# Patient Record
Sex: MALE | Race: WHITE | NOT HISPANIC OR LATINO
[De-identification: names, ages, dates, MRNs, and addresses within clinical notes are randomized per-mention and may not be internally consistent; named-entity substitution may affect disease eponyms.]

---

## 2017-01-03 ENCOUNTER — APPOINTMENT (OUTPATIENT)
Dept: INTERNAL MEDICINE | Facility: CLINIC | Age: 82
End: 2017-01-03

## 2017-01-03 VITALS
WEIGHT: 183 LBS | BODY MASS INDEX: 27.11 KG/M2 | SYSTOLIC BLOOD PRESSURE: 120 MMHG | DIASTOLIC BLOOD PRESSURE: 75 MMHG | HEIGHT: 69 IN | TEMPERATURE: 98 F | HEART RATE: 70 BPM

## 2017-01-03 DIAGNOSIS — H61.23 IMPACTED CERUMEN, BILATERAL: ICD-10-CM

## 2017-01-08 RX ORDER — HYDROCODONE BITARTRATE AND HOMATROPINE METHYLBROMIDE 5; 1.5 MG/5ML; MG/5ML
5-1.5 SYRUP ORAL
Qty: 240 | Refills: 0 | Status: DISCONTINUED | COMMUNITY
Start: 2017-01-03 | End: 2017-01-08

## 2017-01-10 ENCOUNTER — APPOINTMENT (OUTPATIENT)
Dept: INTERNAL MEDICINE | Facility: CLINIC | Age: 82
End: 2017-01-10

## 2017-01-10 VITALS
SYSTOLIC BLOOD PRESSURE: 120 MMHG | TEMPERATURE: 97.3 F | HEIGHT: 69 IN | DIASTOLIC BLOOD PRESSURE: 70 MMHG | HEART RATE: 75 BPM | WEIGHT: 183 LBS | BODY MASS INDEX: 27.11 KG/M2

## 2017-01-10 DIAGNOSIS — R10.32 LEFT LOWER QUADRANT PAIN: ICD-10-CM

## 2017-01-10 RX ORDER — CEFUROXIME AXETIL 250 MG/1
250 TABLET ORAL
Qty: 20 | Refills: 0 | Status: DISCONTINUED | COMMUNITY
Start: 2017-01-03 | End: 2017-01-10

## 2017-01-11 ENCOUNTER — MESSAGE (OUTPATIENT)
Age: 82
End: 2017-01-11

## 2017-01-11 LAB
ALBUMIN SERPL ELPH-MCNC: 4.1 G/DL
ALP BLD-CCNC: 64 U/L
ALT SERPL-CCNC: 11 U/L
ANION GAP SERPL CALC-SCNC: 13 MMOL/L
AST SERPL-CCNC: 18 U/L
BASOPHILS # BLD AUTO: 0.01 K/UL
BASOPHILS NFR BLD AUTO: 0.1 %
BILIRUB SERPL-MCNC: 0.3 MG/DL
BUN SERPL-MCNC: 23 MG/DL
CALCIUM SERPL-MCNC: 9.3 MG/DL
CHLORIDE SERPL-SCNC: 99 MMOL/L
CO2 SERPL-SCNC: 27 MMOL/L
CREAT SERPL-MCNC: 1.21 MG/DL
EOSINOPHIL # BLD AUTO: 0.15 K/UL
EOSINOPHIL NFR BLD AUTO: 2 %
GLUCOSE SERPL-MCNC: 134 MG/DL
HCT VFR BLD CALC: 42.1 %
HGB BLD-MCNC: 13.5 G/DL
IMM GRANULOCYTES NFR BLD AUTO: 0.4 %
LYMPHOCYTES # BLD AUTO: 1.89 K/UL
LYMPHOCYTES NFR BLD AUTO: 24.9 %
MAN DIFF?: NORMAL
MCHC RBC-ENTMCNC: 32.1 GM/DL
MCHC RBC-ENTMCNC: 32.4 PG
MCV RBC AUTO: 101 FL
MONOCYTES # BLD AUTO: 0.61 K/UL
MONOCYTES NFR BLD AUTO: 8 %
NEUTROPHILS # BLD AUTO: 4.89 K/UL
NEUTROPHILS NFR BLD AUTO: 64.6 %
PLATELET # BLD AUTO: 227 K/UL
POTASSIUM SERPL-SCNC: 4.8 MMOL/L
PROT SERPL-MCNC: 6.9 G/DL
RBC # BLD: 4.17 M/UL
RBC # FLD: 13.1 %
SODIUM SERPL-SCNC: 139 MMOL/L
TSH SERPL-ACNC: 1.51 UIU/ML
WBC # FLD AUTO: 7.58 K/UL

## 2017-01-16 ENCOUNTER — MESSAGE (OUTPATIENT)
Age: 82
End: 2017-01-16

## 2017-01-24 ENCOUNTER — APPOINTMENT (OUTPATIENT)
Dept: INTERNAL MEDICINE | Facility: CLINIC | Age: 82
End: 2017-01-24

## 2017-02-10 ENCOUNTER — RX RENEWAL (OUTPATIENT)
Age: 82
End: 2017-02-10

## 2017-02-14 ENCOUNTER — RX RENEWAL (OUTPATIENT)
Age: 82
End: 2017-02-14

## 2017-04-12 ENCOUNTER — APPOINTMENT (OUTPATIENT)
Dept: INTERNAL MEDICINE | Facility: CLINIC | Age: 82
End: 2017-04-12

## 2017-04-12 VITALS
TEMPERATURE: 97.5 F | HEIGHT: 69 IN | WEIGHT: 183 LBS | SYSTOLIC BLOOD PRESSURE: 120 MMHG | BODY MASS INDEX: 27.11 KG/M2 | HEART RATE: 69 BPM | DIASTOLIC BLOOD PRESSURE: 74 MMHG

## 2017-04-12 DIAGNOSIS — J06.9 ACUTE UPPER RESPIRATORY INFECTION, UNSPECIFIED: ICD-10-CM

## 2017-04-18 ENCOUNTER — APPOINTMENT (OUTPATIENT)
Dept: INTERNAL MEDICINE | Facility: CLINIC | Age: 82
End: 2017-04-18

## 2017-04-18 VITALS
HEART RATE: 72 BPM | DIASTOLIC BLOOD PRESSURE: 70 MMHG | BODY MASS INDEX: 27.11 KG/M2 | HEIGHT: 69 IN | TEMPERATURE: 97.7 F | WEIGHT: 183 LBS | SYSTOLIC BLOOD PRESSURE: 120 MMHG

## 2017-04-19 ENCOUNTER — RESULT REVIEW (OUTPATIENT)
Age: 82
End: 2017-04-19

## 2017-04-28 ENCOUNTER — APPOINTMENT (OUTPATIENT)
Dept: PULMONOLOGY | Facility: CLINIC | Age: 82
End: 2017-04-28

## 2017-04-28 VITALS
SYSTOLIC BLOOD PRESSURE: 128 MMHG | BODY MASS INDEX: 27.7 KG/M2 | DIASTOLIC BLOOD PRESSURE: 68 MMHG | HEIGHT: 69 IN | TEMPERATURE: 97.2 F | WEIGHT: 187 LBS | RESPIRATION RATE: 12 BRPM | HEART RATE: 86 BPM

## 2017-04-28 VITALS — BODY MASS INDEX: 27.66 KG/M2 | WEIGHT: 187.31 LBS

## 2017-04-28 DIAGNOSIS — K22.2 ESOPHAGEAL OBSTRUCTION: ICD-10-CM

## 2017-04-28 DIAGNOSIS — Z87.891 PERSONAL HISTORY OF NICOTINE DEPENDENCE: ICD-10-CM

## 2017-04-28 RX ORDER — ALBUTEROL SULFATE 90 UG/1
108 (90 BASE) AEROSOL, METERED RESPIRATORY (INHALATION)
Qty: 1 | Refills: 3 | Status: DISCONTINUED | COMMUNITY
Start: 2017-04-28 | End: 2017-04-28

## 2017-05-02 ENCOUNTER — APPOINTMENT (OUTPATIENT)
Dept: INTERNAL MEDICINE | Facility: CLINIC | Age: 82
End: 2017-05-02

## 2017-05-02 VITALS
HEART RATE: 75 BPM | SYSTOLIC BLOOD PRESSURE: 120 MMHG | TEMPERATURE: 98 F | BODY MASS INDEX: 27.7 KG/M2 | HEIGHT: 69 IN | WEIGHT: 187 LBS | DIASTOLIC BLOOD PRESSURE: 70 MMHG

## 2017-05-02 DIAGNOSIS — J06.9 ACUTE UPPER RESPIRATORY INFECTION, UNSPECIFIED: ICD-10-CM

## 2017-05-02 DIAGNOSIS — J04.10 ACUTE TRACHEITIS W/OUT OBSTRUCTION: ICD-10-CM

## 2017-05-02 RX ORDER — METHYLPREDNISOLONE 4 MG/1
4 TABLET ORAL
Qty: 1 | Refills: 0 | Status: DISCONTINUED | COMMUNITY
Start: 2017-04-18 | End: 2017-05-02

## 2017-05-02 RX ORDER — CEFUROXIME AXETIL 250 MG/1
250 TABLET ORAL
Qty: 14 | Refills: 0 | Status: COMPLETED | COMMUNITY
Start: 2017-04-28 | End: 2017-05-02

## 2017-05-02 RX ORDER — BENZONATATE 200 MG/1
200 CAPSULE ORAL
Qty: 21 | Refills: 0 | Status: COMPLETED | COMMUNITY
Start: 2017-04-12 | End: 2017-05-02

## 2017-05-02 RX ORDER — DOXYCYCLINE HYCLATE 100 MG/1
100 TABLET ORAL TWICE DAILY
Qty: 20 | Refills: 0 | Status: COMPLETED | COMMUNITY
Start: 2017-04-12 | End: 2017-05-02

## 2017-05-09 ENCOUNTER — RX RENEWAL (OUTPATIENT)
Age: 82
End: 2017-05-09

## 2017-06-23 ENCOUNTER — OUTPATIENT (OUTPATIENT)
Dept: OUTPATIENT SERVICES | Facility: HOSPITAL | Age: 82
LOS: 1 days | End: 2017-06-23
Payer: MEDICARE

## 2017-06-23 VITALS
OXYGEN SATURATION: 97 % | TEMPERATURE: 98 F | HEIGHT: 68.5 IN | DIASTOLIC BLOOD PRESSURE: 92 MMHG | WEIGHT: 184.97 LBS | HEART RATE: 82 BPM | SYSTOLIC BLOOD PRESSURE: 184 MMHG | RESPIRATION RATE: 18 BRPM

## 2017-06-23 DIAGNOSIS — Z90.49 ACQUIRED ABSENCE OF OTHER SPECIFIED PARTS OF DIGESTIVE TRACT: Chronic | ICD-10-CM

## 2017-06-23 DIAGNOSIS — Z98.89 OTHER SPECIFIED POSTPROCEDURAL STATES: Chronic | ICD-10-CM

## 2017-06-23 DIAGNOSIS — Z98.61 CORONARY ANGIOPLASTY STATUS: Chronic | ICD-10-CM

## 2017-06-23 DIAGNOSIS — Z98.41 CATARACT EXTRACTION STATUS, RIGHT EYE: Chronic | ICD-10-CM

## 2017-06-23 DIAGNOSIS — Z98.42 CATARACT EXTRACTION STATUS, LEFT EYE: Chronic | ICD-10-CM

## 2017-06-23 DIAGNOSIS — Z96.60 PRESENCE OF UNSPECIFIED ORTHOPEDIC JOINT IMPLANT: Chronic | ICD-10-CM

## 2017-06-23 DIAGNOSIS — Z01.810 ENCOUNTER FOR PREPROCEDURAL CARDIOVASCULAR EXAMINATION: ICD-10-CM

## 2017-06-23 LAB
ANION GAP SERPL CALC-SCNC: 14 MMOL/L — SIGNIFICANT CHANGE UP (ref 5–17)
APTT BLD: 28.3 SEC — SIGNIFICANT CHANGE UP (ref 27.5–37.4)
BUN SERPL-MCNC: 25 MG/DL — HIGH (ref 8–20)
CALCIUM SERPL-MCNC: 9.3 MG/DL — SIGNIFICANT CHANGE UP (ref 8.6–10.2)
CHLORIDE SERPL-SCNC: 100 MMOL/L — SIGNIFICANT CHANGE UP (ref 98–107)
CHOLEST SERPL-MCNC: 218 MG/DL — HIGH (ref 110–199)
CO2 SERPL-SCNC: 28 MMOL/L — SIGNIFICANT CHANGE UP (ref 22–29)
CREAT SERPL-MCNC: 1.13 MG/DL — SIGNIFICANT CHANGE UP (ref 0.5–1.3)
GLUCOSE SERPL-MCNC: 161 MG/DL — HIGH (ref 70–115)
HCT VFR BLD CALC: 41.3 % — LOW (ref 42–52)
HDLC SERPL-MCNC: 61 MG/DL — SIGNIFICANT CHANGE UP
HGB BLD-MCNC: 13.7 G/DL — LOW (ref 14–18)
INR BLD: 1.08 RATIO — SIGNIFICANT CHANGE UP (ref 0.88–1.16)
LIPID PNL WITH DIRECT LDL SERPL: 108 MG/DL — SIGNIFICANT CHANGE UP
MCHC RBC-ENTMCNC: 32.3 PG — HIGH (ref 27–31)
MCHC RBC-ENTMCNC: 33.2 G/DL — SIGNIFICANT CHANGE UP (ref 32–36)
MCV RBC AUTO: 97.4 FL — HIGH (ref 80–94)
PLATELET # BLD AUTO: 205 K/UL — SIGNIFICANT CHANGE UP (ref 150–400)
POTASSIUM SERPL-MCNC: 4.1 MMOL/L — SIGNIFICANT CHANGE UP (ref 3.5–5.3)
POTASSIUM SERPL-SCNC: 4.1 MMOL/L — SIGNIFICANT CHANGE UP (ref 3.5–5.3)
PROTHROM AB SERPL-ACNC: 11.9 SEC — SIGNIFICANT CHANGE UP (ref 9.8–12.7)
RBC # BLD: 4.24 M/UL — LOW (ref 4.6–6.2)
RBC # FLD: 12.9 % — SIGNIFICANT CHANGE UP (ref 11–15.6)
SODIUM SERPL-SCNC: 142 MMOL/L — SIGNIFICANT CHANGE UP (ref 135–145)
TOTAL CHOLESTEROL/HDL RATIO MEASUREMENT: 4 RATIO — SIGNIFICANT CHANGE UP (ref 3.4–9.6)
TRIGL SERPL-MCNC: 244 MG/DL — HIGH (ref 10–200)
WBC # BLD: 8.3 K/UL — SIGNIFICANT CHANGE UP (ref 4.8–10.8)
WBC # FLD AUTO: 8.3 K/UL — SIGNIFICANT CHANGE UP (ref 4.8–10.8)

## 2017-06-23 PROCEDURE — 93005 ELECTROCARDIOGRAM TRACING: CPT

## 2017-06-23 PROCEDURE — 85610 PROTHROMBIN TIME: CPT

## 2017-06-23 PROCEDURE — 80048 BASIC METABOLIC PNL TOTAL CA: CPT

## 2017-06-23 PROCEDURE — 36415 COLL VENOUS BLD VENIPUNCTURE: CPT

## 2017-06-23 PROCEDURE — G0463: CPT

## 2017-06-23 PROCEDURE — 85027 COMPLETE CBC AUTOMATED: CPT

## 2017-06-23 PROCEDURE — 80061 LIPID PANEL: CPT

## 2017-06-23 PROCEDURE — 85730 THROMBOPLASTIN TIME PARTIAL: CPT

## 2017-06-23 PROCEDURE — 93010 ELECTROCARDIOGRAM REPORT: CPT

## 2017-06-23 NOTE — H&P PST ADULT - ATTENDING COMMENTS
chronic diastolic congestive heart failure with severe aortic stenosis. Risks, benefits, and alternatives of surgery were discussed with patient and familyCheli harmon

## 2017-06-23 NOTE — H&P PST ADULT - NEGATIVE NEUROLOGICAL SYMPTOMS
no paresthesias/no vertigo/no weakness/no generalized seizures/no focal seizures/no tremors/no syncope/no transient paralysis

## 2017-06-23 NOTE — ASU PATIENT PROFILE, ADULT - PSH
History of CEA (carotid endarterectomy)  Bilateral  S/P cataract surgery, left    S/P cataract surgery, right    S/P cholecystectomy    S/P joint replacement  Bilateral knee replacement  S/P nasal surgery  Secondary to nasal fracture  S/P PTCA (percutaneous transluminal coronary angioplasty)  x 8  S/P tonsillectomy

## 2017-06-23 NOTE — H&P PST ADULT - PMH
Angina pectoris  class II  Aortic stenosis    Asthma    BPH (benign prostatic hyperplasia)    CAD (coronary artery disease)  S/P Stenting x8  CAD (coronary artery disease)  CABG  Carotid artery disease  RCEA  Diabetes mellitus    MENDEZ (dyspnea on exertion)    Fatigue  Profound w/exertion  Former smoker    Hyperlipidemia    Hypertension    FANNY (obstructive sleep apnea)    PVD (peripheral vascular disease)    SOB (shortness of breath)    SOB (shortness of breath)

## 2017-06-23 NOTE — H&P PST ADULT - HISTORY OF PRESENT ILLNESS
83 y/o white male presents to PST for R&LHC for evaluation for TAVR  Known AS with worsening and progressive MENDEZ/SOB with virtually an intolerance for any distance walking greater than 50 feet before having to sit down    ECHO per MD note states mod AS with preserve EF   2/17 PET small area of ischemia/low risk

## 2017-06-23 NOTE — H&P PST ADULT - NEUROLOGICAL DETAILS
responds to pain/alert and oriented x 3/responds to verbal commands/cranial nerves intact/deep reflexes intact/sensation intact

## 2017-06-23 NOTE — H&P PST ADULT - NEGATIVE OPHTHALMOLOGIC SYMPTOMS
no lacrimation R/no lacrimation L/no blurred vision R/no photophobia/no blurred vision L/no diplopia

## 2017-06-27 ENCOUNTER — OUTPATIENT (OUTPATIENT)
Dept: OUTPATIENT SERVICES | Facility: HOSPITAL | Age: 82
LOS: 1 days | End: 2017-06-27
Payer: MEDICARE

## 2017-06-27 ENCOUNTER — INPATIENT (INPATIENT)
Facility: HOSPITAL | Age: 82
LOS: 0 days | Discharge: HOME | End: 2017-06-28
Attending: INTERNAL MEDICINE | Admitting: INTERNAL MEDICINE
Payer: MEDICARE

## 2017-06-27 VITALS
DIASTOLIC BLOOD PRESSURE: 65 MMHG | RESPIRATION RATE: 18 BRPM | TEMPERATURE: 97 F | HEART RATE: 74 BPM | SYSTOLIC BLOOD PRESSURE: 169 MMHG | OXYGEN SATURATION: 96 %

## 2017-06-27 VITALS
OXYGEN SATURATION: 97 % | SYSTOLIC BLOOD PRESSURE: 178 MMHG | RESPIRATION RATE: 17 BRPM | DIASTOLIC BLOOD PRESSURE: 81 MMHG | HEART RATE: 87 BPM

## 2017-06-27 DIAGNOSIS — Z98.41 CATARACT EXTRACTION STATUS, RIGHT EYE: Chronic | ICD-10-CM

## 2017-06-27 DIAGNOSIS — Z98.42 CATARACT EXTRACTION STATUS, LEFT EYE: Chronic | ICD-10-CM

## 2017-06-27 DIAGNOSIS — Z98.61 CORONARY ANGIOPLASTY STATUS: Chronic | ICD-10-CM

## 2017-06-27 DIAGNOSIS — Z98.89 OTHER SPECIFIED POSTPROCEDURAL STATES: Chronic | ICD-10-CM

## 2017-06-27 DIAGNOSIS — Z90.49 ACQUIRED ABSENCE OF OTHER SPECIFIED PARTS OF DIGESTIVE TRACT: Chronic | ICD-10-CM

## 2017-06-27 DIAGNOSIS — Z96.60 PRESENCE OF UNSPECIFIED ORTHOPEDIC JOINT IMPLANT: Chronic | ICD-10-CM

## 2017-06-27 DIAGNOSIS — I25.10 ATHEROSCLEROTIC HEART DISEASE OF NATIVE CORONARY ARTERY WITHOUT ANGINA PECTORIS: ICD-10-CM

## 2017-06-27 LAB
BLD GP AB SCN SERPL QL: SIGNIFICANT CHANGE UP
DIR ANTIGLOB POLYSPECIFIC INTERPRETATION: SIGNIFICANT CHANGE UP
TYPE + AB SCN PNL BLD: SIGNIFICANT CHANGE UP

## 2017-06-27 PROCEDURE — 93010 ELECTROCARDIOGRAM REPORT: CPT

## 2017-06-27 RX ORDER — SODIUM CHLORIDE 9 MG/ML
1000 INJECTION, SOLUTION INTRAVENOUS
Qty: 0 | Refills: 0 | Status: DISCONTINUED | OUTPATIENT
Start: 2017-06-27 | End: 2017-06-28

## 2017-06-27 RX ORDER — HYDRALAZINE HCL 50 MG
5 TABLET ORAL ONCE
Qty: 0 | Refills: 0 | Status: COMPLETED | OUTPATIENT
Start: 2017-06-27 | End: 2017-06-27

## 2017-06-27 RX ORDER — DEXTROSE 50 % IN WATER 50 %
1 SYRINGE (ML) INTRAVENOUS ONCE
Qty: 0 | Refills: 0 | Status: DISCONTINUED | OUTPATIENT
Start: 2017-06-27 | End: 2017-06-28

## 2017-06-27 RX ORDER — BUDESONIDE AND FORMOTEROL FUMARATE DIHYDRATE 160; 4.5 UG/1; UG/1
2 AEROSOL RESPIRATORY (INHALATION)
Qty: 0 | Refills: 0 | Status: DISCONTINUED | OUTPATIENT
Start: 2017-06-27 | End: 2017-06-28

## 2017-06-27 RX ORDER — DEXTROSE 50 % IN WATER 50 %
12.5 SYRINGE (ML) INTRAVENOUS ONCE
Qty: 0 | Refills: 0 | Status: DISCONTINUED | OUTPATIENT
Start: 2017-06-27 | End: 2017-06-28

## 2017-06-27 RX ORDER — ASPIRIN/CALCIUM CARB/MAGNESIUM 324 MG
325 TABLET ORAL DAILY
Qty: 0 | Refills: 0 | Status: DISCONTINUED | OUTPATIENT
Start: 2017-06-27 | End: 2017-06-28

## 2017-06-27 RX ORDER — ACETAMINOPHEN 500 MG
650 TABLET ORAL EVERY 6 HOURS
Qty: 0 | Refills: 0 | Status: DISCONTINUED | OUTPATIENT
Start: 2017-06-27 | End: 2017-06-28

## 2017-06-27 RX ORDER — DEXTROSE 50 % IN WATER 50 %
25 SYRINGE (ML) INTRAVENOUS ONCE
Qty: 0 | Refills: 0 | Status: DISCONTINUED | OUTPATIENT
Start: 2017-06-27 | End: 2017-06-28

## 2017-06-27 RX ORDER — INSULIN LISPRO 100/ML
VIAL (ML) SUBCUTANEOUS
Qty: 0 | Refills: 0 | Status: DISCONTINUED | OUTPATIENT
Start: 2017-06-27 | End: 2017-06-28

## 2017-06-27 RX ORDER — PANTOPRAZOLE SODIUM 20 MG/1
40 TABLET, DELAYED RELEASE ORAL
Qty: 0 | Refills: 0 | Status: DISCONTINUED | OUTPATIENT
Start: 2017-06-27 | End: 2017-06-28

## 2017-06-27 RX ORDER — ALPRAZOLAM 0.25 MG
0.25 TABLET ORAL EVERY 8 HOURS
Qty: 0 | Refills: 0 | Status: DISCONTINUED | OUTPATIENT
Start: 2017-06-27 | End: 2017-06-28

## 2017-06-27 RX ORDER — TAMSULOSIN HYDROCHLORIDE 0.4 MG/1
0.4 CAPSULE ORAL AT BEDTIME
Qty: 0 | Refills: 0 | Status: DISCONTINUED | OUTPATIENT
Start: 2017-06-27 | End: 2017-06-28

## 2017-06-27 RX ORDER — LATANOPROST 0.05 MG/ML
1 SOLUTION/ DROPS OPHTHALMIC; TOPICAL AT BEDTIME
Qty: 0 | Refills: 0 | Status: DISCONTINUED | OUTPATIENT
Start: 2017-06-27 | End: 2017-06-28

## 2017-06-27 RX ORDER — GLUCAGON INJECTION, SOLUTION 0.5 MG/.1ML
1 INJECTION, SOLUTION SUBCUTANEOUS ONCE
Qty: 0 | Refills: 0 | Status: DISCONTINUED | OUTPATIENT
Start: 2017-06-27 | End: 2017-06-28

## 2017-06-27 RX ORDER — CLOPIDOGREL BISULFATE 75 MG/1
75 TABLET, FILM COATED ORAL DAILY
Qty: 0 | Refills: 0 | Status: DISCONTINUED | OUTPATIENT
Start: 2017-06-27 | End: 2017-06-28

## 2017-06-27 RX ORDER — LOSARTAN POTASSIUM 100 MG/1
25 TABLET, FILM COATED ORAL DAILY
Qty: 0 | Refills: 0 | Status: DISCONTINUED | OUTPATIENT
Start: 2017-06-27 | End: 2017-06-28

## 2017-06-27 RX ADMIN — TAMSULOSIN HYDROCHLORIDE 0.4 MILLIGRAM(S): 0.4 CAPSULE ORAL at 22:52

## 2017-06-27 RX ADMIN — Medication: at 22:55

## 2017-06-27 RX ADMIN — LATANOPROST 1 DROP(S): 0.05 SOLUTION/ DROPS OPHTHALMIC; TOPICAL at 22:52

## 2017-06-27 RX ADMIN — Medication 5 MILLIGRAM(S): at 18:55

## 2017-06-27 RX ADMIN — Medication 0.25 MILLIGRAM(S): at 22:55

## 2017-06-27 NOTE — DISCHARGE NOTE ADULT - HOSPITAL COURSE
83 y/o male presents with worsening AS and progressive MENDEZ/SOB with virtually an intolerance for any distance walking greater than 50 feet s/p R+LHC s/p balloon angioplasty to LM and HUE x1 pLCX. No complications during hospital stay. Discharged home with outpatient follow up for TAVR work up with Dr Mcgill    < from: Cardiac Cath Lab - Adult (06.27.17 @ 14:49) >  Moderate pulmonary HTN.  Severe AS.  Preserved EF.  No sig MR/AI.  Patent LIMA to LAD and SVG to OM.  Angiosculpt PTCA/ICS x 1 to LCx w/ ROEL III flow maintained thruout and 0%  residual stenosis.    s/p balloon angioplasty to LM and HUE x1 pLCX  RFA, RFV access benign, dsg removed. Pt ambulating this am. Denies any complaints

## 2017-06-27 NOTE — DISCHARGE NOTE ADULT - MEDICATION SUMMARY - MEDICATIONS TO TAKE
I will START or STAY ON the medications listed below when I get home from the hospital:    pur[employer]'s Pride probiotic  -- 1 cap(s) by mouth once a day  -- Indication: For Supplement    napncon A  eye drops  -- 1 drop(s) in each eye once a day  -- Indication: For eye care    aspirin 325 mg oral tablet  -- 1 tab(s) by mouth once a day  -- Indication: For CAD (coronary artery disease)    losartan 25 mg oral tablet  -- 1 tab(s) by mouth once a day  -- Indication: For CAD (coronary artery disease)    tamsulosin 0.4 mg oral capsule  -- 1 cap(s) by mouth once a day  -- Indication: For BPH    glimepiride 2 mg oral tablet  -- 1 tab(s) by mouth once a day  -- Indication: For Diabetes    Zetia 10 mg oral tablet  -- 1 tab(s) by mouth once a day  -- Indication: For CAD (coronary artery disease)    clopidogrel 75 mg oral tablet  -- 1 tab(s) by mouth once a day  -- Indication: For CAD (coronary artery disease)    Symbicort 80 mcg-4.5 mcg/inh inhalation aerosol  -- 2 puff(s) inhaled 2 times a day  -- 2 puffs per day  -- Indication: For Reactive airway    garlic oral capsule  -- 1000 milligram(s) by mouth 2 times a day  -- Indication: For Supplement    latanoprost 0.005% ophthalmic solution  -- 1 drop(s) to each affected eye once a day (at bedtime)  -- Indication: For Eye care    pantoprazole 40 mg oral delayed release tablet  -- 1 tab(s) by mouth once a day  -- Indication: For Reflux    multivitamin  -- 1 tab(s) by mouth once a day  -- Indication: For Supplement

## 2017-06-27 NOTE — DISCHARGE NOTE ADULT - CARE PLAN
Principal Discharge DX:	CAD (coronary artery disease)  Goal:	Remain free of worsening CAD  Instructions for follow-up, activity and diet:	No heavy lifting, driving, sex, tub baths, swimming, or any activity that submerges the lower half of the body in water for 48 hours.  Limited walking and stairs for 48 hours.    Change the bandaid after 24 hours and every 24 hours after that.  Keep the puncture site dry and covered with a bandaid until a scab forms.    Observe the site frequently.  If bleeding or a large lump (the size of a golf ball or bigger) occurs lie flat, apply continuous direct pressure just above the puncture site for at least 10 minutes, and notify your physician immediately.  If the bleeding cannot be controlled, call 911 immediately for assistance.  Notify your physician of pain, swelling or any drainage.    Notify your physician immediately if coldness, numbness, discoloration or pain in your foot occurs.  Follow up with Dr. Mcgill in one to two weeks. Principal Discharge DX:	CAD (coronary artery disease)  Goal:	Remain free of worsening CAD  Instructions for follow-up, activity and diet:	Groin care:  No heavy lifting, driving, sex, tub baths, swimming, or any activity that submerges the lower half of the body in water for 48 hours.  Limited walking and stairs for 48 hours.   Observe the site frequently.  If bleeding or a large lump (the size of a golf ball or bigger) occurs lie flat, apply continuous direct pressure just above the puncture site for at least 10 minutes, and notify your physician immediately.  If the bleeding cannot be controlled, call 911 immediately for assistance.  Notify your physician of pain, swelling or any drainage.    Notify your physician immediately if coldness, numbness, discoloration or pain in your foot occurs.  Follow up with Dr. Mcgill in one to two weeks.

## 2017-06-27 NOTE — DISCHARGE NOTE ADULT - CARE PROVIDER_API CALL
Kem Mcgill (MD), Cardiovascular Disease; Interventional Cardiology  [address] [place] [address]  Phone: [phone]  Fax: [phone]

## 2017-06-27 NOTE — PROGRESS NOTE ADULT - SUBJECTIVE AND OBJECTIVE BOX
Cardiology NP note:   -RFA/RFV sheaths discontinued--Manual compression held x 15 minutes with hemostasis obtained--4x4 and tegaderm dsg placed; no bruit; + right PP  -Frequent VS and NV check as ordered  -Patient may sit up at 2230  -Bedrest until 0030 then OOB as tolerated

## 2017-06-27 NOTE — DISCHARGE NOTE ADULT - PLAN OF CARE
Remain free of worsening CAD No heavy lifting, driving, sex, tub baths, swimming, or any activity that submerges the lower half of the body in water for 48 hours.  Limited walking and stairs for 48 hours.    Change the bandaid after 24 hours and every 24 hours after that.  Keep the puncture site dry and covered with a bandaid until a scab forms.    Observe the site frequently.  If bleeding or a large lump (the size of a golf ball or bigger) occurs lie flat, apply continuous direct pressure just above the puncture site for at least 10 minutes, and notify your physician immediately.  If the bleeding cannot be controlled, call 911 immediately for assistance.  Notify your physician of pain, swelling or any drainage.    Notify your physician immediately if coldness, numbness, discoloration or pain in your foot occurs.  Follow up with Dr. Mcgill in one to two weeks. Groin care:  No heavy lifting, driving, sex, tub baths, swimming, or any activity that submerges the lower half of the body in water for 48 hours.  Limited walking and stairs for 48 hours.   Observe the site frequently.  If bleeding or a large lump (the size of a golf ball or bigger) occurs lie flat, apply continuous direct pressure just above the puncture site for at least 10 minutes, and notify your physician immediately.  If the bleeding cannot be controlled, call 911 immediately for assistance.  Notify your physician of pain, swelling or any drainage.    Notify your physician immediately if coldness, numbness, discoloration or pain in your foot occurs.  Follow up with Dr. Mcgill in one to two weeks.

## 2017-06-27 NOTE — PROGRESS NOTE ADULT - SUBJECTIVE AND OBJECTIVE BOX
Cardiology NP note:     -s/p HUE x 1 to pLCX  -RFA #6FR/RFV #7FR sutured in place--Site benign without hematoma/bleeding; + 1 right PP  -VSS  -post PCI EKG: NSR 1st degree 84 bpm RBBB without ST elevations/depressions unchanged from previous EKG    -A/P: 83 y/o male with h/o moderate AS, CAD s/p CABG and stents, PVD s/p CEA with progressive SOB and chest pain with exertion over last few months.  Patient scheduled for elective LHC today in preparation for possible TAVR in the near future now s/p HUE x 1 to pLCX.  -Admit to tele--monitor for arrhythmia and cath site for bleeding/hematoma  -If stable, discharge home in the am.  -Follow up with Dr. Mcgill in one to two weeks.  -Discontinue sheaths at 1830  -Bedrest for 6 hours post sheath pull then OOB as tolerated  -Meds: Maintain ASA/Plavix/ARB/Zetia             Intolerant to Statins  -Benefits of ASA/Plavix emphasized  -Lifestyle mods/diet/activity/meds discussed with patient verbal understanding  -nonsmoker  -Discussed with Dr. Mcgill

## 2017-06-27 NOTE — DISCHARGE NOTE ADULT - PATIENT PORTAL LINK FT
“You can access the Follow[facility] Patient Portal, offered by [facility], by registering with the following website: [email or web address]”

## 2017-06-28 VITALS
RESPIRATION RATE: 18 BRPM | DIASTOLIC BLOOD PRESSURE: 58 MMHG | SYSTOLIC BLOOD PRESSURE: 116 MMHG | OXYGEN SATURATION: 97 % | HEART RATE: 91 BPM

## 2017-06-28 LAB
ANION GAP SERPL CALC-SCNC: 13 MMOL/L — SIGNIFICANT CHANGE UP (ref 5–17)
BASOPHILS # BLD AUTO: 0 K/UL — SIGNIFICANT CHANGE UP (ref 0–0.2)
BASOPHILS NFR BLD AUTO: 0.1 % — SIGNIFICANT CHANGE UP (ref 0–2)
BUN SERPL-MCNC: 23 MG/DL — HIGH (ref 8–20)
CALCIUM SERPL-MCNC: 8.6 MG/DL — SIGNIFICANT CHANGE UP (ref 8.6–10.2)
CHLORIDE SERPL-SCNC: 99 MMOL/L — SIGNIFICANT CHANGE UP (ref 98–107)
CO2 SERPL-SCNC: 24 MMOL/L — SIGNIFICANT CHANGE UP (ref 22–29)
CREAT SERPL-MCNC: 1.14 MG/DL — SIGNIFICANT CHANGE UP (ref 0.5–1.3)
EOSINOPHIL # BLD AUTO: 0.2 K/UL — SIGNIFICANT CHANGE UP (ref 0–0.5)
EOSINOPHIL NFR BLD AUTO: 2.6 % — SIGNIFICANT CHANGE UP (ref 0–5)
GLUCOSE SERPL-MCNC: 201 MG/DL — HIGH (ref 70–115)
HCT VFR BLD CALC: 40.2 % — LOW (ref 42–52)
HGB BLD-MCNC: 13.6 G/DL — LOW (ref 14–18)
LYMPHOCYTES # BLD AUTO: 1.3 K/UL — SIGNIFICANT CHANGE UP (ref 1–4.8)
LYMPHOCYTES # BLD AUTO: 15.1 % — LOW (ref 20–55)
MCHC RBC-ENTMCNC: 32.8 PG — HIGH (ref 27–31)
MCHC RBC-ENTMCNC: 33.8 G/DL — SIGNIFICANT CHANGE UP (ref 32–36)
MCV RBC AUTO: 96.9 FL — HIGH (ref 80–94)
MONOCYTES # BLD AUTO: 1 K/UL — HIGH (ref 0–0.8)
MONOCYTES NFR BLD AUTO: 11.2 % — HIGH (ref 3–10)
NEUTROPHILS # BLD AUTO: 6 K/UL — SIGNIFICANT CHANGE UP (ref 1.8–8)
NEUTROPHILS NFR BLD AUTO: 70.8 % — SIGNIFICANT CHANGE UP (ref 37–73)
PLATELET # BLD AUTO: 188 K/UL — SIGNIFICANT CHANGE UP (ref 150–400)
POTASSIUM SERPL-MCNC: 4.1 MMOL/L — SIGNIFICANT CHANGE UP (ref 3.5–5.3)
POTASSIUM SERPL-SCNC: 4.1 MMOL/L — SIGNIFICANT CHANGE UP (ref 3.5–5.3)
RBC # BLD: 4.15 M/UL — LOW (ref 4.6–6.2)
RBC # FLD: 13.2 % — SIGNIFICANT CHANGE UP (ref 11–15.6)
SODIUM SERPL-SCNC: 136 MMOL/L — SIGNIFICANT CHANGE UP (ref 135–145)
WBC # BLD: 8.4 K/UL — SIGNIFICANT CHANGE UP (ref 4.8–10.8)
WBC # FLD AUTO: 8.4 K/UL — SIGNIFICANT CHANGE UP (ref 4.8–10.8)

## 2017-06-28 PROCEDURE — 86900 BLOOD TYPING SEROLOGIC ABO: CPT

## 2017-06-28 PROCEDURE — 36415 COLL VENOUS BLD VENIPUNCTURE: CPT

## 2017-06-28 PROCEDURE — 93005 ELECTROCARDIOGRAM TRACING: CPT

## 2017-06-28 PROCEDURE — 86850 RBC ANTIBODY SCREEN: CPT

## 2017-06-28 PROCEDURE — C1889: CPT

## 2017-06-28 PROCEDURE — 93461 R&L HRT ART/VENTRICLE ANGIO: CPT | Mod: XU

## 2017-06-28 PROCEDURE — C1725: CPT

## 2017-06-28 PROCEDURE — C1894: CPT

## 2017-06-28 PROCEDURE — 86901 BLOOD TYPING SEROLOGIC RH(D): CPT

## 2017-06-28 PROCEDURE — C1769: CPT

## 2017-06-28 PROCEDURE — 93010 ELECTROCARDIOGRAM REPORT: CPT

## 2017-06-28 PROCEDURE — 85027 COMPLETE CBC AUTOMATED: CPT

## 2017-06-28 PROCEDURE — C9600: CPT | Mod: LC

## 2017-06-28 PROCEDURE — 86880 COOMBS TEST DIRECT: CPT

## 2017-06-28 PROCEDURE — C1887: CPT

## 2017-06-28 PROCEDURE — 80048 BASIC METABOLIC PNL TOTAL CA: CPT

## 2017-06-28 PROCEDURE — 93567 NJX CAR CTH SPRVLV AORTGRPHY: CPT

## 2017-06-28 PROCEDURE — C1874: CPT

## 2017-06-28 RX ORDER — CLOPIDOGREL BISULFATE 75 MG/1
1 TABLET, FILM COATED ORAL
Qty: 30 | Refills: 11 | OUTPATIENT
Start: 2017-06-28

## 2017-06-28 RX ORDER — ASPIRIN/CALCIUM CARB/MAGNESIUM 324 MG
1 TABLET ORAL
Qty: 30 | Refills: 5 | OUTPATIENT
Start: 2017-06-28

## 2017-06-28 RX ADMIN — Medication 650 MILLIGRAM(S): at 01:30

## 2017-06-28 RX ADMIN — Medication 650 MILLIGRAM(S): at 00:48

## 2017-06-28 RX ADMIN — PANTOPRAZOLE SODIUM 40 MILLIGRAM(S): 20 TABLET, DELAYED RELEASE ORAL at 06:31

## 2017-06-28 RX ADMIN — LOSARTAN POTASSIUM 25 MILLIGRAM(S): 100 TABLET, FILM COATED ORAL at 06:31

## 2017-06-28 RX ADMIN — Medication: at 07:15

## 2017-06-28 NOTE — PROGRESS NOTE ADULT - SUBJECTIVE AND OBJECTIVE BOX
85 y/o male presents with worsening AS and progressive MENDEZ/SOB with virtually an intolerance for any distance walking greater than 50 feet s/p R+LHC which showed    < from: Cardiac Cath Lab - Adult (06.27.17 @ 14:49) >  Moderate pulmonary HTN.  Severe AS.  Preserved EF.  No sig MR/AI.  Patent LIMA to LAD and SVG to OM.  Angiosculpt PTCA/ICS x 1 to LCx w/ ROEL III flow maintained thruout and 0%  residual stenosis.    s/p balloon angioplasty to LM and HUE x1 pLCX  RFA, RFV access benign, dsg removed. Pt ambulating this am. Denies any complaints    Vital Signs Last 24 Hrs    T(F): 97.4   HR: 91 (28 Jun 2017 06:30) (74 - 94)  BP: 116/58 (28 Jun 2017 06:30) (116/58 - 179/93)  BP(mean): --  RR: 18 (28 Jun 2017 06:30) (16 - 20)  SpO2: 97% (28 Jun 2017 06:30) (95% - 100%)                          13.6   8.4   )-----------( 188      ( 28 Jun 2017 05:52 )             40.2   06-28    136  |  99  |  23.0<H>  ----------------------------<  201<H>  4.1   |  24.0  |  1.14    Ca    8.6      28 Jun 2017 05:52    A/P:  continue aspirin, plavix, losartan  Pt intolerant to statin, continue zetia  Site precautions discussed with patient  Outpatient follow up with Dr Nano PAYAN w/u

## 2017-07-06 ENCOUNTER — APPOINTMENT (OUTPATIENT)
Dept: INTERNAL MEDICINE | Facility: CLINIC | Age: 82
End: 2017-07-06

## 2017-07-06 VITALS
WEIGHT: 188.5 LBS | SYSTOLIC BLOOD PRESSURE: 120 MMHG | DIASTOLIC BLOOD PRESSURE: 70 MMHG | HEART RATE: 76 BPM | BODY MASS INDEX: 27.92 KG/M2 | HEIGHT: 69 IN | RESPIRATION RATE: 14 BRPM

## 2017-07-06 DIAGNOSIS — F41.8 OTHER SPECIFIED ANXIETY DISORDERS: ICD-10-CM

## 2017-07-06 DIAGNOSIS — Z02.89 ENCOUNTER FOR OTHER ADMINISTRATIVE EXAMINATIONS: ICD-10-CM

## 2017-07-06 DIAGNOSIS — Z87.01 PERSONAL HISTORY OF PNEUMONIA (RECURRENT): ICD-10-CM

## 2017-07-06 DIAGNOSIS — R29.898 OTHER SYMPTOMS AND SIGNS INVOLVING THE MUSCULOSKELETAL SYSTEM: ICD-10-CM

## 2017-07-06 LAB
ALBUMIN: NORMAL
CREATININE: NORMAL
MICROALBUMIN/CREAT UR TEST STR-RTO: ABNORMAL

## 2017-07-06 RX ORDER — PREDNISONE 10 MG/1
10 TABLET ORAL
Qty: 30 | Refills: 0 | Status: DISCONTINUED | COMMUNITY
Start: 2017-04-28 | End: 2017-07-06

## 2017-07-07 ENCOUNTER — APPOINTMENT (OUTPATIENT)
Dept: CARDIOTHORACIC SURGERY | Facility: CLINIC | Age: 82
End: 2017-07-07

## 2017-07-07 VITALS
DIASTOLIC BLOOD PRESSURE: 69 MMHG | SYSTOLIC BLOOD PRESSURE: 142 MMHG | HEIGHT: 69 IN | BODY MASS INDEX: 27.85 KG/M2 | OXYGEN SATURATION: 94 % | RESPIRATION RATE: 16 BRPM | WEIGHT: 188 LBS | HEART RATE: 87 BPM

## 2017-07-07 LAB
ALBUMIN SERPL ELPH-MCNC: 4.4 G/DL
ALP BLD-CCNC: 62 U/L
ALT SERPL-CCNC: 13 U/L
ANION GAP SERPL CALC-SCNC: 18 MMOL/L
APPEARANCE: CLEAR
AST SERPL-CCNC: 17 U/L
BACTERIA: NEGATIVE
BASOPHILS # BLD AUTO: 0.02 K/UL
BASOPHILS NFR BLD AUTO: 0.2 %
BILIRUB SERPL-MCNC: 0.4 MG/DL
BILIRUBIN URINE: NEGATIVE
BLOOD URINE: ABNORMAL
BUN SERPL-MCNC: 28 MG/DL
CALCIUM SERPL-MCNC: 9.6 MG/DL
CHLORIDE SERPL-SCNC: 100 MMOL/L
CHOLEST SERPL-MCNC: 207 MG/DL
CHOLEST/HDLC SERPL: 3.6 RATIO
CO2 SERPL-SCNC: 24 MMOL/L
COLOR: YELLOW
CREAT SERPL-MCNC: 1.32 MG/DL
EOSINOPHIL # BLD AUTO: 0.21 K/UL
EOSINOPHIL NFR BLD AUTO: 2.5 %
GLUCOSE QUALITATIVE U: NORMAL MG/DL
GLUCOSE SERPL-MCNC: 146 MG/DL
HBA1C MFR BLD HPLC: 7.7 %
HCT VFR BLD CALC: 42.1 %
HDLC SERPL-MCNC: 57 MG/DL
HGB BLD-MCNC: 13.8 G/DL
HYALINE CASTS: 0 /LPF
IMM GRANULOCYTES NFR BLD AUTO: 0.2 %
KETONES URINE: NEGATIVE
LDLC SERPL CALC-MCNC: 117 MG/DL
LEUKOCYTE ESTERASE URINE: NEGATIVE
LYMPHOCYTES # BLD AUTO: 2.19 K/UL
LYMPHOCYTES NFR BLD AUTO: 25.8 %
MAGNESIUM SERPL-MCNC: 2 MG/DL
MAN DIFF?: NORMAL
MCHC RBC-ENTMCNC: 32.3 PG
MCHC RBC-ENTMCNC: 32.8 GM/DL
MCV RBC AUTO: 98.6 FL
MICROSCOPIC-UA: NORMAL
MONOCYTES # BLD AUTO: 0.83 K/UL
MONOCYTES NFR BLD AUTO: 9.8 %
NEUTROPHILS # BLD AUTO: 5.23 K/UL
NEUTROPHILS NFR BLD AUTO: 61.5 %
NITRITE URINE: NEGATIVE
PH URINE: 5
PLATELET # BLD AUTO: 227 K/UL
POTASSIUM SERPL-SCNC: 4.7 MMOL/L
PROT SERPL-MCNC: 7.5 G/DL
PROTEIN URINE: ABNORMAL MG/DL
RBC # BLD: 4.27 M/UL
RBC # FLD: 13.4 %
RED BLOOD CELLS URINE: 1 /HPF
SODIUM SERPL-SCNC: 142 MMOL/L
SPECIFIC GRAVITY URINE: 1.02
SQUAMOUS EPITHELIAL CELLS: 0 /HPF
TRIGL SERPL-MCNC: 166 MG/DL
UROBILINOGEN URINE: NORMAL MG/DL
WBC # FLD AUTO: 8.5 K/UL
WHITE BLOOD CELLS URINE: 0 /HPF

## 2017-07-12 ENCOUNTER — APPOINTMENT (OUTPATIENT)
Dept: PULMONOLOGY | Facility: CLINIC | Age: 82
End: 2017-07-12

## 2017-07-13 ENCOUNTER — OTHER (OUTPATIENT)
Age: 82
End: 2017-07-13

## 2017-07-13 ENCOUNTER — OUTPATIENT (OUTPATIENT)
Dept: OUTPATIENT SERVICES | Facility: HOSPITAL | Age: 82
LOS: 1 days | End: 2017-07-13
Payer: MEDICARE

## 2017-07-13 VITALS
TEMPERATURE: 98 F | SYSTOLIC BLOOD PRESSURE: 156 MMHG | WEIGHT: 184.97 LBS | OXYGEN SATURATION: 96 % | HEIGHT: 68.5 IN | RESPIRATION RATE: 18 BRPM | HEART RATE: 77 BPM | DIASTOLIC BLOOD PRESSURE: 78 MMHG

## 2017-07-13 DIAGNOSIS — I35.0 NONRHEUMATIC AORTIC (VALVE) STENOSIS: ICD-10-CM

## 2017-07-13 DIAGNOSIS — Z90.49 ACQUIRED ABSENCE OF OTHER SPECIFIED PARTS OF DIGESTIVE TRACT: Chronic | ICD-10-CM

## 2017-07-13 DIAGNOSIS — Z01.810 ENCOUNTER FOR PREPROCEDURAL CARDIOVASCULAR EXAMINATION: ICD-10-CM

## 2017-07-13 DIAGNOSIS — Z98.42 CATARACT EXTRACTION STATUS, LEFT EYE: Chronic | ICD-10-CM

## 2017-07-13 DIAGNOSIS — Z98.89 OTHER SPECIFIED POSTPROCEDURAL STATES: Chronic | ICD-10-CM

## 2017-07-13 DIAGNOSIS — Z96.60 PRESENCE OF UNSPECIFIED ORTHOPEDIC JOINT IMPLANT: Chronic | ICD-10-CM

## 2017-07-13 DIAGNOSIS — Z98.41 CATARACT EXTRACTION STATUS, RIGHT EYE: Chronic | ICD-10-CM

## 2017-07-13 DIAGNOSIS — Z98.61 CORONARY ANGIOPLASTY STATUS: Chronic | ICD-10-CM

## 2017-07-13 LAB
ANION GAP SERPL CALC-SCNC: 14 MMOL/L — SIGNIFICANT CHANGE UP (ref 5–17)
BUN SERPL-MCNC: 22 MG/DL — HIGH (ref 8–20)
CALCIUM SERPL-MCNC: 9 MG/DL — SIGNIFICANT CHANGE UP (ref 8.6–10.2)
CHLORIDE SERPL-SCNC: 96 MMOL/L — LOW (ref 98–107)
CHOLEST SERPL-MCNC: 178 MG/DL — SIGNIFICANT CHANGE UP (ref 110–199)
CO2 SERPL-SCNC: 25 MMOL/L — SIGNIFICANT CHANGE UP (ref 22–29)
CREAT SERPL-MCNC: 1.28 MG/DL — SIGNIFICANT CHANGE UP (ref 0.5–1.3)
GLUCOSE SERPL-MCNC: 231 MG/DL — HIGH (ref 70–115)
HCT VFR BLD CALC: 40.7 % — LOW (ref 42–52)
HDLC SERPL-MCNC: 55 MG/DL — SIGNIFICANT CHANGE UP
HGB BLD-MCNC: 13.6 G/DL — LOW (ref 14–18)
INR BLD: 1.09 RATIO — SIGNIFICANT CHANGE UP (ref 0.88–1.16)
LIPID PNL WITH DIRECT LDL SERPL: 89 MG/DL — SIGNIFICANT CHANGE UP
MCHC RBC-ENTMCNC: 32.5 PG — HIGH (ref 27–31)
MCHC RBC-ENTMCNC: 33.4 G/DL — SIGNIFICANT CHANGE UP (ref 32–36)
MCV RBC AUTO: 97.1 FL — HIGH (ref 80–94)
PLATELET # BLD AUTO: 192 K/UL — SIGNIFICANT CHANGE UP (ref 150–400)
POTASSIUM SERPL-MCNC: 4.5 MMOL/L — SIGNIFICANT CHANGE UP (ref 3.5–5.3)
POTASSIUM SERPL-SCNC: 4.5 MMOL/L — SIGNIFICANT CHANGE UP (ref 3.5–5.3)
PROTHROM AB SERPL-ACNC: 12 SEC — SIGNIFICANT CHANGE UP (ref 9.8–12.7)
RBC # BLD: 4.19 M/UL — LOW (ref 4.6–6.2)
RBC # FLD: 13 % — SIGNIFICANT CHANGE UP (ref 11–15.6)
SODIUM SERPL-SCNC: 135 MMOL/L — SIGNIFICANT CHANGE UP (ref 135–145)
TOTAL CHOLESTEROL/HDL RATIO MEASUREMENT: 3 RATIO — LOW (ref 3.4–9.6)
TRIGL SERPL-MCNC: 168 MG/DL — SIGNIFICANT CHANGE UP (ref 10–200)
WBC # BLD: 7 K/UL — SIGNIFICANT CHANGE UP (ref 4.8–10.8)
WBC # FLD AUTO: 7 K/UL — SIGNIFICANT CHANGE UP (ref 4.8–10.8)

## 2017-07-13 PROCEDURE — 36415 COLL VENOUS BLD VENIPUNCTURE: CPT

## 2017-07-13 PROCEDURE — 93010 ELECTROCARDIOGRAM REPORT: CPT

## 2017-07-13 PROCEDURE — 80048 BASIC METABOLIC PNL TOTAL CA: CPT

## 2017-07-13 PROCEDURE — 80061 LIPID PANEL: CPT

## 2017-07-13 PROCEDURE — 85610 PROTHROMBIN TIME: CPT

## 2017-07-13 PROCEDURE — 93005 ELECTROCARDIOGRAM TRACING: CPT

## 2017-07-13 PROCEDURE — 85027 COMPLETE CBC AUTOMATED: CPT

## 2017-07-13 PROCEDURE — G0463: CPT

## 2017-07-13 NOTE — H&P PST ADULT - HISTORY OF PRESENT ILLNESS
84 year old male with a h/o CAD s/p CABG, s/p PCIs most recent HUE to LCx 6/27/17, PAD s/p PTA, HTN, HLD, DM, AS who presents for GREGORIA to evaluate AS for TAVR evaluation. Patient reports progressive worsening of dyspnea/fatigue/chest pressure and activity tolerance. He states he has been SOB for years but more recently he has been limited in his activity, such as playing golf or simple grocery shopping.  He also admits to lightheadedness and near syncope type feelings intermittently.      PMD: Dr. Burns  Cardiologist: Dr. Mcgill

## 2017-07-13 NOTE — H&P PST ADULT - FAMILY HISTORY
<<-----Click on this checkbox to enter Family History Family history of depression     Family history of stroke     Mother  Still living? No  Family history of acute myocardial infarction, Age at diagnosis: Age Unknown

## 2017-07-13 NOTE — ASU PATIENT PROFILE, ADULT - PMH
Angina pectoris  class II  Aortic stenosis    Asthma    BPH (benign prostatic hyperplasia)    CAD (coronary artery disease)  S/P Stenting x8  CAD (coronary artery disease)  CABG  Carotid artery disease  RCEA  Diabetes mellitus    MENDEZ (dyspnea on exertion)    Fatigue  Profound w/exertion  Former smoker    Hyperlipidemia    Hypertension    FANNY (obstructive sleep apnea)    PVD (peripheral vascular disease)    SOB (shortness of breath)    SOB (shortness of breath) Angina pectoris  class II  Aortic stenosis    Asthma    BPH (benign prostatic hyperplasia)    CAD (coronary artery disease)  S/P Stenting x8  CAD (coronary artery disease)  CABG  Carotid artery disease  RCEA  Diabetes mellitus    MENDEZ (dyspnea on exertion)    Fatigue  Profound w/exertion  Former smoker    Hyperlipidemia    Hypertension    FANNY (obstructive sleep apnea)  uses mouth peice  PVD (peripheral vascular disease)    SOB (shortness of breath)    SOB (shortness of breath)

## 2017-07-24 ENCOUNTER — APPOINTMENT (OUTPATIENT)
Dept: INTERNAL MEDICINE | Facility: CLINIC | Age: 82
End: 2017-07-24

## 2017-07-24 VITALS
TEMPERATURE: 98.5 F | DIASTOLIC BLOOD PRESSURE: 70 MMHG | SYSTOLIC BLOOD PRESSURE: 130 MMHG | HEART RATE: 76 BPM | BODY MASS INDEX: 27.85 KG/M2 | WEIGHT: 188 LBS | HEIGHT: 69 IN

## 2017-07-24 LAB
BILIRUB UR QL STRIP: NEGATIVE
CLARITY UR: CLEAR
COLLECTION METHOD: NORMAL
GLUCOSE UR-MCNC: NEGATIVE
HCG UR QL: 0.2 EU/DL
HGB UR QL STRIP.AUTO: NORMAL
KETONES UR-MCNC: NEGATIVE
LEUKOCYTE ESTERASE UR QL STRIP: NEGATIVE
NITRITE UR QL STRIP: NEGATIVE
PH UR STRIP: 5
PROT UR STRIP-MCNC: NEGATIVE
SP GR UR STRIP: 1.01

## 2017-07-25 ENCOUNTER — RESULT REVIEW (OUTPATIENT)
Age: 82
End: 2017-07-25

## 2017-07-25 LAB
APPEARANCE: CLEAR
BACTERIA: NEGATIVE
BILIRUBIN URINE: NEGATIVE
BLOOD URINE: NEGATIVE
COLOR: YELLOW
GLUCOSE QUALITATIVE U: NORMAL MG/DL
HYALINE CASTS: 1 /LPF
KETONES URINE: NEGATIVE
LEUKOCYTE ESTERASE URINE: NEGATIVE
MICROSCOPIC-UA: NORMAL
NITRITE URINE: NEGATIVE
PH URINE: 5.5
PROTEIN URINE: NEGATIVE MG/DL
RED BLOOD CELLS URINE: 3 /HPF
SPECIFIC GRAVITY URINE: 1.01
SQUAMOUS EPITHELIAL CELLS: 0 /HPF
UROBILINOGEN URINE: NORMAL MG/DL
WHITE BLOOD CELLS URINE: 0 /HPF

## 2017-07-26 ENCOUNTER — TRANSCRIPTION ENCOUNTER (OUTPATIENT)
Age: 82
End: 2017-07-26

## 2017-07-26 ENCOUNTER — OUTPATIENT (OUTPATIENT)
Dept: OUTPATIENT SERVICES | Facility: HOSPITAL | Age: 82
LOS: 1 days | End: 2017-07-26
Payer: MEDICARE

## 2017-07-26 DIAGNOSIS — Z98.89 OTHER SPECIFIED POSTPROCEDURAL STATES: Chronic | ICD-10-CM

## 2017-07-26 DIAGNOSIS — Z98.61 CORONARY ANGIOPLASTY STATUS: Chronic | ICD-10-CM

## 2017-07-26 DIAGNOSIS — Z01.810 ENCOUNTER FOR PREPROCEDURAL CARDIOVASCULAR EXAMINATION: ICD-10-CM

## 2017-07-26 DIAGNOSIS — Z96.60 PRESENCE OF UNSPECIFIED ORTHOPEDIC JOINT IMPLANT: Chronic | ICD-10-CM

## 2017-07-26 DIAGNOSIS — I35.0 NONRHEUMATIC AORTIC (VALVE) STENOSIS: ICD-10-CM

## 2017-07-26 DIAGNOSIS — Z98.42 CATARACT EXTRACTION STATUS, LEFT EYE: Chronic | ICD-10-CM

## 2017-07-26 DIAGNOSIS — Z90.49 ACQUIRED ABSENCE OF OTHER SPECIFIED PARTS OF DIGESTIVE TRACT: Chronic | ICD-10-CM

## 2017-07-26 DIAGNOSIS — I25.10 ATHEROSCLEROTIC HEART DISEASE OF NATIVE CORONARY ARTERY WITHOUT ANGINA PECTORIS: ICD-10-CM

## 2017-07-26 DIAGNOSIS — Z98.41 CATARACT EXTRACTION STATUS, RIGHT EYE: Chronic | ICD-10-CM

## 2017-07-26 PROCEDURE — 93312 ECHO TRANSESOPHAGEAL: CPT

## 2017-07-26 PROCEDURE — 93320 DOPPLER ECHO COMPLETE: CPT

## 2017-07-26 PROCEDURE — 93325 DOPPLER ECHO COLOR FLOW MAPG: CPT

## 2017-07-26 NOTE — PROGRESS NOTE ADULT - SUBJECTIVE AND OBJECTIVE BOX
< from: GREGORIA Echo Doppler (07.26.17 @ 08:27) >   Summary:   1. Left ventricular ejection fraction, by visual estimation, is 55 to   60%.   2. Normal global left ventricular systolic function.   3. Moderately increased LV wallthickness.   4. Normal right ventricular size and function.   5. The aortic valve is severely calcified with decreased leaflet   opening. The peak gradient is 40 mm Hg with dimensionless index: 0.21   consistent with severe aortic stenosis.    < end of copied text >    full report in chart    Rec: Outpt evaluation for TAVR  CT this Friday    Dr. Mcgill informed

## 2017-07-26 NOTE — DISCHARGE NOTE ADULT - CARE PLAN
Principal Discharge DX:	Aortic stenosis  Goal:	Optimal cardiac function  Instructions for follow-up, activity and diet:	Notify your doctor if you develop a fever, cough up blood, have any chest pain, palpitations or difficulty breathing. You may take a throat lozenge if you develop a sore throat or try warm salt water gargle. Resume your diet with soft foods first. Follow up with your cardiologist within 2 weeks for a follow up or sooner with any concerns. Report to the nearest ER with any emergencies.

## 2017-07-26 NOTE — DISCHARGE NOTE ADULT - HOSPITAL COURSE
s/p GREGORIA     < from: GREGORIA Echo Doppler (07.26.17 @ 08:27) >   Summary:   1. Left ventricular ejection fraction, by visual estimation, is 55 to   60%.   2. Normal global left ventricular systolic function.   3. Moderately increased LV wallthickness.   4. Normal right ventricular size and function.   5. The aortic valve is severely calcified with decreased leaflet   opening. The peak gradient is 40 mm Hg with dimensionless index: 0.21   consistent with severe aortic stenosis.      < end of copied text >    Vital Signs Last 24 Hrs  T(C): --  T(F): --  HR: -- 79  BP: -- 149/73  BP(mean): --  RR: --  SpO2: --    No complications during hospital visit  Continue current medications  Outpatient follow up with Dr Mcgill and Dr Jones

## 2017-07-26 NOTE — DISCHARGE NOTE ADULT - CARE PROVIDER_API CALL
Kem Mcgill), Cardiovascular Disease; Interventional Cardiology  [address] [place] [address]  Phone: [phone]  Fax: [phone]    Joshua Jones), Surgery; Thoracic and Cardiac Surgery  [address] [place] [address]  Phone: [phone]  Fax: [phone]

## 2017-07-26 NOTE — DISCHARGE NOTE ADULT - NS AS ACTIVITY OBS
Do not drive or operate machinery/Walking-Outdoors allowed/Do not make important decisions/Driving allowed/Walking-Indoors allowed

## 2017-07-26 NOTE — PROGRESS NOTE ADULT - SUBJECTIVE AND OBJECTIVE BOX
[facility], [facility]                                   [address] [place] [address]                                                      PHONE: [phone]                                                         FAX: [phone]  -------------------------------------------------------------------------------------------------------------------------------    I have seen and examined this patient. H & P reviewed. Informed consent obtained.   Risks and benefits fully explained. All questions answered.

## 2017-07-26 NOTE — DISCHARGE NOTE ADULT - MEDICATION SUMMARY - MEDICATIONS TO TAKE
I will START or STAY ON the medications listed below when I get home from the hospital:    purvinnie's Pride probiotic  -- 1 cap(s) by mouth once a day  -- Indication: For Supplement    napncon A  eye drops  -- 1 drop(s) in each eye once a day  -- Indication: For Supplement    aspirin 325 mg oral tablet  -- 1 tab(s) by mouth once a day  -- Indication: For Heart disease    losartan 50 mg oral tablet  -- 1 tab(s) by mouth once a day  -- Indication: For HTN    tamsulosin 0.4 mg oral capsule  -- 1 cap(s) by mouth once a day  -- Indication: For BPH    glimepiride 2 mg oral tablet  -- 1 tab(s) by mouth once a day  -- Indication: For Diabetes    Zetia 10 mg oral tablet  -- 1 tab(s) by mouth once a day  -- Indication: For HLD    clopidogrel 75 mg oral tablet  -- 1 tab(s) by mouth once a day  -- Indication: For CAD    Symbicort 80 mcg-4.5 mcg/inh inhalation aerosol  -- 2 puff(s) inhaled 2 times a day  -- 2 puffs per day  -- Indication: For Asthma    garlic oral capsule  -- 1000 milligram(s) by mouth 2 times a day  -- Indication: For Supplement    latanoprost 0.005% ophthalmic solution  -- 1 drop(s) to each affected eye once a day (at bedtime)  -- Indication: For Eye care    pantoprazole 40 mg oral delayed release tablet  -- 1 tab(s) by mouth once a day  -- Indication: For REflux    multivitamin  -- 1 tab(s) by mouth once a day  -- Indication: For Supplement

## 2017-07-27 ENCOUNTER — RESULT REVIEW (OUTPATIENT)
Age: 82
End: 2017-07-27

## 2017-07-28 ENCOUNTER — OUTPATIENT (OUTPATIENT)
Dept: OUTPATIENT SERVICES | Facility: HOSPITAL | Age: 82
LOS: 1 days | End: 2017-07-28
Payer: MEDICARE

## 2017-07-28 DIAGNOSIS — Z98.41 CATARACT EXTRACTION STATUS, RIGHT EYE: Chronic | ICD-10-CM

## 2017-07-28 DIAGNOSIS — Z98.89 OTHER SPECIFIED POSTPROCEDURAL STATES: Chronic | ICD-10-CM

## 2017-07-28 DIAGNOSIS — Z96.60 PRESENCE OF UNSPECIFIED ORTHOPEDIC JOINT IMPLANT: Chronic | ICD-10-CM

## 2017-07-28 DIAGNOSIS — I35.0 NONRHEUMATIC AORTIC (VALVE) STENOSIS: ICD-10-CM

## 2017-07-28 DIAGNOSIS — Z98.61 CORONARY ANGIOPLASTY STATUS: Chronic | ICD-10-CM

## 2017-07-28 DIAGNOSIS — Z98.42 CATARACT EXTRACTION STATUS, LEFT EYE: Chronic | ICD-10-CM

## 2017-07-28 DIAGNOSIS — Z90.49 ACQUIRED ABSENCE OF OTHER SPECIFIED PARTS OF DIGESTIVE TRACT: Chronic | ICD-10-CM

## 2017-07-28 PROCEDURE — 71275 CT ANGIOGRAPHY CHEST: CPT

## 2017-07-28 PROCEDURE — 71275 CT ANGIOGRAPHY CHEST: CPT | Mod: 26

## 2017-07-28 PROCEDURE — 74174 CTA ABD&PLVS W/CONTRAST: CPT | Mod: 26

## 2017-07-28 PROCEDURE — 74174 CTA ABD&PLVS W/CONTRAST: CPT

## 2017-07-30 NOTE — DISCHARGE NOTE ADULT - ABILITY TO HEAR (WITH HEARING AID OR HEARING APPLIANCE IF NORMALLY USED):
Adequate: hears normal conversation without difficulty Head is atraumatic. Head shape is symmetrical.

## 2017-08-01 ENCOUNTER — OTHER (OUTPATIENT)
Age: 82
End: 2017-08-01

## 2017-08-04 ENCOUNTER — OUTPATIENT (OUTPATIENT)
Dept: OUTPATIENT SERVICES | Facility: HOSPITAL | Age: 82
LOS: 1 days | End: 2017-08-04
Payer: MEDICARE

## 2017-08-04 VITALS
SYSTOLIC BLOOD PRESSURE: 150 MMHG | DIASTOLIC BLOOD PRESSURE: 78 MMHG | TEMPERATURE: 98 F | HEIGHT: 68 IN | HEART RATE: 76 BPM | WEIGHT: 188.5 LBS | RESPIRATION RATE: 16 BRPM

## 2017-08-04 DIAGNOSIS — Z98.89 OTHER SPECIFIED POSTPROCEDURAL STATES: Chronic | ICD-10-CM

## 2017-08-04 DIAGNOSIS — Z98.42 CATARACT EXTRACTION STATUS, LEFT EYE: Chronic | ICD-10-CM

## 2017-08-04 DIAGNOSIS — Z01.818 ENCOUNTER FOR OTHER PREPROCEDURAL EXAMINATION: ICD-10-CM

## 2017-08-04 DIAGNOSIS — E11.9 TYPE 2 DIABETES MELLITUS WITHOUT COMPLICATIONS: ICD-10-CM

## 2017-08-04 DIAGNOSIS — Z95.1 PRESENCE OF AORTOCORONARY BYPASS GRAFT: Chronic | ICD-10-CM

## 2017-08-04 DIAGNOSIS — I35.0 NONRHEUMATIC AORTIC (VALVE) STENOSIS: ICD-10-CM

## 2017-08-04 DIAGNOSIS — Z98.41 CATARACT EXTRACTION STATUS, RIGHT EYE: Chronic | ICD-10-CM

## 2017-08-04 DIAGNOSIS — Z96.60 PRESENCE OF UNSPECIFIED ORTHOPEDIC JOINT IMPLANT: Chronic | ICD-10-CM

## 2017-08-04 DIAGNOSIS — Z98.61 CORONARY ANGIOPLASTY STATUS: Chronic | ICD-10-CM

## 2017-08-04 DIAGNOSIS — Z90.49 ACQUIRED ABSENCE OF OTHER SPECIFIED PARTS OF DIGESTIVE TRACT: Chronic | ICD-10-CM

## 2017-08-04 LAB
ALBUMIN SERPL ELPH-MCNC: 4.5 G/DL — SIGNIFICANT CHANGE UP (ref 3.3–5.2)
ALP SERPL-CCNC: 63 U/L — SIGNIFICANT CHANGE UP (ref 40–120)
ALT FLD-CCNC: 12 U/L — SIGNIFICANT CHANGE UP
ANION GAP SERPL CALC-SCNC: 14 MMOL/L — SIGNIFICANT CHANGE UP (ref 5–17)
APPEARANCE UR: CLEAR — SIGNIFICANT CHANGE UP
APTT BLD: 28.8 SEC — SIGNIFICANT CHANGE UP (ref 27.5–37.4)
AST SERPL-CCNC: 15 U/L — SIGNIFICANT CHANGE UP
BASOPHILS # BLD AUTO: 0 K/UL — SIGNIFICANT CHANGE UP (ref 0–0.2)
BASOPHILS NFR BLD AUTO: 0.1 % — SIGNIFICANT CHANGE UP (ref 0–2)
BILIRUB SERPL-MCNC: 0.3 MG/DL — LOW (ref 0.4–2)
BILIRUB UR-MCNC: NEGATIVE — SIGNIFICANT CHANGE UP
BLD GP AB SCN SERPL QL: SIGNIFICANT CHANGE UP
BUN SERPL-MCNC: 28 MG/DL — HIGH (ref 8–20)
CALCIUM SERPL-MCNC: 9.5 MG/DL — SIGNIFICANT CHANGE UP (ref 8.6–10.2)
CHLORIDE SERPL-SCNC: 100 MMOL/L — SIGNIFICANT CHANGE UP (ref 98–107)
CO2 SERPL-SCNC: 27 MMOL/L — SIGNIFICANT CHANGE UP (ref 22–29)
COLOR SPEC: YELLOW — SIGNIFICANT CHANGE UP
CREAT SERPL-MCNC: 1.18 MG/DL — SIGNIFICANT CHANGE UP (ref 0.5–1.3)
DIFF PNL FLD: ABNORMAL
DIR ANTIGLOB POLYSPECIFIC INTERPRETATION: SIGNIFICANT CHANGE UP
EOSINOPHIL # BLD AUTO: 0.2 K/UL — SIGNIFICANT CHANGE UP (ref 0–0.5)
EOSINOPHIL NFR BLD AUTO: 2.6 % — SIGNIFICANT CHANGE UP (ref 0–5)
EPI CELLS # UR: SIGNIFICANT CHANGE UP
GLUCOSE SERPL-MCNC: 134 MG/DL — HIGH (ref 70–115)
GLUCOSE UR QL: NEGATIVE MG/DL — SIGNIFICANT CHANGE UP
HBA1C BLD-MCNC: 8.3 % — HIGH (ref 4–5.6)
HCT VFR BLD CALC: 42.4 % — SIGNIFICANT CHANGE UP (ref 42–52)
HGB BLD-MCNC: 14.1 G/DL — SIGNIFICANT CHANGE UP (ref 14–18)
INR BLD: 0.98 RATIO — SIGNIFICANT CHANGE UP (ref 0.88–1.16)
KETONES UR-MCNC: NEGATIVE — SIGNIFICANT CHANGE UP
LEUKOCYTE ESTERASE UR-ACNC: NEGATIVE — SIGNIFICANT CHANGE UP
LYMPHOCYTES # BLD AUTO: 1.6 K/UL — SIGNIFICANT CHANGE UP (ref 1–4.8)
LYMPHOCYTES # BLD AUTO: 22.8 % — SIGNIFICANT CHANGE UP (ref 20–55)
MCHC RBC-ENTMCNC: 32.3 PG — HIGH (ref 27–31)
MCHC RBC-ENTMCNC: 33.3 G/DL — SIGNIFICANT CHANGE UP (ref 32–36)
MCV RBC AUTO: 97 FL — HIGH (ref 80–94)
MONOCYTES # BLD AUTO: 0.6 K/UL — SIGNIFICANT CHANGE UP (ref 0–0.8)
MONOCYTES NFR BLD AUTO: 8.1 % — SIGNIFICANT CHANGE UP (ref 3–10)
MRSA PCR RESULT.: SIGNIFICANT CHANGE UP
NEUTROPHILS # BLD AUTO: 4.6 K/UL — SIGNIFICANT CHANGE UP (ref 1.8–8)
NEUTROPHILS NFR BLD AUTO: 66.1 % — SIGNIFICANT CHANGE UP (ref 37–73)
NITRITE UR-MCNC: NEGATIVE — SIGNIFICANT CHANGE UP
NT-PROBNP SERPL-SCNC: 556 PG/ML — HIGH (ref 0–300)
PH UR: 5 — SIGNIFICANT CHANGE UP (ref 5–8)
PLATELET # BLD AUTO: 199 K/UL — SIGNIFICANT CHANGE UP (ref 150–400)
POTASSIUM SERPL-MCNC: 4.7 MMOL/L — SIGNIFICANT CHANGE UP (ref 3.5–5.3)
POTASSIUM SERPL-SCNC: 4.7 MMOL/L — SIGNIFICANT CHANGE UP (ref 3.5–5.3)
PREALB SERPL-MCNC: 27 MG/DL — SIGNIFICANT CHANGE UP (ref 18–38)
PROT SERPL-MCNC: 7.5 G/DL — SIGNIFICANT CHANGE UP (ref 6.6–8.7)
PROT UR-MCNC: 15 MG/DL
PROTHROM AB SERPL-ACNC: 10.8 SEC — SIGNIFICANT CHANGE UP (ref 9.8–12.7)
RBC # BLD: 4.37 M/UL — LOW (ref 4.6–6.2)
RBC # FLD: 12.8 % — SIGNIFICANT CHANGE UP (ref 11–15.6)
RBC CASTS # UR COMP ASSIST: ABNORMAL /HPF (ref 0–4)
S AUREUS DNA NOSE QL NAA+PROBE: SIGNIFICANT CHANGE UP
SODIUM SERPL-SCNC: 141 MMOL/L — SIGNIFICANT CHANGE UP (ref 135–145)
SP GR SPEC: 1.02 — SIGNIFICANT CHANGE UP (ref 1.01–1.02)
T3 SERPL-MCNC: 103 NG/DL — SIGNIFICANT CHANGE UP (ref 80–200)
T4 AB SER-ACNC: 7.9 UG/DL — SIGNIFICANT CHANGE UP (ref 4.5–12)
TSH SERPL-MCNC: 2.34 UIU/ML — SIGNIFICANT CHANGE UP (ref 0.27–4.2)
TYPE + AB SCN PNL BLD: SIGNIFICANT CHANGE UP
UROBILINOGEN FLD QL: NEGATIVE MG/DL — SIGNIFICANT CHANGE UP
WBC # BLD: 6.9 K/UL — SIGNIFICANT CHANGE UP (ref 4.8–10.8)
WBC # FLD AUTO: 6.9 K/UL — SIGNIFICANT CHANGE UP (ref 4.8–10.8)
WBC UR QL: SIGNIFICANT CHANGE UP

## 2017-08-04 PROCEDURE — 71046 X-RAY EXAM CHEST 2 VIEWS: CPT

## 2017-08-04 PROCEDURE — 87640 STAPH A DNA AMP PROBE: CPT

## 2017-08-04 PROCEDURE — 85730 THROMBOPLASTIN TIME PARTIAL: CPT

## 2017-08-04 PROCEDURE — 87641 MR-STAPH DNA AMP PROBE: CPT

## 2017-08-04 PROCEDURE — 80053 COMPREHEN METABOLIC PANEL: CPT

## 2017-08-04 PROCEDURE — 86850 RBC ANTIBODY SCREEN: CPT

## 2017-08-04 PROCEDURE — 83880 ASSAY OF NATRIURETIC PEPTIDE: CPT

## 2017-08-04 PROCEDURE — 85027 COMPLETE CBC AUTOMATED: CPT

## 2017-08-04 PROCEDURE — G0463: CPT

## 2017-08-04 PROCEDURE — 71020: CPT | Mod: 26

## 2017-08-04 PROCEDURE — 84134 ASSAY OF PREALBUMIN: CPT

## 2017-08-04 PROCEDURE — 86901 BLOOD TYPING SEROLOGIC RH(D): CPT

## 2017-08-04 PROCEDURE — 86880 COOMBS TEST DIRECT: CPT

## 2017-08-04 PROCEDURE — 86900 BLOOD TYPING SEROLOGIC ABO: CPT

## 2017-08-04 PROCEDURE — 84480 ASSAY TRIIODOTHYRONINE (T3): CPT

## 2017-08-04 PROCEDURE — 83036 HEMOGLOBIN GLYCOSYLATED A1C: CPT

## 2017-08-04 PROCEDURE — 85610 PROTHROMBIN TIME: CPT

## 2017-08-04 PROCEDURE — 93005 ELECTROCARDIOGRAM TRACING: CPT

## 2017-08-04 PROCEDURE — 36415 COLL VENOUS BLD VENIPUNCTURE: CPT

## 2017-08-04 PROCEDURE — 87086 URINE CULTURE/COLONY COUNT: CPT

## 2017-08-04 PROCEDURE — 81001 URINALYSIS AUTO W/SCOPE: CPT

## 2017-08-04 PROCEDURE — 93010 ELECTROCARDIOGRAM REPORT: CPT

## 2017-08-04 PROCEDURE — 84436 ASSAY OF TOTAL THYROXINE: CPT

## 2017-08-04 PROCEDURE — 84443 ASSAY THYROID STIM HORMONE: CPT

## 2017-08-04 NOTE — PATIENT PROFILE ADULT. - PMH
Angina pectoris  class II  Aortic stenosis    Asthma    BPH (benign prostatic hyperplasia)    CAD (coronary artery disease)  CABG  CAD (coronary artery disease)  S/P Stenting x8  Carotid artery disease  RCEA  Diabetes mellitus    MENDEZ (dyspnea on exertion)    Fatigue  Profound w/exertion  Former smoker    Hyperlipidemia    Hypertension    FANNY (obstructive sleep apnea)  uses mouth peice  PVD (peripheral vascular disease)    SOB (shortness of breath)

## 2017-08-04 NOTE — H&P PST ADULT - PMH
Angina pectoris  class II  Aortic stenosis    Asthma    BPH (benign prostatic hyperplasia)    CAD (coronary artery disease)  CABG  CAD (coronary artery disease)  S/P Stenting x8  Carotid artery disease  RCEA  Diabetes mellitus    MENDEZ (dyspnea on exertion)    Fatigue  Profound w/exertion  Former smoker    Hyperlipidemia    Hypertension    FANNY (obstructive sleep apnea)  uses mouth peice  PVD (peripheral vascular disease)    SOB (shortness of breath) Angina pectoris  class II  Aortic stenosis    Asthma    BPH (benign prostatic hyperplasia)    CAD (coronary artery disease)  CABG  CAD (coronary artery disease)  S/P Stenting x8  Carotid artery disease  RCEA  Diabetes mellitus    MENDEZ (dyspnea on exertion)    Fatigue  Profound w/exertion  Former smoker    Hyperlipidemia    Hypertension    FANNY (obstructive sleep apnea)  uses mouth peice  PVD (peripheral vascular disease)  intervention RLE  PVD (peripheral vascular disease)    SOB (shortness of breath)

## 2017-08-04 NOTE — H&P PST ADULT - HISTORY OF PRESENT ILLNESS
83 yo male with AS, planning TAVR, reports chest pressure, lightheadedness and lack of energy with activity.

## 2017-08-04 NOTE — H&P PST ADULT - PSH
History of CEA (carotid endarterectomy)  Bilateral  S/P CABG x 2    S/P cataract surgery, left    S/P cataract surgery, right    S/P cholecystectomy    S/P joint replacement  Bilateral knee replacement  S/P nasal surgery  Secondary to nasal fracture  S/P PTCA (percutaneous transluminal coronary angioplasty)  x 9  S/P tonsillectomy

## 2017-08-07 ENCOUNTER — APPOINTMENT (OUTPATIENT)
Dept: THORACIC SURGERY | Facility: CLINIC | Age: 82
End: 2017-08-07

## 2017-08-07 VITALS
HEART RATE: 78 BPM | DIASTOLIC BLOOD PRESSURE: 69 MMHG | OXYGEN SATURATION: 98 % | SYSTOLIC BLOOD PRESSURE: 153 MMHG | WEIGHT: 185 LBS | BODY MASS INDEX: 27.4 KG/M2 | HEIGHT: 69 IN

## 2017-08-07 LAB
CULTURE RESULTS: SIGNIFICANT CHANGE UP
SPECIMEN SOURCE: SIGNIFICANT CHANGE UP

## 2017-08-11 ENCOUNTER — INPATIENT (INPATIENT)
Facility: HOSPITAL | Age: 82
LOS: 0 days | Discharge: HOME HEALTH | DRG: 266 | End: 2017-08-12
Attending: THORACIC SURGERY (CARDIOTHORACIC VASCULAR SURGERY) | Admitting: THORACIC SURGERY (CARDIOTHORACIC VASCULAR SURGERY)
Payer: MEDICARE

## 2017-08-11 ENCOUNTER — TRANSCRIPTION ENCOUNTER (OUTPATIENT)
Age: 82
End: 2017-08-11

## 2017-08-11 ENCOUNTER — APPOINTMENT (OUTPATIENT)
Dept: CARDIOTHORACIC SURGERY | Facility: HOSPITAL | Age: 82
End: 2017-08-11

## 2017-08-11 VITALS
DIASTOLIC BLOOD PRESSURE: 67 MMHG | WEIGHT: 184.97 LBS | RESPIRATION RATE: 16 BRPM | HEIGHT: 68 IN | OXYGEN SATURATION: 100 % | TEMPERATURE: 98 F | HEART RATE: 75 BPM | SYSTOLIC BLOOD PRESSURE: 149 MMHG

## 2017-08-11 DIAGNOSIS — Z98.42 CATARACT EXTRACTION STATUS, LEFT EYE: Chronic | ICD-10-CM

## 2017-08-11 DIAGNOSIS — Z98.61 CORONARY ANGIOPLASTY STATUS: Chronic | ICD-10-CM

## 2017-08-11 DIAGNOSIS — Z95.1 PRESENCE OF AORTOCORONARY BYPASS GRAFT: Chronic | ICD-10-CM

## 2017-08-11 DIAGNOSIS — Z98.41 CATARACT EXTRACTION STATUS, RIGHT EYE: Chronic | ICD-10-CM

## 2017-08-11 DIAGNOSIS — Z98.89 OTHER SPECIFIED POSTPROCEDURAL STATES: Chronic | ICD-10-CM

## 2017-08-11 DIAGNOSIS — Z96.60 PRESENCE OF UNSPECIFIED ORTHOPEDIC JOINT IMPLANT: Chronic | ICD-10-CM

## 2017-08-11 DIAGNOSIS — I35.0 NONRHEUMATIC AORTIC (VALVE) STENOSIS: ICD-10-CM

## 2017-08-11 DIAGNOSIS — Z90.49 ACQUIRED ABSENCE OF OTHER SPECIFIED PARTS OF DIGESTIVE TRACT: Chronic | ICD-10-CM

## 2017-08-11 LAB
ALBUMIN SERPL ELPH-MCNC: 3.3 G/DL — SIGNIFICANT CHANGE UP (ref 3.3–5.2)
ALP SERPL-CCNC: 43 U/L — SIGNIFICANT CHANGE UP (ref 40–120)
ALT FLD-CCNC: 7 U/L — SIGNIFICANT CHANGE UP
ANION GAP SERPL CALC-SCNC: 11 MMOL/L — SIGNIFICANT CHANGE UP (ref 5–17)
APTT BLD: 26 SEC — LOW (ref 27.5–37.4)
AST SERPL-CCNC: 14 U/L — SIGNIFICANT CHANGE UP
BILIRUB SERPL-MCNC: 0.6 MG/DL — SIGNIFICANT CHANGE UP (ref 0.4–2)
BLD GP AB SCN SERPL QL: SIGNIFICANT CHANGE UP
BUN SERPL-MCNC: 23 MG/DL — HIGH (ref 8–20)
CALCIUM SERPL-MCNC: 8 MG/DL — LOW (ref 8.6–10.2)
CHLORIDE SERPL-SCNC: 106 MMOL/L — SIGNIFICANT CHANGE UP (ref 98–107)
CK SERPL-CCNC: 144 U/L — SIGNIFICANT CHANGE UP (ref 30–200)
CO2 SERPL-SCNC: 23 MMOL/L — SIGNIFICANT CHANGE UP (ref 22–29)
CREAT SERPL-MCNC: 0.96 MG/DL — SIGNIFICANT CHANGE UP (ref 0.5–1.3)
GAS PNL BLDA: SIGNIFICANT CHANGE UP
GAS PNL BLDA: SIGNIFICANT CHANGE UP
GLUCOSE SERPL-MCNC: 134 MG/DL — HIGH (ref 70–115)
HCT VFR BLD CALC: 34.7 % — LOW (ref 42–52)
HGB BLD-MCNC: 11.4 G/DL — LOW (ref 14–18)
INR BLD: 1.18 RATIO — HIGH (ref 0.88–1.16)
MAGNESIUM SERPL-MCNC: 1.6 MG/DL — SIGNIFICANT CHANGE UP (ref 1.6–2.6)
MCHC RBC-ENTMCNC: 32.2 PG — HIGH (ref 27–31)
MCHC RBC-ENTMCNC: 32.9 G/DL — SIGNIFICANT CHANGE UP (ref 32–36)
MCV RBC AUTO: 98 FL — HIGH (ref 80–94)
PLATELET # BLD AUTO: 138 K/UL — LOW (ref 150–400)
POTASSIUM SERPL-MCNC: 4.3 MMOL/L — SIGNIFICANT CHANGE UP (ref 3.5–5.3)
POTASSIUM SERPL-SCNC: 4.3 MMOL/L — SIGNIFICANT CHANGE UP (ref 3.5–5.3)
PROT SERPL-MCNC: 5.6 G/DL — LOW (ref 6.6–8.7)
PROTHROM AB SERPL-ACNC: 13 SEC — HIGH (ref 9.8–12.7)
RBC # BLD: 3.54 M/UL — LOW (ref 4.6–6.2)
RBC # FLD: 12.6 % — SIGNIFICANT CHANGE UP (ref 11–15.6)
SODIUM SERPL-SCNC: 140 MMOL/L — SIGNIFICANT CHANGE UP (ref 135–145)
TROPONIN T SERPL-MCNC: 0.08 NG/ML — HIGH (ref 0–0.06)
TYPE + AB SCN PNL BLD: SIGNIFICANT CHANGE UP
WBC # BLD: 8.6 K/UL — SIGNIFICANT CHANGE UP (ref 4.8–10.8)
WBC # FLD AUTO: 8.6 K/UL — SIGNIFICANT CHANGE UP (ref 4.8–10.8)

## 2017-08-11 PROCEDURE — 93010 ELECTROCARDIOGRAM REPORT: CPT

## 2017-08-11 PROCEDURE — 71010: CPT | Mod: 26

## 2017-08-11 PROCEDURE — 93355 ECHO TRANSESOPHAGEAL (TEE): CPT

## 2017-08-11 PROCEDURE — 33361 REPLACE AORTIC VALVE PERQ: CPT | Mod: 62,Q0

## 2017-08-11 RX ORDER — DEXAMETHASONE 0.5 MG/5ML
6 ELIXIR ORAL ONCE
Qty: 0 | Refills: 0 | Status: COMPLETED | OUTPATIENT
Start: 2017-08-11 | End: 2017-08-11

## 2017-08-11 RX ORDER — DEXAMETHASONE 0.5 MG/5ML
10 ELIXIR ORAL ONCE
Qty: 0 | Refills: 0 | Status: DISCONTINUED | OUTPATIENT
Start: 2017-08-11 | End: 2017-08-11

## 2017-08-11 RX ORDER — SODIUM CHLORIDE 9 MG/ML
250 INJECTION, SOLUTION INTRAVENOUS ONCE
Qty: 0 | Refills: 0 | Status: COMPLETED | OUTPATIENT
Start: 2017-08-11 | End: 2017-08-11

## 2017-08-11 RX ORDER — TAMSULOSIN HYDROCHLORIDE 0.4 MG/1
0.4 CAPSULE ORAL AT BEDTIME
Qty: 0 | Refills: 0 | Status: DISCONTINUED | OUTPATIENT
Start: 2017-08-11 | End: 2017-08-12

## 2017-08-11 RX ORDER — PANTOPRAZOLE SODIUM 20 MG/1
40 TABLET, DELAYED RELEASE ORAL DAILY
Qty: 0 | Refills: 0 | Status: DISCONTINUED | OUTPATIENT
Start: 2017-08-11 | End: 2017-08-12

## 2017-08-11 RX ORDER — DEXTROSE 50 % IN WATER 50 %
1 SYRINGE (ML) INTRAVENOUS ONCE
Qty: 0 | Refills: 0 | Status: DISCONTINUED | OUTPATIENT
Start: 2017-08-11 | End: 2017-08-12

## 2017-08-11 RX ORDER — SODIUM CHLORIDE 9 MG/ML
3 INJECTION INTRAMUSCULAR; INTRAVENOUS; SUBCUTANEOUS EVERY 8 HOURS
Qty: 0 | Refills: 0 | Status: DISCONTINUED | OUTPATIENT
Start: 2017-08-11 | End: 2017-08-12

## 2017-08-11 RX ORDER — ASPIRIN/CALCIUM CARB/MAGNESIUM 324 MG
325 TABLET ORAL DAILY
Qty: 0 | Refills: 0 | Status: DISCONTINUED | OUTPATIENT
Start: 2017-08-12 | End: 2017-08-12

## 2017-08-11 RX ORDER — SODIUM CHLORIDE 9 MG/ML
1000 INJECTION INTRAMUSCULAR; INTRAVENOUS; SUBCUTANEOUS
Qty: 0 | Refills: 0 | Status: DISCONTINUED | OUTPATIENT
Start: 2017-08-11 | End: 2017-08-12

## 2017-08-11 RX ORDER — ASPIRIN/CALCIUM CARB/MAGNESIUM 324 MG
325 TABLET ORAL ONCE
Qty: 0 | Refills: 0 | Status: COMPLETED | OUTPATIENT
Start: 2017-08-11 | End: 2017-08-11

## 2017-08-11 RX ORDER — DEXTROSE 50 % IN WATER 50 %
25 SYRINGE (ML) INTRAVENOUS ONCE
Qty: 0 | Refills: 0 | Status: DISCONTINUED | OUTPATIENT
Start: 2017-08-11 | End: 2017-08-12

## 2017-08-11 RX ORDER — BENZOCAINE AND MENTHOL 5; 1 G/100ML; G/100ML
1 LIQUID ORAL EVERY 4 HOURS
Qty: 0 | Refills: 0 | Status: DISCONTINUED | OUTPATIENT
Start: 2017-08-11 | End: 2017-08-12

## 2017-08-11 RX ORDER — DEXAMETHASONE 0.5 MG/5ML
4 ELIXIR ORAL ONCE
Qty: 0 | Refills: 0 | Status: COMPLETED | OUTPATIENT
Start: 2017-08-11 | End: 2017-08-11

## 2017-08-11 RX ORDER — ACETAMINOPHEN 500 MG
1000 TABLET ORAL ONCE
Qty: 0 | Refills: 0 | Status: COMPLETED | OUTPATIENT
Start: 2017-08-11 | End: 2017-08-11

## 2017-08-11 RX ORDER — BUDESONIDE AND FORMOTEROL FUMARATE DIHYDRATE 160; 4.5 UG/1; UG/1
2 AEROSOL RESPIRATORY (INHALATION)
Qty: 0 | Refills: 0 | Status: DISCONTINUED | OUTPATIENT
Start: 2017-08-11 | End: 2017-08-12

## 2017-08-11 RX ORDER — DEXTROSE 50 % IN WATER 50 %
12.5 SYRINGE (ML) INTRAVENOUS ONCE
Qty: 0 | Refills: 0 | Status: DISCONTINUED | OUTPATIENT
Start: 2017-08-11 | End: 2017-08-12

## 2017-08-11 RX ORDER — IPRATROPIUM/ALBUTEROL SULFATE 18-103MCG
3 AEROSOL WITH ADAPTER (GRAM) INHALATION ONCE
Qty: 0 | Refills: 0 | Status: COMPLETED | OUTPATIENT
Start: 2017-08-11 | End: 2017-08-11

## 2017-08-11 RX ORDER — SODIUM CHLORIDE 9 MG/ML
500 INJECTION, SOLUTION INTRAVENOUS ONCE
Qty: 0 | Refills: 0 | Status: COMPLETED | OUTPATIENT
Start: 2017-08-11 | End: 2017-08-11

## 2017-08-11 RX ORDER — LATANOPROST 0.05 MG/ML
1 SOLUTION/ DROPS OPHTHALMIC; TOPICAL AT BEDTIME
Qty: 0 | Refills: 0 | Status: DISCONTINUED | OUTPATIENT
Start: 2017-08-11 | End: 2017-08-12

## 2017-08-11 RX ORDER — DOCUSATE SODIUM 100 MG
100 CAPSULE ORAL THREE TIMES A DAY
Qty: 0 | Refills: 0 | Status: DISCONTINUED | OUTPATIENT
Start: 2017-08-11 | End: 2017-08-12

## 2017-08-11 RX ORDER — CLOPIDOGREL BISULFATE 75 MG/1
75 TABLET, FILM COATED ORAL ONCE
Qty: 0 | Refills: 0 | Status: COMPLETED | OUTPATIENT
Start: 2017-08-11 | End: 2017-08-11

## 2017-08-11 RX ORDER — CLOPIDOGREL BISULFATE 75 MG/1
75 TABLET, FILM COATED ORAL DAILY
Qty: 0 | Refills: 0 | Status: DISCONTINUED | OUTPATIENT
Start: 2017-08-12 | End: 2017-08-12

## 2017-08-11 RX ORDER — CEFUROXIME AXETIL 250 MG
1500 TABLET ORAL EVERY 8 HOURS
Qty: 0 | Refills: 0 | Status: DISCONTINUED | OUTPATIENT
Start: 2017-08-11 | End: 2017-08-12

## 2017-08-11 RX ORDER — EPINEPHRINE 11.25MG/ML
0.5 SOLUTION, NON-ORAL INHALATION ONCE
Qty: 0 | Refills: 0 | Status: COMPLETED | OUTPATIENT
Start: 2017-08-11 | End: 2017-08-11

## 2017-08-11 RX ORDER — INSULIN LISPRO 100/ML
VIAL (ML) SUBCUTANEOUS
Qty: 0 | Refills: 0 | Status: DISCONTINUED | OUTPATIENT
Start: 2017-08-11 | End: 2017-08-12

## 2017-08-11 RX ORDER — SODIUM CHLORIDE 9 MG/ML
1000 INJECTION, SOLUTION INTRAVENOUS
Qty: 0 | Refills: 0 | Status: DISCONTINUED | OUTPATIENT
Start: 2017-08-11 | End: 2017-08-12

## 2017-08-11 RX ORDER — MAGNESIUM SULFATE 500 MG/ML
2 VIAL (ML) INJECTION ONCE
Qty: 0 | Refills: 0 | Status: COMPLETED | OUTPATIENT
Start: 2017-08-11 | End: 2017-08-11

## 2017-08-11 RX ORDER — GLUCAGON INJECTION, SOLUTION 0.5 MG/.1ML
1 INJECTION, SOLUTION SUBCUTANEOUS ONCE
Qty: 0 | Refills: 0 | Status: DISCONTINUED | OUTPATIENT
Start: 2017-08-11 | End: 2017-08-12

## 2017-08-11 RX ORDER — CEFUROXIME AXETIL 250 MG
1500 TABLET ORAL ONCE
Qty: 0 | Refills: 0 | Status: DISCONTINUED | OUTPATIENT
Start: 2017-08-11 | End: 2017-08-12

## 2017-08-11 RX ADMIN — SODIUM CHLORIDE 2000 MILLILITER(S): 9 INJECTION, SOLUTION INTRAVENOUS at 16:00

## 2017-08-11 RX ADMIN — Medication 0.5 MILLILITER(S): at 13:48

## 2017-08-11 RX ADMIN — BENZOCAINE AND MENTHOL 1 LOZENGE: 5; 1 LIQUID ORAL at 19:49

## 2017-08-11 RX ADMIN — Medication 6 MILLIGRAM(S): at 15:35

## 2017-08-11 RX ADMIN — TAMSULOSIN HYDROCHLORIDE 0.4 MILLIGRAM(S): 0.4 CAPSULE ORAL at 21:12

## 2017-08-11 RX ADMIN — Medication 4 MILLIGRAM(S): at 16:33

## 2017-08-11 RX ADMIN — Medication 3 MILLILITER(S): at 13:48

## 2017-08-11 RX ADMIN — LATANOPROST 1 DROP(S): 0.05 SOLUTION/ DROPS OPHTHALMIC; TOPICAL at 21:13

## 2017-08-11 RX ADMIN — Medication 400 MILLIGRAM(S): at 16:35

## 2017-08-11 RX ADMIN — Medication 1000 MILLIGRAM(S): at 17:35

## 2017-08-11 RX ADMIN — SODIUM CHLORIDE 3 MILLILITER(S): 9 INJECTION INTRAMUSCULAR; INTRAVENOUS; SUBCUTANEOUS at 14:54

## 2017-08-11 RX ADMIN — BUDESONIDE AND FORMOTEROL FUMARATE DIHYDRATE 2 PUFF(S): 160; 4.5 AEROSOL RESPIRATORY (INHALATION) at 20:24

## 2017-08-11 RX ADMIN — SODIUM CHLORIDE 750 MILLILITER(S): 9 INJECTION, SOLUTION INTRAVENOUS at 19:02

## 2017-08-11 RX ADMIN — SODIUM CHLORIDE 3 MILLILITER(S): 9 INJECTION INTRAMUSCULAR; INTRAVENOUS; SUBCUTANEOUS at 21:11

## 2017-08-11 RX ADMIN — Medication 100 MILLIGRAM(S): at 19:02

## 2017-08-11 RX ADMIN — Medication 50 GRAM(S): at 20:48

## 2017-08-11 NOTE — BRIEF OPERATIVE NOTE - COMMENTS
Invasive Lines: Right Radial Arterial Line, Left Femoral Arterial & Venous Sheaths with TVP  IV Medication Infusions: None

## 2017-08-11 NOTE — PROGRESS NOTE ADULT - SUBJECTIVE AND OBJECTIVE BOX
Commercial 29mm CoreValve Evolut Pro Percutaneous Transfemoral TAVR via Right Common Femoral Artery.  NCT# [id number], STS/ACC TVT Registry ID# [id number].

## 2017-08-11 NOTE — CONSULT NOTE ADULT - SUBJECTIVE AND OBJECTIVE BOX
[facility], [facility]                                   [address] [place] [address]                                                      PHONE: [phone]                                                         FAX: [phone]  [email or web address]  ---------------------------------------------------------------------------------------------------------------------------------    Reason for Consult: CHB s/p TAVR    HPI:  TIFFANIE ZELAYA is an 84y Male h/o CAD s/p CABG, s/p PCIs most recent HUE to LCx 6/27/17, PAD s/p PTA, HTN, HLD, DM, AS now s/o TAVR this afternoon with CHB post procedure. Temp wire in place in left femoral vein. Initial rhythm post procedure was sinus huber 46. EKG preprocedure revealed NSR with first degree AVB and RBBB. Pt currently comfortable in bed with family at bedside.    PAST MEDICAL & SURGICAL HISTORY:  PVD (peripheral vascular disease): intervention RLE  CAD (coronary artery disease): CABG  Carotid artery disease: RCEA  Former smoker  Fatigue: Profound w/exertion  MENDEZ (dyspnea on exertion)  SOB (shortness of breath)  BPH (benign prostatic hyperplasia)  Angina pectoris: class II  PVD (peripheral vascular disease)  FANNY (obstructive sleep apnea): uses mouth peice  CAD (coronary artery disease): S/P Stenting x8  Asthma  Aortic stenosis  Diabetes mellitus  Hyperlipidemia  Hypertension  S/P CABG x 2  S/P PTCA (percutaneous transluminal coronary angioplasty): x 9  S/P nasal surgery: Secondary to nasal fracture  S/P joint replacement: Bilateral knee replacement  S/P cataract surgery, right  S/P cataract surgery, left  History of CEA (carotid endarterectomy): Bilateral  S/P cholecystectomy  S/P tonsillectomy      No Known Allergies      MEDICATIONS  (STANDING):  sodium chloride 0.9% lock flush 3 milliLiter(s) IV Push every 8 hours  cefuroxime  IVPB 1500 milliGRAM(s) IV Intermittent once  sodium chloride 0.9%. 1000 milliLiter(s) (5 mL/Hr) IV Continuous <Continuous>  sodium chloride 0.9%. 1000 milliLiter(s) (10 mL/Hr) IV Continuous <Continuous>  pantoprazole  Injectable 40 milliGRAM(s) IV Push daily  docusate sodium 100 milliGRAM(s) Oral three times a day  dexamethasone  IVPB 10 milliGRAM(s) IV Intermittent once  aspirin enteric coated 325 milliGRAM(s) Oral once  clopidogrel Tablet 75 milliGRAM(s) Oral once  tamsulosin 0.4 milliGRAM(s) Oral at bedtime  buDESOnide  80 MICROgram(s)/formoterol 4.5 MICROgram(s) Inhaler 2 Puff(s) Inhalation two times a day  latanoprost 0.005% Ophthalmic Solution 1 Drop(s) Both EYES at bedtime  multivitamin 1 Tablet(s) Oral daily  lactated ringers Bolus 500 milliLiter(s) IV Bolus once    MEDICATIONS  (PRN):      Social History:  Cigarettes:      no              Alchohol:       no          Illicit Drug Abuse:  no    ROS: Negative other than as mentioned in HPI.    Vital Signs Last 24 Hrs  T(C): 36.6 (11 Aug 2017 15:30), Max: 36.9 (11 Aug 2017 08:06)  T(F): 97.9 (11 Aug 2017 15:30), Max: 98.4 (11 Aug 2017 08:06)  HR: 59 (11 Aug 2017 15:30) (54 - 75)  BP: 149/67 (11 Aug 2017 08:06) (149/67 - 149/67)  BP(mean): --  RR: 28 (11 Aug 2017 15:30) (13 - 33)  SpO2: 95% (11 Aug 2017 15:30) (92% - 100%)  ICU Vital Signs Last 24 Hrs  TIFFANIE ZELAYA  I&O's Detail    11 Aug 2017 07:01  -  11 Aug 2017 15:48  --------------------------------------------------------  IN:    Lactated Ringers IV Bolus: 500 mL  Total IN: 500 mL    OUT:    Indwelling Catheter - Urethral: 195 mL  Total OUT: 195 mL    Total NET: 305 mL        I&O's Summary    11 Aug 2017 07:01  -  11 Aug 2017 15:48  --------------------------------------------------------  IN: 500 mL / OUT: 195 mL / NET: 305 mL      Drug Dosing Weight  TIFFANIE ZELAYA      PHYSICAL EXAM:  General: Appears well developed.  HEENT: Head; normocephalic, atraumatic.  Eyes: Pupils reactive.  Neck: Supple.  CARDIOVASCULAR: Normal S1 and S2.   LUNGS: Normal breath sounds bilaterally.  ABDOMEN: Soft, nontender.  EXTREMITIES: No clubbing, cyanosis or edema.   SKIN: warm and dry.  NEURO: Alert/oriented.    PSYCH: normal affect.        LABS:                        11.4   8.6   )-----------( 138      ( 11 Aug 2017 14:19 )             34.7     08-11    140  |  106  |  23.0<H>  ----------------------------<  134<H>  4.3   |  23.0  |  0.96    Ca    8.0<L>      11 Aug 2017 14:19  Mg     1.6     08-11    TPro  5.6<L>  /  Alb  3.3  /  TBili  0.6  /  DBili  x   /  AST  14  /  ALT  7   /  AlkPhos  43  08-11    TIFFANIE ZELAYA  CARDIAC MARKERS ( 11 Aug 2017 14:19 )  x     / 0.08 ng/mL / 144 U/L / x     / x          PT/INR - ( 11 Aug 2017 14:19 )   PT: 13.0 sec;   INR: 1.18 ratio         PTT - ( 11 Aug 2017 14:19 )  PTT:26.0 sec      RADIOLOGY & ADDITIONAL STUDIES:    INTERPRETATION OF TELEMETRY (personally reviewed): CHB with V pacing    ECG: as described above    ECHO: EF 55-60%      Assessment and Plan:  In summary, TIFFANIE ZELAYA is an 84y Male with past medical history significant for CAD s/p CABG, s/p PCIs most recent HUE to LCx 6/27/17, PAD s/p PTA, HTN, HLD, DM, AS now s/o TAVR this afternoon with CHB post procedure. Temp wire in place in left femoral vein. Initial rhythm post procedure was sinus huber 46. EKG preprocedure revealed NSR with first degree AVB and RBBB. Pt currently comfortable in bed with family at bedside.    1. If CHB does not resolve, then recommend proceeding with PPM.  2. Discussed with CT ICU service.

## 2017-08-12 ENCOUNTER — MOBILE ON CALL (OUTPATIENT)
Age: 82
End: 2017-08-12

## 2017-08-12 VITALS
OXYGEN SATURATION: 95 % | SYSTOLIC BLOOD PRESSURE: 123 MMHG | HEART RATE: 68 BPM | DIASTOLIC BLOOD PRESSURE: 63 MMHG | RESPIRATION RATE: 24 BRPM

## 2017-08-12 DIAGNOSIS — I46.9 CARDIAC ARREST, CAUSE UNSPECIFIED: ICD-10-CM

## 2017-08-12 DIAGNOSIS — I35.0 NONRHEUMATIC AORTIC (VALVE) STENOSIS: ICD-10-CM

## 2017-08-12 DIAGNOSIS — I44.2 ATRIOVENTRICULAR BLOCK, COMPLETE: ICD-10-CM

## 2017-08-12 DIAGNOSIS — I25.10 ATHEROSCLEROTIC HEART DISEASE OF NATIVE CORONARY ARTERY WITHOUT ANGINA PECTORIS: ICD-10-CM

## 2017-08-12 DIAGNOSIS — R06.1 STRIDOR: ICD-10-CM

## 2017-08-12 DIAGNOSIS — G47.33 OBSTRUCTIVE SLEEP APNEA (ADULT) (PEDIATRIC): ICD-10-CM

## 2017-08-12 DIAGNOSIS — I50.32 CHRONIC DIASTOLIC (CONGESTIVE) HEART FAILURE: ICD-10-CM

## 2017-08-12 DIAGNOSIS — E11.9 TYPE 2 DIABETES MELLITUS WITHOUT COMPLICATIONS: ICD-10-CM

## 2017-08-12 DIAGNOSIS — J45.909 UNSPECIFIED ASTHMA, UNCOMPLICATED: ICD-10-CM

## 2017-08-12 DIAGNOSIS — N40.0 BENIGN PROSTATIC HYPERPLASIA WITHOUT LOWER URINARY TRACT SYMPTOMS: ICD-10-CM

## 2017-08-12 LAB
ALBUMIN SERPL ELPH-MCNC: 3.4 G/DL — SIGNIFICANT CHANGE UP (ref 3.3–5.2)
ALP SERPL-CCNC: 47 U/L — SIGNIFICANT CHANGE UP (ref 40–120)
ALT FLD-CCNC: 7 U/L — SIGNIFICANT CHANGE UP
ANION GAP SERPL CALC-SCNC: 10 MMOL/L — SIGNIFICANT CHANGE UP (ref 5–17)
APTT BLD: 27.8 SEC — SIGNIFICANT CHANGE UP (ref 27.5–37.4)
AST SERPL-CCNC: 19 U/L — SIGNIFICANT CHANGE UP
BILIRUB DIRECT SERPL-MCNC: 0.1 MG/DL — SIGNIFICANT CHANGE UP (ref 0–0.3)
BILIRUB INDIRECT FLD-MCNC: 0.2 MG/DL — SIGNIFICANT CHANGE UP (ref 0.2–1)
BILIRUB SERPL-MCNC: 0.3 MG/DL — LOW (ref 0.4–2)
BUN SERPL-MCNC: 25 MG/DL — HIGH (ref 8–20)
CALCIUM SERPL-MCNC: 8.2 MG/DL — LOW (ref 8.6–10.2)
CHLORIDE SERPL-SCNC: 103 MMOL/L — SIGNIFICANT CHANGE UP (ref 98–107)
CK MB CFR SERPL CALC: 11.5 NG/ML — HIGH (ref 0–6.7)
CK SERPL-CCNC: 192 U/L — SIGNIFICANT CHANGE UP (ref 30–200)
CO2 SERPL-SCNC: 25 MMOL/L — SIGNIFICANT CHANGE UP (ref 22–29)
CREAT SERPL-MCNC: 1.1 MG/DL — SIGNIFICANT CHANGE UP (ref 0.5–1.3)
GLUCOSE SERPL-MCNC: 200 MG/DL — HIGH (ref 70–115)
HCT VFR BLD CALC: 36.3 % — LOW (ref 42–52)
HGB BLD-MCNC: 11.9 G/DL — LOW (ref 14–18)
INR BLD: 1.12 RATIO — SIGNIFICANT CHANGE UP (ref 0.88–1.16)
MAGNESIUM SERPL-MCNC: 2.5 MG/DL — SIGNIFICANT CHANGE UP (ref 1.6–2.6)
MCHC RBC-ENTMCNC: 32.1 PG — HIGH (ref 27–31)
MCHC RBC-ENTMCNC: 32.8 G/DL — SIGNIFICANT CHANGE UP (ref 32–36)
MCV RBC AUTO: 97.8 FL — HIGH (ref 80–94)
PLATELET # BLD AUTO: 181 K/UL — SIGNIFICANT CHANGE UP (ref 150–400)
POTASSIUM SERPL-MCNC: 4.6 MMOL/L — SIGNIFICANT CHANGE UP (ref 3.5–5.3)
POTASSIUM SERPL-SCNC: 4.6 MMOL/L — SIGNIFICANT CHANGE UP (ref 3.5–5.3)
PROT SERPL-MCNC: 5.8 G/DL — LOW (ref 6.6–8.7)
PROTHROM AB SERPL-ACNC: 12.3 SEC — SIGNIFICANT CHANGE UP (ref 9.8–12.7)
RBC # BLD: 3.71 M/UL — LOW (ref 4.6–6.2)
RBC # FLD: 12.7 % — SIGNIFICANT CHANGE UP (ref 11–15.6)
SODIUM SERPL-SCNC: 138 MMOL/L — SIGNIFICANT CHANGE UP (ref 135–145)
TROPONIN T SERPL-MCNC: 0.1 NG/ML — HIGH (ref 0–0.06)
WBC # BLD: 11.8 K/UL — HIGH (ref 4.8–10.8)
WBC # FLD AUTO: 11.8 K/UL — HIGH (ref 4.8–10.8)

## 2017-08-12 PROCEDURE — 93010 ELECTROCARDIOGRAM REPORT: CPT

## 2017-08-12 PROCEDURE — 71010: CPT | Mod: 26

## 2017-08-12 RX ORDER — MAGNESIUM SULFATE 500 MG/ML
2 VIAL (ML) INJECTION ONCE
Qty: 0 | Refills: 0 | Status: COMPLETED | OUTPATIENT
Start: 2017-08-12 | End: 2017-08-12

## 2017-08-12 RX ORDER — LOSARTAN POTASSIUM 100 MG/1
1 TABLET, FILM COATED ORAL
Qty: 0 | Refills: 0 | COMMUNITY
Start: 2017-08-12

## 2017-08-12 RX ORDER — CEPHALEXIN 500 MG
1 CAPSULE ORAL
Qty: 0 | Refills: 0 | COMMUNITY
Start: 2017-08-12

## 2017-08-12 RX ORDER — LOSARTAN POTASSIUM 100 MG/1
25 TABLET, FILM COATED ORAL DAILY
Qty: 0 | Refills: 0 | Status: DISCONTINUED | OUTPATIENT
Start: 2017-08-12 | End: 2017-08-12

## 2017-08-12 RX ORDER — ASPIRIN/CALCIUM CARB/MAGNESIUM 324 MG
1 TABLET ORAL
Qty: 0 | Refills: 0 | DISCHARGE
Start: 2017-08-12

## 2017-08-12 RX ORDER — GLIMEPIRIDE 1 MG
2 TABLET ORAL
Qty: 0 | Refills: 0 | Status: DISCONTINUED | OUTPATIENT
Start: 2017-08-12 | End: 2017-08-12

## 2017-08-12 RX ORDER — PANTOPRAZOLE SODIUM 20 MG/1
40 TABLET, DELAYED RELEASE ORAL
Qty: 0 | Refills: 0 | Status: DISCONTINUED | OUTPATIENT
Start: 2017-08-12 | End: 2017-08-12

## 2017-08-12 RX ORDER — LOSARTAN POTASSIUM 100 MG/1
1 TABLET, FILM COATED ORAL
Qty: 0 | Refills: 0 | COMMUNITY

## 2017-08-12 RX ORDER — LOSARTAN POTASSIUM 100 MG/1
1 TABLET, FILM COATED ORAL
Qty: 30 | Refills: 1 | OUTPATIENT
Start: 2017-08-12 | End: 2017-10-10

## 2017-08-12 RX ORDER — TAMSULOSIN HYDROCHLORIDE 0.4 MG/1
1 CAPSULE ORAL
Qty: 0 | Refills: 0 | DISCHARGE
Start: 2017-08-12

## 2017-08-12 RX ORDER — CEFAZOLIN SODIUM 1 G
2000 VIAL (EA) INJECTION EVERY 8 HOURS
Qty: 0 | Refills: 0 | Status: DISCONTINUED | OUTPATIENT
Start: 2017-08-12 | End: 2017-08-12

## 2017-08-12 RX ORDER — CEPHALEXIN 500 MG
500 CAPSULE ORAL EVERY 12 HOURS
Qty: 0 | Refills: 0 | Status: DISCONTINUED | OUTPATIENT
Start: 2017-08-12 | End: 2017-08-12

## 2017-08-12 RX ORDER — DOCUSATE SODIUM 100 MG
1 CAPSULE ORAL
Qty: 0 | Refills: 0 | COMMUNITY
Start: 2017-08-12

## 2017-08-12 RX ORDER — FENTANYL CITRATE 50 UG/ML
25 INJECTION INTRAVENOUS ONCE
Qty: 0 | Refills: 0 | Status: DISCONTINUED | OUTPATIENT
Start: 2017-08-12 | End: 2017-08-12

## 2017-08-12 RX ORDER — CEPHALEXIN 500 MG
1 CAPSULE ORAL
Qty: 10 | Refills: 0 | OUTPATIENT
Start: 2017-08-12 | End: 2017-08-17

## 2017-08-12 RX ORDER — CLOPIDOGREL BISULFATE 75 MG/1
1 TABLET, FILM COATED ORAL
Qty: 0 | Refills: 0 | DISCHARGE
Start: 2017-08-12

## 2017-08-12 RX ADMIN — Medication 4: at 11:34

## 2017-08-12 RX ADMIN — BUDESONIDE AND FORMOTEROL FUMARATE DIHYDRATE 2 PUFF(S): 160; 4.5 AEROSOL RESPIRATORY (INHALATION) at 08:16

## 2017-08-12 RX ADMIN — Medication 1 TABLET(S): at 11:35

## 2017-08-12 RX ADMIN — FENTANYL CITRATE 25 MICROGRAM(S): 50 INJECTION INTRAVENOUS at 02:22

## 2017-08-12 RX ADMIN — CLOPIDOGREL BISULFATE 75 MILLIGRAM(S): 75 TABLET, FILM COATED ORAL at 11:36

## 2017-08-12 RX ADMIN — Medication 100 MILLIGRAM(S): at 06:00

## 2017-08-12 RX ADMIN — Medication 100 MILLIGRAM(S): at 06:29

## 2017-08-12 RX ADMIN — Medication 100 MILLIGRAM(S): at 03:15

## 2017-08-12 RX ADMIN — Medication 2: at 07:58

## 2017-08-12 RX ADMIN — Medication 100 MILLIGRAM(S): at 11:34

## 2017-08-12 RX ADMIN — LOSARTAN POTASSIUM 25 MILLIGRAM(S): 100 TABLET, FILM COATED ORAL at 11:34

## 2017-08-12 RX ADMIN — SODIUM CHLORIDE 3 MILLILITER(S): 9 INJECTION INTRAMUSCULAR; INTRAVENOUS; SUBCUTANEOUS at 05:03

## 2017-08-12 RX ADMIN — FENTANYL CITRATE 25 MICROGRAM(S): 50 INJECTION INTRAVENOUS at 02:10

## 2017-08-12 RX ADMIN — Medication 50 GRAM(S): at 00:25

## 2017-08-12 RX ADMIN — PANTOPRAZOLE SODIUM 40 MILLIGRAM(S): 20 TABLET, DELAYED RELEASE ORAL at 07:59

## 2017-08-12 RX ADMIN — Medication 325 MILLIGRAM(S): at 11:35

## 2017-08-12 RX ADMIN — Medication 2 MILLIGRAM(S): at 09:24

## 2017-08-12 RX ADMIN — Medication 24 MILLIGRAM(S): at 11:32

## 2017-08-12 NOTE — PROGRESS NOTE ADULT - PROBLEM SELECTOR PLAN 6
Patient uses nasal CPAP at home  Did not bring CPAP machine to hospital  Offered hospital CPAP machine and refused  SpO2 stable on NC

## 2017-08-12 NOTE — DIETITIAN INITIAL EVALUATION ADULT. - PERTINENT LABORATORY DATA
(8/12)- Labs WNL except low H/H 11.9/36.3, elevated BUN 25, elevated glucose 200, elevated Hgb A1c 8.3

## 2017-08-12 NOTE — DISCHARGE NOTE ADULT - CARE PROVIDER_API CALL
Joshua Jones), Surgery; Thoracic [place] [address]  Phone: [phone]  Fax: [phone]    Kem Mcgill), Cardiovascular Disease; Interventional Cardiology  [address] [place] [address]  Phone: [phone]  Fax: [phone]    Kamran Wiggins), Cardiac Electrophysiology; Cardiovascular Disease  [address] [place] [address]  Phone: [phone]  Fax: [phone] Joshua Jones), Surgery; Thoracic and Cardiac Surgery  [facility]  [address] [place] [address]  Phone: [phone]  Fax: [phone]    Kem Mcgill), Cardiovascular Disease; Interventional Cardiology  [address] [place] [address]  Phone: [phone]  Fax: [phone]    Kamran Wiggins), Cardiac Electrophysiology; Cardiovascular Disease  [address] [place] [address]  Phone: [phone]  Fax: [phone]    Abel Weir (DO), Critical Care Medicine; Internal Medicine; Pulmonary Disease  [address] [facility]  [address]  [place] [address]  Phone: [phone]  Fax: [phone]

## 2017-08-12 NOTE — PROGRESS NOTE ADULT - PROBLEM SELECTOR PLAN 1
s/p TAVR with 29mm core valve  ASA and Plavix to resume in AM  CBC to monitor platelet count  Encourage PO intake  Encourage OOB to chair and ambulation with PT  Encourage deep breathing exercised and coughing  Chest PT with nursing staff

## 2017-08-12 NOTE — DISCHARGE NOTE ADULT - ADDITIONAL INSTRUCTIONS
Please follow up with Dr. Jones by calling the CT PA office at [phone] on Monday August 14, 2017 to make a follow up appointment.   Please follow up with Dr. Wiggins by calling his office at [phone] on Monday August 14, 2017 to make a follow up appointment.  Please call the Cardiothoracic Surgery office at [phone] if you are experiencing any shortness of breath, chest pain, fevers or chills, drainage from the incisions, persistent nausea, vomiting or if you have any questions about your medications. If the symptoms are severe, call [phone] and go to the nearest hospital. Please follow up with Dr. Jones by calling the CT PA office at [phone] on Monday August 14, 2017 to make a follow up appointment.   Please follow up with Dr. Wiggins by calling his office at [phone] on Monday August 14, 2017 to make a follow up appointment.  Please follow up with Dr. Mcgill by calling his office on Monday August 14, 2017 to make a follow up appointment.  Please follow up with Dr. Weir by calling his office on Monday August 14, 2017 to make a follow up appointment.  Please call the Cardiothoracic Surgery office at [phone] if you are experiencing any shortness of breath, chest pain, fevers or chills, drainage from the incisions, persistent nausea, vomiting or if you have any questions about your medications. If the symptoms are severe, call [phone] and go to the nearest hospital.

## 2017-08-12 NOTE — DISCHARGE NOTE ADULT - INSTRUCTIONS
A registered dietician can help you create a personalized plan for healthy eating. Make your calories count by choosin. Healthy carbohydrates such as fruits, vegetables, whole grains, legumes (beans, peas and lentils) and low-fat dairy products.  2. Fiber-rich foods such as vegetables, fruits, nuts, legumes, whole-wheat flour and wheat bran.  3. Heart-healthy fish at least twice a week such as cod, tuna and halibut, which are low-fat options as well as salmon, mackerel, tuna, sardines and bluefish, which are rich in omega-3 fatty acids. Avoid fish with high levels of mercury.  4. "Good" fats such as avocados, almonds, pecans, walnuts, olives and canola, olive and peanut oils.     Minimize foods with high saturated fats (beef, hot dogs, sausage and pollock), trans fats (processed snacks, baked goods, shortening and stick margarines), high cholesterol foods (high fat dairy products and animal proteins, fried food) and high sodium foods (fast food, many frozen foods, processed food).

## 2017-08-12 NOTE — PROGRESS NOTE ADULT - ASSESSMENT
84 year old Male POD# 1 TAVR 29mm core valve via right groin percutaneous approach with post-operative complete heart block progressed to ventricular standstill requiring PPM. Now with [employer] PPM A sensing V pacing with settings of DDD . Immediate post-op period significant for stridor requiring decadron and racemic epinephrine, now resolved.

## 2017-08-12 NOTE — DISCHARGE NOTE ADULT - FINDINGS/TREATMENT
24 mm core valve with mild PVL [employer] PPM left chest settings DDD , currently A sensing V pacing

## 2017-08-12 NOTE — PATIENT PROFILE ADULT. - NS PRO OT REFERRAL QUES 2 YN
83 year old male with Sarcoidosis, Spinal Stenosis, Peripheral Neuropathy, Rt. 3rd toe ulcer, acute on chronic constipation.    Problem/Plan:  Toe Ulcer r/o osteomyelitis - defer work-up and management to primary team, ID following  pain management    Problem/Plan  Acute on Chronic Constipation - improving with bowel regimen  Miralax BID  Colace  Senna HS  Psyllium daily  follow up TSH (testing)  GI prophylaxis - continue PPI     Please call over weekend prn with GI concerns      Enrico Mcneill PA-C    [facility]  [phone] no

## 2017-08-12 NOTE — DISCHARGE NOTE ADULT - PROVIDER TOKENS
TOKEN:'[id number]',TOKEN:'[id number]',TOKEN:'[id number]' TOKEN:'[id number]',TOKEN:'[id number]'

## 2017-08-12 NOTE — DISCHARGE NOTE ADULT - NSFTFSERV1RD_GEN_ALL_CORE
teaching and training/wound care and assessment/medication teaching and assessment/observation and assessment

## 2017-08-12 NOTE — BRIEF OPERATIVE NOTE - POST-OP DX
Chronic diastolic CHF (congestive heart failure), NYHA class 3  08/11/2017    Active  Deyvi Mars  Complete heart block  08/12/2017    Active  Kamran Wiggins  Severe aortic stenosis  08/11/2017    Active  Deyvi Mars
Chronic diastolic CHF (congestive heart failure), NYHA class 3  08/11/2017    Active  Deyvi Mars  Severe aortic stenosis  08/11/2017    Active  Deyvi Mars

## 2017-08-12 NOTE — PROGRESS NOTE ADULT - PROBLEM SELECTOR PLAN 7
Flomax restarted  Patient had traumatic villeda insertion, urine color improving  Will flush then d/c in AM  Follow up trial to void

## 2017-08-12 NOTE — PROGRESS NOTE ADULT - PROBLEM SELECTOR PLAN 3
s/p [employer] PPM placement in left chest  post-op CXR without PTX, official read pending  Dr. Fritz spoke to family and patient at the bedside, explained post-operative instructions  Instruction written in discharge note

## 2017-08-12 NOTE — DISCHARGE NOTE ADULT - CARE PLAN
Principal Discharge DX:	Severe aortic stenosis  Goal:	To recover from surgery.  Instructions for follow-up, activity and diet:	Please follow up with Dr. Jones by calling the [place] PA office at [phone] on Monday August 14, 2017 to make a follow up appointment.   The cardiac surgery office is on the second floor of [facility].   Take the [place] ("G") elevators to 2 and make a left.   Walk down to the second hallway on your left (before the double doors).   Sign says Cardiovascular and Thoracic Surgery.  The waiting room is on your left.   1. Take ALL of your medications as ordered. Fill your prescriptions the day you are discharged and take according to the schedule you were given. Continue to take a stool softener if you are taking narcotic pain medications. AVOID medications such as ibuprofen or naproxen if you have had bypass surgery. If you have any questions or are unable to fill the prescriptions, please call the office right away at [phone].  2. Shower daily. Clean all incisions daily while showering with warm water and mild soap, pat dry with a clean towel and do not cover with any dressings unless instructed to. No bathing, swimming in a pool or the ocean until instructed by MD.  DO NOT use creams or lotions on the wound.  3. We advise that you do not drive until instructed by MD.   4. You may not return to work until instructed by MD.   5. Please eat a low fat, low cholesterol, low salt diet. (No added/extra salt)  6. Weigh yourself every day in the morning and record it in the weight log in your red folder. Notify the office of any weight gain more than 2-3 pounds in 24 hours.  7. Continue breathing exercises several times a day. Continue to use your heart pillow.  8. No heavy lifting nothing greater than 5 pounds until cleared by MD.   9. Call / Notify MD any fever greater than 101.0, any drainage from incisions or if they become red, hot or very tender to the touch.  10. Increase activity as tolerated. Walk indoors and/or outdoors at least 3 times a day.  Secondary Diagnosis:	Chronic diastolic heart failure  Goal:	To continue medications, maintain low sodium intake, and weigh yourself daily.  Instructions for follow-up, activity and diet:	Please follow up with your Cardiologist and Primary Care Physician 2-4 weeks from discharge.  Secondary Diagnosis:	Complete heart block, post-surgical  Goal:	S/P [employer] pacemaker placed on 8/11/17  Instructions for follow-up, activity and diet:	Please do not shower for 7 days or until you follow up- sponge baths only with careful attention to avoiding the pacemaker area.  Please do not move the left arm above shoulder height, use sling if needed.  No physical activity (golf) for at least 4 weeks.  Take antibiotics as prescribed.  Please follow up with your electrophysiologist in 1 week from discharge. Principal Discharge DX:	Severe aortic stenosis  Goal:	To recover from surgery.  Instructions for follow-up, activity and diet:	Please follow up with Dr. Jones by calling the CT PA office at [phone] on Monday August 14, 2017 to make a follow up appointment.   The cardiac surgery office is on the second floor of [facility].   Take the [place] ("G") elevators to 2 and make a left.   Walk down to the second hallway on your left (before the double doors).   Sign says Cardiovascular and Thoracic Surgery.  The waiting room is on your left.   1. Take ALL of your medications as ordered. Fill your prescriptions the day you are discharged and take according to the schedule you were given. Continue to take a stool softener if you are taking narcotic pain medications. AVOID medications such as ibuprofen or naproxen if you have had bypass surgery. If you have any questions or are unable to fill the prescriptions, please call the office right away at [phone].  2. Shower daily. Clean all incisions daily while showering with warm water and mild soap, pat dry with a clean towel and do not cover with any dressings unless instructed to. No bathing, swimming in a pool or the ocean until instructed by MD.  DO NOT use creams or lotions on the wound.  3. We advise that you do not drive until instructed by MD.   4. You may not return to work until instructed by MD.   5. Please eat a low fat, low cholesterol, low salt diet. (No added/extra salt)  6. Weigh yourself every day in the morning and record it in the weight log in your red folder. Notify the office of any weight gain more than 2-3 pounds in 24 hours.  7. Continue breathing exercises several times a day. Continue to use your heart pillow.  8. No heavy lifting nothing greater than 5 pounds until cleared by MD.   9. Call / Notify MD any fever greater than 101.0, any drainage from incisions or if they become red, hot or very tender to the touch.  10. Increase activity as tolerated. Walk indoors and/or outdoors at least 3 times a day.  Secondary Diagnosis:	Chronic diastolic heart failure  Goal:	To continue medications, maintain low sodium intake, and weigh yourself daily.  Instructions for follow-up, activity and diet:	Please follow up with your Cardiologist and Primary Care Physician 2-4 weeks from discharge.  Secondary Diagnosis:	Complete heart block, post-surgical  Goal:	S/P [employer] pacemaker placed on 8/11/17  Instructions for follow-up, activity and diet:	Cardiac Device Implant Post Operative Instructions  - Bruising around the implant site or over the chest, side or arm near the incision is normal, and will take a few weeks to resolve.  -Do not lift the affected arm higher than 90 degrees (shoulder height) in any direction for 4 weeks.   - Do not push, pull or lift anything heavier than 10 lbs (about a gallon of milk) with the affected arm for 4 weeks.     - Do not touch the incision until it is completely healed.    - Do not apply soaps, creams, lotions, ointments or powders to the incision until it is completely healed.  You should call the doctor if:   - you notice redness, drainage, swelling, increased tenderness, hot sensation around the  incision, bleeding or incision edges pulling apart.  - your temperature is greater than 100 degress F for more than 24 hours.  - you notice swelling or bulging at the incision or around the device that was not there when you left the hospital or is increasing in size.  -you experience increased difficulty breathing.  - you notice new/worsening swelling in your legs and ankles.  - you faint or have dizzy spells.  -you have any questions or concerns regarding your device or the procedure. Principal Discharge DX:	Severe aortic stenosis  Goal:	To recover from surgery.  Instructions for follow-up, activity and diet:	Please follow up with Dr. Jones by calling the CT PA office at [phone] on Monday August 14, 2017 to make a follow up appointment.   The cardiac surgery office is on the second floor of [facility].   Take the [place] ("G") elevators to 2 and make a left.   Walk down to the second hallway on your left (before the double doors).   Sign says Cardiovascular and Thoracic Surgery.  The waiting room is on your left.   1. Take ALL of your medications as ordered. Fill your prescriptions the day you are discharged and take according to the schedule you were given. Continue to take a stool softener if you are taking narcotic pain medications. AVOID medications such as ibuprofen or naproxen if you have had bypass surgery. If you have any questions or are unable to fill the prescriptions, please call the office right away at [phone].  2. Shower daily. Clean all incisions daily while showering with warm water and mild soap, pat dry with a clean towel and do not cover with any dressings unless instructed to. No bathing, swimming in a pool or the ocean until instructed by MD.  DO NOT use creams or lotions on the wound.  3. We advise that you do not drive until instructed by MD.   4. You may not return to work until instructed by MD.   5. Please eat a low fat, low cholesterol, low salt diet. (No added/extra salt)  6. Weigh yourself every day in the morning and record it in the weight log in your red folder. Notify the office of any weight gain more than 2-3 pounds in 24 hours.  7. Continue breathing exercises several times a day. Continue to use your heart pillow.  8. No heavy lifting nothing greater than 5 pounds until cleared by MD.   9. Call / Notify MD any fever greater than 101.0, any drainage from incisions or if they become red, hot or very tender to the touch.  10. Increase activity as tolerated. Walk indoors and/or outdoors at least 3 times a day.  Secondary Diagnosis:	Chronic diastolic heart failure  Goal:	To continue medications, maintain low sodium intake, and weigh yourself daily.  Instructions for follow-up, activity and diet:	Please follow up with your Cardiologist and Primary Care Physician 1-2 weeks from discharge.  Secondary Diagnosis:	Complete heart block, post-surgical  Goal:	S/P [employer] pacemaker placed on 8/11/17  Instructions for follow-up, activity and diet:	Cardiac Device Implant Post Operative Instructions  - Bruising around the implant site or over the chest, side or arm near the incision is normal, and will take a few weeks to resolve.  -Do not lift the affected arm higher than 90 degrees (shoulder height) in any direction for 4 weeks.   - Do not push, pull or lift anything heavier than 10 lbs (about a gallon of milk) with the affected arm for 4 weeks.     - Do not touch the incision until it is completely healed.    - Do not apply soaps, creams, lotions, ointments or powders to the incision until it is completely healed.  You should call the doctor if:   - you notice redness, drainage, swelling, increased tenderness, hot sensation around the  incision, bleeding or incision edges pulling apart.  - your temperature is greater than 100 degress F for more than 24 hours.  - you notice swelling or bulging at the incision or around the device that was not there when you left the hospital or is increasing in size.  -you experience increased difficulty breathing.  - you notice new/worsening swelling in your legs and ankles.  - you faint or have dizzy spells.  -you have any questions or concerns regarding your device or the procedure. Principal Discharge DX:	Severe aortic stenosis  Goal:	To recover from surgery.  Instructions for follow-up, activity and diet:	Please follow up with Dr. Jones by calling the CT PA office at [phone] on Monday August 14, 2017 to make a follow up appointment.   The cardiac surgery office is on the second floor of [facility].   Take the [place] ("G") elevators to 2 and make a left.   Walk down to the second hallway on your left (before the double doors).   Sign says Cardiovascular and Thoracic Surgery.  The waiting room is on your left.   1. Take ALL of your medications as ordered. Fill your prescriptions the day you are discharged and take according to the schedule you were given. Continue to take a stool softener if you are taking narcotic pain medications. AVOID medications such as ibuprofen or naproxen if you have had bypass surgery. If you have any questions or are unable to fill the prescriptions, please call the office right away at [phone].  2. Shower daily. Clean all incisions daily while showering with warm water and mild soap, pat dry with a clean towel and do not cover with any dressings unless instructed to. No bathing, swimming in a pool or the ocean until instructed by MD.  DO NOT use creams or lotions on the wound.  3. We advise that you do not drive until instructed by MD.   4. You may not return to work until instructed by MD.   5. Please eat a low fat, low cholesterol, low salt diet. (No added/extra salt)  6. Weigh yourself every day in the morning and record it in the weight log in your red folder. Notify the office of any weight gain more than 2-3 pounds in 24 hours.  7. Continue breathing exercises several times a day. Continue to use your heart pillow.  8. No heavy lifting nothing greater than 5 pounds until cleared by MD.   9. Call / Notify MD any fever greater than 101.0, any drainage from incisions or if they become red, hot or very tender to the touch.  10. Increase activity as tolerated. Walk indoors and/or outdoors at least 3 times a day.  Secondary Diagnosis:	Chronic diastolic heart failure  Goal:	To continue medications, maintain low sodium intake, and weigh yourself daily.  Instructions for follow-up, activity and diet:	Please follow up with your Cardiologist and Primary Care Physician 1-2 weeks from discharge.  Secondary Diagnosis:	Complete heart block, post-surgical  Goal:	S/P [employer] pacemaker placed on 8/11/17  Instructions for follow-up, activity and diet:	Cardiac Device Implant Post Operative Instructions  - Bruising around the implant site or over the chest, side or arm near the incision is normal, and will take a few weeks to resolve.  -Do not lift the affected arm higher than 90 degrees (shoulder height) in any direction for 4 weeks.   - Do not push, pull or lift anything heavier than 10 lbs (about a gallon of milk) with the affected arm for 4 weeks.     - Do not touch the incision until it is completely healed.    - Do not apply soaps, creams, lotions, ointments or powders to the incision until it is completely healed.  You should call the doctor if:   - you notice redness, drainage, swelling, increased tenderness, hot sensation around the  incision, bleeding or incision edges pulling apart.  - your temperature is greater than 100 degress F for more than 24 hours.  - you notice swelling or bulging at the incision or around the device that was not there when you left the hospital or is increasing in size.  -you experience increased difficulty breathing.  - you notice new/worsening swelling in your legs and ankles.  - you faint or have dizzy spells.  -you have any questions or concerns regarding your device or the procedure. Principal Discharge DX:	Severe aortic stenosis  Goal:	To recover from surgery.  Instructions for follow-up, activity and diet:	Please follow up with Dr. Jones by calling the CT PA office at [phone] on Monday August 14, 2017 to make a follow up appointment.   The cardiac surgery office is on the second floor of [facility].   Take the [place] ("G") elevators to 2 and make a left.   Walk down to the second hallway on your left (before the double doors).   Sign says Cardiovascular and Thoracic Surgery.  The waiting room is on your left.   1. Take ALL of your medications as ordered. Fill your prescriptions the day you are discharged and take according to the schedule you were given. Continue to take a stool softener if you are taking narcotic pain medications. AVOID medications such as ibuprofen or naproxen if you have had bypass surgery. If you have any questions or are unable to fill the prescriptions, please call the office right away at [phone].  2. Shower daily. Clean all incisions daily while showering with warm water and mild soap, pat dry with a clean towel and do not cover with any dressings unless instructed to. No bathing, swimming in a pool or the ocean until instructed by MD.  DO NOT use creams or lotions on the wound.  3. We advise that you do not drive until instructed by MD.   4. You may not return to work until instructed by MD.   5. Please eat a low fat, low cholesterol, low salt diet. (No added/extra salt)  6. Weigh yourself every day in the morning and record it in the weight log in your red folder. Notify the office of any weight gain more than 2-3 pounds in 24 hours.  7. Continue breathing exercises several times a day. Continue to use your heart pillow.  8. No heavy lifting nothing greater than 5 pounds until cleared by MD.   9. Call / Notify MD any fever greater than 101.0, any drainage from incisions or if they become red, hot or very tender to the touch.  10. Increase activity as tolerated. Walk indoors and/or outdoors at least 3 times a day.  Secondary Diagnosis:	Chronic diastolic heart failure  Goal:	To continue medications, maintain low sodium intake, and weigh yourself daily.  Instructions for follow-up, activity and diet:	Please follow up with your Cardiologist and Primary Care Physician 1-2 weeks from discharge.  Secondary Diagnosis:	Complete heart block, post-surgical  Goal:	S/P [place] pacemaker placed on 8/11/17  Instructions for follow-up, activity and diet:	Cardiac Device Implant Post Operative Instructions  - Bruising around the implant site or over the chest, side or arm near the incision is normal, and will take a few weeks to resolve.  -Do not lift the affected arm higher than 90 degrees (shoulder height) in any direction for 4 weeks.   - Do not push, pull or lift anything heavier than 10 lbs (about a gallon of milk) with the affected arm for 4 weeks.     - Do not touch the incision until it is completely healed.    - Do not apply soaps, creams, lotions, ointments or powders to the incision until it is completely healed.  You should call the doctor if:   - you notice redness, drainage, swelling, increased tenderness, hot sensation around the  incision, bleeding or incision edges pulling apart.  - your temperature is greater than 100 degress F for more than 24 hours.  - you notice swelling or bulging at the incision or around the device that was not there when you left the hospital or is increasing in size.  -you experience increased difficulty breathing.  - you notice new/worsening swelling in your legs and ankles.  - you faint or have dizzy spells.  -you have any questions or concerns regarding your device or the procedure. Principal Discharge DX:	Severe aortic stenosis  Goal:	To recover from surgery.  Instructions for follow-up, activity and diet:	Please follow up with Dr. Jones by calling the CT PA office at [phone] on Monday August 14, 2017 to make a follow up appointment.   The cardiac surgery office is on the second floor of [facility].   Take the [place] ("G") elevators to 2 and make a left.   Walk down to the second hallway on your left (before the double doors).   Sign says Cardiovascular and Thoracic Surgery.  The waiting room is on your left.   1. Take ALL of your medications as ordered. Fill your prescriptions the day you are discharged and take according to the schedule you were given. Continue to take a stool softener if you are taking narcotic pain medications. AVOID medications such as ibuprofen or naproxen if you have had bypass surgery. If you have any questions or are unable to fill the prescriptions, please call the office right away at [phone].  2. Shower daily. Clean all incisions daily while showering with warm water and mild soap, pat dry with a clean towel and do not cover with any dressings unless instructed to. No bathing, swimming in a pool or the ocean until instructed by MD.  DO NOT use creams or lotions on the wound.  3. We advise that you do not drive until instructed by MD.   4. You may not return to work until instructed by MD.   5. Please eat a low fat, low cholesterol, low salt diet. (No added/extra salt)  6. Weigh yourself every day in the morning and record it in the weight log in your red folder. Notify the office of any weight gain more than 2-3 pounds in 24 hours.  7. Continue breathing exercises several times a day. Continue to use your heart pillow.  8. No heavy lifting nothing greater than 5 pounds until cleared by MD.   9. Call / Notify MD any fever greater than 101.0, any drainage from incisions or if they become red, hot or very tender to the touch.  10. Increase activity as tolerated. Walk indoors and/or outdoors at least 3 times a day.  Secondary Diagnosis:	Chronic diastolic heart failure  Goal:	To continue medications, maintain low sodium intake, and weigh yourself daily.  Instructions for follow-up, activity and diet:	Please follow up with your Cardiologist and Primary Care Physician 1-2 weeks from discharge.  Secondary Diagnosis:	Complete heart block, post-surgical  Goal:	S/P [employer] pacemaker placed on 8/11/17  Instructions for follow-up, activity and diet:	Cardiac Device Implant Post Operative Instructions  - Bruising around the implant site or over the chest, side or arm near the incision is normal, and will take a few weeks to resolve.  -Do not lift the affected arm higher than 90 degrees (shoulder height) in any direction for 4 weeks.   - Do not push, pull or lift anything heavier than 10 lbs (about a gallon of milk) with the affected arm for 4 weeks.     - Do not touch the incision until it is completely healed.    - Do not apply soaps, creams, lotions, ointments or powders to the incision until it is completely healed.  You should call the doctor if:   - you notice redness, drainage, swelling, increased tenderness, hot sensation around the  incision, bleeding or incision edges pulling apart.  - your temperature is greater than 100 degress F for more than 24 hours.  - you notice swelling or bulging at the incision or around the device that was not there when you left the hospital or is increasing in size.  -you experience increased difficulty breathing.  - you notice new/worsening swelling in your legs and ankles.  - you faint or have dizzy spells.  -you have any questions or concerns regarding your device or the procedure. Principal Discharge DX:	Severe aortic stenosis  Goal:	To recover from surgery.  Instructions for follow-up, activity and diet:	Please follow up with Dr. Jones by calling the CT PA office at [phone] on Monday August 14, 2017 to make a follow up appointment.   The cardiac surgery office is on the second floor of [facility].   Take the [place] ("G") elevators to 2 and make a left.   Walk down to the second hallway on your left (before the double doors).   Sign says Cardiovascular and Thoracic Surgery.  The waiting room is on your left.   1. Take ALL of your medications as ordered. Fill your prescriptions the day you are discharged and take according to the schedule you were given. Continue to take a stool softener if you are taking narcotic pain medications. AVOID medications such as ibuprofen or naproxen if you have had bypass surgery. If you have any questions or are unable to fill the prescriptions, please call the office right away at [phone].  2. Shower daily. Clean all incisions daily while showering with warm water and mild soap, pat dry with a clean towel and do not cover with any dressings unless instructed to. No bathing, swimming in a pool or the ocean until instructed by MD.  DO NOT use creams or lotions on the wound.  3. We advise that you do not drive until instructed by MD.   4. You may not return to work until instructed by MD.   5. Please eat a low fat, low cholesterol, low salt diet. (No added/extra salt)  6. Weigh yourself every day in the morning and record it in the weight log in your red folder. Notify the office of any weight gain more than 2-3 pounds in 24 hours.  7. Continue breathing exercises several times a day. Continue to use your heart pillow.  8. No heavy lifting nothing greater than 5 pounds until cleared by MD.   9. Call / Notify MD any fever greater than 101.0, any drainage from incisions or if they become red, hot or very tender to the touch.  10. Increase activity as tolerated. Walk indoors and/or outdoors at least 3 times a day.  Secondary Diagnosis:	Chronic diastolic heart failure  Goal:	To continue medications, maintain low sodium intake, and weigh yourself daily.  Instructions for follow-up, activity and diet:	Please follow up with your Cardiologist and Primary Care Physician 1-2 weeks from discharge.  Secondary Diagnosis:	Complete heart block, post-surgical  Goal:	S/P [employer] [employer] pacemaker placed on 8/11/17  Instructions for follow-up, activity and diet:	Cardiac Device Implant Post Operative Instructions  - Bruising around the implant site or over the chest, side or arm near the incision is normal, and will take a few weeks to resolve.  -Do not lift the affected arm higher than 90 degrees (shoulder height) in any direction for 4 weeks.   - Do not push, pull or lift anything heavier than 10 lbs (about a gallon of milk) with the affected arm for 4 weeks.     - Do not touch the incision until it is completely healed.    - Do not apply soaps, creams, lotions, ointments or powders to the incision until it is completely healed.  You should call the doctor if:   - you notice redness, drainage, swelling, increased tenderness, hot sensation around the  incision, bleeding or incision edges pulling apart.  - your temperature is greater than 100 degress F for more than 24 hours.  - you notice swelling or bulging at the incision or around the device that was not there when you left the hospital or is increasing in size.  -you experience increased difficulty breathing.  - you notice new/worsening swelling in your legs and ankles.  - you faint or have dizzy spells.  -you have any questions or concerns regarding your device or the procedure.

## 2017-08-12 NOTE — BRIEF OPERATIVE NOTE - PROCEDURE
Permanent pacemaker insertion  08/12/2017    [facility]
TAVR, percutaneous  08/11/2017  Percutaneous Transfemoral TAVR via Right Commno Femoral Artery (29mm CoreValve Evolut Pro) (NCT# [id number]) (STS/ACC TVT Registry ID# [id number])  Active  [id number]

## 2017-08-12 NOTE — DISCHARGE NOTE ADULT - MEDICATION SUMMARY - MEDICATIONS TO CHANGE
I will SWITCH the dose or number of times a day I take the medications listed below when I get home from the hospital:    losartan 50 mg oral tablet  -- 1 tab(s) by mouth once a day

## 2017-08-12 NOTE — DISCHARGE NOTE ADULT - HOSPITAL COURSE
84 year old male initially presented with fatigue, progressive MENDEZ, orthopnea, LE edema. Patient had TAVR with 29mm Core valve 8/11/17. Patient was extubated in OR and Mild PVL. Patient had complete heart block post valve deployment therefore transvenous pacing wire was kept & EP called to evaluate. Patient had stridor post op that resolved after Decadron & Racemic Epinephrine was given. [employer] PPM was placed by Dr. Wiggins with settings as follows: DDD , A-sensing V-pacing. Prior to procedure, patient had ventricular standstill and is pacer dependent at this point. Pre-discharge TTE _____________________________. 84 year old male initially presented with fatigue, progressive MENDEZ, orthopnea, LE edema. Patient had TAVR with 29mm Core valve 8/11/17. Patient was extubated in OR and Mild PVL. Patient had complete heart block post valve deployment therefore transvenous pacing wire was kept & EP called to evaluate. Patient had stridor post op that resolved after Decadron & Racemic Epinephrine was given. [employer] PPM was placed by Dr. Wiggins with settings as follows: DDD , A-sensing V-pacing. Prior to procedure, patient had ventricular standstill.  Patient is stable for discharge. 84 year old male initially presented with fatigue, progressive MENDEZ, orthopnea, LE edema. Patient had TAVR with 29mm Core valve 8/11/17. Patient was extubated in OR and Mild PVL. Patient had complete heart block post valve deployment therefore transvenous pacing wire was kept & EP called to evaluate. Patient had stridor post op that resolved after Decadron & Racemic Epinephrine was given. [employer] PPM was placed by Dr. Wiggins with settings as follows: DDD , A-sensing V-pacing. Prior to procedure, patient had ventricular standstill.  Patient is stable for discharge.    5 mm right thyroid nodule seen on preop ct scan.  Please share these findings with your primary care physician so they can order the appropriate outpatient follow-up.

## 2017-08-12 NOTE — DISCHARGE NOTE ADULT - PLAN OF CARE
To recover from surgery. Please follow up with Dr. Jones by calling the CT PA office at [phone] on Monday August 14, 2017 to make a follow up appointment.   The cardiac surgery office is on the second floor of [facility].   Take the [place] ("G") elevators to 2 and make a left.   Walk down to the second hallway on your left (before the double doors).   Sign says Cardiovascular and Thoracic Surgery.  The waiting room is on your left.   1. Take ALL of your medications as ordered. Fill your prescriptions the day you are discharged and take according to the schedule you were given. Continue to take a stool softener if you are taking narcotic pain medications. AVOID medications such as ibuprofen or naproxen if you have had bypass surgery. If you have any questions or are unable to fill the prescriptions, please call the office right away at [phone].  2. Shower daily. Clean all incisions daily while showering with warm water and mild soap, pat dry with a clean towel and do not cover with any dressings unless instructed to. No bathing, swimming in a pool or the ocean until instructed by MD.  DO NOT use creams or lotions on the wound.  3. We advise that you do not drive until instructed by MD.   4. You may not return to work until instructed by MD.   5. Please eat a low fat, low cholesterol, low salt diet. (No added/extra salt)  6. Weigh yourself every day in the morning and record it in the weight log in your red folder. Notify the office of any weight gain more than 2-3 pounds in 24 hours.  7. Continue breathing exercises several times a day. Continue to use your heart pillow.  8. No heavy lifting nothing greater than 5 pounds until cleared by MD.   9. Call / Notify MD any fever greater than 101.0, any drainage from incisions or if they become red, hot or very tender to the touch.  10. Increase activity as tolerated. Walk indoors and/or outdoors at least 3 times a day. To continue medications, maintain low sodium intake, and weigh yourself daily. Please follow up with your Cardiologist and Primary Care Physician 2-4 weeks from discharge. S/P [employer] pacemaker placed on 8/11/17 Please do not shower for 7 days or until you follow up- sponge baths only with careful attention to avoiding the pacemaker area.  Please do not move the left arm above shoulder height, use sling if needed.  No physical activity (golf) for at least 4 weeks.  Take antibiotics as prescribed.  Please follow up with your electrophysiologist in 1 week from discharge. Cardiac Device Implant Post Operative Instructions  - Bruising around the implant site or over the chest, side or arm near the incision is normal, and will take a few weeks to resolve.  -Do not lift the affected arm higher than 90 degrees (shoulder height) in any direction for 4 weeks.   - Do not push, pull or lift anything heavier than 10 lbs (about a gallon of milk) with the affected arm for 4 weeks.     - Do not touch the incision until it is completely healed.    - Do not apply soaps, creams, lotions, ointments or powders to the incision until it is completely healed.  You should call the doctor if:   - you notice redness, drainage, swelling, increased tenderness, hot sensation around the  incision, bleeding or incision edges pulling apart.  - your temperature is greater than 100 degress F for more than 24 hours.  - you notice swelling or bulging at the incision or around the device that was not there when you left the hospital or is increasing in size.  -you experience increased difficulty breathing.  - you notice new/worsening swelling in your legs and ankles.  - you faint or have dizzy spells.  -you have any questions or concerns regarding your device or the procedure. Please follow up with your Cardiologist and Primary Care Physician 1-2 weeks from discharge.

## 2017-08-12 NOTE — DISCHARGE NOTE ADULT - PATIENT PORTAL LINK FT
“You can access the FollowHealth Patient Portal, offered by [facility], by registering with the following website: [email or web address]”

## 2017-08-12 NOTE — DISCHARGE NOTE ADULT - CARE PROVIDERS DIRECT ADDRESSES
,[email or web address],DirectAddress_Unknown [email or web address],DirectAddress_[email or web address]

## 2017-08-12 NOTE — PROGRESS NOTE ADULT - SUBJECTIVE AND OBJECTIVE BOX
Nurse Practitioner Progress note:     INTERVAL HISTORY: 84 year old male s/p TAVR c/b CHB now s/p PPM    MEDICATIONS:  tamsulosin 0.4 milliGRAM(s) Oral at bedtime  losartan 25 milliGRAM(s) Oral daily    cefuroxime  IVPB 1500 milliGRAM(s) IV Intermittent once  cefuroxime  IVPB 1500 milliGRAM(s) IV Intermittent every 8 hours  cephalexin 500 milliGRAM(s) Oral every 12 hours    buDESOnide  80 MICROgram(s)/formoterol 4.5 MICROgram(s) Inhaler 2 Puff(s) Inhalation two times a day      docusate sodium 100 milliGRAM(s) Oral three times a day  pantoprazole    Tablet 40 milliGRAM(s) Oral before breakfast    insulin lispro (HumaLOG) corrective regimen sliding scale   SubCutaneous Before meals and at bedtime  dextrose Gel 1 Dose(s) Oral once PRN  dextrose 50% Injectable 12.5 Gram(s) IV Push once  dextrose 50% Injectable 25 Gram(s) IV Push once  dextrose 50% Injectable 25 Gram(s) IV Push once  glucagon  Injectable 1 milliGRAM(s) IntraMuscular once PRN  glimepiride 2 milliGRAM(s) Oral with breakfast  methylPREDNISolone 24 milliGRAM(s) Oral once  methylPREDNISolone  milliGRAM(s) Oral     aspirin enteric coated 325 milliGRAM(s) Oral daily  clopidogrel Tablet 75 milliGRAM(s) Oral daily  latanoprost 0.005% Ophthalmic Solution 1 Drop(s) Both EYES at bedtime  multivitamin 1 Tablet(s) Oral daily  benzocaine 15 mG/menthol 3.6 mG Lozenge 1 Lozenge Oral every 4 hours PRN      TELEMETRY: NSR     T(C): 36.5 (08-12-17 @ 08:00), Max: 37.5 (08-12-17 @ 06:00)  HR: 80 (08-12-17 @ 08:00) (54 - 84)  BP: 119/58 (08-12-17 @ 08:00) (119/58 - 135/43)  RR: 22 (08-12-17 @ 08:00) (12 - 33)  SpO2: 95% (08-12-17 @ 08:00) (92% - 100%)  Wt(kg): --    PHYSICAL EXAM:  Appearance: Normal	  Cardiovascular: Normal S1 S2, No JVD, No murmurs, No edema  Respiratory: Lungs clear to auscultation	  Neurologic: Non-focal, A&O X3.  No neuro deficits  Procedure Site: L pressure dressing remove with tegaderm. Site benign.  Dermabond intact.  No bleeding/hematoma.  + ecchymosis.    12 lead EKG:  	NSR 77 bpm    LABS:	 	                          11.9   11.8  )-----------( 181      ( 12 Aug 2017 03:33 )             36.3     08-12    138  |  103  |  25.0<H>  ----------------------------<  200<H>  4.6   |  25.0  |  1.10    Ca    8.2<L>      12 Aug 2017 03:33  Mg     2.5     08-12    TPro  5.8<L>  /  Alb  3.4  /  TBili  0.3<L>  /  DBili  0.1  /  AST  19  /  ALT  7   /  AlkPhos  47  08-12        PROCEDURE RESULTS: S/P BS PPM    ASSESSMENT/PLAN: 	  -device precautions reviewed  -CXR w/o ptx  -Resume home meds  -Initiate keflex for discharge  -Follow up with Dr. Wiggins

## 2017-08-12 NOTE — DISCHARGE NOTE ADULT - MEDICATION SUMMARY - MEDICATIONS TO TAKE
I will START or STAY ON the medications listed below when I get home from the hospital:    napncon A  eye drops  -- 1 drop(s) in each eye once a day  -- Indication: For glaucoma    methylPREDNISolone  -- 4mg after breakfast x 5 days  4 mg after lunch x 3 days  4 mg after dinner x 2 days  4 mg before bed x 3 days  8 mg before bed x 1 day tonight  -- Indication: For Stridor    aspirin 325 mg oral delayed release tablet  -- 1 tab(s) by mouth once a day  -- Indication: For S/p tavr    losartan 25 mg oral tablet  -- 1 tab(s) by mouth once a day  -- Indication: For hypertension    tamsulosin 0.4 mg oral capsule  -- 1 cap(s) by mouth once a day (at bedtime)  -- Indication: For Benign prostatic hyperplasia, presence of lower urinary tract symptoms unspecified    glimepiride 2 mg oral tablet  -- 1 tab(s) by mouth once a day  -- Indication: For diabetes    Zetia 10 mg oral tablet  -- 1 tab(s) by mouth once a day  -- Indication: For hyperlipidemia    clopidogrel 75 mg oral tablet  -- 1 tab(s) by mouth once a day  -- Indication: For S/p tavr    Symbicort 80 mcg-4.5 mcg/inh inhalation aerosol  -- 2 puff(s) inhaled 2 times a day  -- 2 puffs per day  -- Indication: For asthma    cephalexin 500 mg oral capsule  -- 1 cap(s) by mouth every 12 hours  -- Indication: For S/p ppm placement    garlic oral capsule  -- 1000 milligram(s) by mouth 2 times a day  -- Indication: For Supplement    docusate sodium 100 mg oral capsule  -- 1 cap(s) by mouth 3 times a day  -- Indication: For Constipation    latanoprost 0.005% ophthalmic solution  -- 1 drop(s) to each affected eye once a day (at bedtime)  -- Indication: For glaucoma    Align 4 mg oral capsule  -- 1 cap(s) by mouth once a day  -- Indication: For probiotics    pantoprazole 40 mg oral delayed release tablet  -- 1 tab(s) by mouth once a day  -- Indication: For gerd    multivitamin  -- 1 tab(s) by mouth once a day  -- Indication: For Supplement

## 2017-08-12 NOTE — PROGRESS NOTE ADULT - SUBJECTIVE AND OBJECTIVE BOX
[place] [facility] - [facility], THE HEART [facility]                                   [address] [place] [address]                                                      PHONE: [phone]                                                         FAX: [phone]  [email or web address]  ---------------------------------------------------------------------------------------------------------------------------------    Overnight events/patient complaints:  NAD feeling today     No Known Allergies    MEDICATIONS  (STANDING):  sodium chloride 0.9% lock flush 3 milliLiter(s) IV Push every 8 hours  cefuroxime  IVPB 1500 milliGRAM(s) IV Intermittent once  docusate sodium 100 milliGRAM(s) Oral three times a day  aspirin enteric coated 325 milliGRAM(s) Oral daily  clopidogrel Tablet 75 milliGRAM(s) Oral daily  tamsulosin 0.4 milliGRAM(s) Oral at bedtime  buDESOnide  80 MICROgram(s)/formoterol 4.5 MICROgram(s) Inhaler 2 Puff(s) Inhalation two times a day  latanoprost 0.005% Ophthalmic Solution 1 Drop(s) Both EYES at bedtime  multivitamin 1 Tablet(s) Oral daily  cefuroxime  IVPB 1500 milliGRAM(s) IV Intermittent every 8 hours  insulin lispro (HumaLOG) corrective regimen sliding scale   SubCutaneous Before meals and at bedtime  dextrose 50% Injectable 12.5 Gram(s) IV Push once  dextrose 50% Injectable 25 Gram(s) IV Push once  dextrose 50% Injectable 25 Gram(s) IV Push once  pantoprazole    Tablet 40 milliGRAM(s) Oral before breakfast  losartan 25 milliGRAM(s) Oral daily  glimepiride 2 milliGRAM(s) Oral with breakfast  methylPREDNISolone  milliGRAM(s) Oral     MEDICATIONS  (PRN):  benzocaine 15 mG/menthol 3.6 mG Lozenge 1 Lozenge Oral every 4 hours PRN Sore Throat  dextrose Gel 1 Dose(s) Oral once PRN Blood Glucose LESS THAN 70 milliGRAM(s)/deciliter  glucagon  Injectable 1 milliGRAM(s) IntraMuscular once PRN Glucose LESS THAN 70 milligrams/deciliter      Vital Signs Last 24 Hrs  T(C): 36.5 (12 Aug 2017 08:00), Max: 37.5 (12 Aug 2017 06:00)  T(F): 97.7 (12 Aug 2017 08:00), Max: 99.5 (12 Aug 2017 06:00)  HR: 80 (12 Aug 2017 08:00) (54 - 84)  BP: 119/58 (12 Aug 2017 08:00) (119/58 - 135/43)  BP(mean): 84 (12 Aug 2017 08:00) (82 - 88)  RR: 22 (12 Aug 2017 08:00) (12 - 33)  SpO2: 95% (12 Aug 2017 08:00) (92% - 100%)  ICU Vital Signs Last 24 Hrs  TIFFANIE ZELAYA  I&O's Detail    11 Aug 2017 07:01  -  12 Aug 2017 07:00  --------------------------------------------------------  IN:    Lactated Ringers IV Bolus: 750 mL    Solution: 100 mL    Solution: 100 mL    Solution: 100 mL    Solution: 50 mL  Total IN: 1100 mL    OUT:    Indwelling Catheter - Urethral: 935 mL    Voided: 100 mL  Total OUT: 1035 mL    Total NET: 65 mL      12 Aug 2017 07:01  -  12 Aug 2017 10:05  --------------------------------------------------------  IN:    Oral Fluid: 120 mL  Total IN: 120 mL    OUT:    Voided: 100 mL  Total OUT: 100 mL    Total NET: 20 mL        I&O's Summary    11 Aug 2017 07:01  -  12 Aug 2017 07:00  --------------------------------------------------------  IN: 1100 mL / OUT: 1035 mL / NET: 65 mL    12 Aug 2017 07:01  -  12 Aug 2017 10:05  --------------------------------------------------------  IN: 120 mL / OUT: 100 mL / NET: 20 mL      Drug Dosing Weight  TIFFANIE ZELAYA      PHYSICAL EXAM:  General: Appears well developed, well nourished alert and cooperative.  HEENT: Head; normocephalic, atraumatic.  Eyes: Pupils reactive, cornea wnl.  Neck: Supple, no nodes adenopathy, no NVD or carotid bruit or thyromegaly.  CARDIOVASCULAR: Normal S1 and S2, 3/6 murmur, rub, gallop or lift.   LUNGS: Decrease BS B/L   ABDOMEN: Soft, nontender without mass or organomegaly. bowel sounds normoactive.  EXTREMITIES: No clubbing, cyanosis or edema. Distal pulses wnl.   SKIN: warm and dry with normal turgor.  NEURO: Alert/oriented x 3/normal motor exam. No pathologic reflexes.    PSYCH: normal affect.        LABS:                        11.9   11.8  )-----------( 181      ( 12 Aug 2017 03:33 )             36.3     08-12    138  |  103  |  25.0<H>  ----------------------------<  200<H>  4.6   |  25.0  |  1.10    Ca    8.2<L>      12 Aug 2017 03:33  Mg     2.5     08-12    TPro  5.8<L>  /  Alb  3.4  /  TBili  0.3<L>  /  DBili  0.1  /  AST  19  /  ALT  7   /  AlkPhos  47  08-12    TIFFANIE ZELAYA  CARDIAC MARKERS ( 12 Aug 2017 03:33 )  x     / 0.10 ng/mL / 192 U/L / x     / 11.5 ng/mL  CARDIAC MARKERS ( 11 Aug 2017 14:19 )  x     / 0.08 ng/mL / 144 U/L / x     / x          PT/INR - ( 12 Aug 2017 03:33 )   PT: 12.3 sec;   INR: 1.12 ratio         PTT - ( 12 Aug 2017 03:33 )  PTT:27.8 sec      RADIOLOGY & ADDITIONAL STUDIES:    INTERPRETATION OF TELEMETRY (personally reviewed):    ECG:    ECHO: pending     ASSESSMENT AND PLAN:  In summary, TIFFANIE ZELAYA is an 84y Male with past medical history significant for severe AS s/p TAVR PPM HD stable awaiting a repeat ECHO post TAVR and if stable then DC planning as per CT surgery

## 2017-08-12 NOTE — PROGRESS NOTE ADULT - SUBJECTIVE AND OBJECTIVE BOX
Brief summary:  84 year old Male POD# 1 TAVR 29mm core valve via right groin percutaneous approach with post-operative complete heart block progressed to ventricular standstill requiring PPM.    Overnight events:  No acute events overnight.    Past Medical History:  Nonrheumatic aortic valve stenosis  H/o or current diagnosis of HF- ACEI/ARB contraindication unknown  Family history of stroke  Family history of acute myocardial infarction (Mother)  Family history of depression  Handoff  PVD (peripheral vascular disease)  CAD (coronary artery disease)  Carotid artery disease  Former smoker  Fatigue  MENDEZ (dyspnea on exertion)  SOB (shortness of breath)  BPH (benign prostatic hyperplasia)  Angina pectoris  PVD (peripheral vascular disease)  SOB (shortness of breath)  FANNY (obstructive sleep apnea)  CAD (coronary artery disease)  Asthma  Aortic stenosis  Diabetes mellitus  Hyperlipidemia  Hypertension  Complete heart block  Chronic diastolic CHF (congestive heart failure), NYHA class 3  Severe aortic stenosis  Complete heart block  Chronic diastolic CHF (congestive heart failure), NYHA class 3  Severe aortic stenosis  TAVR, percutaneous  Severe aortic stenosis  Permanent pacemaker insertion  TAVR, percutaneous  S/P CABG x 2  S/P PTCA (percutaneous transluminal coronary angioplasty)  S/P nasal surgery  S/P joint replacement  S/P cataract surgery, right  S/P cataract surgery, left  History of CEA (carotid endarterectomy)  S/P cholecystectomy  S/P tonsillectomy  NONRHEUMATIC AORTIC (VALVE) ST  Permanent pacemaker insertion  Complete heart block, post-surgical  Chronic diastolic heart failure        sodium chloride 0.9% lock flush 3 milliLiter(s) IV Push every 8 hours  cefuroxime  IVPB 1500 milliGRAM(s) IV Intermittent once  sodium chloride 0.9%. 1000 milliLiter(s) IV Continuous <Continuous>  sodium chloride 0.9%. 1000 milliLiter(s) IV Continuous <Continuous>  pantoprazole  Injectable 40 milliGRAM(s) IV Push daily  docusate sodium 100 milliGRAM(s) Oral three times a day  aspirin enteric coated 325 milliGRAM(s) Oral once  aspirin enteric coated 325 milliGRAM(s) Oral daily  clopidogrel Tablet 75 milliGRAM(s) Oral once  clopidogrel Tablet 75 milliGRAM(s) Oral daily  tamsulosin 0.4 milliGRAM(s) Oral at bedtime  buDESOnide  80 MICROgram(s)/formoterol 4.5 MICROgram(s) Inhaler 2 Puff(s) Inhalation two times a day  latanoprost 0.005% Ophthalmic Solution 1 Drop(s) Both EYES at bedtime  multivitamin 1 Tablet(s) Oral daily  cefuroxime  IVPB 1500 milliGRAM(s) IV Intermittent every 8 hours  benzocaine 15 mG/menthol 3.6 mG Lozenge 1 Lozenge Oral every 4 hours PRN  insulin lispro (HumaLOG) corrective regimen sliding scale   SubCutaneous Before meals and at bedtime  dextrose 5%. 1000 milliLiter(s) IV Continuous <Continuous>  dextrose Gel 1 Dose(s) Oral once PRN  dextrose 50% Injectable 12.5 Gram(s) IV Push once  dextrose 50% Injectable 25 Gram(s) IV Push once  dextrose 50% Injectable 25 Gram(s) IV Push once  glucagon  Injectable 1 milliGRAM(s) IntraMuscular once PRN  ceFAZolin   IVPB 2000 milliGRAM(s) IV Intermittent every 8 hours  fentaNYL    Injectable 25 MICROGram(s) IV Push once  MEDICATIONS  (PRN):  benzocaine 15 mG/menthol 3.6 mG Lozenge 1 Lozenge Oral every 4 hours PRN Sore Throat  dextrose Gel 1 Dose(s) Oral once PRN Blood Glucose LESS THAN 70 milliGRAM(s)/deciliter  glucagon  Injectable 1 milliGRAM(s) IntraMuscular once PRN Glucose LESS THAN 70 milligrams/deciliter    Height (cm): 172 (08-11 @ 21:56)  Weight (kg): 85 (08-11 @ 21:56)  BMI (kg/m2): 28.7 (08-11 @ 21:56)  BSA (m2): 1.98 (08-11 @ 21:56)  Daily Height in cm: 172 (11 Aug 2017 21:56)    Daily       ABG - ( 11 Aug 2017 14:36 )  pH: 7.37  /  pCO2: 40    /  pO2: 201   / HCO3: 23    / Base Excess: -1.8  /  SaO2: 98                                      11.4   8.6   )-----------( 138      ( 11 Aug 2017 14:19 )             34.7   08-11    140  |  106  |  23.0<H>  ----------------------------<  134<H>  4.3   |  23.0  |  0.96    Ca    8.0<L>      11 Aug 2017 14:19  Mg     1.6     08-11    TPro  5.6<L>  /  Alb  3.3  /  TBili  0.6  /  DBili  x   /  AST  14  /  ALT  7   /  AlkPhos  43  08-11    CARDIAC MARKERS ( 11 Aug 2017 14:19 )  x     / 0.08 ng/mL / 144 U/L / x     / x        PT/INR - ( 11 Aug 2017 14:19 )   PT: 13.0 sec;   INR: 1.18 ratio         PTT - ( 11 Aug 2017 14:19 )  PTT:26.0 sec      Objective:  T(C): 36.4 (08-12-17 @ 01:00), Max: 37.2 (08-11-17 @ 19:15)  HR: 68 (08-12-17 @ 01:45) (54 - 84)  BP: 135/43 (08-11-17 @ 21:56) (135/43 - 149/67)  RR: 12 (08-12-17 @ 01:45) (12 - 33)  SpO2: 95% (08-12-17 @ 01:45) (92% - 100%)  Wt(kg): --CAPILLARY BLOOD GLUCOSE  112 (11 Aug 2017 08:06)      I&O's Summary    11 Aug 2017 07:01  -  12 Aug 2017 02:10  --------------------------------------------------------  IN: 1000 mL / OUT: 625 mL / NET: 375 mL        Physical Exam  Neuro: A+O x 3, non-focal, speech clear and intact  Pulm: CTA, equal bilaterally  CV: A sensing V pacing via PPM +S1S2  Abd: soft, NT, ND, +BS  Ext: +DP Pulses b/l, +PT pulses, no edema  Inc: right femoral percutaneous / left femoral sheaths without hematoma or active bleeding

## 2017-08-12 NOTE — DISCHARGE NOTE ADULT - NS AS ACTIVITY OBS
Sex allowed/Walking-Indoors allowed/Walking-Outdoors allowed/Stairs allowed/Do not make important decisions/Do not drive or operate machinery/No Heavy lifting/straining

## 2017-08-28 NOTE — PROGRESS NOTE ADULT - PROBLEM SELECTOR PLAN 10
Right inguinal hernia
Tight glucose control  DEDRA before meals and at bedtime   Resume home medications upon discharge      Plan to be discussed / reviewed with attending surgeons during AM rounds.

## 2017-09-05 ENCOUNTER — APPOINTMENT (OUTPATIENT)
Dept: CARDIOTHORACIC SURGERY | Facility: CLINIC | Age: 82
End: 2017-09-05
Payer: MEDICARE

## 2017-09-05 VITALS
WEIGHT: 181 LBS | SYSTOLIC BLOOD PRESSURE: 169 MMHG | RESPIRATION RATE: 16 BRPM | BODY MASS INDEX: 26.81 KG/M2 | OXYGEN SATURATION: 97 % | DIASTOLIC BLOOD PRESSURE: 73 MMHG | HEART RATE: 88 BPM | HEIGHT: 69 IN

## 2017-09-05 PROCEDURE — 99213 OFFICE O/P EST LOW 20 MIN: CPT

## 2017-09-12 ENCOUNTER — APPOINTMENT (OUTPATIENT)
Dept: PULMONOLOGY | Facility: CLINIC | Age: 82
End: 2017-09-12
Payer: MEDICARE

## 2017-09-12 VITALS
SYSTOLIC BLOOD PRESSURE: 120 MMHG | DIASTOLIC BLOOD PRESSURE: 80 MMHG | HEART RATE: 93 BPM | RESPIRATION RATE: 16 BRPM | OXYGEN SATURATION: 95 %

## 2017-09-12 VITALS — BODY MASS INDEX: 27.47 KG/M2 | WEIGHT: 186 LBS

## 2017-09-12 VITALS — SYSTOLIC BLOOD PRESSURE: 120 MMHG | DIASTOLIC BLOOD PRESSURE: 80 MMHG | OXYGEN SATURATION: 95 % | HEART RATE: 93 BPM

## 2017-09-12 PROCEDURE — 99215 OFFICE O/P EST HI 40 MIN: CPT | Mod: 25

## 2017-09-12 PROCEDURE — 94010 BREATHING CAPACITY TEST: CPT

## 2017-09-20 ENCOUNTER — RX RENEWAL (OUTPATIENT)
Age: 82
End: 2017-09-20

## 2017-10-04 ENCOUNTER — APPOINTMENT (OUTPATIENT)
Dept: INTERNAL MEDICINE | Facility: CLINIC | Age: 82
End: 2017-10-04
Payer: MEDICARE

## 2017-10-04 VITALS
OXYGEN SATURATION: 97 % | HEART RATE: 90 BPM | SYSTOLIC BLOOD PRESSURE: 125 MMHG | HEIGHT: 69 IN | BODY MASS INDEX: 27.11 KG/M2 | DIASTOLIC BLOOD PRESSURE: 75 MMHG | WEIGHT: 183 LBS

## 2017-10-04 DIAGNOSIS — R06.09 OTHER FORMS OF DYSPNEA: ICD-10-CM

## 2017-10-04 DIAGNOSIS — Z87.898 PERSONAL HISTORY OF OTHER SPECIFIED CONDITIONS: ICD-10-CM

## 2017-10-04 DIAGNOSIS — H61.23 IMPACTED CERUMEN, BILATERAL: ICD-10-CM

## 2017-10-04 DIAGNOSIS — Z95.0 PRESENCE OF CARDIAC PACEMAKER: ICD-10-CM

## 2017-10-04 DIAGNOSIS — K57.32 DIVERTICULITIS OF LARGE INTESTINE W/OUT PERFORATION OR ABSCESS W/OUT BLEEDING: ICD-10-CM

## 2017-10-04 PROCEDURE — 90662 IIV NO PRSV INCREASED AG IM: CPT

## 2017-10-04 PROCEDURE — G0008: CPT

## 2017-10-04 PROCEDURE — 99214 OFFICE O/P EST MOD 30 MIN: CPT | Mod: 25

## 2017-10-04 PROCEDURE — 36415 COLL VENOUS BLD VENIPUNCTURE: CPT

## 2017-10-04 RX ORDER — FINASTERIDE 5 MG/1
5 TABLET, FILM COATED ORAL
Refills: 0 | Status: DISCONTINUED | COMMUNITY
End: 2017-10-04

## 2017-10-04 RX ORDER — METHYLPREDNISOLONE 4 MG/1
4 TABLET ORAL
Refills: 0 | Status: DISCONTINUED | COMMUNITY
End: 2017-10-04

## 2017-10-05 LAB
ALBUMIN SERPL ELPH-MCNC: 4.1 G/DL
ALP BLD-CCNC: 68 U/L
ALT SERPL-CCNC: 10 U/L
ANION GAP SERPL CALC-SCNC: 13 MMOL/L
AST SERPL-CCNC: 17 U/L
BASOPHILS # BLD AUTO: 0.01 K/UL
BASOPHILS NFR BLD AUTO: 0.2 %
BILIRUB SERPL-MCNC: 0.4 MG/DL
BUN SERPL-MCNC: 20 MG/DL
CALCIUM SERPL-MCNC: 9.3 MG/DL
CHLORIDE SERPL-SCNC: 102 MMOL/L
CHOLEST SERPL-MCNC: 198 MG/DL
CHOLEST/HDLC SERPL: 3.7 RATIO
CO2 SERPL-SCNC: 27 MMOL/L
CREAT SERPL-MCNC: 1.18 MG/DL
EOSINOPHIL # BLD AUTO: 0.2 K/UL
EOSINOPHIL NFR BLD AUTO: 3.1 %
GLUCOSE SERPL-MCNC: 149 MG/DL
HBA1C MFR BLD HPLC: 7.5 %
HCT VFR BLD CALC: 40.6 %
HDLC SERPL-MCNC: 53 MG/DL
HGB BLD-MCNC: 13.4 G/DL
IMM GRANULOCYTES NFR BLD AUTO: 0.3 %
LDLC SERPL CALC-MCNC: 110 MG/DL
LYMPHOCYTES # BLD AUTO: 1.49 K/UL
LYMPHOCYTES NFR BLD AUTO: 23.2 %
MAGNESIUM SERPL-MCNC: 1.9 MG/DL
MAN DIFF?: NORMAL
MCHC RBC-ENTMCNC: 32.1 PG
MCHC RBC-ENTMCNC: 33 GM/DL
MCV RBC AUTO: 97.4 FL
MONOCYTES # BLD AUTO: 0.57 K/UL
MONOCYTES NFR BLD AUTO: 8.9 %
NEUTROPHILS # BLD AUTO: 4.14 K/UL
NEUTROPHILS NFR BLD AUTO: 64.3 %
PLATELET # BLD AUTO: 184 K/UL
POTASSIUM SERPL-SCNC: 4.4 MMOL/L
PROT SERPL-MCNC: 7.1 G/DL
RBC # BLD: 4.17 M/UL
RBC # FLD: 13.1 %
SODIUM SERPL-SCNC: 142 MMOL/L
TRIGL SERPL-MCNC: 177 MG/DL
WBC # FLD AUTO: 6.43 K/UL

## 2017-10-06 ENCOUNTER — RESULT REVIEW (OUTPATIENT)
Age: 82
End: 2017-10-06

## 2017-10-12 ENCOUNTER — OUTPATIENT (OUTPATIENT)
Dept: OUTPATIENT SERVICES | Facility: HOSPITAL | Age: 82
LOS: 1 days | End: 2017-10-12
Payer: MEDICARE

## 2017-10-12 VITALS
TEMPERATURE: 97 F | OXYGEN SATURATION: 94 % | RESPIRATION RATE: 18 BRPM | DIASTOLIC BLOOD PRESSURE: 67 MMHG | SYSTOLIC BLOOD PRESSURE: 145 MMHG | HEART RATE: 80 BPM | HEIGHT: 68.5 IN | WEIGHT: 179.9 LBS

## 2017-10-12 VITALS — WEIGHT: 181 LBS | HEIGHT: 68.5 IN

## 2017-10-12 DIAGNOSIS — Z98.89 OTHER SPECIFIED POSTPROCEDURAL STATES: Chronic | ICD-10-CM

## 2017-10-12 DIAGNOSIS — Z01.810 ENCOUNTER FOR PREPROCEDURAL CARDIOVASCULAR EXAMINATION: ICD-10-CM

## 2017-10-12 DIAGNOSIS — Z98.61 CORONARY ANGIOPLASTY STATUS: Chronic | ICD-10-CM

## 2017-10-12 DIAGNOSIS — Z98.41 CATARACT EXTRACTION STATUS, RIGHT EYE: Chronic | ICD-10-CM

## 2017-10-12 DIAGNOSIS — Z96.60 PRESENCE OF UNSPECIFIED ORTHOPEDIC JOINT IMPLANT: Chronic | ICD-10-CM

## 2017-10-12 DIAGNOSIS — Z98.42 CATARACT EXTRACTION STATUS, LEFT EYE: Chronic | ICD-10-CM

## 2017-10-12 DIAGNOSIS — Z90.49 ACQUIRED ABSENCE OF OTHER SPECIFIED PARTS OF DIGESTIVE TRACT: Chronic | ICD-10-CM

## 2017-10-12 DIAGNOSIS — Z95.1 PRESENCE OF AORTOCORONARY BYPASS GRAFT: Chronic | ICD-10-CM

## 2017-10-12 DIAGNOSIS — I73.9 PERIPHERAL VASCULAR DISEASE, UNSPECIFIED: ICD-10-CM

## 2017-10-12 LAB
ALLERGY+IMMUNOLOGY DIAG STUDY NOTE: SIGNIFICANT CHANGE UP
ANION GAP SERPL CALC-SCNC: 11 MMOL/L — SIGNIFICANT CHANGE UP (ref 5–17)
ANTIBODY INTERPRETATION 2: SIGNIFICANT CHANGE UP
APTT BLD: 27.9 SEC — SIGNIFICANT CHANGE UP (ref 27.5–37.4)
BLD GP AB SCN SERPL QL: ABNORMAL
BUN SERPL-MCNC: 25 MG/DL — HIGH (ref 8–20)
CALCIUM SERPL-MCNC: 8.7 MG/DL — SIGNIFICANT CHANGE UP (ref 8.6–10.2)
CHLORIDE SERPL-SCNC: 99 MMOL/L — SIGNIFICANT CHANGE UP (ref 98–107)
CHOLEST SERPL-MCNC: 193 MG/DL — SIGNIFICANT CHANGE UP (ref 110–199)
CO2 SERPL-SCNC: 28 MMOL/L — SIGNIFICANT CHANGE UP (ref 22–29)
CREAT SERPL-MCNC: 1.1 MG/DL — SIGNIFICANT CHANGE UP (ref 0.5–1.3)
DAT IGG-SP REAG RBC-IMP: SIGNIFICANT CHANGE UP
DIR ANTIGLOB POLYSPECIFIC INTERPRETATION: ABNORMAL
GLUCOSE SERPL-MCNC: 255 MG/DL — HIGH (ref 70–115)
HCT VFR BLD CALC: 37.7 % — LOW (ref 42–52)
HDLC SERPL-MCNC: 48 MG/DL — LOW
HGB BLD-MCNC: 12.6 G/DL — LOW (ref 14–18)
IAT COMP-SP REAG SERPL QL: ABNORMAL
INR BLD: 1.13 RATIO — SIGNIFICANT CHANGE UP (ref 0.88–1.16)
LIPID PNL WITH DIRECT LDL SERPL: 84 MG/DL — SIGNIFICANT CHANGE UP
MAGNESIUM SERPL-MCNC: 1.9 MG/DL — SIGNIFICANT CHANGE UP (ref 1.6–2.6)
MCHC RBC-ENTMCNC: 31.8 PG — HIGH (ref 27–31)
MCHC RBC-ENTMCNC: 33.4 G/DL — SIGNIFICANT CHANGE UP (ref 32–36)
MCV RBC AUTO: 95.2 FL — HIGH (ref 80–94)
PLATELET # BLD AUTO: 186 K/UL — SIGNIFICANT CHANGE UP (ref 150–400)
POTASSIUM SERPL-MCNC: 4.1 MMOL/L — SIGNIFICANT CHANGE UP (ref 3.5–5.3)
POTASSIUM SERPL-SCNC: 4.1 MMOL/L — SIGNIFICANT CHANGE UP (ref 3.5–5.3)
PROTHROM AB SERPL-ACNC: 12.5 SEC — SIGNIFICANT CHANGE UP (ref 9.8–12.7)
RBC # BLD: 3.96 M/UL — LOW (ref 4.6–6.2)
RBC # FLD: 12.8 % — SIGNIFICANT CHANGE UP (ref 11–15.6)
SODIUM SERPL-SCNC: 138 MMOL/L — SIGNIFICANT CHANGE UP (ref 135–145)
TOTAL CHOLESTEROL/HDL RATIO MEASUREMENT: 4 RATIO — SIGNIFICANT CHANGE UP (ref 3.4–9.6)
TRIGL SERPL-MCNC: 303 MG/DL — HIGH (ref 10–200)
TYPE + AB SCN PNL BLD: SIGNIFICANT CHANGE UP
WBC # BLD: 6.7 K/UL — SIGNIFICANT CHANGE UP (ref 4.8–10.8)
WBC # FLD AUTO: 6.7 K/UL — SIGNIFICANT CHANGE UP (ref 4.8–10.8)

## 2017-10-12 PROCEDURE — G0463: CPT

## 2017-10-12 PROCEDURE — 80048 BASIC METABOLIC PNL TOTAL CA: CPT

## 2017-10-12 PROCEDURE — 93010 ELECTROCARDIOGRAM REPORT: CPT

## 2017-10-12 PROCEDURE — 86880 COOMBS TEST DIRECT: CPT

## 2017-10-12 PROCEDURE — 80061 LIPID PANEL: CPT

## 2017-10-12 PROCEDURE — 86850 RBC ANTIBODY SCREEN: CPT

## 2017-10-12 PROCEDURE — 85610 PROTHROMBIN TIME: CPT

## 2017-10-12 PROCEDURE — 86870 RBC ANTIBODY IDENTIFICATION: CPT

## 2017-10-12 PROCEDURE — 85730 THROMBOPLASTIN TIME PARTIAL: CPT

## 2017-10-12 PROCEDURE — 86077 PHYS BLOOD BANK SERV XMATCH: CPT

## 2017-10-12 PROCEDURE — 83735 ASSAY OF MAGNESIUM: CPT

## 2017-10-12 PROCEDURE — 86900 BLOOD TYPING SEROLOGIC ABO: CPT

## 2017-10-12 PROCEDURE — 93005 ELECTROCARDIOGRAM TRACING: CPT

## 2017-10-12 PROCEDURE — 36415 COLL VENOUS BLD VENIPUNCTURE: CPT

## 2017-10-12 PROCEDURE — 85027 COMPLETE CBC AUTOMATED: CPT

## 2017-10-12 PROCEDURE — 86901 BLOOD TYPING SEROLOGIC RH(D): CPT

## 2017-10-12 NOTE — H&P PST ADULT - FAMILY HISTORY
Family history of depression     Family history of stroke     Mother  Still living? No  Family history of acute myocardial infarction, Age at diagnosis: Age Unknown

## 2017-10-12 NOTE — H&P PST ADULT - PMH
Angina pectoris  class II  Aortic stenosis    Asthma    BPH (benign prostatic hyperplasia)    CAD (coronary artery disease)  CABG  CAD (coronary artery disease)  S/P Stenting x8  Carotid artery disease  RCEA  Diabetes mellitus    MENDEZ (dyspnea on exertion)    Fatigue  Profound w/exertion  Former smoker    Hyperlipidemia    Hypertension    FANNY (obstructive sleep apnea)  uses mouth peice  PVD (peripheral vascular disease)  intervention RLE  PVD (peripheral vascular disease)    SOB (shortness of breath)

## 2017-10-12 NOTE — H&P PST ADULT - HISTORY OF PRESENT ILLNESS
83 yo female presenting for PST for peripherial angiogram with possible PPI.  Patient has h/o TAVR/PPM, CABG, carotid endarterctomy, FANNY, DM, hypertension, hyperlipidemia, BPH and CAD with coronary stents has recent c/o bilateral calf pain.

## 2017-10-13 LAB
BSA DERIVED: 1.73 M2
CREAT 24H UR-MCNC: 1.4 G/24 H
CREAT 24H UR-MCNC: 1.4 G/24 H
CREAT ?TM UR-MCNC: 122 MG/DL
CREAT ?TM UR-MCNC: 122 MG/DL
CREAT CL 24H UR+SERPL-VRATE: 78 ML/MIN
CREAT SERPL-MCNC: 1.22 MG/DL
PROT 24H UR-MRATE: 22 MG/DL
PROT ?TM UR-MCNC: 24 HR
PROT ?TM UR-MCNC: 24 HR
PROT UR-MCNC: 248 MG/24 H
SPECIMEN VOL 24H UR: 1125 ML
SPECIMEN VOL 24H UR: 1125 ML

## 2017-10-18 ENCOUNTER — TRANSCRIPTION ENCOUNTER (OUTPATIENT)
Age: 82
End: 2017-10-18

## 2017-10-18 ENCOUNTER — OUTPATIENT (OUTPATIENT)
Dept: OUTPATIENT SERVICES | Facility: HOSPITAL | Age: 82
LOS: 1 days | Discharge: HOME | End: 2017-10-18
Payer: MEDICARE

## 2017-10-18 VITALS
OXYGEN SATURATION: 96 % | SYSTOLIC BLOOD PRESSURE: 142 MMHG | TEMPERATURE: 98 F | DIASTOLIC BLOOD PRESSURE: 80 MMHG | RESPIRATION RATE: 16 BRPM | HEART RATE: 80 BPM

## 2017-10-18 VITALS
DIASTOLIC BLOOD PRESSURE: 65 MMHG | RESPIRATION RATE: 16 BRPM | SYSTOLIC BLOOD PRESSURE: 146 MMHG | OXYGEN SATURATION: 98 % | HEART RATE: 77 BPM

## 2017-10-18 DIAGNOSIS — Z98.61 CORONARY ANGIOPLASTY STATUS: Chronic | ICD-10-CM

## 2017-10-18 DIAGNOSIS — Z98.89 OTHER SPECIFIED POSTPROCEDURAL STATES: Chronic | ICD-10-CM

## 2017-10-18 DIAGNOSIS — Z95.1 PRESENCE OF AORTOCORONARY BYPASS GRAFT: Chronic | ICD-10-CM

## 2017-10-18 DIAGNOSIS — Z96.60 PRESENCE OF UNSPECIFIED ORTHOPEDIC JOINT IMPLANT: Chronic | ICD-10-CM

## 2017-10-18 DIAGNOSIS — Z98.42 CATARACT EXTRACTION STATUS, LEFT EYE: Chronic | ICD-10-CM

## 2017-10-18 DIAGNOSIS — Z98.41 CATARACT EXTRACTION STATUS, RIGHT EYE: Chronic | ICD-10-CM

## 2017-10-18 DIAGNOSIS — I73.9 PERIPHERAL VASCULAR DISEASE, UNSPECIFIED: ICD-10-CM

## 2017-10-18 DIAGNOSIS — Z90.49 ACQUIRED ABSENCE OF OTHER SPECIFIED PARTS OF DIGESTIVE TRACT: Chronic | ICD-10-CM

## 2017-10-18 LAB
BLD GP AB SCN SERPL QL: ABNORMAL
DIR ANTIGLOB POLYSPECIFIC INTERPRETATION: SIGNIFICANT CHANGE UP
GLUCOSE BLDC GLUCOMTR-MCNC: 178 MG/DL — HIGH (ref 70–99)
TYPE + AB SCN PNL BLD: SIGNIFICANT CHANGE UP

## 2017-10-18 PROCEDURE — 86850 RBC ANTIBODY SCREEN: CPT

## 2017-10-18 PROCEDURE — 36247 INS CATH ABD/L-EXT ART 3RD: CPT | Mod: 50

## 2017-10-18 PROCEDURE — 36415 COLL VENOUS BLD VENIPUNCTURE: CPT

## 2017-10-18 PROCEDURE — 86901 BLOOD TYPING SEROLOGIC RH(D): CPT

## 2017-10-18 PROCEDURE — 86922 COMPATIBILITY TEST ANTIGLOB: CPT

## 2017-10-18 PROCEDURE — C1887: CPT

## 2017-10-18 PROCEDURE — C1769: CPT

## 2017-10-18 PROCEDURE — C1894: CPT

## 2017-10-18 PROCEDURE — 82962 GLUCOSE BLOOD TEST: CPT

## 2017-10-18 PROCEDURE — 86900 BLOOD TYPING SEROLOGIC ABO: CPT

## 2017-10-18 PROCEDURE — 86880 COOMBS TEST DIRECT: CPT

## 2017-10-18 PROCEDURE — 75716 ARTERY X-RAYS ARMS/LEGS: CPT

## 2017-10-18 NOTE — DISCHARGE NOTE ADULT - PATIENT PORTAL LINK FT
“You can access the FollowHealth Patient Portal, offered by [facility], by registering with the following website: [email or web address]”

## 2017-10-18 NOTE — DISCHARGE NOTE ADULT - HOSPITAL COURSE
He came to the cardiac catheterization laboratory and had a peripheral catheterization which showed bilateral SFA stenosis  tolerated procedure well  d/c today   return for staged intervention in 1-2 weeks with MD Mcgill  IV hydration

## 2017-10-18 NOTE — DISCHARGE NOTE ADULT - CARE PLAN
Principal Discharge DX:	PVD (peripheral vascular disease)  Goal:	return for staged intervention on bilateral SFA to reveal symptoms of claudication  Instructions for follow-up, activity and diet:	Follow up with your Cardiologist as instructed.  Take all medications as instructed.  Speak to your doctor before stopping any medications.  Follow the specified diet.  return for staged intervention in near future

## 2017-10-18 NOTE — DISCHARGE NOTE ADULT - PLAN OF CARE
return for staged intervention on bilateral SFA to reveal symptoms of claudication Follow up with your Cardiologist as instructed.  Take all medications as instructed.  Speak to your doctor before stopping any medications.  Follow the specified diet.  return for staged intervention in near future

## 2017-10-18 NOTE — DISCHARGE NOTE ADULT - CARE PROVIDER_API CALL
Kem Mcgill (MD), Cardiovascular Disease; Interventional Cardiology  [address] [place] [address]  Phone: [phone]  Fax: [phone]

## 2017-10-18 NOTE — PROGRESS NOTE ADULT - SUBJECTIVE AND OBJECTIVE BOX
s/p peripheral angiogram revealing severe bilateral stenosis in SFA    Patient feels well.  Denies chest pain, shortness of breath, dizziness or palpitations at this time    LEFT groin #5 arterial sheath removed by RN, procedure site CDI.  no bleeding, no hematoma, site soft, non tender, positive pedal pulses bilaterally        HPI:  83 yo female presenting for PST for peripheral angiogram with possible PPI.  Patient has h/o TAVR/PPM, CABG, carotid endarterctomy, FANNY, DM, hypertension, hyperlipidemia, BPH and CAD with coronary stents has recent c/o bilateral calf pain.    now s/p peripheral angiogram revealing severe bilateral stenosis in SFA    -vital signs, labs, diet and activity per post procedure orders  -ambulate ad edyta post bedrest  -iv hydration   -encourage PO fluids  -plan of care discussed with patient, family and MD Mcgill  -d/c after bedrest if stable  -return in 1-2 weeks for staged procedure  -post procedure and d/c instructions reviewed  -follow up with MD Mcgill in 1-2 weeks  -Discussed therapeutic lifestyle changes to reduce risk factors such as following a cardiac diet, weight loss, maintaining a healthy weight, exercise, smoking cessation, medication compliance, and regular follow-up  with MD to know your numbers (BP, cholesterol, weight, and glucose)

## 2017-10-18 NOTE — DISCHARGE NOTE ADULT - MEDICATION SUMMARY - MEDICATIONS TO TAKE
I will START or STAY ON the medications listed below when I get home from the hospital:    napncon A  eye drops  -- 1 drop(s) in each eye once a day  -- Indication: For eye drops    aspirin 325 mg oral delayed release tablet  -- 1 tab(s) by mouth once a day  -- Indication: For Peripheral vascular disease/coronary disease    losartan 25 mg oral tablet  -- 1 tab(s) by mouth once a day  -- Indication: For high blood pressure    tamsulosin 0.4 mg oral capsule  -- 1 cap(s) by mouth once a day (at bedtime)  -- Indication: For BPH    glimepiride 2 mg oral tablet  -- 1 tab(s) by mouth once a day  -- Indication: For diabetes    Zetia 10 mg oral tablet  -- 1 tab(s) by mouth once a day  -- Indication: For high cholesterol    clopidogrel 75 mg oral tablet  -- 1 tab(s) by mouth once a day  -- Indication: For Peripheral vascular disease/coronary disease    Symbicort 80 mcg-4.5 mcg/inh inhalation aerosol  -- 2 puff(s) inhaled 2 times a day  -- 2 puffs per day  -- Indication: For asthma    garlic oral capsule  -- 1000 milligram(s) by mouth 2 times a day  -- Indication: For supplement    latanoprost 0.005% ophthalmic solution  -- 1 drop(s) to each affected eye once a day (at bedtime)  -- Indication: For eyes drops    Align 4 mg oral capsule  -- 1 cap(s) by mouth once a day  -- Indication: For Probiotic    pantoprazole 40 mg oral delayed release tablet  -- 1 tab(s) by mouth once a day  -- Indication: For GERD    multivitamin  -- 1 tab(s) by mouth once a day  -- Indication: For supplement

## 2017-10-19 PROCEDURE — C1760: CPT

## 2017-10-19 PROCEDURE — C1725: CPT

## 2017-10-19 PROCEDURE — 80048 BASIC METABOLIC PNL TOTAL CA: CPT

## 2017-10-19 PROCEDURE — 99231 SBSQ HOSP IP/OBS SF/LOW 25: CPT

## 2017-10-19 PROCEDURE — 93312 ECHO TRANSESOPHAGEAL: CPT

## 2017-10-19 PROCEDURE — C1785: CPT

## 2017-10-19 PROCEDURE — 82550 ASSAY OF CK (CPK): CPT

## 2017-10-19 PROCEDURE — C1887: CPT

## 2017-10-19 PROCEDURE — 93325 DOPPLER ECHO COLOR FLOW MAPG: CPT

## 2017-10-19 PROCEDURE — 76000 FLUOROSCOPY <1 HR PHYS/QHP: CPT

## 2017-10-19 PROCEDURE — C1889: CPT

## 2017-10-19 PROCEDURE — 71045 X-RAY EXAM CHEST 1 VIEW: CPT

## 2017-10-19 PROCEDURE — 85730 THROMBOPLASTIN TIME PARTIAL: CPT

## 2017-10-19 PROCEDURE — L8699: CPT

## 2017-10-19 PROCEDURE — 82553 CREATINE MB FRACTION: CPT

## 2017-10-19 PROCEDURE — 93320 DOPPLER ECHO COMPLETE: CPT

## 2017-10-19 PROCEDURE — 82435 ASSAY OF BLOOD CHLORIDE: CPT

## 2017-10-19 PROCEDURE — C1769: CPT

## 2017-10-19 PROCEDURE — 82803 BLOOD GASES ANY COMBINATION: CPT

## 2017-10-19 PROCEDURE — 85610 PROTHROMBIN TIME: CPT

## 2017-10-19 PROCEDURE — 82947 ASSAY GLUCOSE BLOOD QUANT: CPT

## 2017-10-19 PROCEDURE — 86900 BLOOD TYPING SEROLOGIC ABO: CPT

## 2017-10-19 PROCEDURE — C1898: CPT

## 2017-10-19 PROCEDURE — 80053 COMPREHEN METABOLIC PANEL: CPT

## 2017-10-19 PROCEDURE — 85027 COMPLETE CBC AUTOMATED: CPT

## 2017-10-19 PROCEDURE — 80076 HEPATIC FUNCTION PANEL: CPT

## 2017-10-19 PROCEDURE — 82330 ASSAY OF CALCIUM: CPT

## 2017-10-19 PROCEDURE — 83605 ASSAY OF LACTIC ACID: CPT

## 2017-10-19 PROCEDURE — 86901 BLOOD TYPING SEROLOGIC RH(D): CPT

## 2017-10-19 PROCEDURE — 83735 ASSAY OF MAGNESIUM: CPT

## 2017-10-19 PROCEDURE — 86920 COMPATIBILITY TEST SPIN: CPT

## 2017-10-19 PROCEDURE — 84295 ASSAY OF SERUM SODIUM: CPT

## 2017-10-19 PROCEDURE — 84132 ASSAY OF SERUM POTASSIUM: CPT

## 2017-10-19 PROCEDURE — 84484 ASSAY OF TROPONIN QUANT: CPT

## 2017-10-19 PROCEDURE — 94640 AIRWAY INHALATION TREATMENT: CPT

## 2017-10-19 PROCEDURE — C1894: CPT

## 2017-10-19 PROCEDURE — 86850 RBC ANTIBODY SCREEN: CPT

## 2017-10-19 PROCEDURE — 94660 CPAP INITIATION&MGMT: CPT

## 2017-10-19 PROCEDURE — 36415 COLL VENOUS BLD VENIPUNCTURE: CPT

## 2017-10-19 PROCEDURE — 93005 ELECTROCARDIOGRAM TRACING: CPT

## 2017-10-19 PROCEDURE — 93308 TTE F-UP OR LMTD: CPT

## 2017-10-19 PROCEDURE — 94760 N-INVAS EAR/PLS OXIMETRY 1: CPT

## 2017-10-19 PROCEDURE — 85014 HEMATOCRIT: CPT

## 2017-10-25 ENCOUNTER — CLINICAL ADVICE (OUTPATIENT)
Age: 82
End: 2017-10-25

## 2017-11-14 ENCOUNTER — OUTPATIENT (OUTPATIENT)
Dept: OUTPATIENT SERVICES | Facility: HOSPITAL | Age: 82
LOS: 1 days | End: 2017-11-14
Payer: MEDICARE

## 2017-11-14 VITALS
WEIGHT: 179.9 LBS | DIASTOLIC BLOOD PRESSURE: 72 MMHG | RESPIRATION RATE: 18 BRPM | SYSTOLIC BLOOD PRESSURE: 156 MMHG | HEART RATE: 75 BPM | HEIGHT: 68.5 IN | TEMPERATURE: 97 F | OXYGEN SATURATION: 99 %

## 2017-11-14 VITALS — WEIGHT: 179.9 LBS | HEIGHT: 68.5 IN

## 2017-11-14 DIAGNOSIS — Z98.89 OTHER SPECIFIED POSTPROCEDURAL STATES: Chronic | ICD-10-CM

## 2017-11-14 DIAGNOSIS — Z98.61 CORONARY ANGIOPLASTY STATUS: Chronic | ICD-10-CM

## 2017-11-14 DIAGNOSIS — Z98.41 CATARACT EXTRACTION STATUS, RIGHT EYE: Chronic | ICD-10-CM

## 2017-11-14 DIAGNOSIS — Z96.60 PRESENCE OF UNSPECIFIED ORTHOPEDIC JOINT IMPLANT: Chronic | ICD-10-CM

## 2017-11-14 DIAGNOSIS — Z01.810 ENCOUNTER FOR PREPROCEDURAL CARDIOVASCULAR EXAMINATION: ICD-10-CM

## 2017-11-14 DIAGNOSIS — Z98.42 CATARACT EXTRACTION STATUS, LEFT EYE: Chronic | ICD-10-CM

## 2017-11-14 DIAGNOSIS — Z95.1 PRESENCE OF AORTOCORONARY BYPASS GRAFT: Chronic | ICD-10-CM

## 2017-11-14 DIAGNOSIS — Z90.49 ACQUIRED ABSENCE OF OTHER SPECIFIED PARTS OF DIGESTIVE TRACT: Chronic | ICD-10-CM

## 2017-11-14 LAB
ANION GAP SERPL CALC-SCNC: 12 MMOL/L — SIGNIFICANT CHANGE UP (ref 5–17)
APTT BLD: 29.8 SEC — SIGNIFICANT CHANGE UP (ref 27.5–37.4)
BASOPHILS # BLD AUTO: 0 K/UL — SIGNIFICANT CHANGE UP (ref 0–0.2)
BASOPHILS NFR BLD AUTO: 0.1 % — SIGNIFICANT CHANGE UP (ref 0–2)
BUN SERPL-MCNC: 32 MG/DL — HIGH (ref 8–20)
CALCIUM SERPL-MCNC: 9.2 MG/DL — SIGNIFICANT CHANGE UP (ref 8.6–10.2)
CHLORIDE SERPL-SCNC: 102 MMOL/L — SIGNIFICANT CHANGE UP (ref 98–107)
CHOLEST SERPL-MCNC: 186 MG/DL — SIGNIFICANT CHANGE UP (ref 110–199)
CO2 SERPL-SCNC: 26 MMOL/L — SIGNIFICANT CHANGE UP (ref 22–29)
CREAT SERPL-MCNC: 1.12 MG/DL — SIGNIFICANT CHANGE UP (ref 0.5–1.3)
EOSINOPHIL # BLD AUTO: 0.1 K/UL — SIGNIFICANT CHANGE UP (ref 0–0.5)
EOSINOPHIL NFR BLD AUTO: 1.9 % — SIGNIFICANT CHANGE UP (ref 0–5)
GLUCOSE SERPL-MCNC: 137 MG/DL — HIGH (ref 70–115)
HCT VFR BLD CALC: 38.1 % — LOW (ref 42–52)
HDLC SERPL-MCNC: 54 MG/DL — LOW
HGB BLD-MCNC: 12.7 G/DL — LOW (ref 14–18)
INR BLD: 1.03 RATIO — SIGNIFICANT CHANGE UP (ref 0.88–1.16)
LIPID PNL WITH DIRECT LDL SERPL: 107 MG/DL — SIGNIFICANT CHANGE UP
LYMPHOCYTES # BLD AUTO: 1.9 K/UL — SIGNIFICANT CHANGE UP (ref 1–4.8)
LYMPHOCYTES # BLD AUTO: 26.1 % — SIGNIFICANT CHANGE UP (ref 20–55)
MCHC RBC-ENTMCNC: 31.5 PG — HIGH (ref 27–31)
MCHC RBC-ENTMCNC: 33.3 G/DL — SIGNIFICANT CHANGE UP (ref 32–36)
MCV RBC AUTO: 94.5 FL — HIGH (ref 80–94)
MONOCYTES # BLD AUTO: 0.7 K/UL — SIGNIFICANT CHANGE UP (ref 0–0.8)
MONOCYTES NFR BLD AUTO: 9.6 % — SIGNIFICANT CHANGE UP (ref 3–10)
NEUTROPHILS # BLD AUTO: 4.5 K/UL — SIGNIFICANT CHANGE UP (ref 1.8–8)
NEUTROPHILS NFR BLD AUTO: 62 % — SIGNIFICANT CHANGE UP (ref 37–73)
PLATELET # BLD AUTO: 179 K/UL — SIGNIFICANT CHANGE UP (ref 150–400)
POTASSIUM SERPL-MCNC: 4.3 MMOL/L — SIGNIFICANT CHANGE UP (ref 3.5–5.3)
POTASSIUM SERPL-SCNC: 4.3 MMOL/L — SIGNIFICANT CHANGE UP (ref 3.5–5.3)
PROTHROM AB SERPL-ACNC: 11.3 SEC — SIGNIFICANT CHANGE UP (ref 9.8–12.7)
RBC # BLD: 4.03 M/UL — LOW (ref 4.6–6.2)
RBC # FLD: 12.9 % — SIGNIFICANT CHANGE UP (ref 11–15.6)
SODIUM SERPL-SCNC: 140 MMOL/L — SIGNIFICANT CHANGE UP (ref 135–145)
TOTAL CHOLESTEROL/HDL RATIO MEASUREMENT: 3 RATIO — LOW (ref 3.4–9.6)
TRIGL SERPL-MCNC: 126 MG/DL — SIGNIFICANT CHANGE UP (ref 10–200)
WBC # BLD: 7.3 K/UL — SIGNIFICANT CHANGE UP (ref 4.8–10.8)
WBC # FLD AUTO: 7.3 K/UL — SIGNIFICANT CHANGE UP (ref 4.8–10.8)

## 2017-11-14 PROCEDURE — 93010 ELECTROCARDIOGRAM REPORT: CPT

## 2017-11-14 NOTE — H&P PST ADULT - ASSESSMENT
This is an 85 yo white male who presents for complaints of L and R lower extremity calf pain with ambulation which started approximately 1 to 1 & 1/2 years ago.  Mr Pedraza id 2 months sp TAVR/PPM here at [facility]. Past medical history includes Aortic stenosis, asthma BPH with urinary obstruction, CAD, DM, HLD, HTN, microscopic hematuria, nocturia, FANNY uses a mouth piece, SOB, and urinary frequency.  Past surgical history includes b/l knee replacements, cataract surgery, multiple cardiac catheterizations last on approximately 3 months ago with multiple stent placements totaling approximately nine, b/l CEAs, CABG, PPM.      Patient presents today for PST for Peripheral angiogram on 11/17/2017. This is an 85 yo white male who presents for complaints of L and R lower extremity calf pain with ambulation which started approximately 1 to 1 & 1/2 years ago.  Mr Pedraza id 2 months sp TAVR/PPM here at [facility]. Past medical history includes Aortic stenosis, asthma BPH with urinary obstruction, CAD, DM, HLD, HTN, microscopic hematuria, nocturia, FANNY uses a mouth piece, SOB, and urinary frequency.  Past surgical history includes b/l knee replacements, cataract surgery, multiple cardiac catheterizations last on approximately 3 months ago with multiple stent placements totaling approximately nine, b/l CEAs, CABG, PPM.      Patient presents today for PST for Peripheral angiogram on 11/17/2017. Patient instructed to hold Glipizide the morning of this procedure. This is an 85 yo white male who presents for complaints of L and R lower extremity calf pain with ambulation which started approximately 1 to 1 & 1/2 years ago.  Mr Pedraza id 2 months sp TAVR/PPM here at [facility]. Past medical history includes Aortic stenosis, asthma BPH with urinary obstruction, CAD, DM, HLD, HTN, microscopic hematuria, nocturia, FANNY uses a mouth piece, SOB, and urinary frequency.  Past surgical history includes b/l knee replacements, cataract surgery, multiple cardiac catheterizations last on approximately 3 months ago with multiple stent placements totaling approximately nine, b/l CEAs, CABG, PPM.      Patient presents today for PST for Peripheral angiogram on 11/17/2017. Patient instructed to hold Glipizide the morning of this procedure.

## 2017-11-14 NOTE — H&P PST ADULT - PMH
Age-related incipient cataract of both eyes    Angina pectoris  class II  Aortic stenosis    Asthma    BPH (benign prostatic hyperplasia)    CAD (coronary artery disease)  CABG  CAD (coronary artery disease)  S/P Stenting x8  Carotid artery disease  RCEA  Diabetes mellitus    MENDEZ (dyspnea on exertion)    Fatigue  Profound w/exertion  Former smoker    Hyperlipidemia    Hypertension    FANNY (obstructive sleep apnea)  uses mouth peice  Osteoarthritis    PVD (peripheral vascular disease)  intervention RLE  PVD (peripheral vascular disease)    SOB (shortness of breath)

## 2017-11-14 NOTE — H&P PST ADULT - LAST ECHOCARDIOGRAM
9/7/2017 LV normal in size, moderate LVH, EF 70-75 %, mildly dilated L atrium, mildly thickened mitral leaflets, mild diastolic dysfunction, mild mitral regurg, mild tricuspid regurg, s/p TAVR, bioprosthetic aortic valve, mild aortic regurg, normal aorta.

## 2017-11-14 NOTE — H&P PST ADULT - HISTORY OF PRESENT ILLNESS
This is an 85 yo white male who presents for complaints of L and R lower extremity calf pain with ambulation which started approximately 1 to 1 & 1/2 years ago.  Mr Keila id 2 months sp TAVR/PPM here at [facility]. Past medical history includes Aortic stenosis, asthma BPH with urinary obstruction, CAD, DM, HLD, HTN, microscopic hematuria, nocturia, FANNY uses a mouth piece, SOB, and urinary frequency.  Past surgical history includes b/l knee replacements, cataract surgery, multiple cardiac catheterizations last on approximately 3 months ago with multiple stent placements totaling approximately nine, b/l CEAs, CABG, PPM.      Last Cardiac Cath: 10/18/2017  < from: Cardiac Cath Lab - Adult (10.18.17 @ 10:40) >  AORTA: Calcified distal abd aorta w/ small nonflow limiting dissection and  mild disesae.  Iliac disease noted bilaterally w/ no gradient.  LEFT LOWER EXTREMITY VESSELS: CFA endarterectomy site widely patent.  Distal SFA 95% calcified stenosis w/ popliteal occlusion and distal vessel  filling via collaterals.  RIGHT LOWER EXTREMITY VESSELS: 95% distal SFA stenosis w/ mild popliteal  disease.  2 v runoff noted.  COMPLICATIONS: No complications occurred during the cath lab visit.  DIAGNOSTIC IMPRESSIONS: Severe SFA disease bilaterally ( L > R ) w/  artificial knees bilaterally that make full visualization difficult.  Hx TAVR.  Hx CABG.  DM.  PPM.  DIAGNOSTIC RECOMMENDATIONS: Outpt CTA/surgical eval to aid in planning  intervention  INTERVENTIONAL IMPRESSIONS: Severe SFA disease bilaterally ( L > R ) w/  artificial knees bilaterally that make full visualization difficult.  Hx TAVR.  Hx CABG.  DM.  PPM.  INTERVENTIONAL RECOMMENDATIONS: Outpt CTA/surgical eval to aid in planning  intervention    < end of copied text > This is an 85 yo white male who presents for complaints of L and R lower extremity calf pain with ambulation which started approximately 1 to 1 & 1/2 years ago.  Mr Keila is 2 months sp TAVR/PPM here at [facility]. Past medical history includes Aortic stenosis, asthma BPH with urinary obstruction, CAD, DM, HLD, HTN, microscopic hematuria, nocturia, FANNY uses a mouth piece, SOB, and urinary frequency.  Past surgical history includes b/l knee replacements, cataract surgery, multiple cardiac catheterizations last one approximately 3 months ago with multiple stent placements totaling approximately nine, b/l CEAs, CABG, PPM.      Last Cardiac Cath: 10/18/2017  < from: Cardiac Cath Lab - Adult (10.18.17 @ 10:40) >  AORTA: Calcified distal abd aorta w/ small nonflow limiting dissection and  mild disesae.  Iliac disease noted bilaterally w/ no gradient.  LEFT LOWER EXTREMITY VESSELS: CFA endarterectomy site widely patent.  Distal SFA 95% calcified stenosis w/ popliteal occlusion and distal vessel  filling via collaterals.  RIGHT LOWER EXTREMITY VESSELS: 95% distal SFA stenosis w/ mild popliteal  disease.  2 v runoff noted.  COMPLICATIONS: No complications occurred during the cath lab visit.  DIAGNOSTIC IMPRESSIONS: Severe SFA disease bilaterally ( L > R ) w/  artificial knees bilaterally that make full visualization difficult.  Hx TAVR.  Hx CABG.  DM.  PPM.  DIAGNOSTIC RECOMMENDATIONS: Outpt CTA/surgical eval to aid in planning  intervention  INTERVENTIONAL IMPRESSIONS: Severe SFA disease bilaterally ( L > R ) w/  artificial knees bilaterally that make full visualization difficult.  Hx TAVR.  Hx CABG.  DM.  PPM.  INTERVENTIONAL RECOMMENDATIONS: Outpt CTA/surgical eval to aid in planning  intervention    < end of copied text >

## 2017-11-17 ENCOUNTER — TRANSCRIPTION ENCOUNTER (OUTPATIENT)
Age: 82
End: 2017-11-17

## 2017-11-17 ENCOUNTER — INPATIENT (INPATIENT)
Facility: HOSPITAL | Age: 82
LOS: 0 days | Discharge: HOME | DRG: 253 | End: 2017-11-18
Attending: INTERNAL MEDICINE | Admitting: INTERNAL MEDICINE
Payer: COMMERCIAL

## 2017-11-17 VITALS
TEMPERATURE: 98 F | RESPIRATION RATE: 16 BRPM | DIASTOLIC BLOOD PRESSURE: 66 MMHG | HEART RATE: 78 BPM | SYSTOLIC BLOOD PRESSURE: 144 MMHG | OXYGEN SATURATION: 95 %

## 2017-11-17 DIAGNOSIS — Z96.60 PRESENCE OF UNSPECIFIED ORTHOPEDIC JOINT IMPLANT: Chronic | ICD-10-CM

## 2017-11-17 DIAGNOSIS — Z90.49 ACQUIRED ABSENCE OF OTHER SPECIFIED PARTS OF DIGESTIVE TRACT: Chronic | ICD-10-CM

## 2017-11-17 DIAGNOSIS — Z98.89 OTHER SPECIFIED POSTPROCEDURAL STATES: Chronic | ICD-10-CM

## 2017-11-17 DIAGNOSIS — Z98.61 CORONARY ANGIOPLASTY STATUS: Chronic | ICD-10-CM

## 2017-11-17 DIAGNOSIS — I73.9 PERIPHERAL VASCULAR DISEASE, UNSPECIFIED: ICD-10-CM

## 2017-11-17 DIAGNOSIS — Z95.1 PRESENCE OF AORTOCORONARY BYPASS GRAFT: Chronic | ICD-10-CM

## 2017-11-17 DIAGNOSIS — Z98.41 CATARACT EXTRACTION STATUS, RIGHT EYE: Chronic | ICD-10-CM

## 2017-11-17 DIAGNOSIS — Z98.42 CATARACT EXTRACTION STATUS, LEFT EYE: Chronic | ICD-10-CM

## 2017-11-17 LAB
GLUCOSE BLDC GLUCOMTR-MCNC: 109 MG/DL — HIGH (ref 70–99)
GLUCOSE BLDC GLUCOMTR-MCNC: 192 MG/DL — HIGH (ref 70–99)
GLUCOSE BLDC GLUCOMTR-MCNC: 220 MG/DL — HIGH (ref 70–99)

## 2017-11-17 RX ORDER — LOSARTAN POTASSIUM 100 MG/1
50 TABLET, FILM COATED ORAL DAILY
Qty: 0 | Refills: 0 | Status: DISCONTINUED | OUTPATIENT
Start: 2017-11-17 | End: 2017-11-18

## 2017-11-17 RX ORDER — BUDESONIDE AND FORMOTEROL FUMARATE DIHYDRATE 160; 4.5 UG/1; UG/1
2 AEROSOL RESPIRATORY (INHALATION)
Qty: 0 | Refills: 0 | Status: DISCONTINUED | OUTPATIENT
Start: 2017-11-17 | End: 2017-11-18

## 2017-11-17 RX ORDER — ASPIRIN/CALCIUM CARB/MAGNESIUM 324 MG
325 TABLET ORAL DAILY
Qty: 0 | Refills: 0 | Status: DISCONTINUED | OUTPATIENT
Start: 2017-11-17 | End: 2017-11-18

## 2017-11-17 RX ORDER — TAMSULOSIN HYDROCHLORIDE 0.4 MG/1
0.4 CAPSULE ORAL AT BEDTIME
Qty: 0 | Refills: 0 | Status: DISCONTINUED | OUTPATIENT
Start: 2017-11-17 | End: 2017-11-18

## 2017-11-17 RX ORDER — CLOPIDOGREL BISULFATE 75 MG/1
75 TABLET, FILM COATED ORAL DAILY
Qty: 0 | Refills: 0 | Status: DISCONTINUED | OUTPATIENT
Start: 2017-11-17 | End: 2017-11-18

## 2017-11-17 RX ORDER — PANTOPRAZOLE SODIUM 20 MG/1
40 TABLET, DELAYED RELEASE ORAL
Qty: 0 | Refills: 0 | Status: DISCONTINUED | OUTPATIENT
Start: 2017-11-17 | End: 2017-11-18

## 2017-11-17 RX ORDER — SODIUM CHLORIDE 9 MG/ML
1000 INJECTION INTRAMUSCULAR; INTRAVENOUS; SUBCUTANEOUS
Qty: 0 | Refills: 0 | Status: DISCONTINUED | OUTPATIENT
Start: 2017-11-17 | End: 2017-11-17

## 2017-11-17 RX ORDER — DEXTROSE 50 % IN WATER 50 %
25 SYRINGE (ML) INTRAVENOUS ONCE
Qty: 0 | Refills: 0 | Status: DISCONTINUED | OUTPATIENT
Start: 2017-11-17 | End: 2017-11-18

## 2017-11-17 RX ORDER — HYDRALAZINE HCL 50 MG
5 TABLET ORAL ONCE
Qty: 0 | Refills: 0 | Status: DISCONTINUED | OUTPATIENT
Start: 2017-11-17 | End: 2017-11-18

## 2017-11-17 RX ORDER — INSULIN LISPRO 100/ML
VIAL (ML) SUBCUTANEOUS
Qty: 0 | Refills: 0 | Status: DISCONTINUED | OUTPATIENT
Start: 2017-11-17 | End: 2017-11-18

## 2017-11-17 RX ORDER — LATANOPROST 0.05 MG/ML
1 SOLUTION/ DROPS OPHTHALMIC; TOPICAL AT BEDTIME
Qty: 0 | Refills: 0 | Status: DISCONTINUED | OUTPATIENT
Start: 2017-11-17 | End: 2017-11-18

## 2017-11-17 RX ORDER — DEXTROSE 50 % IN WATER 50 %
1 SYRINGE (ML) INTRAVENOUS ONCE
Qty: 0 | Refills: 0 | Status: DISCONTINUED | OUTPATIENT
Start: 2017-11-17 | End: 2017-11-18

## 2017-11-17 RX ORDER — SODIUM CHLORIDE 9 MG/ML
500 INJECTION INTRAMUSCULAR; INTRAVENOUS; SUBCUTANEOUS
Qty: 0 | Refills: 0 | Status: DISCONTINUED | OUTPATIENT
Start: 2017-11-17 | End: 2017-11-18

## 2017-11-17 RX ORDER — ALPRAZOLAM 0.25 MG
0.25 TABLET ORAL EVERY 6 HOURS
Qty: 0 | Refills: 0 | Status: DISCONTINUED | OUTPATIENT
Start: 2017-11-17 | End: 2017-11-18

## 2017-11-17 RX ORDER — SODIUM CHLORIDE 9 MG/ML
1000 INJECTION, SOLUTION INTRAVENOUS
Qty: 0 | Refills: 0 | Status: DISCONTINUED | OUTPATIENT
Start: 2017-11-17 | End: 2017-11-18

## 2017-11-17 RX ORDER — ACETAMINOPHEN 500 MG
650 TABLET ORAL EVERY 6 HOURS
Qty: 0 | Refills: 0 | Status: DISCONTINUED | OUTPATIENT
Start: 2017-11-17 | End: 2017-11-18

## 2017-11-17 RX ORDER — DEXTROSE 50 % IN WATER 50 %
12.5 SYRINGE (ML) INTRAVENOUS ONCE
Qty: 0 | Refills: 0 | Status: DISCONTINUED | OUTPATIENT
Start: 2017-11-17 | End: 2017-11-18

## 2017-11-17 RX ORDER — ZOLPIDEM TARTRATE 10 MG/1
5 TABLET ORAL AT BEDTIME
Qty: 0 | Refills: 0 | Status: DISCONTINUED | OUTPATIENT
Start: 2017-11-17 | End: 2017-11-18

## 2017-11-17 RX ORDER — GLUCAGON INJECTION, SOLUTION 0.5 MG/.1ML
1 INJECTION, SOLUTION SUBCUTANEOUS ONCE
Qty: 0 | Refills: 0 | Status: DISCONTINUED | OUTPATIENT
Start: 2017-11-17 | End: 2017-11-18

## 2017-11-17 RX ADMIN — Medication: at 15:52

## 2017-11-17 RX ADMIN — LATANOPROST 1 DROP(S): 0.05 SOLUTION/ DROPS OPHTHALMIC; TOPICAL at 21:24

## 2017-11-17 RX ADMIN — TAMSULOSIN HYDROCHLORIDE 0.4 MILLIGRAM(S): 0.4 CAPSULE ORAL at 21:24

## 2017-11-17 RX ADMIN — LOSARTAN POTASSIUM 50 MILLIGRAM(S): 100 TABLET, FILM COATED ORAL at 16:52

## 2017-11-17 RX ADMIN — Medication 0.25 MILLIGRAM(S): at 21:24

## 2017-11-17 RX ADMIN — BUDESONIDE AND FORMOTEROL FUMARATE DIHYDRATE 2 PUFF(S): 160; 4.5 AEROSOL RESPIRATORY (INHALATION) at 20:50

## 2017-11-17 NOTE — PATIENT PROFILE ADULT. - NS PRO AD PATIENT TYPE
Patient here today with sore throat and fever.   No medications prior to arrival.
Health Care Proxy (HCP)

## 2017-11-17 NOTE — DISCHARGE NOTE ADULT - INSTRUCTIONS
Activities as tolerated minimize stair climbing, heavy lifting greater than 10lbs, strenous house work, contact sports,No intercourse for 1 week. Site care no Bath tubs, Hot tubs , Pools for 1 week. Monitor site for infection such as warmth drainage or swelling of site. Monitor for bleeding call MD if continous bleeding occurs hold pressure report to nearest ER, Do not opperate heavy machinery or drive for 24hours.  Remove left groin dressing within 24 hours

## 2017-11-17 NOTE — PROGRESS NOTE ADULT - SUBJECTIVE AND OBJECTIVE BOX
S/P PPA w/PPI per report:    LEFT LOWER EXTREMITY VESSELS: Runoff angio of LLE reveals calcified 99%  stenosis at distal SFA w/ short popliteal occlusion and TPtrunk fills  retrograde.  PT fills at mid leg.  RIGHT LOWER EXTREMITY VESSELS: Run off of RLE reveals 95% calcified  stenosis w 1 v runoff. Right superficial femoral: There was a 95 %  stenosis.  COMPLICATIONS: No complications occurred during the cath lab visit.  DIAGNOSTIC IMPRESSIONS: PTA/DCB and Stent x 1 to R SFA w/ good results and  normal flow maintained thruout.  Occluded distal L SFA w/ reconstitution at the knee w/ 2 v runoff .  DIAGNOSTIC RECOMMENDATIONS: Asa/plavix.  Followup 2-4 weeks at [facility] to discuss L leg intervention-would require  atherectomy and lateral projection ( TKR ).  INTERVENTIONAL IMPRESSIONS: PTA/DCB and Stent x 1 to R SFA w/ good results  and normal flow maintained thruout.  Occluded distal L SFA w/ reconstitution at the knee w/ 2 v runoff .            REVIEW OF SYSTEMS:  Denies SOB, CP, NV, HA, dizziness, palpitations, site pain    PHYSICAL EXAM: A&Ox3 NAD Skin warm and dry  NEURO: Speech intact +gag +swallow Tongue midline FERGUSON  NECK: No JVD, trachea midline. Eupneic  HEART:  RRR gr ii/vi SM tele SR no ectopy  PULMONARY:  CTA nano  ABDOMEN: Soft nontender X4 +BS   EXTREMITIES"  LFA site: No bleed, hematoma, pain, ecchymosis or swelling Leftt DP/PT+  patient experienced significant post sheath removal bleed requiring 30 additional minutes of manual hold

## 2017-11-17 NOTE — DISCHARGE NOTE ADULT - MEDICATION SUMMARY - MEDICATIONS TO TAKE
I will START or STAY ON the medications listed below when I get home from the hospital:    napncon A  eye drops  -- 1 drop(s) in each eye once a day  -- Indication: For Allergies    aspirin 325 mg oral delayed release tablet  -- 1 tab(s) by mouth once a day  -- Indication: For PAD (peripheral artery disease)    losartan 50 mg oral tablet  -- 1 tab(s) by mouth once a day  -- Indication: For Hypertension    tamsulosin 0.4 mg oral capsule  -- 1 cap(s) by mouth once a day (at bedtime)  -- Indication: For BPH (benign prostatic hyperplasia)    GlipiZIDE XL 5 mg oral tablet, extended release  -- 1 tab(s) by mouth once a day  -- Indication: For Diabetes mellitus    Zetia 10 mg oral tablet  -- 1 tab(s) by mouth once a day  -- Indication: For Hyperlipidemia    clopidogrel 75 mg oral tablet  -- 1 tab(s) by mouth once a day  -- Indication: For PAD (peripheral artery disease)    Symbicort 80 mcg-4.5 mcg/inh inhalation aerosol  -- 2 puff(s) inhaled 2 times a day  -- 2 puffs per day  -- Indication: For Asthma    garlic oral capsule  -- 1000 milligram(s) by mouth 2 times a day  -- Indication: For Supplement    latanoprost 0.005% ophthalmic solution  -- 1 drop(s) to each affected eye once a day (at bedtime)  -- Indication: For Glaucoma    Align 4 mg oral capsule  -- 1 cap(s) by mouth once a day  -- Indication: For Supplement    pantoprazole 40 mg oral delayed release tablet  -- 1 tab(s) by mouth once a day  -- Indication: For GERD    multivitamin  -- 1 tab(s) by mouth once a day  -- Indication: For Supplement

## 2017-11-17 NOTE — DISCHARGE NOTE ADULT - PLAN OF CARE
optimal peripheral circulation Follow up in 2 weeks with Dr. Mcgill  Continue with all prescribed medications.  Don't stop your antiplatelet medication (Plavix/Effient/Brilinta) without asking your cardiologist first.  Follow your prescribed diet.

## 2017-11-17 NOTE — DISCHARGE NOTE ADULT - CARE PLAN
Principal Discharge DX:	PVD (peripheral vascular disease)  Goal:	optimal peripheral circulation Principal Discharge DX:	PAD (peripheral artery disease)  Goal:	optimal peripheral circulation  Instructions for follow-up, activity and diet:	Follow up in 2 weeks with Dr. Mcgill  Continue with all prescribed medications.  Don't stop your antiplatelet medication (Plavix/Effient/Brilinta) without asking your cardiologist first.  Follow your prescribed diet.

## 2017-11-17 NOTE — PROGRESS NOTE ADULT - PROBLEM SELECTOR PLAN 1
Due to left groin post procedure bleed and patient's wife unable to drive (she just had CABG/AVR) will admit pt overnight for safety of vascular concerns and anticipate discharge in morning.    F/U [place] 2-4 weeks for further options for left leg PVD management  D/W patient needs for compliance of diet, meds, and follow-up as well as groin care Due to left groin post procedure bleed and patient's wife unable to drive (she just had CABG/AVR and they live far from the hospital) I  will admit pt overnight for safety of vascular concerns and anticipate discharge in morning.    F/U [place] 2-4 weeks for further options for left leg PVD management  D/W patient needs for compliance of diet, meds, and follow-up as well as groin care

## 2017-11-17 NOTE — DISCHARGE NOTE ADULT - PATIENT PORTAL LINK FT
“You can access the FollowHealth Patient Portal, offered by [facility], by registering with the following website: [email or web address]”

## 2017-11-17 NOTE — DISCHARGE NOTE ADULT - CARE PROVIDER_API CALL
Kem Mcgill (MD), Cardiovascular Disease; Interventional Cardiology  [address] [place] [address]  Phone: [phone]  Fax: [phone]

## 2017-11-17 NOTE — PROGRESS NOTE ADULT - ASSESSMENT
LEFT LOWER EXTREMITY VESSELS: Runoff angio of LLE reveals calcified 99%  stenosis at distal SFA w/ short popliteal occlusion and TPtrunk fills  retrograde.  PT fills at mid leg.  RIGHT LOWER EXTREMITY VESSELS: Run off of RLE reveals 95% calcified  stenosis w 1 v runoff. Right superficial femoral: There was a 95 %  stenosis.  COMPLICATIONS: No complications occurred during the cath lab visit.  DIAGNOSTIC IMPRESSIONS: PTA/DCB and Stent x 1 to R SFA w/ good results and  normal flow maintained thruout.  Occluded distal L SFA w/ reconstitution at the knee w/ 2 v runoff .  DIAGNOSTIC RECOMMENDATIONS: Asa/plavix.  Followup 2-4 weeks at [facility] to discuss L leg intervention-would require  atherectomy and lateral projection ( TKR ).  INTERVENTIONAL IMPRESSIONS: PTA/DCB and Stent x 1 to R SFA w/ good results  and normal flow maintained thruout.  Occluded distal L SFA w/ reconstitution at the knee w/ 2 v runoff .

## 2017-11-17 NOTE — DISCHARGE NOTE ADULT - HOSPITAL COURSE
s/p PPA with PPI via LFA via #6 [place] sheath. Had post sheath pull bleed and for vascular safety concerns kept pt overnight due to his wife unable to drive and concerns with further bleeding at home.  DIAGNOSTIC IMPRESSIONS: PTA/DCB and Stent x 1 to R SFA w/ good results and  normal flow maintained thruout.  Occluded distal L SFA w/ reconstitution at the knee w/ 2 v runoff .  DIAGNOSTIC RECOMMENDATIONS: Asa/plavix.  Followup 2-4 weeks at [facility] to discuss L leg intervention-would require  atherectomy and lateral projection ( TKR ).  INTERVENTIONAL IMPRESSIONS: PTA/DCB and Stent x 1 to R SFA w/ good results  and normal flow maintained thruout.  Occluded distal L SFA w/ reconstitution at the knee w/ 2 v runoff .  INTERVENTIONAL RECOMMENDATIONS: Asa/plavix.  Followup 2-4 weeks at [facility] to discuss L leg intervention-would require  atherectomy and lateral projection ( TKR ).

## 2017-11-18 VITALS
RESPIRATION RATE: 18 BRPM | HEART RATE: 85 BPM | TEMPERATURE: 98 F | DIASTOLIC BLOOD PRESSURE: 58 MMHG | OXYGEN SATURATION: 95 % | SYSTOLIC BLOOD PRESSURE: 146 MMHG

## 2017-11-18 LAB
ALBUMIN SERPL ELPH-MCNC: 3.4 G/DL — SIGNIFICANT CHANGE UP (ref 3.3–5.2)
ALP SERPL-CCNC: 57 U/L — SIGNIFICANT CHANGE UP (ref 40–120)
ALT FLD-CCNC: 9 U/L — SIGNIFICANT CHANGE UP
ANION GAP SERPL CALC-SCNC: 12 MMOL/L — SIGNIFICANT CHANGE UP (ref 5–17)
AST SERPL-CCNC: 14 U/L — SIGNIFICANT CHANGE UP
BILIRUB SERPL-MCNC: 0.4 MG/DL — SIGNIFICANT CHANGE UP (ref 0.4–2)
BUN SERPL-MCNC: 24 MG/DL — HIGH (ref 8–20)
CALCIUM SERPL-MCNC: 8.7 MG/DL — SIGNIFICANT CHANGE UP (ref 8.6–10.2)
CHLORIDE SERPL-SCNC: 101 MMOL/L — SIGNIFICANT CHANGE UP (ref 98–107)
CO2 SERPL-SCNC: 26 MMOL/L — SIGNIFICANT CHANGE UP (ref 22–29)
CREAT SERPL-MCNC: 1.13 MG/DL — SIGNIFICANT CHANGE UP (ref 0.5–1.3)
GLUCOSE BLDC GLUCOMTR-MCNC: 159 MG/DL — HIGH (ref 70–99)
GLUCOSE BLDC GLUCOMTR-MCNC: 308 MG/DL — HIGH (ref 70–99)
GLUCOSE SERPL-MCNC: 179 MG/DL — HIGH (ref 70–115)
HBA1C BLD-MCNC: 7.4 % — HIGH (ref 4–5.6)
POTASSIUM SERPL-MCNC: 3.9 MMOL/L — SIGNIFICANT CHANGE UP (ref 3.5–5.3)
POTASSIUM SERPL-SCNC: 3.9 MMOL/L — SIGNIFICANT CHANGE UP (ref 3.5–5.3)
PROT SERPL-MCNC: 6.3 G/DL — LOW (ref 6.6–8.7)
SODIUM SERPL-SCNC: 139 MMOL/L — SIGNIFICANT CHANGE UP (ref 135–145)

## 2017-11-18 RX ADMIN — CLOPIDOGREL BISULFATE 75 MILLIGRAM(S): 75 TABLET, FILM COATED ORAL at 11:20

## 2017-11-18 RX ADMIN — BUDESONIDE AND FORMOTEROL FUMARATE DIHYDRATE 2 PUFF(S): 160; 4.5 AEROSOL RESPIRATORY (INHALATION) at 09:27

## 2017-11-18 RX ADMIN — PANTOPRAZOLE SODIUM 40 MILLIGRAM(S): 20 TABLET, DELAYED RELEASE ORAL at 05:14

## 2017-11-18 RX ADMIN — Medication 4: at 11:21

## 2017-11-18 RX ADMIN — Medication 325 MILLIGRAM(S): at 11:20

## 2017-11-18 RX ADMIN — Medication 1: at 08:32

## 2017-11-18 RX ADMIN — LOSARTAN POTASSIUM 50 MILLIGRAM(S): 100 TABLET, FILM COATED ORAL at 05:14

## 2017-11-18 NOTE — PROGRESS NOTE ADULT - ASSESSMENT
Procedure: Right leg angiography and intervention on right superficial femoral artery with drug coated balloon angioplasty and stent.  Discharge Diagnosis: PAD    1. Discharge home today    2. Follow up as an outpatient with: Dr. Mcgill    3. Post procedure teaching done including importance of medication adherence and access site care and monitoring.    4. DAPT: Plavix 75mg daily and aspirin 325mg daily    5. Continue other medications

## 2017-11-18 NOTE — PROGRESS NOTE ADULT - SUBJECTIVE AND OBJECTIVE BOX
85yMale S/P lower extremity angiography as stated below.  The patient denies chest pain, SOB, leg/foot pain or groin pain.    LEFT LOWER EXTREMITY VESSELS: Runoff angio of LLE reveals calcified 99% stenosis at distal SFA w/ short popliteal occlusion and TP trunk fills retrograde. PT fills at mid leg.  RIGHT LOWER EXTREMITY VESSELS: Run off of RLE reveals 95% calcified stenosis w 1 v runoff. Right superficial femoral: There was a 95 % stenosis treated with a LUTONIX 5F 5 X 40 X 130CM balloon and EVERFLEX 6MM X 60MM X 120CM stent.      HPI: This is an 83 yo white male who presents for complaints of L and R lower extremity calf pain with ambulation which started approximately 1 to 1 & 1/2 years ago.       General: Awake, alert, oriented, in no acute distress   Chest: CTA, S1, S2, RRR  Right Groin: Right femoral artery sheath, soft, no bleeding, no hematoma  Extremities: No edema  Pulses:        Right: +       Left: +    Labs:                         13.4   8.3   )-----------( 170      ( 18 Nov 2017 07:30 )             39.2     11-18    138  |  100  |  23.0  ----------------------<  167  4.4   |  25.0  |  1.12    Ca    8.9      18 Nov 2017 07:30    TPro  6.8  /  Alb  3.6  /  TBili  0.6  /  DBili  x   /  AST  14  /  ALT  10  /  AlkPhos  66  11-18

## 2017-12-14 ENCOUNTER — APPOINTMENT (OUTPATIENT)
Dept: PULMONOLOGY | Facility: CLINIC | Age: 82
End: 2017-12-14

## 2017-12-19 ENCOUNTER — APPOINTMENT (OUTPATIENT)
Dept: VASCULAR SURGERY | Facility: CLINIC | Age: 82
End: 2017-12-19
Payer: MEDICARE

## 2017-12-19 VITALS
BODY MASS INDEX: 27.25 KG/M2 | RESPIRATION RATE: 14 BRPM | SYSTOLIC BLOOD PRESSURE: 145 MMHG | OXYGEN SATURATION: 96 % | HEART RATE: 79 BPM | TEMPERATURE: 97.9 F | DIASTOLIC BLOOD PRESSURE: 61 MMHG | HEIGHT: 69 IN | WEIGHT: 184 LBS

## 2017-12-19 PROCEDURE — 85610 PROTHROMBIN TIME: CPT

## 2017-12-19 PROCEDURE — C1769: CPT

## 2017-12-19 PROCEDURE — 94640 AIRWAY INHALATION TREATMENT: CPT

## 2017-12-19 PROCEDURE — 80061 LIPID PANEL: CPT

## 2017-12-19 PROCEDURE — 36415 COLL VENOUS BLD VENIPUNCTURE: CPT

## 2017-12-19 PROCEDURE — C1725: CPT

## 2017-12-19 PROCEDURE — 85027 COMPLETE CBC AUTOMATED: CPT

## 2017-12-19 PROCEDURE — 93005 ELECTROCARDIOGRAM TRACING: CPT

## 2017-12-19 PROCEDURE — 80048 BASIC METABOLIC PNL TOTAL CA: CPT

## 2017-12-19 PROCEDURE — 80053 COMPREHEN METABOLIC PANEL: CPT

## 2017-12-19 PROCEDURE — 82962 GLUCOSE BLOOD TEST: CPT

## 2017-12-19 PROCEDURE — C1894: CPT

## 2017-12-19 PROCEDURE — 83036 HEMOGLOBIN GLYCOSYLATED A1C: CPT

## 2017-12-19 PROCEDURE — C1887: CPT

## 2017-12-19 PROCEDURE — G0463: CPT

## 2017-12-19 PROCEDURE — 85730 THROMBOPLASTIN TIME PARTIAL: CPT

## 2017-12-19 PROCEDURE — 99204 OFFICE O/P NEW MOD 45 MIN: CPT

## 2018-01-12 ENCOUNTER — APPOINTMENT (OUTPATIENT)
Dept: INTERNAL MEDICINE | Facility: CLINIC | Age: 83
End: 2018-01-12
Payer: MEDICARE

## 2018-01-12 ENCOUNTER — LABORATORY RESULT (OUTPATIENT)
Age: 83
End: 2018-01-12

## 2018-01-12 VITALS
WEIGHT: 188 LBS | SYSTOLIC BLOOD PRESSURE: 125 MMHG | HEART RATE: 79 BPM | DIASTOLIC BLOOD PRESSURE: 75 MMHG | OXYGEN SATURATION: 96 % | BODY MASS INDEX: 27.85 KG/M2 | HEIGHT: 69 IN

## 2018-01-12 DIAGNOSIS — Z92.29 PERSONAL HISTORY OF OTHER DRUG THERAPY: ICD-10-CM

## 2018-01-12 PROCEDURE — 99214 OFFICE O/P EST MOD 30 MIN: CPT | Mod: 25

## 2018-01-12 PROCEDURE — 36415 COLL VENOUS BLD VENIPUNCTURE: CPT

## 2018-01-16 ENCOUNTER — RESULT REVIEW (OUTPATIENT)
Age: 83
End: 2018-01-16

## 2018-01-16 LAB
ALBUMIN SERPL ELPH-MCNC: 4 G/DL
ALP BLD-CCNC: 62 U/L
ALT SERPL-CCNC: 11 U/L
ANION GAP SERPL CALC-SCNC: 15 MMOL/L
AST SERPL-CCNC: 17 U/L
BASOPHILS # BLD AUTO: 0.01 K/UL
BASOPHILS NFR BLD AUTO: 0.1 %
BILIRUB SERPL-MCNC: 0.3 MG/DL
BUN SERPL-MCNC: 18 MG/DL
CALCIUM SERPL-MCNC: 8.6 MG/DL
CHLORIDE SERPL-SCNC: 99 MMOL/L
CHOLEST SERPL-MCNC: 186 MG/DL
CHOLEST/HDLC SERPL: 3.6 RATIO
CO2 SERPL-SCNC: 26 MMOL/L
CREAT SERPL-MCNC: 1.25 MG/DL
EOSINOPHIL # BLD AUTO: 0.18 K/UL
EOSINOPHIL NFR BLD AUTO: 2.3 %
GLUCOSE SERPL-MCNC: 252 MG/DL
HBA1C MFR BLD HPLC: 7.6 %
HCT VFR BLD CALC: 38.9 %
HDLC SERPL-MCNC: 51 MG/DL
HGB BLD-MCNC: 12.4 G/DL
IMM GRANULOCYTES NFR BLD AUTO: 0.3 %
LDLC SERPL CALC-MCNC: 87 MG/DL
LYMPHOCYTES # BLD AUTO: 1.79 K/UL
LYMPHOCYTES NFR BLD AUTO: 22.6 %
MAGNESIUM SERPL-MCNC: 1.9 MG/DL
MAN DIFF?: NORMAL
MCHC RBC-ENTMCNC: 31.2 PG
MCHC RBC-ENTMCNC: 31.9 GM/DL
MCV RBC AUTO: 97.7 FL
MONOCYTES # BLD AUTO: 0.52 K/UL
MONOCYTES NFR BLD AUTO: 6.6 %
NEUTROPHILS # BLD AUTO: 5.4 K/UL
NEUTROPHILS NFR BLD AUTO: 68.1 %
PLATELET # BLD AUTO: 182 K/UL
POTASSIUM SERPL-SCNC: 4.5 MMOL/L
PROT SERPL-MCNC: 6.9 G/DL
RBC # BLD: 3.98 M/UL
RBC # FLD: 14.2 %
SODIUM SERPL-SCNC: 140 MMOL/L
TRIGL SERPL-MCNC: 239 MG/DL
WBC # FLD AUTO: 7.92 K/UL

## 2018-01-18 ENCOUNTER — OUTPATIENT (OUTPATIENT)
Dept: OUTPATIENT SERVICES | Facility: HOSPITAL | Age: 83
LOS: 1 days | End: 2018-01-18
Payer: MEDICARE

## 2018-01-18 VITALS — RESPIRATION RATE: 16 BRPM

## 2018-01-18 DIAGNOSIS — I73.9 PERIPHERAL VASCULAR DISEASE, UNSPECIFIED: ICD-10-CM

## 2018-01-18 DIAGNOSIS — Z98.41 CATARACT EXTRACTION STATUS, RIGHT EYE: Chronic | ICD-10-CM

## 2018-01-18 DIAGNOSIS — Z98.89 OTHER SPECIFIED POSTPROCEDURAL STATES: Chronic | ICD-10-CM

## 2018-01-18 DIAGNOSIS — Z95.3 PRESENCE OF XENOGENIC HEART VALVE: Chronic | ICD-10-CM

## 2018-01-18 DIAGNOSIS — Z98.61 CORONARY ANGIOPLASTY STATUS: Chronic | ICD-10-CM

## 2018-01-18 DIAGNOSIS — Z90.49 ACQUIRED ABSENCE OF OTHER SPECIFIED PARTS OF DIGESTIVE TRACT: Chronic | ICD-10-CM

## 2018-01-18 DIAGNOSIS — Z95.1 PRESENCE OF AORTOCORONARY BYPASS GRAFT: Chronic | ICD-10-CM

## 2018-01-18 DIAGNOSIS — Z01.818 ENCOUNTER FOR OTHER PREPROCEDURAL EXAMINATION: ICD-10-CM

## 2018-01-18 DIAGNOSIS — Z96.60 PRESENCE OF UNSPECIFIED ORTHOPEDIC JOINT IMPLANT: Chronic | ICD-10-CM

## 2018-01-18 DIAGNOSIS — Z98.42 CATARACT EXTRACTION STATUS, LEFT EYE: Chronic | ICD-10-CM

## 2018-01-18 LAB
ALLERGY+IMMUNOLOGY DIAG STUDY NOTE: SIGNIFICANT CHANGE UP
ANION GAP SERPL CALC-SCNC: 13 MMOL/L — SIGNIFICANT CHANGE UP (ref 5–17)
APTT BLD: 28.4 SEC — SIGNIFICANT CHANGE UP (ref 27.5–37.4)
BLD GP AB SCN SERPL QL: ABNORMAL
BUN SERPL-MCNC: 27 MG/DL — HIGH (ref 8–20)
CALCIUM SERPL-MCNC: 8.8 MG/DL — SIGNIFICANT CHANGE UP (ref 8.6–10.2)
CHLORIDE SERPL-SCNC: 100 MMOL/L — SIGNIFICANT CHANGE UP (ref 98–107)
CO2 SERPL-SCNC: 26 MMOL/L — SIGNIFICANT CHANGE UP (ref 22–29)
CREAT SERPL-MCNC: 1.23 MG/DL — SIGNIFICANT CHANGE UP (ref 0.5–1.3)
DIR ANTIGLOB POLYSPECIFIC INTERPRETATION: SIGNIFICANT CHANGE UP
GLUCOSE SERPL-MCNC: 251 MG/DL — HIGH (ref 70–115)
HBA1C BLD-MCNC: 7.6 % — HIGH (ref 4–5.6)
HCT VFR BLD CALC: 40.6 % — LOW (ref 42–52)
HGB BLD-MCNC: 13.2 G/DL — LOW (ref 14–18)
INR BLD: 1.04 RATIO — SIGNIFICANT CHANGE UP (ref 0.88–1.16)
MCHC RBC-ENTMCNC: 31.3 PG — HIGH (ref 27–31)
MCHC RBC-ENTMCNC: 32.5 G/DL — SIGNIFICANT CHANGE UP (ref 32–36)
MCV RBC AUTO: 96.2 FL — HIGH (ref 80–94)
PLATELET # BLD AUTO: 198 K/UL — SIGNIFICANT CHANGE UP (ref 150–400)
POTASSIUM SERPL-MCNC: 4.1 MMOL/L — SIGNIFICANT CHANGE UP (ref 3.5–5.3)
POTASSIUM SERPL-SCNC: 4.1 MMOL/L — SIGNIFICANT CHANGE UP (ref 3.5–5.3)
PROTHROM AB SERPL-ACNC: 11.5 SEC — SIGNIFICANT CHANGE UP (ref 9.8–12.7)
RBC # BLD: 4.22 M/UL — LOW (ref 4.6–6.2)
RBC # FLD: 13.7 % — SIGNIFICANT CHANGE UP (ref 11–15.6)
SODIUM SERPL-SCNC: 139 MMOL/L — SIGNIFICANT CHANGE UP (ref 135–145)
WBC # BLD: 7.4 K/UL — SIGNIFICANT CHANGE UP (ref 4.8–10.8)
WBC # FLD AUTO: 7.4 K/UL — SIGNIFICANT CHANGE UP (ref 4.8–10.8)

## 2018-01-18 PROCEDURE — 86077 PHYS BLOOD BANK SERV XMATCH: CPT

## 2018-01-18 PROCEDURE — 93010 ELECTROCARDIOGRAM REPORT: CPT

## 2018-01-18 RX ORDER — SODIUM CHLORIDE 9 MG/ML
3 INJECTION INTRAMUSCULAR; INTRAVENOUS; SUBCUTANEOUS ONCE
Qty: 0 | Refills: 0 | Status: DISCONTINUED | OUTPATIENT
Start: 2018-02-02 | End: 2018-02-02

## 2018-01-18 RX ORDER — GARLIC 1000 MG
1000 CAPSULE ORAL
Qty: 0 | Refills: 0 | COMMUNITY

## 2018-01-18 NOTE — ASU PATIENT PROFILE, ADULT - LEARNING ASSESSMENT (PATIENT) ADDITIONAL COMMENTS
Instructed pt on pre-op instructions, tips for safer surgery, pain management scale, and pre-surgical infection prevention instructions, instructed pt to take Symbicort the morning of surgery, and do not take blood sugar (diabetic) medication the morning of surgery, to contact Dr. Mcgill re: staying or stopping Plavix and ASA prior to surgery, and to contact Endocrinologist or Dr Burns re: fasting morning of surgery and diabetic medication surgery is @ 1300, verbalized understanding of al.

## 2018-01-18 NOTE — H&P PST ADULT - PSH
History of CEA (carotid endarterectomy)  Bilateral  S/P CABG x 2    S/P cataract surgery, left    S/P cataract surgery, right    S/P cholecystectomy    S/P joint replacement  Bilateral knee replacement  S/P nasal surgery  Secondary to nasal fracture  S/P PTCA (percutaneous transluminal coronary angioplasty)  x 9  S/P tonsillectomy    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

## 2018-01-18 NOTE — H&P PST ADULT - ASSESSMENT
medications reviewed, instructions given on what medications to take and what not to take. Asked the patient to take the Blood pressure medication/ heart medication on DOP. asked the patient to consult with cardiologist regarding Plavix, patient agreed.

## 2018-01-18 NOTE — H&P PST ADULT - HISTORY OF PRESENT ILLNESS
This is an 84 yo white male who presents for complaints of L lower extremity calf pain with ambulation which started approximately 1 to 1 & 1/2 years ago.  Mr Keila is 2 months sp TAVR/PPM here at [facility]. Past medical history includes Aortic stenosis, asthma BPH with urinary obstruction, CAD, DM, HLD, HTN, microscopic hematuria, nocturia, FANNY uses a mouth piece, SOB, and urinary frequency.  Past surgical history includes b/l knee replacements, cataract surgery, multiple cardiac catheterizations last one approximately 3 months ago with multiple stent placements totaling approximately nine, b/l CEAs, CABG, PPM.      Last Cardiac Cath: 10/18/2017

## 2018-01-18 NOTE — ASU PATIENT PROFILE, ADULT - VISION (WITH CORRECTIVE LENSES IF THE PATIENT USUALLY WEARS THEM):
Partially impaired: cannot see medication labels or newsprint, but can see obstacles in path, and the surrounding layout; can count fingers at arm's length Partially impaired: cannot see medication labels or newsprint, but can see obstacles in path, and the surrounding layout; can count fingers at arm's length/reading glasses

## 2018-01-24 ENCOUNTER — APPOINTMENT (OUTPATIENT)
Dept: INTERNAL MEDICINE | Facility: CLINIC | Age: 83
End: 2018-01-24
Payer: MEDICARE

## 2018-01-24 VITALS
HEIGHT: 69 IN | SYSTOLIC BLOOD PRESSURE: 120 MMHG | HEART RATE: 80 BPM | RESPIRATION RATE: 13 BRPM | WEIGHT: 187 LBS | BODY MASS INDEX: 27.7 KG/M2 | DIASTOLIC BLOOD PRESSURE: 70 MMHG

## 2018-01-24 PROCEDURE — 99214 OFFICE O/P EST MOD 30 MIN: CPT

## 2018-02-01 ENCOUNTER — TRANSCRIPTION ENCOUNTER (OUTPATIENT)
Age: 83
End: 2018-02-01

## 2018-02-01 ENCOUNTER — INPATIENT (INPATIENT)
Facility: HOSPITAL | Age: 83
LOS: 0 days | Discharge: HOME | DRG: 254 | End: 2018-02-02
Attending: SURGERY | Admitting: SURGERY
Payer: COMMERCIAL

## 2018-02-01 VITALS
DIASTOLIC BLOOD PRESSURE: 65 MMHG | HEIGHT: 68.5 IN | HEART RATE: 79 BPM | TEMPERATURE: 98 F | WEIGHT: 179.9 LBS | SYSTOLIC BLOOD PRESSURE: 143 MMHG | RESPIRATION RATE: 16 BRPM | OXYGEN SATURATION: 97 %

## 2018-02-01 DIAGNOSIS — Z96.60 PRESENCE OF UNSPECIFIED ORTHOPEDIC JOINT IMPLANT: Chronic | ICD-10-CM

## 2018-02-01 DIAGNOSIS — Z98.89 OTHER SPECIFIED POSTPROCEDURAL STATES: Chronic | ICD-10-CM

## 2018-02-01 DIAGNOSIS — Z95.1 PRESENCE OF AORTOCORONARY BYPASS GRAFT: Chronic | ICD-10-CM

## 2018-02-01 DIAGNOSIS — I73.9 PERIPHERAL VASCULAR DISEASE, UNSPECIFIED: ICD-10-CM

## 2018-02-01 DIAGNOSIS — Z98.42 CATARACT EXTRACTION STATUS, LEFT EYE: Chronic | ICD-10-CM

## 2018-02-01 DIAGNOSIS — Z90.49 ACQUIRED ABSENCE OF OTHER SPECIFIED PARTS OF DIGESTIVE TRACT: Chronic | ICD-10-CM

## 2018-02-01 DIAGNOSIS — Z95.3 PRESENCE OF XENOGENIC HEART VALVE: Chronic | ICD-10-CM

## 2018-02-01 DIAGNOSIS — Z98.61 CORONARY ANGIOPLASTY STATUS: Chronic | ICD-10-CM

## 2018-02-01 DIAGNOSIS — Z98.41 CATARACT EXTRACTION STATUS, RIGHT EYE: Chronic | ICD-10-CM

## 2018-02-01 LAB — TYPE + AB SCN PNL BLD: SIGNIFICANT CHANGE UP

## 2018-02-01 PROCEDURE — 85027 COMPLETE CBC AUTOMATED: CPT

## 2018-02-01 PROCEDURE — 86870 RBC ANTIBODY IDENTIFICATION: CPT

## 2018-02-01 PROCEDURE — G0463: CPT

## 2018-02-01 PROCEDURE — 80048 BASIC METABOLIC PNL TOTAL CA: CPT

## 2018-02-01 PROCEDURE — 76000 FLUOROSCOPY <1 HR PHYS/QHP: CPT

## 2018-02-01 PROCEDURE — 85730 THROMBOPLASTIN TIME PARTIAL: CPT

## 2018-02-01 PROCEDURE — 86880 COOMBS TEST DIRECT: CPT

## 2018-02-01 PROCEDURE — 86900 BLOOD TYPING SEROLOGIC ABO: CPT

## 2018-02-01 PROCEDURE — 83036 HEMOGLOBIN GLYCOSYLATED A1C: CPT

## 2018-02-01 PROCEDURE — 86850 RBC ANTIBODY SCREEN: CPT

## 2018-02-01 PROCEDURE — 36415 COLL VENOUS BLD VENIPUNCTURE: CPT

## 2018-02-01 PROCEDURE — 93005 ELECTROCARDIOGRAM TRACING: CPT

## 2018-02-01 PROCEDURE — 85610 PROTHROMBIN TIME: CPT

## 2018-02-01 PROCEDURE — 86901 BLOOD TYPING SEROLOGIC RH(D): CPT

## 2018-02-01 RX ORDER — LATANOPROST 0.05 MG/ML
1 SOLUTION/ DROPS OPHTHALMIC; TOPICAL AT BEDTIME
Qty: 0 | Refills: 0 | Status: DISCONTINUED | OUTPATIENT
Start: 2018-02-01 | End: 2018-02-02

## 2018-02-01 RX ORDER — ASPIRIN/CALCIUM CARB/MAGNESIUM 324 MG
325 TABLET ORAL DAILY
Qty: 0 | Refills: 0 | Status: DISCONTINUED | OUTPATIENT
Start: 2018-02-01 | End: 2018-02-02

## 2018-02-01 RX ORDER — CLOPIDOGREL BISULFATE 75 MG/1
75 TABLET, FILM COATED ORAL DAILY
Qty: 0 | Refills: 0 | Status: DISCONTINUED | OUTPATIENT
Start: 2018-02-01 | End: 2018-02-02

## 2018-02-01 RX ORDER — LOSARTAN POTASSIUM 100 MG/1
50 TABLET, FILM COATED ORAL AT BEDTIME
Qty: 0 | Refills: 0 | Status: DISCONTINUED | OUTPATIENT
Start: 2018-02-01 | End: 2018-02-02

## 2018-02-01 RX ORDER — ONDANSETRON 8 MG/1
4 TABLET, FILM COATED ORAL ONCE
Qty: 0 | Refills: 0 | Status: DISCONTINUED | OUTPATIENT
Start: 2018-02-01 | End: 2018-02-02

## 2018-02-01 RX ORDER — OXYCODONE AND ACETAMINOPHEN 5; 325 MG/1; MG/1
2 TABLET ORAL EVERY 4 HOURS
Qty: 0 | Refills: 0 | Status: DISCONTINUED | OUTPATIENT
Start: 2018-02-01 | End: 2018-02-02

## 2018-02-01 RX ORDER — FENTANYL CITRATE 50 UG/ML
25 INJECTION INTRAVENOUS
Qty: 0 | Refills: 0 | Status: DISCONTINUED | OUTPATIENT
Start: 2018-02-01 | End: 2018-02-02

## 2018-02-01 RX ORDER — BUDESONIDE AND FORMOTEROL FUMARATE DIHYDRATE 160; 4.5 UG/1; UG/1
2 AEROSOL RESPIRATORY (INHALATION)
Qty: 0 | Refills: 0 | Status: DISCONTINUED | OUTPATIENT
Start: 2018-02-01 | End: 2018-02-02

## 2018-02-01 RX ORDER — TAMSULOSIN HYDROCHLORIDE 0.4 MG/1
0.4 CAPSULE ORAL AT BEDTIME
Qty: 0 | Refills: 0 | Status: DISCONTINUED | OUTPATIENT
Start: 2018-02-01 | End: 2018-02-02

## 2018-02-01 RX ORDER — PANTOPRAZOLE SODIUM 20 MG/1
40 TABLET, DELAYED RELEASE ORAL
Qty: 0 | Refills: 0 | Status: DISCONTINUED | OUTPATIENT
Start: 2018-02-01 | End: 2018-02-02

## 2018-02-01 RX ORDER — EZETIMIBE 10 MG/1
10 TABLET ORAL DAILY
Qty: 0 | Refills: 0 | Status: DISCONTINUED | OUTPATIENT
Start: 2018-02-01 | End: 2018-02-02

## 2018-02-01 RX ADMIN — FENTANYL CITRATE 25 MICROGRAM(S): 50 INJECTION INTRAVENOUS at 20:34

## 2018-02-01 RX ADMIN — OXYCODONE AND ACETAMINOPHEN 2 TABLET(S): 5; 325 TABLET ORAL at 23:00

## 2018-02-01 RX ADMIN — Medication 5 MILLIGRAM(S): at 22:28

## 2018-02-01 RX ADMIN — CLOPIDOGREL BISULFATE 75 MILLIGRAM(S): 75 TABLET, FILM COATED ORAL at 22:28

## 2018-02-01 RX ADMIN — TAMSULOSIN HYDROCHLORIDE 0.4 MILLIGRAM(S): 0.4 CAPSULE ORAL at 22:28

## 2018-02-01 RX ADMIN — LOSARTAN POTASSIUM 50 MILLIGRAM(S): 100 TABLET, FILM COATED ORAL at 22:31

## 2018-02-01 RX ADMIN — FENTANYL CITRATE 25 MICROGRAM(S): 50 INJECTION INTRAVENOUS at 21:00

## 2018-02-01 RX ADMIN — OXYCODONE AND ACETAMINOPHEN 2 TABLET(S): 5; 325 TABLET ORAL at 22:14

## 2018-02-01 RX ADMIN — Medication 325 MILLIGRAM(S): at 22:28

## 2018-02-01 NOTE — BRIEF OPERATIVE NOTE - PRE-OP DX
PAD (peripheral artery disease)  02/01/2018    Active  Per Smith  PAD (peripheral artery disease)  02/01/2018    Active  Per Smith

## 2018-02-01 NOTE — BRIEF OPERATIVE NOTE - PROCEDURE
<<-----Click on this checkbox to enter Procedure Angioplasty of artery of lower extremity  02/01/2018  popliteal with drug coated balloon  Active  PORSCHELA

## 2018-02-02 ENCOUNTER — TRANSCRIPTION ENCOUNTER (OUTPATIENT)
Age: 83
End: 2018-02-02

## 2018-02-02 VITALS
DIASTOLIC BLOOD PRESSURE: 52 MMHG | SYSTOLIC BLOOD PRESSURE: 101 MMHG | HEART RATE: 70 BPM | TEMPERATURE: 98 F | RESPIRATION RATE: 18 BRPM | OXYGEN SATURATION: 95 %

## 2018-02-02 LAB
ANION GAP SERPL CALC-SCNC: 10 MMOL/L — SIGNIFICANT CHANGE UP (ref 5–17)
APTT BLD: 26.5 SEC — LOW (ref 27.5–37.4)
BUN SERPL-MCNC: 23 MG/DL — HIGH (ref 8–20)
CALCIUM SERPL-MCNC: 8.3 MG/DL — LOW (ref 8.6–10.2)
CHLORIDE SERPL-SCNC: 99 MMOL/L — SIGNIFICANT CHANGE UP (ref 98–107)
CO2 SERPL-SCNC: 28 MMOL/L — SIGNIFICANT CHANGE UP (ref 22–29)
CREAT SERPL-MCNC: 1.15 MG/DL — SIGNIFICANT CHANGE UP (ref 0.5–1.3)
GLUCOSE SERPL-MCNC: 230 MG/DL — HIGH (ref 70–115)
HCT VFR BLD CALC: 35.4 % — LOW (ref 42–52)
HCT VFR BLD CALC: 36 % — LOW (ref 42–52)
HGB BLD-MCNC: 11.4 G/DL — LOW (ref 14–18)
HGB BLD-MCNC: 11.5 G/DL — LOW (ref 14–18)
INR BLD: 1.09 RATIO — SIGNIFICANT CHANGE UP (ref 0.88–1.16)
MAGNESIUM SERPL-MCNC: 1.9 MG/DL — SIGNIFICANT CHANGE UP (ref 1.8–2.6)
MCHC RBC-ENTMCNC: 30.7 PG — SIGNIFICANT CHANGE UP (ref 27–31)
MCHC RBC-ENTMCNC: 30.9 PG — SIGNIFICANT CHANGE UP (ref 27–31)
MCHC RBC-ENTMCNC: 31.9 G/DL — LOW (ref 32–36)
MCHC RBC-ENTMCNC: 32.2 G/DL — SIGNIFICANT CHANGE UP (ref 32–36)
MCV RBC AUTO: 95.9 FL — HIGH (ref 80–94)
MCV RBC AUTO: 96.3 FL — HIGH (ref 80–94)
PHOSPHATE SERPL-MCNC: 4 MG/DL — SIGNIFICANT CHANGE UP (ref 2.4–4.7)
PLATELET # BLD AUTO: 163 K/UL — SIGNIFICANT CHANGE UP (ref 150–400)
PLATELET # BLD AUTO: 172 K/UL — SIGNIFICANT CHANGE UP (ref 150–400)
POTASSIUM SERPL-MCNC: 5.3 MMOL/L — SIGNIFICANT CHANGE UP (ref 3.5–5.3)
POTASSIUM SERPL-SCNC: 5.3 MMOL/L — SIGNIFICANT CHANGE UP (ref 3.5–5.3)
PROTHROM AB SERPL-ACNC: 12 SEC — SIGNIFICANT CHANGE UP (ref 9.8–12.7)
RBC # BLD: 3.69 M/UL — LOW (ref 4.6–6.2)
RBC # BLD: 3.74 M/UL — LOW (ref 4.6–6.2)
RBC # FLD: 13.3 % — SIGNIFICANT CHANGE UP (ref 11–15.6)
RBC # FLD: 13.5 % — SIGNIFICANT CHANGE UP (ref 11–15.6)
SODIUM SERPL-SCNC: 137 MMOL/L — SIGNIFICANT CHANGE UP (ref 135–145)
WBC # BLD: 7.7 K/UL — SIGNIFICANT CHANGE UP (ref 4.8–10.8)
WBC # BLD: 9.9 K/UL — SIGNIFICANT CHANGE UP (ref 4.8–10.8)
WBC # FLD AUTO: 7.7 K/UL — SIGNIFICANT CHANGE UP (ref 4.8–10.8)
WBC # FLD AUTO: 9.9 K/UL — SIGNIFICANT CHANGE UP (ref 4.8–10.8)

## 2018-02-02 PROCEDURE — 86850 RBC ANTIBODY SCREEN: CPT

## 2018-02-02 PROCEDURE — C2623: CPT

## 2018-02-02 PROCEDURE — 86900 BLOOD TYPING SEROLOGIC ABO: CPT

## 2018-02-02 PROCEDURE — 80048 BASIC METABOLIC PNL TOTAL CA: CPT

## 2018-02-02 PROCEDURE — C1769: CPT

## 2018-02-02 PROCEDURE — 84100 ASSAY OF PHOSPHORUS: CPT

## 2018-02-02 PROCEDURE — 82962 GLUCOSE BLOOD TEST: CPT

## 2018-02-02 PROCEDURE — C1887: CPT

## 2018-02-02 PROCEDURE — 37224: CPT

## 2018-02-02 PROCEDURE — C1760: CPT

## 2018-02-02 PROCEDURE — 86880 COOMBS TEST DIRECT: CPT

## 2018-02-02 PROCEDURE — 85027 COMPLETE CBC AUTOMATED: CPT

## 2018-02-02 PROCEDURE — 85730 THROMBOPLASTIN TIME PARTIAL: CPT

## 2018-02-02 PROCEDURE — 36415 COLL VENOUS BLD VENIPUNCTURE: CPT

## 2018-02-02 PROCEDURE — 86901 BLOOD TYPING SEROLOGIC RH(D): CPT

## 2018-02-02 PROCEDURE — C1725: CPT

## 2018-02-02 PROCEDURE — 85610 PROTHROMBIN TIME: CPT

## 2018-02-02 PROCEDURE — 76000 FLUOROSCOPY <1 HR PHYS/QHP: CPT

## 2018-02-02 PROCEDURE — 94640 AIRWAY INHALATION TREATMENT: CPT

## 2018-02-02 PROCEDURE — 83735 ASSAY OF MAGNESIUM: CPT

## 2018-02-02 PROCEDURE — C1894: CPT

## 2018-02-02 RX ORDER — LANOLIN ALCOHOL/MO/W.PET/CERES
3 CREAM (GRAM) TOPICAL AT BEDTIME
Qty: 0 | Refills: 0 | Status: DISCONTINUED | OUTPATIENT
Start: 2018-02-02 | End: 2018-02-02

## 2018-02-02 RX ORDER — SODIUM CHLORIDE 9 MG/ML
1000 INJECTION INTRAMUSCULAR; INTRAVENOUS; SUBCUTANEOUS
Qty: 0 | Refills: 0 | Status: DISCONTINUED | OUTPATIENT
Start: 2018-02-02 | End: 2018-02-02

## 2018-02-02 RX ADMIN — BUDESONIDE AND FORMOTEROL FUMARATE DIHYDRATE 2 PUFF(S): 160; 4.5 AEROSOL RESPIRATORY (INHALATION) at 08:48

## 2018-02-02 RX ADMIN — PANTOPRAZOLE SODIUM 40 MILLIGRAM(S): 20 TABLET, DELAYED RELEASE ORAL at 06:24

## 2018-02-02 RX ADMIN — Medication 3 MILLIGRAM(S): at 01:43

## 2018-02-02 RX ADMIN — SODIUM CHLORIDE 100 MILLILITER(S): 9 INJECTION INTRAMUSCULAR; INTRAVENOUS; SUBCUTANEOUS at 00:45

## 2018-02-02 NOTE — DISCHARGE NOTE ADULT - PATIENT PORTAL LINK FT
“You can access the [employer] Patient Portal, offered by [facility], by registering with the following website: [email or web address]”

## 2018-02-02 NOTE — DISCHARGE NOTE ADULT - HOSPITAL COURSE
Pt presented to [facility] 1/2 for LLE angioplasty, pt tolerated procedure well, he was extubated and stable to PACU. Transferred from PACU to floor.  Hospital stay was uneventful. Upon discharge his pain is well controlled, tolerating diet without nausea or vomiting, ambulating independently, voiding independently.  He remains hemodynamically well and stable, remains neurovascularly intact to bilateral lower extremities.   As per attending, he is safe for discharge and will follow-up with Dr. Smith outpatient.  The appropriate medications were sent to his pharmacy.    Please return to ED or call your surgeon if you experience any pain unrelieved with discharge medications, bleeding from incision that does not stop, numbness/tingling, chest pain, shortness of breath, palpitations, loss of sensation, nausea, vomiting.

## 2018-02-02 NOTE — PROGRESS NOTE ADULT - ASSESSMENT
s/p LLE popliteal angioplasty.   -pain control prn  -monitor R groin for hematoma, keep sandbag  -monitor vitals  -regular diet  -AM labs  -admit to monitored bed  -NV checks per arnel  -continue home meds

## 2018-02-02 NOTE — DISCHARGE NOTE ADULT - PLAN OF CARE
Alleviation of pain and symptoms, back to baseline Remove R groin dressing in 24 hours and may shower. Monitor groin for any redness or swelling. Follow up with Dr Romy Walsh in office in   1    week. Office will call with appointment. Office number [phone]

## 2018-02-02 NOTE — DISCHARGE NOTE ADULT - MEDICATION SUMMARY - MEDICATIONS TO TAKE
I will START or STAY ON the medications listed below when I get home from the hospital:    napncon A  eye drops  -- 1 drop(s) in each eye once a day  -- Indication: For GLAUCOMA    aspirin 325 mg oral delayed release tablet  -- 1 tab(s) by mouth once a day  -- Indication: For PAD    losartan 50 mg oral tablet  -- 1 tab(s) by mouth once a day (takes at night)  -- Indication: For HTN    tamsulosin 0.4 mg oral capsule  -- 1 cap(s) by mouth once a day (at bedtime)  -- Indication: For BPH    GlipiZIDE XL 5 mg oral tablet, extended release  -- 1 tab(s) by mouth once a day  -- Indication: For DM    Zetia 10 mg oral tablet  -- 1 tab(s) by mouth once a day  -- Indication: For HLD    clopidogrel 75 mg oral tablet  -- 1 tab(s) by mouth once a day  -- Indication: For PAD    Symbicort 80 mcg-4.5 mcg/inh inhalation aerosol  -- 2 puff(s) inhaled 2 times a day  -- 2 puffs per day  -- Indication: For COPD    garlic oral capsule  -- 1000 milligram(s) by mouth once a day  -- Indication: For SUPPLEMENT    latanoprost 0.005% ophthalmic solution  -- 1 drop(s) to each affected eye once a day (at bedtime)  -- Indication: For GLAUCOMA    Align 4 mg oral capsule  -- 1 cap(s) by mouth once a day  -- Indication: For SUPPLEMENT    pantoprazole 40 mg oral delayed release tablet  -- 1 tab(s) by mouth once a day  -- Indication: For GERD    multivitamin  -- 1 tab(s) by mouth once a day  -- Indication: For SUPPLEMENT

## 2018-02-02 NOTE — DISCHARGE NOTE ADULT - NS AS ACTIVITY OBS
No Heavy lifting/straining No Heavy lifting/straining/Stairs allowed/Walking-Outdoors allowed/Walking-Indoors allowed

## 2018-02-02 NOTE — DISCHARGE NOTE ADULT - CARE PROVIDER_API CALL
Per Jara (MD), Surgery; Vascular Surgery  [address] [place] [address]  Phone: [phone]  Fax: [phone]

## 2018-02-02 NOTE — PROGRESS NOTE ADULT - SUBJECTIVE AND OBJECTIVE BOX
Patient is a 85y old  Male who presents with a chief complaint of Claudication (02 Feb 2018 01:00)  Pt is S/P LLE angiogram/popliteal artery angioplasty with drug coated balloon         POD#   1  R groin access bleeding overnight requiring manual pressure and sandbag  Pt feels well without complaints    Vital Signs Last 24 Hrs  T(C): 36.5 (02 Feb 2018 04:34), Max: 36.8 (01 Feb 2018 21:45)  T(F): 97.7 (02 Feb 2018 04:34), Max: 98.3 (01 Feb 2018 21:45)  HR: 70 (02 Feb 2018 04:34) (62 - 79)  BP: 98/55 (02 Feb 2018 04:34) (98/46 - 143/68)  BP(mean): --  RR: 18 (02 Feb 2018 04:34) (12 - 20)  SpO2: 96% (02 Feb 2018 04:34) (93% - 100%)  I&O's Detail    01 Feb 2018 07:01  -  02 Feb 2018 07:00  --------------------------------------------------------  IN:    Oral Fluid: 300 mL    sodium chloride 0.9%.: 700 mL  Total IN: 1000 mL    OUT:    Voided: 650 mL  Total OUT: 650 mL    Total NET: 350 mL      02 Feb 2018 07:01  -  02 Feb 2018 08:19  --------------------------------------------------------  IN:    sodium chloride 0.9%.: 100 mL  Total IN: 100 mL    OUT:  Total OUT: 0 mL    Total NET: 100 mL    MEDICATIONS  (STANDING):  aspirin 325 milliGRAM(s) Oral daily  buDESOnide  80 MICROgram(s)/formoterol 4.5 MICROgram(s) Inhaler 2 Puff(s) Inhalation two times a day  clopidogrel Tablet 75 milliGRAM(s) Oral daily  ezetimibe 10 milliGRAM(s) Oral daily  glipiZIDE XL 5 milliGRAM(s) Oral daily  latanoprost 0.005% Ophthalmic Solution 1 Drop(s) Both EYES at bedtime  losartan 50 milliGRAM(s) Oral at bedtime  melatonin 3 milliGRAM(s) Oral at bedtime  pantoprazole    Tablet 40 milliGRAM(s) Oral before breakfast  sodium chloride 0.9% lock flush 3 milliLiter(s) IV Push Once  tamsulosin 0.4 milliGRAM(s) Oral at bedtime    MEDICATIONS  (PRN):  oxyCODONE    5 mG/acetaminophen 325 mG 2 Tablet(s) Oral every 4 hours PRN Severe Pain (7 - 10)    PAST MEDICAL & SURGICAL HISTORY:  Osteoarthritis  Age-related incipient cataract of both eyes  PVD (peripheral vascular disease): intervention RLE  CAD (coronary artery disease): CABG  Carotid artery disease: RCEA  Former smoker  Fatigue: Profound w/exertion  MENDEZ (dyspnea on exertion)  SOB (shortness of breath)  BPH (benign prostatic hyperplasia)  Angina pectoris: class II  PVD (peripheral vascular disease)  FANNY (obstructive sleep apnea): uses mouth peice  CAD (coronary artery disease): S/P Stenting x8  Asthma  Aortic stenosis  Diabetes mellitus  Hyperlipidemia  Hypertension  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 + motor and diminished sensation (unchanged). + DP/PT. R groin without evidence of ecchymosis or hematoma      LABS:                        11.5   7.7   )-----------( 172      ( 02 Feb 2018 07:04 )             36.0     02-02    137  |  99  |  23.0<H>  ----------------------------<  230<H>  5.3   |  28.0  |  1.15    Ca    8.3<L>      02 Feb 2018 07:04  Phos  4.0     02-02  Mg     1.9     02-02      PT/INR - ( 02 Feb 2018 07:04 )   PT: 12.0 sec;   INR: 1.09 ratio    PTT - ( 02 Feb 2018 07:04 )  PTT:26.5 sec    CAPILLARY BLOOD GLUCOSE  POCT Blood Glucose.: 142 mg/dL (01 Feb 2018 22:02)  POCT Blood Glucose.: 175 mg/dL (01 Feb 2018 11:25)    Assessment:85y Male with LLE claudication    S/P LLE angiogram/popliteal artery angioplasty with drug coated balloon         POD#   1  Post procedure access bleeding without hematoma    Plan:  Cont ASA and Plavix  Cont Statin  OOB/Ambulate  DM management  DC home today  Discussed with Dr. Romy Walsh

## 2018-02-02 NOTE — DISCHARGE NOTE ADULT - CARE PLAN
Principal Discharge DX:	PVD (peripheral vascular disease)  Goal:	Alleviation of pain and symptoms, back to baseline Principal Discharge DX:	PVD (peripheral vascular disease)  Goal:	Alleviation of pain and symptoms, back to baseline  Assessment and plan of treatment:	Remove R groin dressing in 24 hours and may shower. Monitor groin for any redness or swelling. Follow up with Dr Romy Walsh in office in   1    week. Office will call with appointment. Office number [phone]

## 2018-02-02 NOTE — PROGRESS NOTE ADULT - SUBJECTIVE AND OBJECTIVE BOX
Post-op Check    Subjective:  Pt offers no acute complaints at this time. Pain well controlled on current regiment. I was called by nurse that R groin was bleeding, pressure was held for half hr, hemostasis achieved, a 10 lb sandbag was placed on groin. Denies chest pain and SOB, palpitations, numbness/tingling, n/v/d. + void since surgery.     STATUS POST:  angioplasty LLE popliteal artery with drug coated baloon placement    POST OPERATIVE DAY #: 0    MEDICATIONS  (STANDING):  aspirin 325 milliGRAM(s) Oral daily  buDESOnide  80 MICROgram(s)/formoterol 4.5 MICROgram(s) Inhaler 2 Puff(s) Inhalation two times a day  clopidogrel Tablet 75 milliGRAM(s) Oral daily  ezetimibe 10 milliGRAM(s) Oral daily  glipiZIDE XL 5 milliGRAM(s) Oral daily  latanoprost 0.005% Ophthalmic Solution 1 Drop(s) Both EYES at bedtime  losartan 50 milliGRAM(s) Oral at bedtime  pantoprazole    Tablet 40 milliGRAM(s) Oral before breakfast  sodium chloride 0.9%. 1000 milliLiter(s) (100 mL/Hr) IV Continuous <Continuous>  tamsulosin 0.4 milliGRAM(s) Oral at bedtime    MEDICATIONS  (PRN):  fentaNYL    Injectable 25 MICROGram(s) IV Push every 5 minutes PRN Moderate Pain  ondansetron Injectable 4 milliGRAM(s) IV Push once PRN Nausea and/or Vomiting  oxyCODONE    5 mG/acetaminophen 325 mG 2 Tablet(s) Oral every 4 hours PRN Severe Pain (7 - 10)      Vital Signs Last 24 Hrs  T(C): 36.8 (01 Feb 2018 21:45), Max: 36.8 (01 Feb 2018 21:45)  T(F): 98.3 (01 Feb 2018 21:45), Max: 98.3 (01 Feb 2018 21:45)  HR: 62 (02 Feb 2018 00:30) (62 - 79)  BP: 98/51 (02 Feb 2018 00:30) (98/46 - 143/68)  BP(mean): --  RR: 13 (02 Feb 2018 00:30) (12 - 20)  SpO2: 95% (02 Feb 2018 00:30) (93% - 100%)    Physical Exam:    Constitutional: NAD  Neck: No JVD, FROM without pain  Respiratory: no accessory muscle use, non-labored  cardio: normal s1/s2, RRR  Neurological: A&O x 3; without gross deficit, no loss of sensation  Vascular: b/l palpable femoral pulses, dopplerable pt and dp b/l, RLE and LLE is warm, well perfused, good cap refill. No loss of sensation. R groin is soft with no signs of hematoma. R groin is still oozy, pressure held and hemostasis achieved, new steri strip and pressure dressing applied with 10 lb sandbag placed on groin.

## 2018-02-04 ENCOUNTER — EMERGENCY (EMERGENCY)
Facility: HOSPITAL | Age: 83
LOS: 1 days | Discharge: HOME | End: 2018-02-04
Attending: EMERGENCY MEDICINE | Admitting: EMERGENCY MEDICINE
Payer: MEDICARE

## 2018-02-04 VITALS
RESPIRATION RATE: 20 BRPM | SYSTOLIC BLOOD PRESSURE: 149 MMHG | TEMPERATURE: 98 F | HEIGHT: 68 IN | OXYGEN SATURATION: 96 % | DIASTOLIC BLOOD PRESSURE: 78 MMHG | HEART RATE: 82 BPM | WEIGHT: 179.9 LBS

## 2018-02-04 DIAGNOSIS — Z98.89 OTHER SPECIFIED POSTPROCEDURAL STATES: Chronic | ICD-10-CM

## 2018-02-04 DIAGNOSIS — Z90.49 ACQUIRED ABSENCE OF OTHER SPECIFIED PARTS OF DIGESTIVE TRACT: Chronic | ICD-10-CM

## 2018-02-04 DIAGNOSIS — Z95.3 PRESENCE OF XENOGENIC HEART VALVE: Chronic | ICD-10-CM

## 2018-02-04 DIAGNOSIS — Z98.61 CORONARY ANGIOPLASTY STATUS: Chronic | ICD-10-CM

## 2018-02-04 DIAGNOSIS — Z96.60 PRESENCE OF UNSPECIFIED ORTHOPEDIC JOINT IMPLANT: Chronic | ICD-10-CM

## 2018-02-04 DIAGNOSIS — Z98.41 CATARACT EXTRACTION STATUS, RIGHT EYE: Chronic | ICD-10-CM

## 2018-02-04 DIAGNOSIS — Z95.1 PRESENCE OF AORTOCORONARY BYPASS GRAFT: Chronic | ICD-10-CM

## 2018-02-04 DIAGNOSIS — Z98.42 CATARACT EXTRACTION STATUS, LEFT EYE: Chronic | ICD-10-CM

## 2018-02-04 LAB
ALBUMIN SERPL ELPH-MCNC: 3.9 G/DL — SIGNIFICANT CHANGE UP (ref 3.3–5.2)
ALP SERPL-CCNC: 64 U/L — SIGNIFICANT CHANGE UP (ref 40–120)
ALT FLD-CCNC: 14 U/L — SIGNIFICANT CHANGE UP
ANION GAP SERPL CALC-SCNC: 13 MMOL/L — SIGNIFICANT CHANGE UP (ref 5–17)
APTT BLD: 25 SEC — LOW (ref 27.5–37.4)
AST SERPL-CCNC: 22 U/L — SIGNIFICANT CHANGE UP
BASOPHILS # BLD AUTO: 0 K/UL — SIGNIFICANT CHANGE UP (ref 0–0.2)
BASOPHILS NFR BLD AUTO: 0.1 % — SIGNIFICANT CHANGE UP (ref 0–2)
BILIRUB SERPL-MCNC: 0.3 MG/DL — LOW (ref 0.4–2)
BLD GP AB SCN SERPL QL: SIGNIFICANT CHANGE UP
BUN SERPL-MCNC: 26 MG/DL — HIGH (ref 8–20)
CALCIUM SERPL-MCNC: 8.8 MG/DL — SIGNIFICANT CHANGE UP (ref 8.6–10.2)
CHLORIDE SERPL-SCNC: 99 MMOL/L — SIGNIFICANT CHANGE UP (ref 98–107)
CO2 SERPL-SCNC: 26 MMOL/L — SIGNIFICANT CHANGE UP (ref 22–29)
CREAT SERPL-MCNC: 1.23 MG/DL — SIGNIFICANT CHANGE UP (ref 0.5–1.3)
DIR ANTIGLOB POLYSPECIFIC INTERPRETATION: SIGNIFICANT CHANGE UP
EOSINOPHIL # BLD AUTO: 0.2 K/UL — SIGNIFICANT CHANGE UP (ref 0–0.5)
EOSINOPHIL NFR BLD AUTO: 2.4 % — SIGNIFICANT CHANGE UP (ref 0–6)
GLUCOSE SERPL-MCNC: 181 MG/DL — HIGH (ref 70–115)
HCT VFR BLD CALC: 34.1 % — LOW (ref 42–52)
HGB BLD-MCNC: 11 G/DL — LOW (ref 14–18)
INR BLD: 1.05 RATIO — SIGNIFICANT CHANGE UP (ref 0.88–1.16)
LACTATE BLDV-MCNC: 1.4 MMOL/L — SIGNIFICANT CHANGE UP (ref 0.5–2)
LYMPHOCYTES # BLD AUTO: 1.8 K/UL — SIGNIFICANT CHANGE UP (ref 1–4.8)
LYMPHOCYTES # BLD AUTO: 20.7 % — SIGNIFICANT CHANGE UP (ref 20–55)
MCHC RBC-ENTMCNC: 31 PG — SIGNIFICANT CHANGE UP (ref 27–31)
MCHC RBC-ENTMCNC: 32.3 G/DL — SIGNIFICANT CHANGE UP (ref 32–36)
MCV RBC AUTO: 96.1 FL — HIGH (ref 80–94)
MONOCYTES # BLD AUTO: 1 K/UL — HIGH (ref 0–0.8)
MONOCYTES NFR BLD AUTO: 11.7 % — HIGH (ref 3–10)
NEUTROPHILS # BLD AUTO: 5.8 K/UL — SIGNIFICANT CHANGE UP (ref 1.8–8)
NEUTROPHILS NFR BLD AUTO: 64.8 % — SIGNIFICANT CHANGE UP (ref 37–73)
PLATELET # BLD AUTO: 170 K/UL — SIGNIFICANT CHANGE UP (ref 150–400)
POTASSIUM SERPL-MCNC: 5.1 MMOL/L — SIGNIFICANT CHANGE UP (ref 3.5–5.3)
POTASSIUM SERPL-SCNC: 5.1 MMOL/L — SIGNIFICANT CHANGE UP (ref 3.5–5.3)
PROT SERPL-MCNC: 7 G/DL — SIGNIFICANT CHANGE UP (ref 6.6–8.7)
PROTHROM AB SERPL-ACNC: 11.6 SEC — SIGNIFICANT CHANGE UP (ref 9.8–12.7)
RBC # BLD: 3.55 M/UL — LOW (ref 4.6–6.2)
RBC # FLD: 13.7 % — SIGNIFICANT CHANGE UP (ref 11–15.6)
SODIUM SERPL-SCNC: 138 MMOL/L — SIGNIFICANT CHANGE UP (ref 135–145)
TYPE + AB SCN PNL BLD: SIGNIFICANT CHANGE UP
WBC # BLD: 8.9 K/UL — SIGNIFICANT CHANGE UP (ref 4.8–10.8)
WBC # FLD AUTO: 8.9 K/UL — SIGNIFICANT CHANGE UP (ref 4.8–10.8)

## 2018-02-04 PROCEDURE — 93925 LOWER EXTREMITY STUDY: CPT

## 2018-02-04 PROCEDURE — 99283 EMERGENCY DEPT VISIT LOW MDM: CPT

## 2018-02-04 PROCEDURE — 99284 EMERGENCY DEPT VISIT MOD MDM: CPT

## 2018-02-04 PROCEDURE — 93925 LOWER EXTREMITY STUDY: CPT | Mod: 26

## 2018-02-04 PROCEDURE — 93970 EXTREMITY STUDY: CPT

## 2018-02-04 PROCEDURE — 85730 THROMBOPLASTIN TIME PARTIAL: CPT

## 2018-02-04 PROCEDURE — 99284 EMERGENCY DEPT VISIT MOD MDM: CPT | Mod: 25

## 2018-02-04 PROCEDURE — 36415 COLL VENOUS BLD VENIPUNCTURE: CPT

## 2018-02-04 PROCEDURE — 85610 PROTHROMBIN TIME: CPT

## 2018-02-04 PROCEDURE — 86880 COOMBS TEST DIRECT: CPT

## 2018-02-04 PROCEDURE — 83605 ASSAY OF LACTIC ACID: CPT

## 2018-02-04 PROCEDURE — 86850 RBC ANTIBODY SCREEN: CPT

## 2018-02-04 PROCEDURE — 93971 EXTREMITY STUDY: CPT

## 2018-02-04 PROCEDURE — 93971 EXTREMITY STUDY: CPT | Mod: 26,LT

## 2018-02-04 PROCEDURE — 86900 BLOOD TYPING SEROLOGIC ABO: CPT

## 2018-02-04 PROCEDURE — 85027 COMPLETE CBC AUTOMATED: CPT

## 2018-02-04 PROCEDURE — 86901 BLOOD TYPING SEROLOGIC RH(D): CPT

## 2018-02-04 PROCEDURE — 93970 EXTREMITY STUDY: CPT | Mod: 26

## 2018-02-04 PROCEDURE — 80053 COMPREHEN METABOLIC PANEL: CPT

## 2018-02-04 NOTE — CONSULT NOTE ADULT - ASSESSMENT
20:35 PM --  ADDENDUM NOTE:      follow up arterial duplex study-   findings right lower extremity =   right common femoral- no evidence od acute pathology                                                                                                    right superficial femoral artery- no evidence of acute pathology                                                                                                    right popliteal artery- no evidence of acute pathology                                                                                                    right calf/foot arteries- no evidence of acute pathology                                                     findings left lower extremity =   left common femoral artery- no evidence acute pathology                                                                                                 left superficial femoral artery- loss of triphasic flow left occipitail and mid superficial femoral artery and monophasic flow seen in distal left superficial artery, popliteal artery and arteries in left calf.  No evidence of occlusion.  Significant increased stenosis in left popliteal artery measuring 231 cm/s versus hyperintense centimeters per second more proximally and distal superficial femoeral .                                                                                                  left calf/foot arteries- no evidence of acute pathology, no occlusion or significant stenosis.  normal waveform.  discussed with Surgeon Dr. RUDDY Frank  Impression :  no occlusive arterial disease left lower extremity, no surgical intervention at present time.   Plan : discharge home per ER, follow up office of Dr. Smith on Tuesday 2/6/18

## 2018-02-04 NOTE — ED PROVIDER NOTE - CALF TENDERNESS
tendern left calf and popliteal fopssa, foot is warm and well perfused, good cap refill, +dopplerable PT and DP pulses/LEFT

## 2018-02-04 NOTE — ED ADULT TRIAGE NOTE - CHIEF COMPLAINT QUOTE
Thursday procedure leg angiogram hear. They could not get the stent in so they ballooned  it. Has continued pain swelling. Was sent in to see Dr Walsh. no chest pain, no dyspnea

## 2018-02-04 NOTE — CONSULT NOTE ADULT - SUBJECTIVE AND OBJECTIVE BOX
INTERVAL HPI/OVERNIGHT EVENTS/SUBJECTIVE: 84yo male s/p Angiogram and Angioplasty Friday 2/2/18 presented to the ED today with CC of Left leg pain worse with walking and swelling. Pt states that his pain has improved since discharge but is concerned that it has not went away fully. Pt is tolerating current diet w/o any n/v/d. Denies fever, chills, CP, SOB.    ICU Vital Signs Last 24 Hrs  T(C): 36.5 (04 Feb 2018 11:56), Max: 36.5 (04 Feb 2018 11:56)  T(F): 97.7 (04 Feb 2018 11:56), Max: 97.7 (04 Feb 2018 11:56)  HR: 82 (04 Feb 2018 11:56) (82 - 82)  BP: 149/78 (04 Feb 2018 11:56) (149/78 - 149/78)  RR: 20 (04 Feb 2018 11:56) (20 - 20)  SpO2: 96% (04 Feb 2018 11:56) (96% - 96%)    NUTRITION/IVF: Regular      PHYSICAL EXAM:    Gen: Alert, NAD, Mentating well    Eyes: EOMI, PERRL    Neurological: A&Ox3, GCS 15, Baseline mental status    ENMT: Nares patent w/o discharge, MMM, no LAD     Neck: supple, no masses appreciated, Trachea midline, No JVD    Pulmonary: CTA BL, Non labored, chest rise is  symmetrical with respiration    Cardiovascular: S1S2 RRR    Gastrointestinal: Abdomen soft and non tympanic, NTND, +BS x4    Genitourinary: No Caba, no discharge or rash.    Extremities: No gross deformities or angulations, No calf tenderness, Palpable femoral pulses bilaterally, non palpable DP/PT bilaterally, capillary refill <3sec, feet are warm bilaterally.     Skin: Warm and dry, no rash.     Musculoskeletal: No focal deficits, strength 5/5 through out all extremities bilaterally, Baseline ROM.          LABS:  CBC Full  -  ( 04 Feb 2018 13:25 )  WBC Count : 8.9 K/uL  Hemoglobin : 11.0 g/dL  Hematocrit : 34.1 %  Platelet Count - Automated : 170 K/uL  Mean Cell Volume : 96.1 fl  Mean Cell Hemoglobin : 31.0 pg  Mean Cell Hemoglobin Concentration : 32.3 g/dL  Auto Neutrophil # : 5.8 K/uL  Auto Lymphocyte # : 1.8 K/uL  Auto Monocyte # : 1.0 K/uL  Auto Eosinophil # : 0.2 K/uL  Auto Basophil # : 0.0 K/uL  Auto Neutrophil % : 64.8 %  Auto Lymphocyte % : 20.7 %  Auto Monocyte % : 11.7 %  Auto Eosinophil % : 2.4 %  Auto Basophil % : 0.1 %    02-04    138  |  99  |  26.0<H>  ----------------------------<  181<H>  5.1   |  26.0  |  1.23    Ca    8.8      04 Feb 2018 13:25    TPro  7.0  /  Alb  3.9  /  TBili  0.3<L>  /  DBili  x   /  AST  22  /  ALT  14  /  AlkPhos  64  02-04    PT/INR - ( 04 Feb 2018 13:25 )   PT: 11.6 sec;   INR: 1.05 ratio         PTT - ( 04 Feb 2018 13:25 )  PTT:25.0 sec    RECENT CULTURES:      LIVER FUNCTIONS - ( 04 Feb 2018 13:25 )  Alb: 3.9 g/dL / Pro: 7.0 g/dL / ALK PHOS: 64 U/L / ALT: 14 U/L / AST: 22 U/L / GGT: x           ASSESSMENT/PLAN:  85yMale presenting with: Angiogram and Angioplasty Friday 2/2/18 presented to the ED today with CC of Left leg pain worse with walking and swelling.  Reccomend:  - Arterial Duplex of the lower Extremities  - Venous Duplex of the lower Extremities  - Vein mapping of the Left leg  - Follow up non-invasive studies  - Continue Plavix  Plan discussed with Attending I	NTERVAL HPI/OVERNIGHT EVENTS/SUBJECTIVE: 84yo male s/p Angiogram and Angioplasty Friday 2/2/18 presented to the ED today with CC of Left leg pain worse with walking and swelling. Pt states that his pain has improved since discharge but is concerned that it has not went away fully. Pt is tolerating current diet w/o any n/v/d. Denies fever, chills, CP, SOB.    ICU Vital Signs Last 24 Hrs  T(C): 36.5 (04 Feb 2018 11:56), Max: 36.5 (04 Feb 2018 11:56)  T(F): 97.7 (04 Feb 2018 11:56), Max: 97.7 (04 Feb 2018 11:56)  HR: 82 (04 Feb 2018 11:56) (82 - 82)  BP: 149/78 (04 Feb 2018 11:56) (149/78 - 149/78)  RR: 20 (04 Feb 2018 11:56) (20 - 20)  SpO2: 96% (04 Feb 2018 11:56) (96% - 96%)    NUTRITION/IVF: Regular      PHYSICAL EXAM:    Gen: Alert, NAD, Mentating well    Eyes: EOMI, PERRL    Neurological: A&Ox3, GCS 15, Baseline mental status    ENMT: Nares patent w/o discharge, MMM, no LAD     Neck: supple, no masses appreciated, Trachea midline, No JVD    Pulmonary: CTA BL, Non labored, chest rise is  symmetrical with respiration    Cardiovascular: S1S2 RRR    Gastrointestinal: Abdomen soft and non tympanic, NTND, +BS x4    Genitourinary: No Caba, no discharge or rash.    Extremities: No gross deformities or angulations, No calf tenderness, Palpable femoral pulses bilaterally, non palpable DP/PT bilaterally, capillary refill <3sec, feet are warm bilaterally.     Skin: Warm and dry, no rash.     Musculoskeletal: No focal deficits, strength 5/5 through out all extremities bilaterally, Baseline ROM.          LABS:  CBC Full  -  ( 04 Feb 2018 13:25 )  WBC Count : 8.9 K/uL  Hemoglobin : 11.0 g/dL  Hematocrit : 34.1 %  Platelet Count - Automated : 170 K/uL  Mean Cell Volume : 96.1 fl  Mean Cell Hemoglobin : 31.0 pg  Mean Cell Hemoglobin Concentration : 32.3 g/dL  Auto Neutrophil # : 5.8 K/uL  Auto Lymphocyte # : 1.8 K/uL  Auto Monocyte # : 1.0 K/uL  Auto Eosinophil # : 0.2 K/uL  Auto Basophil # : 0.0 K/uL  Auto Neutrophil % : 64.8 %  Auto Lymphocyte % : 20.7 %  Auto Monocyte % : 11.7 %  Auto Eosinophil % : 2.4 %  Auto Basophil % : 0.1 %    02-04    138  |  99  |  26.0<H>  ----------------------------<  181<H>  5.1   |  26.0  |  1.23    Ca    8.8      04 Feb 2018 13:25    TPro  7.0  /  Alb  3.9  /  TBili  0.3<L>  /  DBili  x   /  AST  22  /  ALT  14  /  AlkPhos  64  02-04    PT/INR - ( 04 Feb 2018 13:25 )   PT: 11.6 sec;   INR: 1.05 ratio         PTT - ( 04 Feb 2018 13:25 )  PTT:25.0 sec    RECENT CULTURES:      LIVER FUNCTIONS - ( 04 Feb 2018 13:25 )  Alb: 3.9 g/dL / Pro: 7.0 g/dL / ALK PHOS: 64 U/L / ALT: 14 U/L / AST: 22 U/L / GGT: x           ASSESSMENT/PLAN:  85yMale presenting with: Angiogram and Angioplasty Friday 2/2/18 presented to the ED today with CC of Left leg pain worse with walking and swelling.  Reccomend:  - Arterial Duplex of the lower Extremities  - Venous Duplex of the lower Extremities  - Vein mapping of the Left leg  - Follow up non-invasive studies  - Continue Plavix  Plan discussed with Attending

## 2018-02-04 NOTE — ED ADULT NURSE NOTE - OBJECTIVE STATEMENT
84y/o male c/o left leg swelling s/p attempted angiopasty. Pt AOx4, resp even unlabored, denies fever or chills, N/V, Chest pain, SOB, numbness or tingling. bd soft non tender non distended, right leg swelling, no reddening noted, +distal pulses, minor swelling. ambulates without assistance will continue to monitor

## 2018-02-04 NOTE — ED PROVIDER NOTE - OBJECTIVE STATEMENT
85 year old male with PMH CAD, carotid stenosis, aortic stenosis, HTN, HLD, DM, PAD presents with leg swelling and pain. pt reports that 3 days ago he had an angiogram and angioplasty of his left leg. Beginning the following day he began to have left calf welling and pain, constant and worse with walking. No fever, chills, numbness, tingling, weakness.

## 2018-02-12 ENCOUNTER — APPOINTMENT (OUTPATIENT)
Dept: VASCULAR SURGERY | Facility: CLINIC | Age: 83
End: 2018-02-12
Payer: MEDICARE

## 2018-02-12 VITALS
SYSTOLIC BLOOD PRESSURE: 136 MMHG | OXYGEN SATURATION: 97 % | HEART RATE: 87 BPM | DIASTOLIC BLOOD PRESSURE: 73 MMHG | HEIGHT: 69 IN | WEIGHT: 185 LBS | TEMPERATURE: 98 F | BODY MASS INDEX: 27.4 KG/M2

## 2018-02-12 PROCEDURE — 99024 POSTOP FOLLOW-UP VISIT: CPT

## 2018-02-12 PROCEDURE — 93923 UPR/LXTR ART STDY 3+ LVLS: CPT

## 2018-02-12 PROCEDURE — 93926 LOWER EXTREMITY STUDY: CPT

## 2018-02-12 RX ORDER — ALPRAZOLAM 0.25 MG/1
0.25 TABLET ORAL
Qty: 30 | Refills: 0 | Status: DISCONTINUED | COMMUNITY
Start: 2017-01-08 | End: 2018-02-12

## 2018-02-12 RX ORDER — GLIPIZIDE 2.5 MG/1
2.5 TABLET, FILM COATED, EXTENDED RELEASE ORAL
Qty: 90 | Refills: 0 | Status: DISCONTINUED | COMMUNITY
Start: 2017-09-05 | End: 2018-02-12

## 2018-02-12 RX ORDER — METHYLPREDNISOLONE 4 MG/1
4 TABLET ORAL
Qty: 15 | Refills: 0 | Status: DISCONTINUED | COMMUNITY
Start: 2017-08-12 | End: 2018-02-12

## 2018-02-12 RX ORDER — REPAGLINIDE 1 MG/1
1 TABLET ORAL
Qty: 30 | Refills: 0 | Status: DISCONTINUED | COMMUNITY
Start: 2017-12-06 | End: 2018-02-12

## 2018-02-12 RX ORDER — CEPHALEXIN 500 MG/1
500 CAPSULE ORAL
Qty: 10 | Refills: 0 | Status: DISCONTINUED | COMMUNITY
Start: 2017-08-12 | End: 2018-02-12

## 2018-02-20 ENCOUNTER — APPOINTMENT (OUTPATIENT)
Dept: VASCULAR SURGERY | Facility: CLINIC | Age: 83
End: 2018-02-20

## 2018-02-22 ENCOUNTER — RX RENEWAL (OUTPATIENT)
Age: 83
End: 2018-02-22

## 2018-05-29 ENCOUNTER — RESULT CHARGE (OUTPATIENT)
Age: 83
End: 2018-05-29

## 2018-05-30 ENCOUNTER — APPOINTMENT (OUTPATIENT)
Dept: INTERNAL MEDICINE | Facility: CLINIC | Age: 83
End: 2018-05-30
Payer: MEDICARE

## 2018-05-30 ENCOUNTER — OUTPATIENT (OUTPATIENT)
Dept: OUTPATIENT SERVICES | Facility: HOSPITAL | Age: 83
LOS: 1 days | End: 2018-05-30
Payer: MEDICARE

## 2018-05-30 VITALS
SYSTOLIC BLOOD PRESSURE: 130 MMHG | HEIGHT: 69 IN | HEART RATE: 79 BPM | BODY MASS INDEX: 27.4 KG/M2 | DIASTOLIC BLOOD PRESSURE: 70 MMHG | WEIGHT: 185 LBS

## 2018-05-30 VITALS
SYSTOLIC BLOOD PRESSURE: 157 MMHG | WEIGHT: 184.97 LBS | RESPIRATION RATE: 18 BRPM | OXYGEN SATURATION: 96 % | TEMPERATURE: 98 F | HEART RATE: 75 BPM | DIASTOLIC BLOOD PRESSURE: 72 MMHG | HEIGHT: 68.5 IN

## 2018-05-30 DIAGNOSIS — Z95.1 PRESENCE OF AORTOCORONARY BYPASS GRAFT: Chronic | ICD-10-CM

## 2018-05-30 DIAGNOSIS — R94.39 ABNORMAL RESULT OF OTHER CARDIOVASCULAR FUNCTION STUDY: ICD-10-CM

## 2018-05-30 DIAGNOSIS — Z95.3 PRESENCE OF XENOGENIC HEART VALVE: Chronic | ICD-10-CM

## 2018-05-30 DIAGNOSIS — R53.83 OTHER FATIGUE: ICD-10-CM

## 2018-05-30 DIAGNOSIS — Z98.42 CATARACT EXTRACTION STATUS, LEFT EYE: Chronic | ICD-10-CM

## 2018-05-30 DIAGNOSIS — Z98.89 OTHER SPECIFIED POSTPROCEDURAL STATES: Chronic | ICD-10-CM

## 2018-05-30 DIAGNOSIS — Z01.810 ENCOUNTER FOR PREPROCEDURAL CARDIOVASCULAR EXAMINATION: ICD-10-CM

## 2018-05-30 DIAGNOSIS — Z96.60 PRESENCE OF UNSPECIFIED ORTHOPEDIC JOINT IMPLANT: Chronic | ICD-10-CM

## 2018-05-30 DIAGNOSIS — Z98.41 CATARACT EXTRACTION STATUS, RIGHT EYE: Chronic | ICD-10-CM

## 2018-05-30 DIAGNOSIS — Z98.61 CORONARY ANGIOPLASTY STATUS: Chronic | ICD-10-CM

## 2018-05-30 DIAGNOSIS — Z90.49 ACQUIRED ABSENCE OF OTHER SPECIFIED PARTS OF DIGESTIVE TRACT: Chronic | ICD-10-CM

## 2018-05-30 LAB
ALBUMIN SERPL ELPH-MCNC: 4 G/DL — SIGNIFICANT CHANGE UP (ref 3.3–5.2)
ALP SERPL-CCNC: 59 U/L — SIGNIFICANT CHANGE UP (ref 40–120)
ALT FLD-CCNC: 14 U/L — SIGNIFICANT CHANGE UP
ANION GAP SERPL CALC-SCNC: 12 MMOL/L — SIGNIFICANT CHANGE UP (ref 5–17)
APTT BLD: 27 SEC — LOW (ref 27.5–37.4)
AST SERPL-CCNC: 18 U/L — SIGNIFICANT CHANGE UP
BILIRUB SERPL-MCNC: 0.3 MG/DL — LOW (ref 0.4–2)
BLD GP AB SCN SERPL QL: SIGNIFICANT CHANGE UP
BUN SERPL-MCNC: 22 MG/DL — HIGH (ref 8–20)
CALCIUM SERPL-MCNC: 8.7 MG/DL — SIGNIFICANT CHANGE UP (ref 8.6–10.2)
CHLORIDE SERPL-SCNC: 100 MMOL/L — SIGNIFICANT CHANGE UP (ref 98–107)
CHOLEST SERPL-MCNC: 206 MG/DL — HIGH (ref 110–199)
CO2 SERPL-SCNC: 26 MMOL/L — SIGNIFICANT CHANGE UP (ref 22–29)
COMMENT - BLOOD BANK: SIGNIFICANT CHANGE UP
CREAT SERPL-MCNC: 1.17 MG/DL — SIGNIFICANT CHANGE UP (ref 0.5–1.3)
DIR ANTIGLOB POLYSPECIFIC INTERPRETATION: SIGNIFICANT CHANGE UP
GLUCOSE SERPL-MCNC: 116 MG/DL — HIGH (ref 70–115)
HBA1C BLD-MCNC: 7.4 % — HIGH (ref 4–5.6)
HCT VFR BLD CALC: 38.4 % — LOW (ref 42–52)
HDLC SERPL-MCNC: 54 MG/DL — LOW
HGB BLD-MCNC: 12.4 G/DL — LOW (ref 14–18)
INR BLD: 1.06 RATIO — SIGNIFICANT CHANGE UP (ref 0.88–1.16)
LIPID PNL WITH DIRECT LDL SERPL: 113 MG/DL — SIGNIFICANT CHANGE UP
MAGNESIUM SERPL-MCNC: 1.7 MG/DL — SIGNIFICANT CHANGE UP (ref 1.6–2.6)
MCHC RBC-ENTMCNC: 30.4 PG — SIGNIFICANT CHANGE UP (ref 27–31)
MCHC RBC-ENTMCNC: 32.3 G/DL — SIGNIFICANT CHANGE UP (ref 32–36)
MCV RBC AUTO: 94.1 FL — HIGH (ref 80–94)
PLATELET # BLD AUTO: 178 K/UL — SIGNIFICANT CHANGE UP (ref 150–400)
POTASSIUM SERPL-MCNC: 4.2 MMOL/L — SIGNIFICANT CHANGE UP (ref 3.5–5.3)
POTASSIUM SERPL-SCNC: 4.2 MMOL/L — SIGNIFICANT CHANGE UP (ref 3.5–5.3)
PROT SERPL-MCNC: 7 G/DL — SIGNIFICANT CHANGE UP (ref 6.6–8.7)
PROTHROM AB SERPL-ACNC: 11.7 SEC — SIGNIFICANT CHANGE UP (ref 9.8–12.7)
RBC # BLD: 4.08 M/UL — LOW (ref 4.6–6.2)
RBC # FLD: 14.4 % — SIGNIFICANT CHANGE UP (ref 11–15.6)
SODIUM SERPL-SCNC: 138 MMOL/L — SIGNIFICANT CHANGE UP (ref 135–145)
TOTAL CHOLESTEROL/HDL RATIO MEASUREMENT: 4 RATIO — SIGNIFICANT CHANGE UP (ref 3.4–9.6)
TRIGL SERPL-MCNC: 195 MG/DL — SIGNIFICANT CHANGE UP (ref 10–200)
TYPE + AB SCN PNL BLD: SIGNIFICANT CHANGE UP
WBC # BLD: 7.5 K/UL — SIGNIFICANT CHANGE UP (ref 4.8–10.8)
WBC # FLD AUTO: 7.5 K/UL — SIGNIFICANT CHANGE UP (ref 4.8–10.8)

## 2018-05-30 PROCEDURE — 69209 REMOVE IMPACTED EAR WAX UNI: CPT

## 2018-05-30 PROCEDURE — 86900 BLOOD TYPING SEROLOGIC ABO: CPT

## 2018-05-30 PROCEDURE — 36415 COLL VENOUS BLD VENIPUNCTURE: CPT

## 2018-05-30 PROCEDURE — 85730 THROMBOPLASTIN TIME PARTIAL: CPT

## 2018-05-30 PROCEDURE — 99214 OFFICE O/P EST MOD 30 MIN: CPT | Mod: 25

## 2018-05-30 PROCEDURE — 85610 PROTHROMBIN TIME: CPT

## 2018-05-30 PROCEDURE — 83735 ASSAY OF MAGNESIUM: CPT

## 2018-05-30 PROCEDURE — 86850 RBC ANTIBODY SCREEN: CPT

## 2018-05-30 PROCEDURE — G0463: CPT

## 2018-05-30 PROCEDURE — 86880 COOMBS TEST DIRECT: CPT

## 2018-05-30 PROCEDURE — 86901 BLOOD TYPING SEROLOGIC RH(D): CPT

## 2018-05-30 PROCEDURE — 85027 COMPLETE CBC AUTOMATED: CPT

## 2018-05-30 PROCEDURE — 83036 HEMOGLOBIN GLYCOSYLATED A1C: CPT

## 2018-05-30 PROCEDURE — 93005 ELECTROCARDIOGRAM TRACING: CPT

## 2018-05-30 PROCEDURE — 80061 LIPID PANEL: CPT

## 2018-05-30 PROCEDURE — 80053 COMPREHEN METABOLIC PANEL: CPT

## 2018-05-30 PROCEDURE — 93010 ELECTROCARDIOGRAM REPORT: CPT

## 2018-05-30 RX ORDER — NEOMYCIN SULFATE, POLYMYXIN B SULFATE AND DEXAMETHASONE 3.5; 10000; 1 MG/ML; [USP'U]/ML; MG/ML
3.5-10000-0.1 SUSPENSION OPHTHALMIC
Qty: 5 | Refills: 0 | Status: DISCONTINUED | COMMUNITY
Start: 2018-01-25 | End: 2018-05-30

## 2018-05-30 RX ORDER — ALUMINUM ZIRCONIUM TRICHLOROHYDREX GLY 0.2 G/G
1 STICK TOPICAL
Qty: 0 | Refills: 0 | COMMUNITY

## 2018-05-30 RX ORDER — LOSARTAN POTASSIUM 100 MG/1
1 TABLET, FILM COATED ORAL
Qty: 0 | Refills: 1 | COMMUNITY

## 2018-05-30 NOTE — H&P PST ADULT - FAMILY HISTORY
Sibling  Still living? Unknown  Family history of depression, Age at diagnosis: Age Unknown  Family history of acute myocardial infarction, Age at diagnosis: Age Unknown  Family history of stroke, Age at diagnosis: Age Unknown

## 2018-05-30 NOTE — H&P PST ADULT - HISTORY OF PRESENT ILLNESS
84 yo male with past medical history of aortic stenosis, TAVR, PM implant, asthma, BPH, CAD with stents, DM, hyperlipidemia, hypertension, PVD with PPI, FANNY and SOB with recent complaints of progressively worsening fatigue and SOB.  PET scan 4/27/2018 moderate risk inferior wall ?ISR of LM/LCX,  Echo 4/27/2018 moderate MR, mild TR EF 75%.  Plan now for PST for cardiac cath to assess for further CAD.

## 2018-06-01 ENCOUNTER — RESULT REVIEW (OUTPATIENT)
Age: 83
End: 2018-06-01

## 2018-06-01 RX ORDER — SODIUM CHLORIDE 9 MG/ML
250 INJECTION INTRAMUSCULAR; INTRAVENOUS; SUBCUTANEOUS
Qty: 0 | Refills: 0 | Status: DISCONTINUED | OUTPATIENT
Start: 2018-06-04 | End: 2018-06-14

## 2018-06-04 ENCOUNTER — INPATIENT (INPATIENT)
Facility: HOSPITAL | Age: 83
LOS: 0 days | Discharge: HOME | DRG: 251 | End: 2018-06-05
Attending: INTERNAL MEDICINE | Admitting: INTERNAL MEDICINE
Payer: COMMERCIAL

## 2018-06-04 ENCOUNTER — TRANSCRIPTION ENCOUNTER (OUTPATIENT)
Age: 83
End: 2018-06-04

## 2018-06-04 VITALS
OXYGEN SATURATION: 98 % | HEART RATE: 67 BPM | TEMPERATURE: 98 F | RESPIRATION RATE: 20 BRPM | DIASTOLIC BLOOD PRESSURE: 75 MMHG | SYSTOLIC BLOOD PRESSURE: 163 MMHG

## 2018-06-04 DIAGNOSIS — Z98.61 CORONARY ANGIOPLASTY STATUS: Chronic | ICD-10-CM

## 2018-06-04 DIAGNOSIS — Z98.41 CATARACT EXTRACTION STATUS, RIGHT EYE: Chronic | ICD-10-CM

## 2018-06-04 DIAGNOSIS — Z98.89 OTHER SPECIFIED POSTPROCEDURAL STATES: Chronic | ICD-10-CM

## 2018-06-04 DIAGNOSIS — Z95.1 PRESENCE OF AORTOCORONARY BYPASS GRAFT: Chronic | ICD-10-CM

## 2018-06-04 DIAGNOSIS — Z98.42 CATARACT EXTRACTION STATUS, LEFT EYE: Chronic | ICD-10-CM

## 2018-06-04 DIAGNOSIS — Z95.3 PRESENCE OF XENOGENIC HEART VALVE: Chronic | ICD-10-CM

## 2018-06-04 DIAGNOSIS — Z96.60 PRESENCE OF UNSPECIFIED ORTHOPEDIC JOINT IMPLANT: Chronic | ICD-10-CM

## 2018-06-04 DIAGNOSIS — I25.10 ATHEROSCLEROTIC HEART DISEASE OF NATIVE CORONARY ARTERY WITHOUT ANGINA PECTORIS: ICD-10-CM

## 2018-06-04 DIAGNOSIS — Z90.49 ACQUIRED ABSENCE OF OTHER SPECIFIED PARTS OF DIGESTIVE TRACT: Chronic | ICD-10-CM

## 2018-06-04 LAB
BLD GP AB SCN SERPL QL: SIGNIFICANT CHANGE UP
DIR ANTIGLOB POLYSPECIFIC INTERPRETATION: SIGNIFICANT CHANGE UP
GLUCOSE BLDC GLUCOMTR-MCNC: 258 MG/DL — HIGH (ref 70–99)
TYPE + AB SCN PNL BLD: SIGNIFICANT CHANGE UP

## 2018-06-04 PROCEDURE — 93010 ELECTROCARDIOGRAM REPORT: CPT

## 2018-06-04 RX ORDER — ALPRAZOLAM 0.25 MG
0.25 TABLET ORAL AT BEDTIME
Qty: 0 | Refills: 0 | Status: DISCONTINUED | OUTPATIENT
Start: 2018-06-04 | End: 2018-06-05

## 2018-06-04 RX ORDER — INSULIN LISPRO 100/ML
VIAL (ML) SUBCUTANEOUS
Qty: 0 | Refills: 0 | Status: DISCONTINUED | OUTPATIENT
Start: 2018-06-04 | End: 2018-06-05

## 2018-06-04 RX ORDER — ACETAMINOPHEN 500 MG
1000 TABLET ORAL ONCE
Qty: 0 | Refills: 0 | Status: COMPLETED | OUTPATIENT
Start: 2018-06-04 | End: 2018-06-04

## 2018-06-04 RX ORDER — GLUCAGON INJECTION, SOLUTION 0.5 MG/.1ML
1 INJECTION, SOLUTION SUBCUTANEOUS ONCE
Qty: 0 | Refills: 0 | Status: DISCONTINUED | OUTPATIENT
Start: 2018-06-04 | End: 2018-06-05

## 2018-06-04 RX ORDER — PANTOPRAZOLE SODIUM 20 MG/1
40 TABLET, DELAYED RELEASE ORAL
Qty: 0 | Refills: 0 | Status: DISCONTINUED | OUTPATIENT
Start: 2018-06-04 | End: 2018-06-05

## 2018-06-04 RX ORDER — DEXTROSE 50 % IN WATER 50 %
12.5 SYRINGE (ML) INTRAVENOUS ONCE
Qty: 0 | Refills: 0 | Status: DISCONTINUED | OUTPATIENT
Start: 2018-06-04 | End: 2018-06-05

## 2018-06-04 RX ORDER — LATANOPROST 0.05 MG/ML
1 SOLUTION/ DROPS OPHTHALMIC; TOPICAL AT BEDTIME
Qty: 0 | Refills: 0 | Status: DISCONTINUED | OUTPATIENT
Start: 2018-06-04 | End: 2018-06-05

## 2018-06-04 RX ORDER — BUDESONIDE AND FORMOTEROL FUMARATE DIHYDRATE 160; 4.5 UG/1; UG/1
2 AEROSOL RESPIRATORY (INHALATION)
Qty: 0 | Refills: 0 | Status: DISCONTINUED | OUTPATIENT
Start: 2018-06-04 | End: 2018-06-05

## 2018-06-04 RX ORDER — CLOPIDOGREL BISULFATE 75 MG/1
75 TABLET, FILM COATED ORAL DAILY
Qty: 0 | Refills: 0 | Status: DISCONTINUED | OUTPATIENT
Start: 2018-06-04 | End: 2018-06-05

## 2018-06-04 RX ORDER — ASPIRIN/CALCIUM CARB/MAGNESIUM 324 MG
325 TABLET ORAL DAILY
Qty: 0 | Refills: 0 | Status: DISCONTINUED | OUTPATIENT
Start: 2018-06-04 | End: 2018-06-05

## 2018-06-04 RX ORDER — HYDRALAZINE HCL 50 MG
5 TABLET ORAL ONCE
Qty: 0 | Refills: 0 | Status: COMPLETED | OUTPATIENT
Start: 2018-06-04 | End: 2018-06-04

## 2018-06-04 RX ORDER — METOPROLOL TARTRATE 50 MG
25 TABLET ORAL DAILY
Qty: 0 | Refills: 0 | Status: DISCONTINUED | OUTPATIENT
Start: 2018-06-04 | End: 2018-06-04

## 2018-06-04 RX ORDER — DEXTROSE 50 % IN WATER 50 %
15 SYRINGE (ML) INTRAVENOUS ONCE
Qty: 0 | Refills: 0 | Status: DISCONTINUED | OUTPATIENT
Start: 2018-06-04 | End: 2018-06-05

## 2018-06-04 RX ORDER — TAMSULOSIN HYDROCHLORIDE 0.4 MG/1
0.4 CAPSULE ORAL AT BEDTIME
Qty: 0 | Refills: 0 | Status: DISCONTINUED | OUTPATIENT
Start: 2018-06-04 | End: 2018-06-05

## 2018-06-04 RX ORDER — DEXTROSE 50 % IN WATER 50 %
25 SYRINGE (ML) INTRAVENOUS ONCE
Qty: 0 | Refills: 0 | Status: DISCONTINUED | OUTPATIENT
Start: 2018-06-04 | End: 2018-06-05

## 2018-06-04 RX ORDER — SODIUM CHLORIDE 9 MG/ML
1000 INJECTION INTRAMUSCULAR; INTRAVENOUS; SUBCUTANEOUS
Qty: 0 | Refills: 0 | Status: DISCONTINUED | OUTPATIENT
Start: 2018-06-04 | End: 2018-06-05

## 2018-06-04 RX ORDER — SODIUM CHLORIDE 9 MG/ML
1000 INJECTION, SOLUTION INTRAVENOUS
Qty: 0 | Refills: 0 | Status: DISCONTINUED | OUTPATIENT
Start: 2018-06-04 | End: 2018-06-05

## 2018-06-04 RX ORDER — METOPROLOL TARTRATE 50 MG
25 TABLET ORAL AT BEDTIME
Qty: 0 | Refills: 0 | Status: DISCONTINUED | OUTPATIENT
Start: 2018-06-04 | End: 2018-06-05

## 2018-06-04 RX ADMIN — Medication 5 MILLIGRAM(S): at 15:30

## 2018-06-04 RX ADMIN — SODIUM CHLORIDE 200 MILLILITER(S): 9 INJECTION INTRAMUSCULAR; INTRAVENOUS; SUBCUTANEOUS at 10:11

## 2018-06-04 RX ADMIN — BUDESONIDE AND FORMOTEROL FUMARATE DIHYDRATE 2 PUFF(S): 160; 4.5 AEROSOL RESPIRATORY (INHALATION) at 19:39

## 2018-06-04 RX ADMIN — Medication 400 MILLIGRAM(S): at 15:30

## 2018-06-04 RX ADMIN — Medication 0.25 MILLIGRAM(S): at 22:56

## 2018-06-04 RX ADMIN — TAMSULOSIN HYDROCHLORIDE 0.4 MILLIGRAM(S): 0.4 CAPSULE ORAL at 21:10

## 2018-06-04 RX ADMIN — Medication 3: at 21:11

## 2018-06-04 RX ADMIN — Medication 1000 MILLIGRAM(S): at 16:00

## 2018-06-04 RX ADMIN — LATANOPROST 1 DROP(S): 0.05 SOLUTION/ DROPS OPHTHALMIC; TOPICAL at 21:10

## 2018-06-04 RX ADMIN — SODIUM CHLORIDE 200 MILLILITER(S): 9 INJECTION INTRAMUSCULAR; INTRAVENOUS; SUBCUTANEOUS at 10:30

## 2018-06-04 NOTE — DISCHARGE NOTE ADULT - PATIENT PORTAL LINK FT
You can access the [facility], offered by [facility], by registering with the following website: [email or web address]

## 2018-06-04 NOTE — DISCHARGE NOTE ADULT - MEDICATION SUMMARY - MEDICATIONS TO TAKE
I will START or STAY ON the medications listed below when I get home from the hospital:    napncon A  eye drops  -- 1 drop(s) in each eye once a day  -- Indication: For eye    budesonide 180 mcg/inh inhalation powder  -- 1 puff(s) inhaled 2 times a day  -- Indication: For breathing     aspirin 325 mg oral delayed release tablet  -- 1 tab(s) by mouth once a day  -- Indication: For CAD (coronary artery disease)    tamsulosin 0.4 mg oral capsule  -- 1 cap(s) by mouth once a day (at bedtime)  -- Indication: For prostate    GlipiZIDE XL 5 mg oral tablet, extended release  -- 1 tab(s) by mouth once a day  -- Indication: For diabetes     Zetia 10 mg oral tablet  -- 1 tab(s) by mouth once a day  -- Indication: For Cholesterol     clopidogrel 75 mg oral tablet  -- 1 tab(s) by mouth once a day  -- Indication: For STENTS     Metoprolol Succinate ER 25 mg oral tablet, extended release  -- 1 tab(s) by mouth once a day  -- Indication: For heart     Symbicort 80 mcg-4.5 mcg/inh inhalation aerosol  -- 2 puff(s) inhaled 2 times a day  -- 2 puffs per day  -- Indication: For breathing     garlic oral capsule  -- 1000 milligram(s) by mouth once a day  -- Indication: For supplements    latanoprost 0.005% ophthalmic solution  -- 1 drop(s) to each affected eye once a day (at bedtime)  -- Indication: For eye    pantoprazole 40 mg oral delayed release tablet  -- 1 tab(s) by mouth once a day  -- Indication: For stomach protection     multivitamin  -- 1 tab(s) by mouth once a day  -- Indication: For supplments

## 2018-06-04 NOTE — DISCHARGE NOTE ADULT - HOSPITAL COURSE
This is a 85 year old male dm   HTN HL Class III angina/sob despite meds w/ hx CABG/TAVR and increased ischemia on nuclear imaging. Presented to [facility] for LHC and evaluation   s/p Angio sculpt PTCA of LM and LCX via RFA tolerated procedure well monitored overnight    Currently feeling well reports no chest pain or sob ambulating with out issue or angina " I  feel great "    General: A/ox 3, No acute Distress  Neck: Supple, NO JVD  Cardiac: S1 S2, No M/R/G  Pulmonary: CTAB, Breathing unlabored, No Rhonchi/Rales/Wheezing  Abdomen: Soft, Non -tender, +BS   Extremities: No Rashes, No edema  Neuro: A/o x 3, No focal deficits  a/p CAD   ASA/plavix Zeta   Pt unable to tolerate statins   continue all previous medications   ambulate if stable discharge   pt to follow up with Dr Mcgill 1-2 weeks

## 2018-06-04 NOTE — DISCHARGE NOTE ADULT - CARE PROVIDER_API CALL
Kem Mcgill (MD), Cardiovascular Disease; Interventional Cardiology  [address] [place] [address]  Phone: [phone]  Fax: [phone]

## 2018-06-04 NOTE — DISCHARGE NOTE ADULT - CARE PLAN
Principal Discharge DX:	CAD (coronary artery disease)  Goal:	Optimal cardiac function  Assessment and plan of treatment:	No heavy lifting, driving, sex, tub baths, swimming, or any activity that submerges the lower half of the body in water for 48 hours.  Limited walking and stairs for 48 hours.    Observe the site frequently.  If bleeding or a large lump (the size of a golf ball or bigger) occurs lie flat, apply continuous direct pressure just above the puncture site for at least 10 minutes, and notify your physician immediately.  If the bleeding cannot be controlled, call 911 immediately for assistance.  Notify your physician of pain, swelling or any drainage.    Notify your physician immediately if coldness, numbness, discoloration or pain in your foot occurs.

## 2018-06-05 VITALS
OXYGEN SATURATION: 95 % | DIASTOLIC BLOOD PRESSURE: 56 MMHG | TEMPERATURE: 98 F | HEART RATE: 83 BPM | SYSTOLIC BLOOD PRESSURE: 112 MMHG | RESPIRATION RATE: 12 BRPM

## 2018-06-05 LAB
ANION GAP SERPL CALC-SCNC: 14 MMOL/L — SIGNIFICANT CHANGE UP (ref 5–17)
BASOPHILS # BLD AUTO: 0 K/UL — SIGNIFICANT CHANGE UP (ref 0–0.2)
BASOPHILS NFR BLD AUTO: 0.1 % — SIGNIFICANT CHANGE UP (ref 0–2)
BUN SERPL-MCNC: 30 MG/DL — HIGH (ref 8–20)
CALCIUM SERPL-MCNC: 9 MG/DL — SIGNIFICANT CHANGE UP (ref 8.6–10.2)
CHLORIDE SERPL-SCNC: 98 MMOL/L — SIGNIFICANT CHANGE UP (ref 98–107)
CO2 SERPL-SCNC: 25 MMOL/L — SIGNIFICANT CHANGE UP (ref 22–29)
CREAT SERPL-MCNC: 1.21 MG/DL — SIGNIFICANT CHANGE UP (ref 0.5–1.3)
EOSINOPHIL # BLD AUTO: 0.2 K/UL — SIGNIFICANT CHANGE UP (ref 0–0.5)
EOSINOPHIL NFR BLD AUTO: 2.5 % — SIGNIFICANT CHANGE UP (ref 0–5)
GLUCOSE BLDC GLUCOMTR-MCNC: 171 MG/DL — HIGH (ref 70–99)
GLUCOSE SERPL-MCNC: 156 MG/DL — HIGH (ref 70–115)
HCT VFR BLD CALC: 39.8 % — LOW (ref 42–52)
HGB BLD-MCNC: 12.8 G/DL — LOW (ref 14–18)
LYMPHOCYTES # BLD AUTO: 1.6 K/UL — SIGNIFICANT CHANGE UP (ref 1–4.8)
LYMPHOCYTES # BLD AUTO: 17.6 % — LOW (ref 20–55)
MCHC RBC-ENTMCNC: 30.3 PG — SIGNIFICANT CHANGE UP (ref 27–31)
MCHC RBC-ENTMCNC: 32.2 G/DL — SIGNIFICANT CHANGE UP (ref 32–36)
MCV RBC AUTO: 94.3 FL — HIGH (ref 80–94)
MONOCYTES # BLD AUTO: 0.9 K/UL — HIGH (ref 0–0.8)
MONOCYTES NFR BLD AUTO: 9.9 % — SIGNIFICANT CHANGE UP (ref 3–10)
NEUTROPHILS # BLD AUTO: 6.2 K/UL — SIGNIFICANT CHANGE UP (ref 1.8–8)
NEUTROPHILS NFR BLD AUTO: 69.9 % — SIGNIFICANT CHANGE UP (ref 37–73)
PLATELET # BLD AUTO: 194 K/UL — SIGNIFICANT CHANGE UP (ref 150–400)
POTASSIUM SERPL-MCNC: 4.5 MMOL/L — SIGNIFICANT CHANGE UP (ref 3.5–5.3)
POTASSIUM SERPL-SCNC: 4.5 MMOL/L — SIGNIFICANT CHANGE UP (ref 3.5–5.3)
RBC # BLD: 4.22 M/UL — LOW (ref 4.6–6.2)
RBC # FLD: 14.4 % — SIGNIFICANT CHANGE UP (ref 11–15.6)
SODIUM SERPL-SCNC: 137 MMOL/L — SIGNIFICANT CHANGE UP (ref 135–145)
WBC # BLD: 8.9 K/UL — SIGNIFICANT CHANGE UP (ref 4.8–10.8)
WBC # FLD AUTO: 8.9 K/UL — SIGNIFICANT CHANGE UP (ref 4.8–10.8)

## 2018-06-05 PROCEDURE — C1894: CPT

## 2018-06-05 PROCEDURE — 93455 CORONARY ART/GRFT ANGIO S&I: CPT

## 2018-06-05 PROCEDURE — 94640 AIRWAY INHALATION TREATMENT: CPT

## 2018-06-05 PROCEDURE — 86880 COOMBS TEST DIRECT: CPT

## 2018-06-05 PROCEDURE — C1725: CPT

## 2018-06-05 PROCEDURE — 82962 GLUCOSE BLOOD TEST: CPT

## 2018-06-05 PROCEDURE — 86850 RBC ANTIBODY SCREEN: CPT

## 2018-06-05 PROCEDURE — 85027 COMPLETE CBC AUTOMATED: CPT

## 2018-06-05 PROCEDURE — 86900 BLOOD TYPING SEROLOGIC ABO: CPT

## 2018-06-05 PROCEDURE — C1769: CPT

## 2018-06-05 PROCEDURE — 93010 ELECTROCARDIOGRAM REPORT: CPT

## 2018-06-05 PROCEDURE — 92920 PRQ TRLUML C ANGIOP 1ART&/BR: CPT

## 2018-06-05 PROCEDURE — 86901 BLOOD TYPING SEROLOGIC RH(D): CPT

## 2018-06-05 PROCEDURE — 80048 BASIC METABOLIC PNL TOTAL CA: CPT

## 2018-06-05 PROCEDURE — 93005 ELECTROCARDIOGRAM TRACING: CPT

## 2018-06-05 PROCEDURE — 36415 COLL VENOUS BLD VENIPUNCTURE: CPT

## 2018-06-05 PROCEDURE — C1887: CPT

## 2018-06-05 RX ORDER — ALPRAZOLAM 0.25 MG
0.25 TABLET ORAL ONCE
Qty: 0 | Refills: 0 | Status: DISCONTINUED | OUTPATIENT
Start: 2018-06-05 | End: 2018-06-05

## 2018-06-05 RX ORDER — ACETAMINOPHEN 500 MG
650 TABLET ORAL ONCE
Qty: 0 | Refills: 0 | Status: COMPLETED | OUTPATIENT
Start: 2018-06-05 | End: 2018-06-05

## 2018-06-05 RX ADMIN — Medication 650 MILLIGRAM(S): at 04:53

## 2018-06-05 RX ADMIN — Medication 650 MILLIGRAM(S): at 05:22

## 2018-06-05 RX ADMIN — Medication 1: at 07:36

## 2018-06-05 RX ADMIN — PANTOPRAZOLE SODIUM 40 MILLIGRAM(S): 20 TABLET, DELAYED RELEASE ORAL at 04:53

## 2018-06-05 RX ADMIN — Medication 25 MILLIGRAM(S): at 01:14

## 2018-06-05 RX ADMIN — Medication 0.25 MILLIGRAM(S): at 01:16

## 2018-06-05 NOTE — PROGRESS NOTE ADULT - SUBJECTIVE AND OBJECTIVE BOX
Subjective:    Medications:  ALPRAZolam 0.25 milliGRAM(s) Oral at bedtime  aspirin enteric coated 325 milliGRAM(s) Oral daily  buDESOnide  80 MICROgram(s)/formoterol 4.5 MICROgram(s) Inhaler 2 Puff(s) Inhalation two times a day  clopidogrel Tablet 75 milliGRAM(s) Oral daily  glucagon  Injectable 1 milliGRAM(s) IntraMuscular once PRN  insulin lispro (HumaLOG) corrective regimen sliding scale   SubCutaneous three times a day before meals  latanoprost 0.005% Ophthalmic Solution 1 Drop(s) Both EYES at bedtime  metoprolol succinate ER 25 milliGRAM(s) Oral at bedtime  pantoprazole    Tablet 40 milliGRAM(s) Oral before breakfast  sodium chloride 0.9%. 1000 milliLiter(s) IV Continuous <Continuous>  tamsulosin 0.4 milliGRAM(s) Oral at bedtime      PHYSICAL EXAM:  Vital Signs Last 24 Hrs  T(C): 36.4 (04 Jun 2018 10:12), Max: 36.4 (04 Jun 2018 10:12)  T(F): 97.5 (04 Jun 2018 10:12), Max: 97.5 (04 Jun 2018 10:12)  HR: 64 (05 Jun 2018 05:22) (64 - 85)  BP: 120/57 (05 Jun 2018 04:45) (114/53 - 173/79)  RR: 20 (05 Jun 2018 05:22) (16 - 20)  SpO2: 94% (05 Jun 2018 04:45) (93% - 99%)    04 Jun 2018 07:01  -  05 Jun 2018 07:00  --------------------------------------------------------  IN:    Oral Fluid: 240 mL  Total IN: 240 mL    OUT:  Total OUT: 0 mL    Total NET: 240 mL          General: A/ox 3, No acute Distress  Neck: Supple, NO JVD  Cardiac: S1 S2, No M/R/G  Pulmonary: CTAB, Breathing unlabored, No Rhonchi/Rales/Wheezing  Abdomen: Soft, Non -tender, +BS   Extremities: No Rashes, No edema  Neuro: A/o x 3, No focal deficits  Psch: normal mood , normal affect    LABS:                          12.8   8.9   )-----------( 194      ( 05 Jun 2018 05:10 )             39.8     06-05    137  |  98  |  30.0<H>  ----------------------------<  156<H>  4.5   |  25.0  |  1.21    Creatinine, Serum: 1.17 mg/dL (05.30.18 @ 13:43)        Ca    9.0      05 Jun 2018 05:10          INDICATIONS: Class III angina/sob despite meds w/ hx CABG/TAVR and inc  ischemia on nuclear imaging.  PROCEDURE:  --  Left coronary angiography.  --  Right coronary angiography.  --  Bypass graft angiography.  --  Sheath Exchange for Intervention.  --  Intervention on proximal circumflex: percutaneous intervention.  --  Intervention on left main: percutaneous intervention.  TECHNIQUE: Cardiac catheterization performed electively. Coronary  intervention performed electively.  Local anesthetic given. Right femoral artery access. Left coronary artery  angiography. The vessel was injected utilizing a catheter. Right coronary  artery angiography. The vessel was injected utilizing a catheter. Graft  angiography. The vessel was injected utilizing a catheter. RADIATION  EXPOSURE: 16.1 min. A percutaneous intervention was performed on the 70 %  lesion in the proximal circumflex. Following intervention there was a 1 %  residual stenosis. There was no dissection. Balloon angioplasty was  performed, using a EMERGE PUSH RX 1.50 X 12MM balloon, with 1 inflations  and a maximum inflation pressure of 10 wero. During the procedure, the  previous guider was changed for a 6F JL3.5 LAUNCHER guider, and a new .014  X 182 CHOICE PT ES WIRE wire was advanced across the lesion. Balloon  angioplasty was performed, using a EUPHORA 2.0MM X 15MM balloon, with 2  inflations and a maximum inflation pressure of 18 wero. Balloon angioplasty  was performed, using a RX 3.0 X 15 X 137 PTCA ANGIOSCULPT balloon, with 2  inflations and a maximum inflation pressure of 18 wero. Balloon angioplasty  was performed, using a NC EMERGE 3.50 X 8MM balloon, with 2 inflations and  a maximum inflation pressure of 20 wero. A percutaneous intervention was  performed on the 70 % lesion in the left main. Following intervention  there was a 1 % residual stenosis. There was no dissection. Balloon  angioplasty was performed, using a RX 3.0 X 15 X 137 PTCA ANGIOSCULPT  balloon, with 2 inflations and a maximum inflation pressure of 18 wero.  During the procedure, the previous guider was changed for a 6F JL3.5  LAUNCHER guider, and a new .014 X 182 CHOICE PT ES WIRE wire was advanced  across the lesion. Balloon angioplasty was performed, using a NC EMERGE  3.50 X 8MM balloon, with 3 inflations and a maximum inflation pressure of  18 wero. Balloon angioplasty was performed, using a RX 3.5 X 10 X 137 PTCA  ANGIOSCULPT balloon, with 3 inflations and a maximum inflation pressure of  20 wero. Sheath Exchange for Intervention.  CONTRAST GIVEN: Omnipaque 32 ml. Omnipaque 64 ml.  MEDICATIONS GIVEN: Midazolam, 1 mg, IV. Bivalirudin (Angiomax), 12 ml, IV.  Bivalirudin (Angiomax), infusion rate of 28, IV. 1% Lidocaine, 10 ml,  subcutaneously.  VENTRICLES: No LV gram ( preserved EF by noninv imaging and TAVR fxning  well ).  CORONARY VESSELS: R dominant.  Moderate sized RCA w/ patent PDA stents.  LM stent w/ 70% ISR.  Large LCx w/ ostial 70%-80% ISR w/ large distal vessel w/ 90% ostial OM1  stenosis-distal OM1 fills via graft.  Large LAD w/ 80% proximal ISR w/ distal vessel filling antegrade and via  LIMA.  Grafts-1. Radial to OM1-patent.  2. LIMA toLAD-patent.  LM:   --  LM: There was a 70 % stenosis.  CX:   --  Proximal circumflex: There was a 70 % stenosis.  COMPLICATIONS: No complications occurred during the cath lab visit.  DIAGNOSTIC IMPRESSIONS: Angiosculpt PTCA of LM w/ 20% residual stenosis (  original stent small ) and ROEL III flow maintained thruout.  Angiosculpt PTCA of LCx w/ 10% residual stenosis and Roel III flow  maintained thruout.  Patent grafts to OM1/LAD.  Patent PDA stents.  Hx TAVR.  Hx PPM.  DIAGNOSTIC RECOMMENDATIONS: Cont plavix and followup 4 weeks at [facility].  INTERVENTIONAL IMPRESSIONS: Angiosculpt PTCA of LM w/ 20% residual stenosis  ( original stent small ) and ROEL III flow maintained thruout.  Angiosculpt PTCA of LCx w/ 10% residual stenosis and Roel IIIflow  maintained thruout.  Patent grafts to OM1/LAD.  Patent PDA stents.  Hx TAVR.  Hx PPM.  INTERVENTIONAL RECOMMENDATIONS: Cont plavix and followup 4 weeks at  [facility].
Subjective:  85y  Male S/P PTCA to LCX, PTCA to LM, RFA with #6Fr sheath sutured in place. Patient awake and alert without complaints. Denies chest pain, sob, palps.    < from: Cardiac Cath Lab - Adult (06.04.18 @ 11:20) >  DIAGNOSTIC RECOMMENDATIONS: Cont plavix and followup 4 weeks at [facility].  INTERVENTIONAL IMPRESSIONS: Angiosculpt PTCA of LM w/ 20% residual stenosis  ( original stent small ) and ROEL III flow maintained thruout.  Angiosculpt PTCA of LCx w/ 10% residual stenosis and Roel IIIflow  maintained thruout.  Patent grafts to OM1/LAD.  Patent PDA stents.  Hx TAVR.  Hx PPM.  INTERVENTIONAL RECOMMENDATIONS: Cont plavix and followup 4 weeks at  [facility].  Prepared and signed by  Kem Mcgill MD    < end of copied text >    PAST MEDICAL & SURGICAL HISTORY:  Osteoarthritis  Age-related incipient cataract of both eyes  PVD (peripheral vascular disease): intervention RLE  CAD (coronary artery disease): CABG  Carotid artery disease: RCEA  Former smoker  Fatigue: Profound w/exertion  MENDEZ (dyspnea on exertion)  SOB (shortness of breath)  BPH (benign prostatic hyperplasia)  Angina pectoris: class II  PVD (peripheral vascular disease)  FANNY (obstructive sleep apnea): uses mouth peice  CAD (coronary artery disease): S/P Stenting x8  Asthma  Aortic stenosis  Diabetes mellitus  Hyperlipidemia  Hypertension  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  S/P CABG x 2  S/P PTCA (percutaneous transluminal coronary angioplasty): x 9  S/P nasal surgery: Secondary to nasal fracture  S/P joint replacement: Bilateral knee replacement  S/P cataract surgery, right  S/P cataract surgery, left  History of CEA (carotid endarterectomy): Bilateral  S/P cholecystectomy  S/P tonsillectomy    FAMILY HISTORY:  Family history of stroke (Sibling)  Family history of acute myocardial infarction (Sibling)  Family history of depression (Sibling)    MEDICATIONS  (STANDING):  aspirin enteric coated 325 milliGRAM(s) Oral daily  buDESOnide  80 MICROgram(s)/formoterol 4.5 MICROgram(s) Inhaler 2 Puff(s) Inhalation two times a day  clopidogrel Tablet 75 milliGRAM(s) Oral daily  latanoprost 0.005% Ophthalmic Solution 1 Drop(s) Both EYES at bedtime  metoprolol succinate ER 25 milliGRAM(s) Oral daily  pantoprazole    Tablet 40 milliGRAM(s) Oral before breakfast  sodium chloride 0.9%. 1000 milliLiter(s) (200 mL/Hr) IV Continuous <Continuous>  tamsulosin 0.4 milliGRAM(s) Oral at bedtime      General: No fatigue, no fevers/chills  Respiratory: No dyspnea, no cough, no wheeze  CV: No chest pain, no palpitations  Abd: No nausea  Neuro: No headache, no dizziness  No Known Allergies      Objective:  Vital Signs Last 24 Hrs  T(C): 36.4 (04 Jun 2018 10:12), Max: 36.4 (04 Jun 2018 10:12)  T(F): 97.5 (04 Jun 2018 10:12), Max: 97.5 (04 Jun 2018 10:12)  HR: 67 (04 Jun 2018 10:12) (67 - 67)  BP: 161/74 (04 Jun 2018 12:36) (161/74 - 163/75)  BP(mean): --  RR: 16 (04 Jun 2018 12:36) (16 - 20)  SpO2: 98% (04 Jun 2018 12:36) (98% - 98%)    Neuro: A&OX3  Lungs: CTA B/L  CV: S1, S2, no murmur, RRR  Abd: Soft  Right Groin: Soft, no bleeding, no hematoma  Extremity: + distal pulses  EKG: pending        A/P: CAD s/p PCI: PTCA LM and PTCA to LCX  1.                 Groin management discussed with patient.  2.                 Post procedure EKG pending  3.                 Pt given instructions on importance of taking antiplatelet medication.    4.                 Aspirin/Plavix/Statin/BB  5.                 Follow up with cardiologist in 2 weeks or sooner if needed  6.                 Bedrest x 6  hours  7.                 Remove sheath in 2 hours if ACT<180  8.                 Probable discharge in am
Removal of Femoral Sheath    Pulses in the (right lower extremity are palpable. The patient was placed in the supine position. The insertion site was identified and the sutures were removed per protocol.  The _#6___ Tajik femoral sheath was then removed. Direct pressure was applied for  _25_____ minutes.     Monitoring of the right groin and both lower extremities including neuro-vascular checks and vital signs every 15 minutes x 4, then every 30 minutes x 2, then every 1 hour was ordered.    Complications: None/Other    Comments:

## 2018-06-05 NOTE — PROGRESS NOTE ADULT - ASSESSMENT
This is a 85 year old male HTN HL Class III angina/sob despite meds w/ hx CABG/TAVR and increased ischemia on nuclear imaging. Presented to [facility] for LHC and evaluation   s/p Angio sculpt PTCA of LM and LCX via RFA tolerated procedure well monitored overnight    Currently feeling well

## 2018-06-15 ENCOUNTER — APPOINTMENT (OUTPATIENT)
Dept: CHRONIC DISEASE MANAGEMENT | Facility: CLINIC | Age: 83
End: 2018-06-15

## 2018-06-27 NOTE — H&P PST ADULT - MS EXT PE MLT D E PC
Pre-Operative Diagnosis: Abdominal pain, Diarrhea     Post-Operative Diagnosis: Normal EGD , Normal Colon      Procedure Performed:   Procedure(s):  COLONOSCOPY with biopsies  esophagogastroduodenoscopy with biopsies    Surgeon(s) and Role:     * LENNOX Whitlock no clubbing/no cyanosis/normal

## 2018-07-03 ENCOUNTER — APPOINTMENT (OUTPATIENT)
Dept: INTERNAL MEDICINE | Facility: CLINIC | Age: 83
End: 2018-07-03
Payer: MEDICARE

## 2018-07-03 VITALS
HEIGHT: 69 IN | DIASTOLIC BLOOD PRESSURE: 80 MMHG | WEIGHT: 185 LBS | BODY MASS INDEX: 27.4 KG/M2 | HEART RATE: 70 BPM | SYSTOLIC BLOOD PRESSURE: 120 MMHG

## 2018-07-03 DIAGNOSIS — N40.0 BENIGN PROSTATIC HYPERPLASIA WITHOUT LOWER URINARY TRACT SYMPMS: ICD-10-CM

## 2018-07-03 LAB
BILIRUB UR QL STRIP: NEGATIVE
CLARITY UR: CLEAR
COLLECTION METHOD: NORMAL
GLUCOSE UR-MCNC: NEGATIVE
HCG UR QL: 0.2 EU/DL
HGB UR QL STRIP.AUTO: NORMAL
KETONES UR-MCNC: NEGATIVE
LEUKOCYTE ESTERASE UR QL STRIP: NEGATIVE
NITRITE UR QL STRIP: NEGATIVE
PH UR STRIP: 5
PROT UR STRIP-MCNC: NEGATIVE
SP GR UR STRIP: 1.02

## 2018-07-03 PROCEDURE — 99214 OFFICE O/P EST MOD 30 MIN: CPT | Mod: 25

## 2018-07-03 PROCEDURE — 36415 COLL VENOUS BLD VENIPUNCTURE: CPT

## 2018-07-03 PROCEDURE — 81003 URINALYSIS AUTO W/O SCOPE: CPT | Mod: QW

## 2018-07-05 ENCOUNTER — RESULT REVIEW (OUTPATIENT)
Age: 83
End: 2018-07-05

## 2018-07-05 LAB
ALBUMIN SERPL ELPH-MCNC: 4 G/DL
ALP BLD-CCNC: 59 U/L
ALT SERPL-CCNC: 10 U/L
ANION GAP SERPL CALC-SCNC: 11 MMOL/L
APPEARANCE: CLEAR
AST SERPL-CCNC: 18 U/L
BACTERIA UR CULT: NORMAL
BACTERIA: NEGATIVE
BASOPHILS # BLD AUTO: 0.01 K/UL
BASOPHILS NFR BLD AUTO: 0.1 %
BILIRUB SERPL-MCNC: 0.3 MG/DL
BILIRUBIN URINE: NEGATIVE
BLOOD URINE: NEGATIVE
BUN SERPL-MCNC: 28 MG/DL
CALCIUM SERPL-MCNC: 9.6 MG/DL
CHLORIDE SERPL-SCNC: 100 MMOL/L
CO2 SERPL-SCNC: 27 MMOL/L
COLOR: YELLOW
CREAT SERPL-MCNC: 1.49 MG/DL
EOSINOPHIL # BLD AUTO: 0.17 K/UL
EOSINOPHIL NFR BLD AUTO: 2.4 %
GLUCOSE QUALITATIVE U: NEGATIVE MG/DL
GLUCOSE SERPL-MCNC: 177 MG/DL
HCT VFR BLD CALC: 40.6 %
HGB BLD-MCNC: 13 G/DL
HYALINE CASTS: 1 /LPF
IMM GRANULOCYTES NFR BLD AUTO: 0.1 %
KETONES URINE: NEGATIVE
LEUKOCYTE ESTERASE URINE: NEGATIVE
LYMPHOCYTES # BLD AUTO: 1.38 K/UL
LYMPHOCYTES NFR BLD AUTO: 19.3 %
MAN DIFF?: NORMAL
MCHC RBC-ENTMCNC: 31.5 PG
MCHC RBC-ENTMCNC: 32 GM/DL
MCV RBC AUTO: 98.3 FL
MICROSCOPIC-UA: NORMAL
MONOCYTES # BLD AUTO: 0.8 K/UL
MONOCYTES NFR BLD AUTO: 11.2 %
NEUTROPHILS # BLD AUTO: 4.79 K/UL
NEUTROPHILS NFR BLD AUTO: 66.9 %
NITRITE URINE: NEGATIVE
PH URINE: 5
PLATELET # BLD AUTO: 192 K/UL
POTASSIUM SERPL-SCNC: 4.9 MMOL/L
PROT SERPL-MCNC: 7 G/DL
PROTEIN URINE: NEGATIVE MG/DL
RBC # BLD: 4.13 M/UL
RBC # FLD: 14.4 %
RED BLOOD CELLS URINE: 1 /HPF
SODIUM SERPL-SCNC: 138 MMOL/L
SPECIFIC GRAVITY URINE: 1.02
SQUAMOUS EPITHELIAL CELLS: 0 /HPF
UROBILINOGEN URINE: NEGATIVE MG/DL
WBC # FLD AUTO: 7.16 K/UL
WHITE BLOOD CELLS URINE: 1 /HPF

## 2018-07-19 ENCOUNTER — APPOINTMENT (OUTPATIENT)
Dept: INTERNAL MEDICINE | Facility: CLINIC | Age: 83
End: 2018-07-19
Payer: MEDICARE

## 2018-07-19 VITALS — SYSTOLIC BLOOD PRESSURE: 120 MMHG | DIASTOLIC BLOOD PRESSURE: 70 MMHG | HEART RATE: 69 BPM

## 2018-07-19 PROCEDURE — 99213 OFFICE O/P EST LOW 20 MIN: CPT | Mod: 25

## 2018-07-19 PROCEDURE — 36415 COLL VENOUS BLD VENIPUNCTURE: CPT

## 2018-07-19 NOTE — PHYSICAL EXAM
[General Appearance - In No Acute Distress] : in no acute distress [] : no respiratory distress [Respiration, Rhythm And Depth] : normal respiratory rhythm and effort [Auscultation Breath Sounds / Voice Sounds] : lungs were clear to auscultation bilaterally [Heart Rate And Rhythm] : heart rate was normal and rhythm regular [Affect] : the affect was normal [Mood] : the mood was normal [FreeTextEntry1] : +PPM

## 2018-07-19 NOTE — ASSESSMENT
[FreeTextEntry1] : Labs sent out.Patient advised to continue present medications with diet/exercise and specialists followup. Patient will return to the office for CPE as sched\par \par \par Last colonoscopy was 2013\par Last 24-hour urine was  10/17\par specialists include\par 1. endocrinology-Dr. Tolentino at [facility]\par 2. ophthalmology-Dr. Canseco-to call for report\par 3. podiatry-Dr. Stone\par 4. cardiology-Dr. king\par 5.  pulmonary-Dr. gamino\par 6. gastroenterology-Dr. Burnette\par 7. vascular-Dr. Haskins-changed to Dr. Mayur Walsh\par 8.neurology-Dr. Alston_no longer goes\par 9. Cardiothoracic surgeon-Dr. Jones-NO NEED FOR FOLLOW UP\par 10. -Dr. Xavier\par shingles vaccine discussed\par No indication for hepatitis C/HIV screening\par

## 2018-07-19 NOTE — HISTORY OF PRESENT ILLNESS
[FreeTextEntry1] : Patient presents for followup on hypertension/repeat labs. Patient offers no complaints. Patient is currently on cozaar/toprol for hypertension And was found to have creatinine elevated at 1.4 with previous blood work, abnormal results discussed with patient/ questions answered\par

## 2018-07-20 ENCOUNTER — RESULT REVIEW (OUTPATIENT)
Age: 83
End: 2018-07-20

## 2018-07-20 LAB
ANION GAP SERPL CALC-SCNC: 14 MMOL/L
BUN SERPL-MCNC: 28 MG/DL
CALCIUM SERPL-MCNC: 8.9 MG/DL
CHLORIDE SERPL-SCNC: 100 MMOL/L
CO2 SERPL-SCNC: 25 MMOL/L
CREAT SERPL-MCNC: 1.26 MG/DL
GLUCOSE SERPL-MCNC: 219 MG/DL
POTASSIUM SERPL-SCNC: 4.6 MMOL/L
SODIUM SERPL-SCNC: 139 MMOL/L

## 2018-07-25 ENCOUNTER — RX RENEWAL (OUTPATIENT)
Age: 83
End: 2018-07-25

## 2018-07-25 PROBLEM — I73.9 PERIPHERAL VASCULAR DISEASE, UNSPECIFIED: Chronic | Status: ACTIVE | Noted: 2017-08-04

## 2018-07-25 PROBLEM — R53.83 OTHER FATIGUE: Chronic | Status: ACTIVE | Noted: 2017-06-23

## 2018-07-25 PROBLEM — M19.90 UNSPECIFIED OSTEOARTHRITIS, UNSPECIFIED SITE: Chronic | Status: ACTIVE | Noted: 2017-11-14

## 2018-07-25 PROBLEM — N40.0 BENIGN PROSTATIC HYPERPLASIA WITHOUT LOWER URINARY TRACT SYMPTOMS: Chronic | Status: ACTIVE | Noted: 2017-06-23

## 2018-07-25 PROBLEM — R06.09 OTHER FORMS OF DYSPNEA: Chronic | Status: ACTIVE | Noted: 2017-06-23

## 2018-07-25 PROBLEM — R06.02 SHORTNESS OF BREATH: Chronic | Status: ACTIVE | Noted: 2017-06-23

## 2018-07-25 PROBLEM — H25.093 OTHER AGE-RELATED INCIPIENT CATARACT, BILATERAL: Chronic | Status: ACTIVE | Noted: 2017-11-14

## 2018-07-25 PROBLEM — Z87.891 PERSONAL HISTORY OF NICOTINE DEPENDENCE: Chronic | Status: ACTIVE | Noted: 2017-06-23

## 2018-07-25 PROBLEM — I77.9 DISORDER OF ARTERIES AND ARTERIOLES, UNSPECIFIED: Chronic | Status: ACTIVE | Noted: 2017-06-23

## 2018-08-02 ENCOUNTER — APPOINTMENT (OUTPATIENT)
Dept: INTERNAL MEDICINE | Facility: CLINIC | Age: 83
End: 2018-08-02
Payer: MEDICARE

## 2018-08-02 VITALS
RESPIRATION RATE: 16 BRPM | HEART RATE: 73 BPM | HEIGHT: 68.5 IN | WEIGHT: 186 LBS | BODY MASS INDEX: 27.87 KG/M2 | SYSTOLIC BLOOD PRESSURE: 132 MMHG | DIASTOLIC BLOOD PRESSURE: 80 MMHG

## 2018-08-02 DIAGNOSIS — M25.512 PAIN IN LEFT SHOULDER: ICD-10-CM

## 2018-08-02 DIAGNOSIS — R29.898 OTHER SYMPTOMS AND SIGNS INVOLVING THE MUSCULOSKELETAL SYSTEM: ICD-10-CM

## 2018-08-02 DIAGNOSIS — M54.5 LOW BACK PAIN: ICD-10-CM

## 2018-08-02 DIAGNOSIS — M54.16 RADICULOPATHY, LUMBAR REGION: ICD-10-CM

## 2018-08-02 DIAGNOSIS — G89.29 LOW BACK PAIN: ICD-10-CM

## 2018-08-02 PROCEDURE — G0439: CPT

## 2018-08-02 PROCEDURE — 36415 COLL VENOUS BLD VENIPUNCTURE: CPT

## 2018-08-02 NOTE — HISTORY OF PRESENT ILLNESS
[FreeTextEntry1] : 85-year-old male with extensive medical history including ASHD/ischemic cardiomyopathy with history of CABG in 2005 and multiple stents,, PVD with stenting to the left leg, type 2 diabetes, moderate aortic stenosis, hypertension and hyperlipidemia for which he is intolerant to statin therapy.\par \par The patient has had multiple significant cardiovascular issues this past year:\par -August 2017 the patient had progressive aortic stenosis and had aTAVR with a permanent pacemaker\par -November 2017 had PCI of his right leg secondary to claudication and February 2018 hadn't of the left leg\par -April 2018 he had non-sustained VT with the addition of Toprol\par -June 2018 he had a cardiac catheterization with PCI to his left main and circumflex arteries.\par \par From a cardiac perspective he is doing better without any chest pain and decreased shortness of breath.\par \par Patient's complaints include continued right leg pain mainly in his thigh not activity related likely secondary to lumbar spinal stenosis with radiculopathy\par \par Recurrent cough especially when he lies down with feeling of postnasal drip.\par \par Right shoulder abnormal feeling and heaviness with occasional pins and needles with fairly good range of motion.\par No trauma. \par \par Patient also has chronic severe degenerative disc disease with chronic low back pain.

## 2018-08-02 NOTE — ASSESSMENT
[FreeTextEntry1] : 85-year-old male with extensive medical history including ASHD/ischemic cardiopathy, type 2 diabetes with fair control, hypertension which is controlled and hyperlipidemia which is not optimal but patient intolerant to statin therapy.\par \par The patient is status post TAVR August 2017 with a permanent pacemaker.\par \par PVD with status post bilateral leg PC eyes November 2017 in February 2018\par \par Nonsustained VT on Toprol\par \par Progressive CAD with status post stenting to the left main and circumflex June 2018.\par \par Right shoulder issue therefore MRI of the right shoulder\par \par Low back pain with right leg radicular symptoms refer her for physical therapy\par \par Postnasal drip with cough therefore start Flonase and Claritin\par \par Plan is to continue present medications with patient to followup with cardiology.\par \par \par Followup with specialist as scheduled\par \par Colonoscopy November 2013\par Ophthalmology positive\par Podiatry every 3 months\par Carotid generate 2017\par \par Patient up to date with vaccines\par \par Followup in 3to4 months

## 2018-08-02 NOTE — PHYSICAL EXAM
[General Appearance - Alert] : alert [General Appearance - In No Acute Distress] : in no acute distress [Sclera] : the sclera and conjunctiva were normal [PERRL With Normal Accommodation] : pupils were equal in size, round, and reactive to light [Extraocular Movements] : extraocular movements were intact [Outer Ear] : the ears and nose were normal in appearance [Oropharynx] : the oropharynx was normal [Neck Appearance] : the appearance of the neck was normal [Neck Cervical Mass (___cm)] : no neck mass was observed [Jugular Venous Distention Increased] : there was no jugular-venous distention [Thyroid Diffuse Enlargement] : the thyroid was not enlarged [Thyroid Nodule] : there were no palpable thyroid nodules [Auscultation Breath Sounds / Voice Sounds] : lungs were clear to auscultation bilaterally [Heart Rate And Rhythm] : heart rate was normal and rhythm regular [Heart Sounds] : normal S1 and S2 [Heart Sounds Gallop] : no gallops [Heart Sounds Pericardial Friction Rub] : no pericardial rub [Arterial Pulses Carotid] : carotid pulses were normal with no bruits [Abdominal Aorta] : the abdominal aorta was normal [Arterial Pulses Femoral] : femoral pulses were normal without bruits [Edema] : there was no peripheral edema [Veins - Varicosity Changes] : there were no varicosital changes [Bowel Sounds] : normal bowel sounds [Abdomen Soft] : soft [Abdomen Tenderness] : non-tender [Abdomen Mass (___ Cm)] : no abdominal mass palpated [Cervical Lymph Nodes Enlarged Posterior Bilaterally] : posterior cervical [Cervical Lymph Nodes Enlarged Anterior Bilaterally] : anterior cervical [Supraclavicular Lymph Nodes Enlarged Bilaterally] : supraclavicular [Axillary Lymph Nodes Enlarged Bilaterally] : axillary [Femoral Lymph Nodes Enlarged Bilaterally] : femoral [Inguinal Lymph Nodes Enlarged Bilaterally] : inguinal [No Spinal Tenderness] : no spinal tenderness [Abnormal Walk] : normal gait [Nail Clubbing] : no clubbing  or cyanosis of the fingernails [Musculoskeletal - Swelling] : no joint swelling seen [Motor Tone] : muscle strength and tone were normal [Skin Color & Pigmentation] : normal skin color and pigmentation [Skin Turgor] : normal skin turgor [] : no rash [Right Foot Was Examined] : right foot was examined [Left Foot Was Examined] : left foot was examined [Normal Appearance] : normal in appearance [Normal in Appearance] : normal in appearance [Normal] : normal [1+] : 1+ in the dorsalis pedis [Cranial Nerves] : cranial nerves 2-12 were intact [No Focal Deficits] : no focal deficits [Oriented To Time, Place, And Person] : oriented to person, place, and time [Impaired Insight] : insight and judgment were intact [Affect] : the affect was normal [#1 Diminished] : number 1 was normal [#2 Diminished] : number 2 was normal [#3 Diminished] : number 3 was normal [#4 Diminished] : number 4 was normal [#5 Diminished] : number 5 was normal [#6 Diminished] : number 6 was normal [#7 Diminished] : number 7 was normal [#8 Diminished] : number 8 was normal [#9 Diminished] : number 9 was normal [#10 Diminished] : number 10 was normal [FreeTextEntry1] : Sees podiatry

## 2018-08-02 NOTE — REVIEW OF SYSTEMS
[As noted in HPI] : as noted in HPI [Negative] : Heme/Lymph [As Noted in HPI] : as noted in HPI [Limb Pain] : limb pain [FreeTextEntry4] : postnasal drip [FreeTextEntry7] : Excess gas [FreeTextEntry9] : chronic low back pain 2 right leg

## 2018-08-02 NOTE — HEALTH RISK ASSESSMENT
[Fair] :  ~his/her~ mood as fair [No falls in past year] : Patient reported no falls in the past year [0] : 2) Feeling down, depressed, or hopeless: Not at all (0) [None] : None [With Significant Other] : lives with significant other [# of Members in Household ___] :  household currently consist of [unfilled] member(s) [Retired] : retired [High School] : high school [] :  [# Of Children ___] : has [unfilled] children [Sexually Active] : sexually active [Feels Safe at Home] : Feels safe at home [Fully functional (bathing, dressing, toileting, transferring, walking, feeding)] : Fully functional (bathing, dressing, toileting, transferring, walking, feeding) [Fully functional (using the telephone, shopping, preparing meals, housekeeping, doing laundry, using] : Fully functional and needs no help or supervision to perform IADLs (using the telephone, shopping, preparing meals, housekeeping, doing laundry, using transportation, managing medications and managing finances) [Reports changes in vision] : Reports changes in vision [Smoke Detector] : smoke detector [Carbon Monoxide Detector] : carbon monoxide detector [Seat Belt] :  uses seat belt [Discussed at today's visit] : Advance Directives Discussed at today's visit [Name: ___] : Health Care Proxy's Name: [unfilled]  [] : No [de-identified] : rare [[id number]] : 0 [Change in mental status noted] : No change in mental status noted [Reports changes in hearing] : Reports no changes in hearing [Reports changes in dental health] : Reports no changes in dental health [Guns at Home] : no guns at home [Sunscreen] : does not use sunscreen

## 2018-08-04 LAB
ALBUMIN SERPL ELPH-MCNC: 4.4 G/DL
ALP BLD-CCNC: 70 U/L
ALT SERPL-CCNC: 14 U/L
ANION GAP SERPL CALC-SCNC: 13 MMOL/L
APPEARANCE: CLEAR
AST SERPL-CCNC: 19 U/L
BACTERIA: NEGATIVE
BASOPHILS # BLD AUTO: 0.01 K/UL
BASOPHILS NFR BLD AUTO: 0.1 %
BILIRUB SERPL-MCNC: 0.4 MG/DL
BILIRUBIN URINE: NEGATIVE
BLOOD URINE: NEGATIVE
BUN SERPL-MCNC: 24 MG/DL
CALCIUM SERPL-MCNC: 9.1 MG/DL
CHLORIDE SERPL-SCNC: 98 MMOL/L
CHOLEST SERPL-MCNC: 199 MG/DL
CHOLEST/HDLC SERPL: 3.5 RATIO
CO2 SERPL-SCNC: 28 MMOL/L
COLOR: YELLOW
CREAT SERPL-MCNC: 1.12 MG/DL
CREAT SPEC-SCNC: 55 MG/DL
EOSINOPHIL # BLD AUTO: 0.29 K/UL
EOSINOPHIL NFR BLD AUTO: 3.6 %
GLUCOSE QUALITATIVE U: NEGATIVE MG/DL
GLUCOSE SERPL-MCNC: 154 MG/DL
HBA1C MFR BLD HPLC: 8.4 %
HCT VFR BLD CALC: 40.9 %
HDLC SERPL-MCNC: 57 MG/DL
HGB BLD-MCNC: 13.1 G/DL
IMM GRANULOCYTES NFR BLD AUTO: 0.2 %
KETONES URINE: NEGATIVE
LDLC SERPL CALC-MCNC: 117 MG/DL
LEUKOCYTE ESTERASE URINE: NEGATIVE
LYMPHOCYTES # BLD AUTO: 1.73 K/UL
LYMPHOCYTES NFR BLD AUTO: 21.5 %
MAGNESIUM SERPL-MCNC: 1.9 MG/DL
MAN DIFF?: NORMAL
MCHC RBC-ENTMCNC: 31.1 PG
MCHC RBC-ENTMCNC: 32 GM/DL
MCV RBC AUTO: 97.1 FL
MICROALBUMIN 24H UR DL<=1MG/L-MCNC: 3.8 MG/DL
MICROALBUMIN/CREAT 24H UR-RTO: 69 MG/G
MICROSCOPIC-UA: NORMAL
MONOCYTES # BLD AUTO: 0.69 K/UL
MONOCYTES NFR BLD AUTO: 8.6 %
NEUTROPHILS # BLD AUTO: 5.3 K/UL
NEUTROPHILS NFR BLD AUTO: 66 %
NITRITE URINE: NEGATIVE
PH URINE: 5.5
PLATELET # BLD AUTO: 186 K/UL
POTASSIUM SERPL-SCNC: 5 MMOL/L
PROT SERPL-MCNC: 7.1 G/DL
PROTEIN URINE: NEGATIVE MG/DL
RBC # BLD: 4.21 M/UL
RBC # FLD: 13.6 %
RED BLOOD CELLS URINE: 1 /HPF
SODIUM SERPL-SCNC: 139 MMOL/L
SPECIFIC GRAVITY URINE: 1.01
SQUAMOUS EPITHELIAL CELLS: 0 /HPF
TRIGL SERPL-MCNC: 123 MG/DL
UROBILINOGEN URINE: NEGATIVE MG/DL
VIT B12 SERPL-MCNC: 553 PG/ML
WBC # FLD AUTO: 8.04 K/UL
WHITE BLOOD CELLS URINE: 0 /HPF

## 2018-08-08 ENCOUNTER — RESULT REVIEW (OUTPATIENT)
Age: 83
End: 2018-08-08

## 2018-08-20 DIAGNOSIS — R80.9 PROTEINURIA, UNSPECIFIED: ICD-10-CM

## 2018-08-21 ENCOUNTER — RESULT REVIEW (OUTPATIENT)
Age: 83
End: 2018-08-21

## 2018-08-21 LAB
CREAT 24H UR-MCNC: 1.3 G/24 H
CREAT ?TM UR-MCNC: 68 MG/DL
PROT 24H UR-MRATE: 9 MG/DL
PROT ?TM UR-MCNC: 24 HR
PROT UR-MCNC: 169 MG/24 H
SPECIMEN VOL 24H UR: 1875 ML

## 2018-09-07 ENCOUNTER — APPOINTMENT (OUTPATIENT)
Dept: PULMONOLOGY | Facility: CLINIC | Age: 83
End: 2018-09-07
Payer: MEDICARE

## 2018-09-07 VITALS
SYSTOLIC BLOOD PRESSURE: 110 MMHG | OXYGEN SATURATION: 95 % | WEIGHT: 185 LBS | DIASTOLIC BLOOD PRESSURE: 60 MMHG | BODY MASS INDEX: 27.72 KG/M2 | HEART RATE: 77 BPM

## 2018-09-07 DIAGNOSIS — R94.2 ABNORMAL RESULTS OF PULMONARY FUNCTION STUDIES: ICD-10-CM

## 2018-09-07 PROCEDURE — 94060 EVALUATION OF WHEEZING: CPT

## 2018-09-07 PROCEDURE — 94664 DEMO&/EVAL PT USE INHALER: CPT | Mod: 59

## 2018-09-07 PROCEDURE — 99215 OFFICE O/P EST HI 40 MIN: CPT | Mod: 25

## 2018-09-10 ENCOUNTER — RECORD ABSTRACTING (OUTPATIENT)
Age: 83
End: 2018-09-10

## 2018-09-11 ENCOUNTER — RECORD ABSTRACTING (OUTPATIENT)
Age: 83
End: 2018-09-11

## 2018-09-18 ENCOUNTER — APPOINTMENT (OUTPATIENT)
Dept: CARDIOTHORACIC SURGERY | Facility: CLINIC | Age: 83
End: 2018-09-18
Payer: MEDICARE

## 2018-09-18 VITALS
BODY MASS INDEX: 28.04 KG/M2 | HEIGHT: 68 IN | DIASTOLIC BLOOD PRESSURE: 75 MMHG | OXYGEN SATURATION: 96 % | WEIGHT: 185 LBS | RESPIRATION RATE: 16 BRPM | SYSTOLIC BLOOD PRESSURE: 159 MMHG | HEART RATE: 82 BPM

## 2018-09-18 PROCEDURE — 99214 OFFICE O/P EST MOD 30 MIN: CPT

## 2018-09-21 ENCOUNTER — APPOINTMENT (OUTPATIENT)
Dept: CHRONIC DISEASE MANAGEMENT | Facility: CLINIC | Age: 83
End: 2018-09-21

## 2018-11-13 ENCOUNTER — APPOINTMENT (OUTPATIENT)
Dept: INTERNAL MEDICINE | Facility: CLINIC | Age: 83
End: 2018-11-13
Payer: MEDICARE

## 2018-11-13 VITALS
WEIGHT: 186 LBS | HEART RATE: 77 BPM | HEIGHT: 68 IN | SYSTOLIC BLOOD PRESSURE: 125 MMHG | BODY MASS INDEX: 28.19 KG/M2 | OXYGEN SATURATION: 97 % | DIASTOLIC BLOOD PRESSURE: 75 MMHG

## 2018-11-13 PROCEDURE — 36415 COLL VENOUS BLD VENIPUNCTURE: CPT

## 2018-11-13 PROCEDURE — 99214 OFFICE O/P EST MOD 30 MIN: CPT | Mod: 25

## 2018-11-13 RX ORDER — FLUTICASONE PROPIONATE 50 UG/1
50 SPRAY, METERED NASAL DAILY
Qty: 3 | Refills: 1 | Status: DISCONTINUED | COMMUNITY
Start: 2018-08-02 | End: 2018-11-13

## 2018-11-13 NOTE — HISTORY OF PRESENT ILLNESS
[FreeTextEntry1] : Patient presents for followup on hypertension/hyperlipidemia/type 2 diabetes. Patient is currently fasting for today's labs. Patient is currently on cozaar/toprol for hypertension,on zetia for his hyperlipidemia and on glipizide for his type 2 diabetes.\par -Seeing endocrinology today who may institute insulin due to uncontrolled diabetes\par

## 2018-11-13 NOTE — ASSESSMENT
[FreeTextEntry1] : Labs sent out. Patient advised to continue present medications with diet/exercise and specialists followup. Patient will return to the office in 3-4 months \par \par \par Last colonoscopy was 2013\par Last 24-hour urine was  8/2018/MA 8/2018\par specialists include\par 1. endocrinology-Dr. Tolentino at [facility]\par 2. ophthalmology-Dr. Canseco-3/2018\par 3. podiatry-Dr. Stone\par 4. cardiology-Dr. king\par 5.  pulmonary-Dr. gamino\par 6. gastroenterology-Dr. Burnette\par 7. vascular-Dr. Haskins-changed to Dr. Mayur Walsh\par 8.neurology-Dr. Alston_no longer goes\par 9. Cardiothoracic surgeon-Dr. Mckeon-NO NEED FOR FOLLOW UP\par 10. -Dr. Xavier\par shingles vaccine discussed/already got flu vaccine\par No indication for hepatitis C/HIV screening\par echo 8/2018\par

## 2018-11-14 ENCOUNTER — RESULT REVIEW (OUTPATIENT)
Age: 83
End: 2018-11-14

## 2018-11-14 LAB
ALBUMIN SERPL ELPH-MCNC: 4.3 G/DL
ALP BLD-CCNC: 56 U/L
ALT SERPL-CCNC: 14 U/L
ANION GAP SERPL CALC-SCNC: 14 MMOL/L
AST SERPL-CCNC: 18 U/L
BASOPHILS # BLD AUTO: 0.01 K/UL
BASOPHILS NFR BLD AUTO: 0.1 %
BILIRUB SERPL-MCNC: 0.5 MG/DL
BUN SERPL-MCNC: 23 MG/DL
CALCIUM SERPL-MCNC: 9.7 MG/DL
CHLORIDE SERPL-SCNC: 99 MMOL/L
CHOLEST SERPL-MCNC: 192 MG/DL
CHOLEST/HDLC SERPL: 3.2 RATIO
CO2 SERPL-SCNC: 26 MMOL/L
CREAT SERPL-MCNC: 1.2 MG/DL
EOSINOPHIL # BLD AUTO: 0.16 K/UL
EOSINOPHIL NFR BLD AUTO: 2.3 %
GLUCOSE SERPL-MCNC: 179 MG/DL
HBA1C MFR BLD HPLC: 8.1 %
HCT VFR BLD CALC: 42.2 %
HDLC SERPL-MCNC: 60 MG/DL
HGB BLD-MCNC: 14.4 G/DL
IMM GRANULOCYTES NFR BLD AUTO: 0.1 %
LDLC SERPL CALC-MCNC: 107 MG/DL
LYMPHOCYTES # BLD AUTO: 1.85 K/UL
LYMPHOCYTES NFR BLD AUTO: 26.2 %
MAGNESIUM SERPL-MCNC: 1.9 MG/DL
MAN DIFF?: NORMAL
MCHC RBC-ENTMCNC: 33 PG
MCHC RBC-ENTMCNC: 34.1 GM/DL
MCV RBC AUTO: 96.8 FL
MONOCYTES # BLD AUTO: 0.53 K/UL
MONOCYTES NFR BLD AUTO: 7.5 %
NEUTROPHILS # BLD AUTO: 4.51 K/UL
NEUTROPHILS NFR BLD AUTO: 63.8 %
PLATELET # BLD AUTO: 179 K/UL
POTASSIUM SERPL-SCNC: 5 MMOL/L
PROT SERPL-MCNC: 7.3 G/DL
RBC # BLD: 4.36 M/UL
RBC # FLD: 14.2 %
SODIUM SERPL-SCNC: 139 MMOL/L
TRIGL SERPL-MCNC: 126 MG/DL
WBC # FLD AUTO: 7.07 K/UL

## 2019-02-19 ENCOUNTER — APPOINTMENT (OUTPATIENT)
Dept: INTERNAL MEDICINE | Facility: CLINIC | Age: 84
End: 2019-02-19
Payer: MEDICARE

## 2019-02-19 VITALS
RESPIRATION RATE: 12 BRPM | BODY MASS INDEX: 28.49 KG/M2 | DIASTOLIC BLOOD PRESSURE: 75 MMHG | HEIGHT: 68 IN | WEIGHT: 188 LBS | SYSTOLIC BLOOD PRESSURE: 130 MMHG | HEART RATE: 82 BPM

## 2019-02-19 DIAGNOSIS — H61.23 IMPACTED CERUMEN, BILATERAL: ICD-10-CM

## 2019-02-19 DIAGNOSIS — Z86.39 PERSONAL HISTORY OF OTHER ENDOCRINE, NUTRITIONAL AND METABOLIC DISEASE: ICD-10-CM

## 2019-02-19 PROCEDURE — 99213 OFFICE O/P EST LOW 20 MIN: CPT | Mod: 25

## 2019-02-19 PROCEDURE — 36415 COLL VENOUS BLD VENIPUNCTURE: CPT

## 2019-02-19 RX ORDER — GLIPIZIDE 5 MG/1
5 TABLET, FILM COATED, EXTENDED RELEASE ORAL
Qty: 1 | Refills: 0 | Status: DISCONTINUED | COMMUNITY
End: 2019-02-19

## 2019-02-19 NOTE — ASSESSMENT
[FreeTextEntry1] : Labs sent out. Patient advised to continue present medications with diet/exercise and specialists followup. Patient will return to the office in 3 months/CPE in aug\par \par \par Last colonoscopy was 2013\par Last 24-hour urine was  8/2018/MA 8/2018\par specialists include\par 1. endocrinology-Dr. Tolentino at [facility]\par 2. ophthalmology-Dr. Canseco-12/2018\par 3. podiatry-Dr. Stone\par 4. cardiology-Dr. king\par 5.  pulmonary-Dr. gamino\par 6. gastroenterology-Dr. Burnette\par 7. vascular-Dr. Haskins-changed to Dr. Mayur Walsh\par 8.neurology-Dr. Alston_no longer goes\par 9. Cardiothoracic surgeon-Dr. Mckeon-NO NEED FOR FOLLOW UP\par 10. -Dr. Xavier\par shingles vaccine discussed\par No indication for hepatitis C/HIV screening\par echo 8/2018\par

## 2019-02-19 NOTE — HISTORY OF PRESENT ILLNESS
[FreeTextEntry1] : Patient presents for followup on hypertension/hyperlipidemia/type 2 diabetes. Patient is currently fasting for today's labs/no new issues. Patient is currently on cozaar/toprol for hypertension,on zetia for his hyperlipidemia and on lantus/prandin for his type 2 diabetes.\par \par

## 2019-02-20 ENCOUNTER — RESULT REVIEW (OUTPATIENT)
Age: 84
End: 2019-02-20

## 2019-02-20 LAB
ALBUMIN SERPL ELPH-MCNC: 4.2 G/DL
ALP BLD-CCNC: 50 U/L
ALT SERPL-CCNC: 15 U/L
ANION GAP SERPL CALC-SCNC: 13 MMOL/L
AST SERPL-CCNC: 22 U/L
BASOPHILS # BLD AUTO: 0.02 K/UL
BASOPHILS NFR BLD AUTO: 0.3 %
BILIRUB SERPL-MCNC: 0.5 MG/DL
BUN SERPL-MCNC: 28 MG/DL
CALCIUM SERPL-MCNC: 9.6 MG/DL
CHLORIDE SERPL-SCNC: 103 MMOL/L
CHOLEST SERPL-MCNC: 160 MG/DL
CHOLEST/HDLC SERPL: 3.1 RATIO
CO2 SERPL-SCNC: 25 MMOL/L
CREAT SERPL-MCNC: 1.28 MG/DL
EOSINOPHIL # BLD AUTO: 0.23 K/UL
EOSINOPHIL NFR BLD AUTO: 3.1 %
GLUCOSE SERPL-MCNC: 121 MG/DL
HBA1C MFR BLD HPLC: 7.3 %
HCT VFR BLD CALC: 43.5 %
HDLC SERPL-MCNC: 52 MG/DL
HGB BLD-MCNC: 13.8 G/DL
IMM GRANULOCYTES NFR BLD AUTO: 0.3 %
LDLC SERPL CALC-MCNC: 89 MG/DL
LYMPHOCYTES # BLD AUTO: 1.64 K/UL
LYMPHOCYTES NFR BLD AUTO: 22.1 %
MAGNESIUM SERPL-MCNC: 2 MG/DL
MAN DIFF?: NORMAL
MCHC RBC-ENTMCNC: 31.7 GM/DL
MCHC RBC-ENTMCNC: 31.9 PG
MCV RBC AUTO: 100.5 FL
MONOCYTES # BLD AUTO: 0.84 K/UL
MONOCYTES NFR BLD AUTO: 11.3 %
NEUTROPHILS # BLD AUTO: 4.68 K/UL
NEUTROPHILS NFR BLD AUTO: 62.9 %
PLATELET # BLD AUTO: 177 K/UL
POTASSIUM SERPL-SCNC: 5.3 MMOL/L
PROT SERPL-MCNC: 7 G/DL
RBC # BLD: 4.33 M/UL
RBC # FLD: 13.3 %
SODIUM SERPL-SCNC: 141 MMOL/L
TRIGL SERPL-MCNC: 97 MG/DL
WBC # FLD AUTO: 7.43 K/UL

## 2019-02-25 ENCOUNTER — RX RENEWAL (OUTPATIENT)
Age: 84
End: 2019-02-25

## 2019-03-05 ENCOUNTER — RESULT REVIEW (OUTPATIENT)
Age: 84
End: 2019-03-05

## 2019-03-05 ENCOUNTER — APPOINTMENT (OUTPATIENT)
Dept: PULMONOLOGY | Facility: CLINIC | Age: 84
End: 2019-03-05
Payer: MEDICARE

## 2019-03-05 VITALS
SYSTOLIC BLOOD PRESSURE: 124 MMHG | OXYGEN SATURATION: 95 % | WEIGHT: 192 LBS | DIASTOLIC BLOOD PRESSURE: 62 MMHG | HEART RATE: 74 BPM | RESPIRATION RATE: 14 BRPM | BODY MASS INDEX: 29.19 KG/M2

## 2019-03-05 PROCEDURE — 99214 OFFICE O/P EST MOD 30 MIN: CPT

## 2019-03-12 NOTE — ASU PREOP CHECKLIST - TYPE OF SOLUTION
How Severe Is It?: mild Is This A New Presentation, Or A Follow-Up?: Follow Up Isotretinoin Display Cumulative Dose In The Note?: Yes Additional History: Pain 0/10 Patient Reported Weight In Pounds (Required For Calculation): 155 When Was Isotretinoin Started?: 02/11/2019 [facility]

## 2019-03-21 ENCOUNTER — APPOINTMENT (OUTPATIENT)
Dept: INTERNAL MEDICINE | Facility: CLINIC | Age: 84
End: 2019-03-21
Payer: MEDICARE

## 2019-03-21 VITALS — DIASTOLIC BLOOD PRESSURE: 70 MMHG | SYSTOLIC BLOOD PRESSURE: 120 MMHG | HEART RATE: 70 BPM | TEMPERATURE: 97.8 F

## 2019-03-21 PROCEDURE — 99214 OFFICE O/P EST MOD 30 MIN: CPT

## 2019-03-21 RX ORDER — CEFUROXIME AXETIL 500 MG/1
500 TABLET ORAL
Qty: 10 | Refills: 0 | Status: DISCONTINUED | COMMUNITY
Start: 2019-03-05 | End: 2019-03-21

## 2019-03-21 NOTE — HISTORY OF PRESENT ILLNESS
[FreeTextEntry8] : Patient presents complaining of\par 1. Cough has persisted since 3/5. Patient saw pulmonary who gave him a Medrol Dosepak/Ceftin/Flonase/Astelin and he continues on Symbicort. Chest x-ray was normal. Patient states he continues to be bothered by the cough which he thinks is more in his throat. Patient has no dyspnea/shortness of breath. Patient states the cough is keeping him up at night\par \par 2. Patient also states a day ago he hit his right back on the car mirror., area is bruised but does not have significant pain but would like it checked while here

## 2019-03-21 NOTE — ASSESSMENT
[FreeTextEntry1] : Patient prescribed Tessalon to use during the day/Hycodan at night, caution advised.   .Patient advised to rest/increase fluids and use supportive therapy. Patient will call if symptoms persist or worsen and return to the office as scheduled for regular followup.\par \par \par Dr. Burns Was present in office building while I examined patient

## 2019-03-21 NOTE — PHYSICAL EXAM
[No Acute Distress] : no acute distress [Normal Outer Ear/Nose] : the outer ears and nose were normal in appearance [Normal Oropharynx] : the oropharynx was normal [Normal TMs] : both tympanic membranes were normal [Normal Nasal Mucosa] : the nasal mucosa was normal [Supple] : supple [No Respiratory Distress] : no respiratory distress  [Clear to Auscultation] : lungs were clear to auscultation bilaterally [Normal Rate] : normal rate  [Regular Rhythm] : with a regular rhythm [Normal Affect] : the affect was normal [Normal Mood] : the mood was normal [de-identified] : +PPM [de-identified] : +Bruising noted right mid back, no breakage in the skin, no spinal tenderness or bony deformity,

## 2019-05-19 ENCOUNTER — RESULT CHARGE (OUTPATIENT)
Age: 84
End: 2019-05-19

## 2019-05-20 ENCOUNTER — APPOINTMENT (OUTPATIENT)
Dept: INTERNAL MEDICINE | Facility: CLINIC | Age: 84
End: 2019-05-20
Payer: MEDICARE

## 2019-05-20 VITALS
SYSTOLIC BLOOD PRESSURE: 130 MMHG | BODY MASS INDEX: 29.1 KG/M2 | DIASTOLIC BLOOD PRESSURE: 78 MMHG | HEART RATE: 70 BPM | OXYGEN SATURATION: 95 % | WEIGHT: 192 LBS | HEIGHT: 68 IN

## 2019-05-20 PROCEDURE — 69209 REMOVE IMPACTED EAR WAX UNI: CPT | Mod: 50

## 2019-05-20 PROCEDURE — 99214 OFFICE O/P EST MOD 30 MIN: CPT | Mod: 25

## 2019-05-20 PROCEDURE — 36415 COLL VENOUS BLD VENIPUNCTURE: CPT

## 2019-05-20 RX ORDER — INSULIN GLARGINE 100 [IU]/ML
100 INJECTION, SOLUTION SUBCUTANEOUS
Refills: 0 | Status: DISCONTINUED | COMMUNITY
Start: 2018-11-14 | End: 2019-05-20

## 2019-05-20 RX ORDER — BENZONATATE 200 MG/1
200 CAPSULE ORAL 3 TIMES DAILY
Qty: 21 | Refills: 0 | Status: DISCONTINUED | COMMUNITY
Start: 2019-03-21 | End: 2019-05-20

## 2019-05-20 RX ORDER — TRAMADOL HYDROCHLORIDE 50 MG/1
50 TABLET, COATED ORAL
Qty: 28 | Refills: 0 | Status: DISCONTINUED | COMMUNITY
Start: 2018-02-12 | End: 2019-05-20

## 2019-05-20 RX ORDER — METHYLPREDNISOLONE 4 MG/1
4 TABLET ORAL
Qty: 1 | Refills: 3 | Status: DISCONTINUED | COMMUNITY
Start: 2019-03-05 | End: 2019-05-20

## 2019-05-20 RX ORDER — HYDROCODONE BITARTRATE AND HOMATROPINE METHYLBROMIDE 5; 1.5 MG/5ML; MG/5ML
5-1.5 SYRUP ORAL EVERY 6 HOURS
Qty: 120 | Refills: 0 | Status: DISCONTINUED | COMMUNITY
Start: 2019-03-21 | End: 2019-05-20

## 2019-05-20 NOTE — ASSESSMENT
[FreeTextEntry1] : Labs sent out. Patient advised to continue present medications with diet/exercise and specialists followup. Patient will return to the office in 3 months for CPE\par \par Dr. Burns was present in office building while I examined pt\par \par \par Last colonoscopy was 2013\par Last 24-hour urine was  8/2018/MA 8/2018\par specialists include\par 1. endocrinology-Dr. Tolentino at [facility]\par 2. ophthalmology-Dr. Canseco-12/2018\par 3. podiatry-Dr. Stone\par 4. cardiology-Dr. king\par 5.  pulmonary-Dr. gamino\par 6. gastroenterology-Dr. Burnette\par 7. vascular-Dr. Haskins-changed to Dr. Mayur Walsh\par 8.neurology-Dr. Alston_no longer goes\par 9. Cardiothoracic surgeon-Dr. Mckeon-NO NEED FOR FOLLOW UP\par 10. -Dr. Xavier\par shingles vaccine discussed\par No indication for hepatitis C/HIV screening\par echo 2/2019\par

## 2019-05-20 NOTE — HISTORY OF PRESENT ILLNESS
[FreeTextEntry1] : Patient presents for followup on hypertension/hyperlipidemia/type 2 diabetes. Patient is currently fasting for today's labs. Patient is currently on cozaar/toprol for hypertension,on zetia for his hyperlipidemia and on prandin for his type 2 diabetes.\par -Insulin discontinued per endocrinology\par -Complaining of decreased hearing bilateral\par

## 2019-05-20 NOTE — PHYSICAL EXAM
[General Appearance - In No Acute Distress] : in no acute distress [] : no respiratory distress [Auscultation Breath Sounds / Voice Sounds] : lungs were clear to auscultation bilaterally [Respiration, Rhythm And Depth] : normal respiratory rhythm and effort [Heart Rate And Rhythm] : heart rate was normal and rhythm regular [Affect] : the affect was normal [Mood] : the mood was normal [de-identified] : +Wax impaction bilateral status post irrigation with excellent results, patient tolerated well [FreeTextEntry1] : +PPM

## 2019-05-21 LAB
ALBUMIN SERPL ELPH-MCNC: 4.2 G/DL
ALP BLD-CCNC: 54 U/L
ALT SERPL-CCNC: 14 U/L
ANION GAP SERPL CALC-SCNC: 11 MMOL/L
AST SERPL-CCNC: 17 U/L
BASOPHILS # BLD AUTO: 0.02 K/UL
BASOPHILS NFR BLD AUTO: 0.3 %
BILIRUB SERPL-MCNC: 0.5 MG/DL
BUN SERPL-MCNC: 25 MG/DL
CALCIUM SERPL-MCNC: 9.1 MG/DL
CHLORIDE SERPL-SCNC: 102 MMOL/L
CHOLEST SERPL-MCNC: 173 MG/DL
CHOLEST/HDLC SERPL: 2.8 RATIO
CO2 SERPL-SCNC: 26 MMOL/L
CREAT SERPL-MCNC: 1.14 MG/DL
EOSINOPHIL # BLD AUTO: 0.16 K/UL
EOSINOPHIL NFR BLD AUTO: 2.1 %
ESTIMATED AVERAGE GLUCOSE: 154 MG/DL
GLUCOSE SERPL-MCNC: 157 MG/DL
HBA1C MFR BLD HPLC: 7 %
HCT VFR BLD CALC: 42.8 %
HDLC SERPL-MCNC: 61 MG/DL
HGB BLD-MCNC: 13.4 G/DL
IMM GRANULOCYTES NFR BLD AUTO: 0.3 %
LDLC SERPL CALC-MCNC: 94 MG/DL
LYMPHOCYTES # BLD AUTO: 1.72 K/UL
LYMPHOCYTES NFR BLD AUTO: 22.8 %
MAGNESIUM SERPL-MCNC: 1.9 MG/DL
MAN DIFF?: NORMAL
MCHC RBC-ENTMCNC: 31.3 GM/DL
MCHC RBC-ENTMCNC: 31.7 PG
MCV RBC AUTO: 101.2 FL
MONOCYTES # BLD AUTO: 0.8 K/UL
MONOCYTES NFR BLD AUTO: 10.6 %
NEUTROPHILS # BLD AUTO: 4.84 K/UL
NEUTROPHILS NFR BLD AUTO: 63.9 %
PLATELET # BLD AUTO: 176 K/UL
POTASSIUM SERPL-SCNC: 4.9 MMOL/L
PROT SERPL-MCNC: 6.9 G/DL
RBC # BLD: 4.23 M/UL
RBC # FLD: 13.8 %
SODIUM SERPL-SCNC: 139 MMOL/L
TRIGL SERPL-MCNC: 91 MG/DL
WBC # FLD AUTO: 7.56 K/UL

## 2019-05-24 ENCOUNTER — RESULT REVIEW (OUTPATIENT)
Age: 84
End: 2019-05-24

## 2019-05-28 ENCOUNTER — TRANSCRIPTION ENCOUNTER (OUTPATIENT)
Age: 84
End: 2019-05-28

## 2019-05-28 ENCOUNTER — INPATIENT (INPATIENT)
Facility: HOSPITAL | Age: 84
LOS: 0 days | Discharge: HOME | DRG: 247 | End: 2019-05-29
Attending: INTERNAL MEDICINE | Admitting: INTERNAL MEDICINE
Payer: COMMERCIAL

## 2019-05-28 VITALS
RESPIRATION RATE: 18 BRPM | OXYGEN SATURATION: 98 % | TEMPERATURE: 98 F | HEART RATE: 66 BPM | SYSTOLIC BLOOD PRESSURE: 180 MMHG | DIASTOLIC BLOOD PRESSURE: 77 MMHG

## 2019-05-28 DIAGNOSIS — Z98.89 OTHER SPECIFIED POSTPROCEDURAL STATES: Chronic | ICD-10-CM

## 2019-05-28 DIAGNOSIS — Z98.42 CATARACT EXTRACTION STATUS, LEFT EYE: Chronic | ICD-10-CM

## 2019-05-28 DIAGNOSIS — Z90.49 ACQUIRED ABSENCE OF OTHER SPECIFIED PARTS OF DIGESTIVE TRACT: Chronic | ICD-10-CM

## 2019-05-28 DIAGNOSIS — Z96.60 PRESENCE OF UNSPECIFIED ORTHOPEDIC JOINT IMPLANT: Chronic | ICD-10-CM

## 2019-05-28 DIAGNOSIS — Z98.41 CATARACT EXTRACTION STATUS, RIGHT EYE: Chronic | ICD-10-CM

## 2019-05-28 DIAGNOSIS — Z98.61 CORONARY ANGIOPLASTY STATUS: Chronic | ICD-10-CM

## 2019-05-28 DIAGNOSIS — Z95.3 PRESENCE OF XENOGENIC HEART VALVE: Chronic | ICD-10-CM

## 2019-05-28 DIAGNOSIS — Z95.1 PRESENCE OF AORTOCORONARY BYPASS GRAFT: Chronic | ICD-10-CM

## 2019-05-28 DIAGNOSIS — Z95.0 PRESENCE OF CARDIAC PACEMAKER: Chronic | ICD-10-CM

## 2019-05-28 DIAGNOSIS — R94.30 ABNORMAL RESULT OF CARDIOVASCULAR FUNCTION STUDY, UNSPECIFIED: ICD-10-CM

## 2019-05-28 LAB
ANION GAP SERPL CALC-SCNC: 12 MMOL/L — SIGNIFICANT CHANGE UP (ref 5–17)
APTT BLD: 27.5 SEC — SIGNIFICANT CHANGE UP (ref 27.5–36.3)
BLD GP AB SCN SERPL QL: SIGNIFICANT CHANGE UP
BUN SERPL-MCNC: 27 MG/DL — HIGH (ref 8–20)
CALCIUM SERPL-MCNC: 8.9 MG/DL — SIGNIFICANT CHANGE UP (ref 8.6–10.2)
CHLORIDE SERPL-SCNC: 99 MMOL/L — SIGNIFICANT CHANGE UP (ref 98–107)
CO2 SERPL-SCNC: 26 MMOL/L — SIGNIFICANT CHANGE UP (ref 22–29)
CREAT SERPL-MCNC: 1.09 MG/DL — SIGNIFICANT CHANGE UP (ref 0.5–1.3)
DIR ANTIGLOB POLYSPECIFIC INTERPRETATION: SIGNIFICANT CHANGE UP
GLUCOSE SERPL-MCNC: 166 MG/DL — HIGH (ref 70–115)
HCT VFR BLD CALC: 39 % — LOW (ref 42–52)
HGB BLD-MCNC: 13 G/DL — LOW (ref 14–18)
INR BLD: 1.08 RATIO — SIGNIFICANT CHANGE UP (ref 0.88–1.16)
MAGNESIUM SERPL-MCNC: 2 MG/DL — SIGNIFICANT CHANGE UP (ref 1.6–2.6)
MCHC RBC-ENTMCNC: 32.3 PG — HIGH (ref 27–31)
MCHC RBC-ENTMCNC: 33.3 G/DL — SIGNIFICANT CHANGE UP (ref 32–36)
MCV RBC AUTO: 97 FL — HIGH (ref 80–94)
PLATELET # BLD AUTO: 176 K/UL — SIGNIFICANT CHANGE UP (ref 150–400)
POTASSIUM SERPL-MCNC: 4.5 MMOL/L — SIGNIFICANT CHANGE UP (ref 3.5–5.3)
POTASSIUM SERPL-SCNC: 4.5 MMOL/L — SIGNIFICANT CHANGE UP (ref 3.5–5.3)
PROTHROM AB SERPL-ACNC: 12.4 SEC — SIGNIFICANT CHANGE UP (ref 10–12.9)
RBC # BLD: 4.02 M/UL — LOW (ref 4.6–6.2)
RBC # FLD: 13.7 % — SIGNIFICANT CHANGE UP (ref 11–15.6)
SODIUM SERPL-SCNC: 137 MMOL/L — SIGNIFICANT CHANGE UP (ref 135–145)
TYPE + AB SCN PNL BLD: SIGNIFICANT CHANGE UP
WBC # BLD: 7.8 K/UL — SIGNIFICANT CHANGE UP (ref 4.8–10.8)
WBC # FLD AUTO: 7.8 K/UL — SIGNIFICANT CHANGE UP (ref 4.8–10.8)

## 2019-05-28 PROCEDURE — 93010 ELECTROCARDIOGRAM REPORT: CPT | Mod: 76

## 2019-05-28 RX ORDER — REPAGLINIDE 1 MG/1
0.5 TABLET ORAL THREE TIMES A DAY
Refills: 0 | Status: DISCONTINUED | OUTPATIENT
Start: 2019-05-28 | End: 2019-05-29

## 2019-05-28 RX ORDER — LATANOPROST 0.05 MG/ML
1 SOLUTION/ DROPS OPHTHALMIC; TOPICAL AT BEDTIME
Refills: 0 | Status: DISCONTINUED | OUTPATIENT
Start: 2019-05-28 | End: 2019-05-29

## 2019-05-28 RX ORDER — CLOPIDOGREL BISULFATE 75 MG/1
75 TABLET, FILM COATED ORAL DAILY
Refills: 0 | Status: DISCONTINUED | OUTPATIENT
Start: 2019-05-28 | End: 2019-05-29

## 2019-05-28 RX ORDER — SODIUM CHLORIDE 9 MG/ML
1000 INJECTION INTRAMUSCULAR; INTRAVENOUS; SUBCUTANEOUS
Refills: 0 | Status: DISCONTINUED | OUTPATIENT
Start: 2019-05-28 | End: 2019-05-28

## 2019-05-28 RX ORDER — GARLIC 1000 MG
1000 CAPSULE ORAL
Qty: 0 | Refills: 0 | DISCHARGE

## 2019-05-28 RX ORDER — ASPIRIN/CALCIUM CARB/MAGNESIUM 324 MG
325 TABLET ORAL DAILY
Refills: 0 | Status: DISCONTINUED | OUTPATIENT
Start: 2019-05-28 | End: 2019-05-29

## 2019-05-28 RX ORDER — TAMSULOSIN HYDROCHLORIDE 0.4 MG/1
0.4 CAPSULE ORAL AT BEDTIME
Refills: 0 | Status: DISCONTINUED | OUTPATIENT
Start: 2019-05-28 | End: 2019-05-29

## 2019-05-28 RX ORDER — SODIUM CHLORIDE 9 MG/ML
400 INJECTION INTRAMUSCULAR; INTRAVENOUS; SUBCUTANEOUS
Refills: 0 | Status: DISCONTINUED | OUTPATIENT
Start: 2019-05-28 | End: 2019-05-29

## 2019-05-28 RX ORDER — EZETIMIBE 10 MG/1
10 TABLET ORAL DAILY
Refills: 0 | Status: DISCONTINUED | OUTPATIENT
Start: 2019-05-28 | End: 2019-05-29

## 2019-05-28 RX ORDER — BUDESONIDE, MICRONIZED 100 %
1 POWDER (GRAM) MISCELLANEOUS
Qty: 0 | Refills: 0 | DISCHARGE

## 2019-05-28 RX ORDER — OXYCODONE AND ACETAMINOPHEN 5; 325 MG/1; MG/1
1 TABLET ORAL ONCE
Refills: 0 | Status: DISCONTINUED | OUTPATIENT
Start: 2019-05-28 | End: 2019-05-28

## 2019-05-28 RX ORDER — METOPROLOL TARTRATE 50 MG
25 TABLET ORAL DAILY
Refills: 0 | Status: DISCONTINUED | OUTPATIENT
Start: 2019-05-28 | End: 2019-05-29

## 2019-05-28 RX ORDER — ONDANSETRON 8 MG/1
4 TABLET, FILM COATED ORAL EVERY 8 HOURS
Refills: 0 | Status: DISCONTINUED | OUTPATIENT
Start: 2019-05-28 | End: 2019-05-29

## 2019-05-28 RX ORDER — PANTOPRAZOLE SODIUM 20 MG/1
40 TABLET, DELAYED RELEASE ORAL
Refills: 0 | Status: DISCONTINUED | OUTPATIENT
Start: 2019-05-28 | End: 2019-05-29

## 2019-05-28 RX ORDER — ACETAMINOPHEN 500 MG
650 TABLET ORAL EVERY 6 HOURS
Refills: 0 | Status: DISCONTINUED | OUTPATIENT
Start: 2019-05-28 | End: 2019-05-29

## 2019-05-28 RX ORDER — LOSARTAN POTASSIUM 100 MG/1
25 TABLET, FILM COATED ORAL DAILY
Refills: 0 | Status: DISCONTINUED | OUTPATIENT
Start: 2019-05-28 | End: 2019-05-29

## 2019-05-28 RX ORDER — ALPRAZOLAM 0.25 MG
0.25 TABLET ORAL AT BEDTIME
Refills: 0 | Status: DISCONTINUED | OUTPATIENT
Start: 2019-05-28 | End: 2019-05-29

## 2019-05-28 RX ORDER — HYDRALAZINE HCL 50 MG
5 TABLET ORAL ONCE
Refills: 0 | Status: COMPLETED | OUTPATIENT
Start: 2019-05-28 | End: 2019-05-28

## 2019-05-28 RX ORDER — BUDESONIDE AND FORMOTEROL FUMARATE DIHYDRATE 160; 4.5 UG/1; UG/1
2 AEROSOL RESPIRATORY (INHALATION)
Refills: 0 | Status: DISCONTINUED | OUTPATIENT
Start: 2019-05-28 | End: 2019-05-29

## 2019-05-28 RX ADMIN — Medication 5 MILLIGRAM(S): at 20:00

## 2019-05-28 RX ADMIN — TAMSULOSIN HYDROCHLORIDE 0.4 MILLIGRAM(S): 0.4 CAPSULE ORAL at 23:42

## 2019-05-28 RX ADMIN — BUDESONIDE AND FORMOTEROL FUMARATE DIHYDRATE 2 PUFF(S): 160; 4.5 AEROSOL RESPIRATORY (INHALATION) at 22:04

## 2019-05-28 RX ADMIN — SODIUM CHLORIDE 50 MILLILITER(S): 9 INJECTION INTRAMUSCULAR; INTRAVENOUS; SUBCUTANEOUS at 15:56

## 2019-05-28 RX ADMIN — OXYCODONE AND ACETAMINOPHEN 1 TABLET(S): 5; 325 TABLET ORAL at 23:42

## 2019-05-28 NOTE — H&P PST ADULT - NSICDXPASTSURGICALHX_GEN_ALL_CORE_FT
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

## 2019-05-28 NOTE — H&P PST ADULT - NEGATIVE NEUROLOGICAL SYMPTOMS
no tremors/no vertigo/no loss of sensation/no weakness/no paresthesias/no generalized seizures/no transient paralysis/no focal seizures

## 2019-05-28 NOTE — H&P PST ADULT - ASSESSMENT
87 y/o white male with long history of CAD tx with CABG/TAVR/PPM with recent onset of chest pain and recent abnormal PET scan.  to have [facility] to evaluate recent abnormal PET scan showing moderate area of moderate ischemia involving mid to basal anterior, anteroseptal, anterolateral and inferolateral  elevated TID (high risk) with EF 57%.

## 2019-05-28 NOTE — DISCHARGE NOTE PROVIDER - NSDCCPCAREPLAN_GEN_ALL_CORE_FT
PRINCIPAL DISCHARGE DIAGNOSIS  Diagnosis: CAD (coronary artery disease)  Assessment and Plan of Treatment:

## 2019-05-28 NOTE — H&P PST ADULT - NSICDXPASTMEDICALHX_GEN_ALL_CORE_FT
PAST MEDICAL HISTORY:  Age-related incipient cataract of both eyes     Angina pectoris class II    Aortic stenosis     Asthma     BPH (benign prostatic hyperplasia)     CAD (coronary artery disease) S/P Stenting x8    CAD (coronary artery disease) CABG LIMA LAD SVG OM1, PCI PDA LM/LCX with PTCA    Carotid artery disease RCEA    Diabetes mellitus     MENDEZ (dyspnea on exertion)     Fatigue Profound w/exertion    Former smoker     Hyperlipidemia     Hypertension     FANNY (obstructive sleep apnea) uses mouth peice    Osteoarthritis     PVD (peripheral vascular disease) intervention RLE    PVD (peripheral vascular disease)     SOB (shortness of breath) PAST MEDICAL HISTORY:  Abnormal positron emission tomography (PET) scan 2019 anterior, anteroseptal,anterolateral and iferolateral ischemia    Age-related incipient cataract of both eyes     Angina pectoris class II    Angina, class II     Aortic stenosis TAVR #29 Medtronic core valve 2017    Asthma     BPH (benign prostatic hyperplasia)     CAD (coronary artery disease) S/P Stenting x8    CAD (coronary artery disease) CABG LIMA LAD SVG OM1, PCI PDA LM/LCX with PTCA    CAD (coronary artery disease) PPM, PCI LM and LCX, CABG LIMA OAD and OM1    Carotid artery disease RCEA    Cataract     COPD, mild     Diabetes mellitus     MENDEZ (dyspnea on exertion)     Fatigue Profound w/exertion    Former smoker     Former smoker     H/O urinary frequency     Hematuria     History of BPH     Hyperlipidemia     Hypertension     MR (mitral regurgitation) moderate      FANNY (obstructive sleep apnea)     FANNY (obstructive sleep apnea) uses mouth peice    Osteoarthritis     PVD (peripheral vascular disease) intervention RLE    PVD (peripheral vascular disease)     PVD (peripheral vascular disease) R SFA and L SFA w/stents and PTA    SOB (shortness of breath)     Statin intolerance

## 2019-05-28 NOTE — H&P PST ADULT - REASON FOR ADMISSION
[facility] to evaluate recent abnormal PET scan showing moderate area of moderate ischemia involving mid to basal anterior, anteroseptal, anterolateral and inferolateral  elevated TID (high risk) with EF 57%

## 2019-05-28 NOTE — H&P PST ADULT - HISTORY OF PRESENT ILLNESS
85 y/o white male with long history of CAD tx with CABG/TAVR/PPM with recent onset of chest pain and recent abnormal PET scan.  to have [facility] to evaluate recent abnormal PET scan showing moderate area of moderate ischemia involving mid to basal anterior, anteroseptal, anterolateral and inferolateral  elevated TID (high risk) with EF 57%.    PMH:  CABG Feb 2018 LIMA to LAD, SVG to OM1 PTCA LM, LCX, PDA stents, PPM, TAVR, HTN, HLD, DM 85 y/o white male with long history of CAD tx with CABG/TAVR/PPM with recent onset of chest pain and recent abnormal PET scan.  to have [facility] to evaluate recent abnormal PET scan showing moderate area of moderate ischemia involving mid to basal anterior, anteroseptal, anterolateral and inferolateral  elevated TID (high risk) with EF 57%.    ASA 3 Mallampati 2 BR  GFR 61  BR 0.8%  Took his aspirin/plavix    PMH:  CABG Feb 2018 LIMA to LAD, SVG to OM1 PTCA LM, LCX, PDA stents, PPM, TAVR, HTN, HLD, DM

## 2019-05-28 NOTE — DISCHARGE NOTE PROVIDER - HOSPITAL COURSE
s/p LHC RFA w/laser atherectomy LM s/p LHC RFA w/laser atherectomy LM        VENTRICLES: No LV gram ( preserved EF by noninv imaging ).    CORONARY VESSELS: R dominant.    Moderate sized RCA imaged nonselectively and reveals mild disease w/ patent    PDA stent.    95% stenosis of LM ( severe ISR ) w/ large distal LCx w/ 95% stenosis of    ostial OM1.    SVG known to be patent to OM1 and competitive flow seen.    LIMA known to be patent to LAD and competitive flow seen.    LM:   --  LM: There was a 90 % stenosis.    COMPLICATIONS: No complications occurred during the cath lab visit.    DIAGNOSTIC IMPRESSIONS: Laser atherectomy and PTCA of LM w/ 10% residual    stenosis and ROEL III flow maintained thruout.    PTCA of OM1 w/ 30% residual stenosis and ROEL III flow maintained thruout (    SVG to OM1 can now supply LCx as well ).    Patent PDA stent.    Hx Evolut TAVR.    DIAGNOSTIC RECOMMENDATIONS: Asa/plavix.    Followup in 3 weeks at [facility].    INTERVENTIONAL IMPRESSIONS: Laser atherectomy and PTCA of LM w/ 10%    residual stenosis and ROEL III flow maintained thruout.    PTCA of OM1 w/ 30% residual stenosis and ROEL III flow maintained thruout (    SVG to OM1 can now supply LCx as well ).    Patent PDA stent.    Hx Evolut TAVR.    INTERVENTIONAL RECOMMENDATIONS: Asa/plavix.    Followup in 3 weeks at [facility]. s/p C RFA w/laser atherectomy LM        VENTRICLES: No LV gram ( preserved EF by noninv imaging ).    CORONARY VESSELS: R dominant.    Moderate sized RCA imaged nonselectively and reveals mild disease w/ patent    PDA stent.    95% stenosis of LM ( severe ISR ) w/ large distal LCx w/ 95% stenosis of    ostial OM1.    SVG known to be patent to OM1 and competitive flow seen.    LIMA known to be patent to LAD and competitive flow seen.    LM:   --  LM: There was a 90 % stenosis.    COMPLICATIONS: No complications occurred during the cath lab visit.    DIAGNOSTIC IMPRESSIONS: Laser atherectomy and PTCA of LM w/ 10% residual    stenosis and ROEL III flow maintained thruout.    PTCA of OM1 w/ 30% residual stenosis and ROEL III flow maintained thruout (    SVG to OM1 can now supply LCx as well ).    Patent PDA stent.    Hx Evolut TAVR.    DIAGNOSTIC RECOMMENDATIONS: Asa/plavix.    Followup in 3 weeks at [facility].    INTERVENTIONAL IMPRESSIONS: Laser atherectomy and PTCA of LM w/ 10%    residual stenosis and ROEL III flow maintained thruout.    PTCA of OM1 w/ 30% residual stenosis and ROEL III flow maintained thruout (    SVG to OM1 can now supply LCx as well ).    Patent PDA stent.    Hx Evolut TAVR.    INTERVENTIONAL RECOMMENDATIONS: Asa/plavix.    Followup in 3 weeks at [facility].     OOB ambulating     No chest pain SOB or dizziness    Rt groin dressing removed w/teaching for care of site rendered    Voiding/eating    Tele overnight w/o ectopy or events                    REVIEW OF SYSTEMS:    Denies SOB, CP, NV, HA, dizziness, palpitations, site pain        PHYSICAL EXAM: A&Ox3 NAD Skin warm and dry    NEURO: Speech intact +gag +swallow Tongue midline FERGUSON    NECK: No JVD, trachea midline. Eupneic    HEART: S1S2  ECG completed and reviewed    PULMONARY:  CTA nano    ABDOMEN: Soft nontender X4 +BS Vdg/eating    EXTREMITIES:  RFA site: No bleed, hematoma, pain, ecchymosis or swelling Rt DP/PT+ s/p C RFA w/laser atherectomy LM        VENTRICLES: No LV gram ( preserved EF by noninv imaging ).    CORONARY VESSELS: R dominant.    Moderate sized RCA imaged nonselectively and reveals mild disease w/ patent    PDA stent.    95% stenosis of LM ( severe ISR ) w/ large distal LCx w/ 95% stenosis of    ostial OM1.    SVG known to be patent to OM1 and competitive flow seen.    LIMA known to be patent to LAD and competitive flow seen.    LM:   --  LM: There was a 90 % stenosis.    COMPLICATIONS: No complications occurred during the cath lab visit.    DIAGNOSTIC IMPRESSIONS: Laser atherectomy and PTCA of LM w/ 10% residual    stenosis and ROEL III flow maintained thruout.    PTCA of OM1 w/ 30% residual stenosis and ROEL III flow maintained thruout (    SVG to OM1 can now supply LCx as well ).    Patent PDA stent.    Hx Evolut TAVR.    DIAGNOSTIC RECOMMENDATIONS: Asa/plavix.    Followup in 3 weeks at [facility].    INTERVENTIONAL IMPRESSIONS: Laser atherectomy and PTCA of LM w/ 10%    residual stenosis and ROEL III flow maintained thruout.    PTCA of OM1 w/ 30% residual stenosis and ROEL III flow maintained thruout (    SVG to OM1 can now supply LCx as well ).    Patent PDA stent.    Hx Evolut TAVR.    INTERVENTIONAL RECOMMENDATIONS: Asa/plavix.    Followup in 3 weeks at [facility].     OOB ambulating     No chest pain SOB or dizziness    Rt groin dressing removed w/teaching for care of site rendered    Voiding/eating    Tele overnight w/o ectopy or events                    REVIEW OF SYSTEMS:    Denies SOB, CP, NV, HA, dizziness, palpitations, site pain        PHYSICAL EXAM: A&Ox3 NAD Skin warm and dry    NEURO: Speech intact +gag +swallow Tongue midline FERGUSON    NECK: No JVD, trachea midline. Eupneic    HEART: S1S2  ECG completed and reviewed appropriate AS/ noted     PULMONARY:  CTA nano    ABDOMEN: Soft nontender X4 +BS Vdg/eating    EXTREMITIES:  RFA site: No bleed, hematoma, pain, ecchymosis or swelling Rt DP/PT+

## 2019-05-28 NOTE — DISCHARGE NOTE PROVIDER - NSDCFUADDAPPT_GEN_ALL_CORE_FT
cardiac rehab info provided/referral and communication to cardiac rehab completed  FU Dr Mcgill 1-2 weeks  Remove right groin dressing within 24 hours of discharge cardiac rehab info provided/referral and communication to cardiac rehab completed  FU Dr Mcgill 1-2 weeks  Remove right groin dressing was removed

## 2019-05-28 NOTE — DISCHARGE NOTE PROVIDER - CARE PROVIDER_API CALL
Kem Mcgill)  Cardiovascular Disease; Interventional Cardiology  [address] [place] [address]  Phone: [phone]  Fax: [phone]  Follow Up Time:

## 2019-05-28 NOTE — H&P PST ADULT - NEUROLOGICAL DETAILS
cranial nerves intact/alert and oriented x 3/responds to verbal commands/sensation intact/deep reflexes intact/responds to pain

## 2019-05-28 NOTE — DISCHARGE NOTE PROVIDER - NSDCACTIVITY_GEN_ALL_CORE
Walking - Indoors allowed/Do not drive or operate machinery/Showering allowed/Do not make important decisions

## 2019-05-28 NOTE — H&P PST ADULT - NSICDXFAMILYHX_GEN_ALL_CORE_FT
FAMILY HISTORY:  Sibling  Still living? Unknown  Family history of acute myocardial infarction, Age at diagnosis: Age Unknown  Family history of depression, Age at diagnosis: Age Unknown  Family history of stroke, Age at diagnosis: Age Unknown

## 2019-05-28 NOTE — DISCHARGE NOTE PROVIDER - REASON FOR ADMISSION
[facility] to evaluate recent abnormal PET scan showing moderate area of moderate ischemia involving mid to basal anterior, anteroseptal, anterolateral and inferolateral  elevated TID (high risk) with EF 57%

## 2019-05-28 NOTE — PROGRESS NOTE ADULT - SUBJECTIVE AND OBJECTIVE BOX
RFA #6 Fr sheath removed without incident, hemostasis obtained within 20 minutes of manual pressure. Pt tolerated procedure well, no bleeding or hematoma noted, pulses palpable b/l. Bedrest with R LE straight x4 hours.

## 2019-05-28 NOTE — PROGRESS NOTE ADULT - SUBJECTIVE AND OBJECTIVE BOX
Admit for HTN >160mmHg and laser atherectomy via RFA  STATIN intolerant Admit for HTN >160mmHg and laser atherectomy via RFA  STATIN intolerant  s/p LHC RFA w/laser atherectom /POBA LM Admit for HTN >160mmHg and laser atherectomy via RFA  STATIN intolerant  s/p LHC RFA w/laser atherectomy /POBA LM Nurse Practitioner Progress note:     INTERVAL HISTORY: 87 y/o white male with long history of CAD tx with CABG/TAVR/PPM with recent onset of chest pain and recent abnormal PET scan.  to have [facility] to evaluate recent abnormal PET scan showing moderate area of moderate ischemia involving mid to basal anterior, anteroseptal, anterolateral and inferolateral  elevated TID (high risk) with EF 57%.      MEDICATIONS:  hydrALAZINE Injectable 5 milliGRAM(s) IV Push once  losartan 25 milliGRAM(s) Oral daily  metoprolol succinate ER 25 milliGRAM(s) Oral daily  tamsulosin 0.4 milliGRAM(s) Oral at bedtime  buDESOnide  80 MICROgram(s)/formoterol 4.5 MICROgram(s) Inhaler 2 Puff(s) Inhalation two times a day  acetaminophen   Tablet .. 650 milliGRAM(s) Oral every 6 hours PRN  ondansetron Injectable 4 milliGRAM(s) IV Push every 8 hours PRN  aluminum hydroxide/magnesium hydroxide/simethicone Suspension 30 milliLiter(s) Oral every 4 hours PRN  pantoprazole    Tablet 40 milliGRAM(s) Oral before breakfast  ezetimibe 10 milliGRAM(s) Oral daily  repaglinide 0.5 milliGRAM(s) Oral three times a day  aspirin enteric coated 325 milliGRAM(s) Oral daily  clopidogrel Tablet 75 milliGRAM(s) Oral daily  latanoprost 0.005% Ophthalmic Solution 1 Drop(s) Both EYES at bedtime  sodium chloride 0.9%. 400 milliLiter(s) IV Continuous <Continuous>      TELEMETRY: Vpaced 78 bpm    T(C): 36.4 (05-28-19 @ 15:05), Max: 36.4 (05-28-19 @ 15:05)  HR: 66 (05-28-19 @ 15:05) (66 - 66)  BP: 180/77 (05-28-19 @ 15:05) (180/77 - 180/77)  RR: 18 (05-28-19 @ 15:05) (18 - 18)  SpO2: 98% (05-28-19 @ 15:05) (98% - 98%)  Wt(kg): --    PHYSICAL EXAM:  Appearance: Normal	  Cardiovascular: Normal S1 S2, No JVD, No murmurs, No edema  Respiratory: Lungs clear to auscultation	  Psychiatry: A & O x 3, Mood & affect appropriate  Neurologic: Non-focal, A&O X3.  No neuro deficits  Procedure Site: Right groin #6 Fr sheath sutured in place.  Site benign.  No bleeding/hematoma/ecchymosis.  + palp pedal pulse.     12 lead EKG:  	Vpaced 78 bpm with PVC 78 bpm.  No acute changes      PROCEDURE RESULTS: S/P LHC RFA with laser atherectomy/POBA LM.  Pt received plavix 300mg in lab.    < from: Cardiac Cath Lab - Adult (05.28.19 @ 17:54) >  INTERVENTIONAL IMPRESSIONS: Laser atherectomy and PTCA of LM w/ 10%  residual stenosis and ROEL III flow maintained thruout.  PTCA of OM1 w/ 30% residual stenosis and ROEL III flow maintained thruout (  SVG to OM1 can now supply LCx as well ).  Patent PDA stent.  Hx Evolut TAVR.  INTERVENTIONAL RECOMMENDATIONS: Asa/plavix.  Followup in 3 weeks at [facility].    < end of copied text >        ASSESSMENT/PLAN: 	  -Admit for HTN >160mmHg and laser atherectomy via RFF  -Statin intolerant  -Groin precautions  -ACT Q1h until ACT <180  -Remove sheath when ACT wnl  -Resume home meds  -Hydralazine for HTN  -Follow up with Dr. Mcgill  -Check labs/EKG/site check in AM  -Probable discharge in AM

## 2019-05-29 ENCOUNTER — TRANSCRIPTION ENCOUNTER (OUTPATIENT)
Age: 84
End: 2019-05-29

## 2019-05-29 VITALS
TEMPERATURE: 98 F | OXYGEN SATURATION: 94 % | HEART RATE: 86 BPM | RESPIRATION RATE: 15 BRPM | SYSTOLIC BLOOD PRESSURE: 122 MMHG | DIASTOLIC BLOOD PRESSURE: 56 MMHG

## 2019-05-29 LAB
ANION GAP SERPL CALC-SCNC: 15 MMOL/L — SIGNIFICANT CHANGE UP (ref 5–17)
BUN SERPL-MCNC: 25 MG/DL — HIGH (ref 8–20)
CALCIUM SERPL-MCNC: 8.9 MG/DL — SIGNIFICANT CHANGE UP (ref 8.6–10.2)
CHLORIDE SERPL-SCNC: 100 MMOL/L — SIGNIFICANT CHANGE UP (ref 98–107)
CO2 SERPL-SCNC: 21 MMOL/L — LOW (ref 22–29)
CREAT SERPL-MCNC: 1.07 MG/DL — SIGNIFICANT CHANGE UP (ref 0.5–1.3)
GLUCOSE SERPL-MCNC: 200 MG/DL — HIGH (ref 70–115)
HCT VFR BLD CALC: 41.9 % — LOW (ref 42–52)
HGB BLD-MCNC: 13.9 G/DL — LOW (ref 14–18)
MAGNESIUM SERPL-MCNC: 1.7 MG/DL — SIGNIFICANT CHANGE UP (ref 1.6–2.6)
MCHC RBC-ENTMCNC: 32.2 PG — HIGH (ref 27–31)
MCHC RBC-ENTMCNC: 33.2 G/DL — SIGNIFICANT CHANGE UP (ref 32–36)
MCV RBC AUTO: 97 FL — HIGH (ref 80–94)
PLATELET # BLD AUTO: 175 K/UL — SIGNIFICANT CHANGE UP (ref 150–400)
POTASSIUM SERPL-MCNC: 4.3 MMOL/L — SIGNIFICANT CHANGE UP (ref 3.5–5.3)
POTASSIUM SERPL-SCNC: 4.3 MMOL/L — SIGNIFICANT CHANGE UP (ref 3.5–5.3)
RBC # BLD: 4.32 M/UL — LOW (ref 4.6–6.2)
RBC # FLD: 13.7 % — SIGNIFICANT CHANGE UP (ref 11–15.6)
SODIUM SERPL-SCNC: 136 MMOL/L — SIGNIFICANT CHANGE UP (ref 135–145)
WBC # BLD: 10.9 K/UL — HIGH (ref 4.8–10.8)
WBC # FLD AUTO: 10.9 K/UL — HIGH (ref 4.8–10.8)

## 2019-05-29 PROCEDURE — 92943 PRQ TRLUML REVSC CH OCC ANT: CPT

## 2019-05-29 PROCEDURE — 93461 R&L HRT ART/VENTRICLE ANGIO: CPT

## 2019-05-29 PROCEDURE — 86901 BLOOD TYPING SEROLOGIC RH(D): CPT

## 2019-05-29 PROCEDURE — C1874: CPT

## 2019-05-29 PROCEDURE — 86900 BLOOD TYPING SEROLOGIC ABO: CPT

## 2019-05-29 PROCEDURE — 83735 ASSAY OF MAGNESIUM: CPT

## 2019-05-29 PROCEDURE — 80048 BASIC METABOLIC PNL TOTAL CA: CPT

## 2019-05-29 PROCEDURE — C1887: CPT

## 2019-05-29 PROCEDURE — C1885: CPT

## 2019-05-29 PROCEDURE — C1894: CPT

## 2019-05-29 PROCEDURE — 85730 THROMBOPLASTIN TIME PARTIAL: CPT

## 2019-05-29 PROCEDURE — 93010 ELECTROCARDIOGRAM REPORT: CPT

## 2019-05-29 PROCEDURE — 99152 MOD SED SAME PHYS/QHP 5/>YRS: CPT

## 2019-05-29 PROCEDURE — 92924 PRQ TRLUML C ATHRC 1 ART&/BR: CPT | Mod: LM

## 2019-05-29 PROCEDURE — 86880 COOMBS TEST DIRECT: CPT

## 2019-05-29 PROCEDURE — 94640 AIRWAY INHALATION TREATMENT: CPT

## 2019-05-29 PROCEDURE — C1725: CPT

## 2019-05-29 PROCEDURE — 92934: CPT

## 2019-05-29 PROCEDURE — C1769: CPT

## 2019-05-29 PROCEDURE — 92933 PRQ TRLML C ATHRC ST ANGIOP1: CPT

## 2019-05-29 PROCEDURE — 85027 COMPLETE CBC AUTOMATED: CPT

## 2019-05-29 PROCEDURE — 93455 CORONARY ART/GRFT ANGIO S&I: CPT | Mod: XU

## 2019-05-29 PROCEDURE — 93005 ELECTROCARDIOGRAM TRACING: CPT

## 2019-05-29 PROCEDURE — 85610 PROTHROMBIN TIME: CPT

## 2019-05-29 PROCEDURE — 99153 MOD SED SAME PHYS/QHP EA: CPT

## 2019-05-29 PROCEDURE — 36415 COLL VENOUS BLD VENIPUNCTURE: CPT

## 2019-05-29 PROCEDURE — 86850 RBC ANTIBODY SCREEN: CPT

## 2019-05-29 RX ORDER — CLOPIDOGREL BISULFATE 75 MG/1
1 TABLET, FILM COATED ORAL
Qty: 90 | Refills: 3
Start: 2019-05-29 | End: 2020-05-22

## 2019-05-29 RX ADMIN — LOSARTAN POTASSIUM 25 MILLIGRAM(S): 100 TABLET, FILM COATED ORAL at 06:28

## 2019-05-29 RX ADMIN — REPAGLINIDE 0.5 MILLIGRAM(S): 1 TABLET ORAL at 06:27

## 2019-05-29 RX ADMIN — Medication 25 MILLIGRAM(S): at 06:27

## 2019-05-29 RX ADMIN — OXYCODONE AND ACETAMINOPHEN 1 TABLET(S): 5; 325 TABLET ORAL at 00:30

## 2019-05-29 RX ADMIN — PANTOPRAZOLE SODIUM 40 MILLIGRAM(S): 20 TABLET, DELAYED RELEASE ORAL at 06:27

## 2019-05-29 RX ADMIN — REPAGLINIDE 0.5 MILLIGRAM(S): 1 TABLET ORAL at 01:10

## 2019-05-29 NOTE — DISCHARGE NOTE NURSING/CASE MANAGEMENT/SOCIAL WORK - NSDCFUADDAPPT_GEN_ALL_CORE_FT
cardiac rehab info provided/referral and communication to cardiac rehab completed  FU Dr Mcgill 1-2 weeks  Remove right groin dressing was removed

## 2019-05-29 NOTE — DISCHARGE NOTE NURSING/CASE MANAGEMENT/SOCIAL WORK - NSDCDPATPORTLINK_GEN_ALL_CORE
You can access the [facility] Patient Portal, offered by [facility], by registering with the following website: [email or web address]

## 2019-05-30 ENCOUNTER — RESULT REVIEW (OUTPATIENT)
Age: 84
End: 2019-05-30

## 2019-06-12 NOTE — DISCHARGE NOTE ADULT - MEDICATION SUMMARY - MEDICATIONS TO CHANGE
The patient is a 14y Male complaining of ankle pain/injury. I will SWITCH the dose or number of times a day I take the medications listed below when I get home from the hospital:  None

## 2019-07-22 PROBLEM — I34.0 NONRHEUMATIC MITRAL (VALVE) INSUFFICIENCY: Chronic | Status: ACTIVE | Noted: 2019-05-28

## 2019-07-22 PROBLEM — Z87.891 PERSONAL HISTORY OF NICOTINE DEPENDENCE: Chronic | Status: ACTIVE | Noted: 2019-05-28

## 2019-07-22 PROBLEM — I25.10 ATHEROSCLEROTIC HEART DISEASE OF NATIVE CORONARY ARTERY WITHOUT ANGINA PECTORIS: Chronic | Status: ACTIVE | Noted: 2019-05-28

## 2019-07-22 PROBLEM — H26.9 UNSPECIFIED CATARACT: Chronic | Status: ACTIVE | Noted: 2019-05-28

## 2019-07-22 PROBLEM — R94.8 ABNORMAL RESULTS OF FUNCTION STUDIES OF OTHER ORGANS AND SYSTEMS: Chronic | Status: ACTIVE | Noted: 2019-05-28

## 2019-07-22 PROBLEM — I25.10 ATHEROSCLEROTIC HEART DISEASE OF NATIVE CORONARY ARTERY WITHOUT ANGINA PECTORIS: Chronic | Status: ACTIVE | Noted: 2017-06-23

## 2019-07-22 PROBLEM — I73.9 PERIPHERAL VASCULAR DISEASE, UNSPECIFIED: Chronic | Status: ACTIVE | Noted: 2019-05-28

## 2019-07-22 PROBLEM — Z87.438 PERSONAL HISTORY OF OTHER DISEASES OF MALE GENITAL ORGANS: Chronic | Status: ACTIVE | Noted: 2019-05-28

## 2019-07-22 PROBLEM — R31.9 HEMATURIA, UNSPECIFIED: Chronic | Status: ACTIVE | Noted: 2019-05-28

## 2019-07-22 PROBLEM — Z78.9 OTHER SPECIFIED HEALTH STATUS: Chronic | Status: ACTIVE | Noted: 2019-05-28

## 2019-07-22 PROBLEM — Z87.898 PERSONAL HISTORY OF OTHER SPECIFIED CONDITIONS: Chronic | Status: ACTIVE | Noted: 2019-05-28

## 2019-07-24 ENCOUNTER — APPOINTMENT (OUTPATIENT)
Dept: PULMONOLOGY | Facility: CLINIC | Age: 84
End: 2019-07-24
Payer: MEDICARE

## 2019-07-24 ENCOUNTER — OUTPATIENT (OUTPATIENT)
Dept: OUTPATIENT SERVICES | Facility: HOSPITAL | Age: 84
LOS: 1 days | End: 2019-07-24
Payer: MEDICARE

## 2019-07-24 VITALS
TEMPERATURE: 98 F | DIASTOLIC BLOOD PRESSURE: 67 MMHG | HEART RATE: 80 BPM | WEIGHT: 188.05 LBS | HEIGHT: 68 IN | SYSTOLIC BLOOD PRESSURE: 155 MMHG | RESPIRATION RATE: 16 BRPM

## 2019-07-24 VITALS
DIASTOLIC BLOOD PRESSURE: 60 MMHG | OXYGEN SATURATION: 97 % | BODY MASS INDEX: 27.98 KG/M2 | SYSTOLIC BLOOD PRESSURE: 110 MMHG | WEIGHT: 184 LBS | HEART RATE: 71 BPM

## 2019-07-24 VITALS — OXYGEN SATURATION: 96 %

## 2019-07-24 DIAGNOSIS — Z98.42 CATARACT EXTRACTION STATUS, LEFT EYE: Chronic | ICD-10-CM

## 2019-07-24 DIAGNOSIS — Z98.89 OTHER SPECIFIED POSTPROCEDURAL STATES: Chronic | ICD-10-CM

## 2019-07-24 DIAGNOSIS — Z98.41 CATARACT EXTRACTION STATUS, RIGHT EYE: Chronic | ICD-10-CM

## 2019-07-24 DIAGNOSIS — R94.39 ABNORMAL RESULT OF OTHER CARDIOVASCULAR FUNCTION STUDY: ICD-10-CM

## 2019-07-24 DIAGNOSIS — Z95.3 PRESENCE OF XENOGENIC HEART VALVE: Chronic | ICD-10-CM

## 2019-07-24 DIAGNOSIS — G47.33 OBSTRUCTIVE SLEEP APNEA (ADULT) (PEDIATRIC): ICD-10-CM

## 2019-07-24 DIAGNOSIS — Z95.1 PRESENCE OF AORTOCORONARY BYPASS GRAFT: Chronic | ICD-10-CM

## 2019-07-24 DIAGNOSIS — Z01.810 ENCOUNTER FOR PREPROCEDURAL CARDIOVASCULAR EXAMINATION: ICD-10-CM

## 2019-07-24 DIAGNOSIS — Z95.0 PRESENCE OF CARDIAC PACEMAKER: Chronic | ICD-10-CM

## 2019-07-24 DIAGNOSIS — Z90.49 ACQUIRED ABSENCE OF OTHER SPECIFIED PARTS OF DIGESTIVE TRACT: Chronic | ICD-10-CM

## 2019-07-24 DIAGNOSIS — Z96.60 PRESENCE OF UNSPECIFIED ORTHOPEDIC JOINT IMPLANT: Chronic | ICD-10-CM

## 2019-07-24 DIAGNOSIS — Z98.61 CORONARY ANGIOPLASTY STATUS: Chronic | ICD-10-CM

## 2019-07-24 LAB
ANION GAP SERPL CALC-SCNC: 11 MMOL/L — SIGNIFICANT CHANGE UP (ref 5–17)
APTT BLD: 31 SEC — SIGNIFICANT CHANGE UP (ref 27.5–36.3)
BUN SERPL-MCNC: 31 MG/DL — HIGH (ref 8–20)
CALCIUM SERPL-MCNC: 9.2 MG/DL — SIGNIFICANT CHANGE UP (ref 8.6–10.2)
CHLORIDE SERPL-SCNC: 105 MMOL/L — SIGNIFICANT CHANGE UP (ref 98–107)
CHOLEST SERPL-MCNC: 167 MG/DL — SIGNIFICANT CHANGE UP (ref 110–199)
CO2 SERPL-SCNC: 24 MMOL/L — SIGNIFICANT CHANGE UP (ref 22–29)
CREAT SERPL-MCNC: 1.2 MG/DL — SIGNIFICANT CHANGE UP (ref 0.5–1.3)
GLUCOSE SERPL-MCNC: 218 MG/DL — HIGH (ref 70–115)
HCT VFR BLD CALC: 38.9 % — LOW (ref 39–50)
HDLC SERPL-MCNC: 48 MG/DL — SIGNIFICANT CHANGE UP
HGB BLD-MCNC: 12.9 G/DL — LOW (ref 13–17)
INR BLD: 1 RATIO — SIGNIFICANT CHANGE UP (ref 0.88–1.16)
LIPID PNL WITH DIRECT LDL SERPL: 84 MG/DL — SIGNIFICANT CHANGE UP
MAGNESIUM SERPL-MCNC: 1.8 MG/DL — SIGNIFICANT CHANGE UP (ref 1.6–2.6)
MCHC RBC-ENTMCNC: 33.1 PG — SIGNIFICANT CHANGE UP (ref 27–34)
MCHC RBC-ENTMCNC: 33.2 GM/DL — SIGNIFICANT CHANGE UP (ref 32–36)
MCV RBC AUTO: 99.7 FL — SIGNIFICANT CHANGE UP (ref 80–100)
PLATELET # BLD AUTO: 195 K/UL — SIGNIFICANT CHANGE UP (ref 150–400)
POTASSIUM SERPL-MCNC: 4.5 MMOL/L — SIGNIFICANT CHANGE UP (ref 3.5–5.3)
POTASSIUM SERPL-SCNC: 4.5 MMOL/L — SIGNIFICANT CHANGE UP (ref 3.5–5.3)
PROTHROM AB SERPL-ACNC: 11.5 SEC — SIGNIFICANT CHANGE UP (ref 10–12.9)
RBC # BLD: 3.9 M/UL — LOW (ref 4.2–5.8)
RBC # FLD: 13.2 % — SIGNIFICANT CHANGE UP (ref 10.3–14.5)
SODIUM SERPL-SCNC: 140 MMOL/L — SIGNIFICANT CHANGE UP (ref 135–145)
TOTAL CHOLESTEROL/HDL RATIO MEASUREMENT: 3 RATIO — LOW (ref 3.4–9.6)
TRIGL SERPL-MCNC: 175 MG/DL — SIGNIFICANT CHANGE UP (ref 10–200)
WBC # BLD: 7.25 K/UL — SIGNIFICANT CHANGE UP (ref 3.8–10.5)
WBC # FLD AUTO: 7.25 K/UL — SIGNIFICANT CHANGE UP (ref 3.8–10.5)

## 2019-07-24 PROCEDURE — 36415 COLL VENOUS BLD VENIPUNCTURE: CPT

## 2019-07-24 PROCEDURE — G0463: CPT

## 2019-07-24 PROCEDURE — 85027 COMPLETE CBC AUTOMATED: CPT

## 2019-07-24 PROCEDURE — 83735 ASSAY OF MAGNESIUM: CPT

## 2019-07-24 PROCEDURE — 93005 ELECTROCARDIOGRAM TRACING: CPT

## 2019-07-24 PROCEDURE — 85730 THROMBOPLASTIN TIME PARTIAL: CPT

## 2019-07-24 PROCEDURE — 85610 PROTHROMBIN TIME: CPT

## 2019-07-24 PROCEDURE — 93010 ELECTROCARDIOGRAM REPORT: CPT

## 2019-07-24 PROCEDURE — 80048 BASIC METABOLIC PNL TOTAL CA: CPT

## 2019-07-24 PROCEDURE — 99215 OFFICE O/P EST HI 40 MIN: CPT

## 2019-07-24 PROCEDURE — 80061 LIPID PANEL: CPT

## 2019-07-24 RX ORDER — FLUTICASONE PROPIONATE 50 UG/1
50 SPRAY, METERED NASAL
Qty: 1 | Refills: 3 | Status: DISCONTINUED | COMMUNITY
Start: 2019-03-05 | End: 2019-07-24

## 2019-07-24 RX ORDER — ALBUTEROL SULFATE 90 UG/1
108 (90 BASE) AEROSOL, METERED RESPIRATORY (INHALATION)
Qty: 1 | Refills: 5 | Status: DISCONTINUED | COMMUNITY
Start: 2017-04-28 | End: 2019-07-24

## 2019-07-24 NOTE — ASU PATIENT PROFILE, ADULT - PSH
History of CEA (carotid endarterectomy)  Bilateral  History of permanent cardiac pacemaker placement  [employer]  S/P CABG x 2  LIMA LAD SVG OM1  S/P cataract surgery, left    S/P cataract surgery, right    S/P cholecystectomy    S/P joint replacement  Bilateral knee replacement  S/P nasal surgery  Secondary to nasal fracture  S/P PTCA (percutaneous transluminal coronary angioplasty)  x 9  S/P tonsillectomy    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  #29 Medtronic core valve

## 2019-07-24 NOTE — ASU PATIENT PROFILE, ADULT - PMH
Abnormal positron emission tomography (PET) scan  2019 anterior, anteroseptal,anterolateral and iferolateral ischemia  Age-related incipient cataract of both eyes    Angina pectoris  class II  Angina, class II    Aortic stenosis  TAVR #29 Medtronic core valve 2017  Asthma    BPH (benign prostatic hyperplasia)    CAD (coronary artery disease)  S/P Stenting x8  CAD (coronary artery disease)  PPM, PCI LM and LCX, CABG LIMA OAD and OM1  CAD (coronary artery disease)  CABG LIMA LAD SVG OM1, PCI PDA LM/LCX with PTCA  Carotid artery disease  RCEA  Cataract    COPD, mild    Diabetes mellitus    MENDEZ (dyspnea on exertion)    Fatigue  Profound w/exertion  Former smoker    Former smoker    H/O urinary frequency    Hematuria    History of BPH    Hyperlipidemia    Hypertension    MR (mitral regurgitation)  moderate    FANNY (obstructive sleep apnea)    FANNY (obstructive sleep apnea)  uses mouth peice  Osteoarthritis    PVD (peripheral vascular disease)    PVD (peripheral vascular disease)  R SFA and L SFA w/stents and PTA  PVD (peripheral vascular disease)  intervention RLE  SOB (shortness of breath)    Statin intolerance

## 2019-07-24 NOTE — H&P PST ADULT - HISTORY OF PRESENT ILLNESS
85 yo male with known CAD, claudication, TAVR,  atherectomy/PTCA of Left main, PTCA of OM1, several stent with new complaints of SOBOE and at rest.  5/23/19 PET scan done which showed.  Abnormal myocardial perfusion study.  A moderate area of moderate ischemia involving mid to basal anterior, anteroseptal, anterolateral and  inferolateral wall.  TID ratio is elevated which is high risk marker  EF 57%.  Patient here for cath to r/o further CAD.

## 2019-07-24 NOTE — H&P PST ADULT - ASSESSMENT
87 yo male with known CAD, claudication, TAVR,  atherectomy/PTCA of Left main, PTCA of OM1, several stent with new complaints of SOBOE and at rest.  5/23/19 PET scan done which showed.  Abnormal myocardial perfusion study.  A moderate area of moderate ischemia involving mid to basal anterior, anteroseptal, anterolateral and  inferolateral wall.  TID ratio is elevated which is high risk marker  EF 57%.  Patient here for cath to r/o further CAD.

## 2019-07-29 ENCOUNTER — TRANSCRIPTION ENCOUNTER (OUTPATIENT)
Age: 84
End: 2019-07-29

## 2019-07-29 ENCOUNTER — OUTPATIENT (OUTPATIENT)
Dept: OUTPATIENT SERVICES | Facility: HOSPITAL | Age: 84
LOS: 1 days | Discharge: HOME | End: 2019-07-29
Payer: MEDICARE

## 2019-07-29 VITALS
DIASTOLIC BLOOD PRESSURE: 74 MMHG | RESPIRATION RATE: 16 BRPM | OXYGEN SATURATION: 97 % | SYSTOLIC BLOOD PRESSURE: 135 MMHG | HEART RATE: 74 BPM

## 2019-07-29 VITALS
TEMPERATURE: 98 F | SYSTOLIC BLOOD PRESSURE: 170 MMHG | HEART RATE: 74 BPM | OXYGEN SATURATION: 99 % | RESPIRATION RATE: 16 BRPM | DIASTOLIC BLOOD PRESSURE: 77 MMHG

## 2019-07-29 DIAGNOSIS — Z95.0 PRESENCE OF CARDIAC PACEMAKER: Chronic | ICD-10-CM

## 2019-07-29 DIAGNOSIS — Z98.89 OTHER SPECIFIED POSTPROCEDURAL STATES: Chronic | ICD-10-CM

## 2019-07-29 DIAGNOSIS — Z98.41 CATARACT EXTRACTION STATUS, RIGHT EYE: Chronic | ICD-10-CM

## 2019-07-29 DIAGNOSIS — Z96.60 PRESENCE OF UNSPECIFIED ORTHOPEDIC JOINT IMPLANT: Chronic | ICD-10-CM

## 2019-07-29 DIAGNOSIS — Z98.61 CORONARY ANGIOPLASTY STATUS: Chronic | ICD-10-CM

## 2019-07-29 DIAGNOSIS — Z95.3 PRESENCE OF XENOGENIC HEART VALVE: Chronic | ICD-10-CM

## 2019-07-29 DIAGNOSIS — Z90.49 ACQUIRED ABSENCE OF OTHER SPECIFIED PARTS OF DIGESTIVE TRACT: Chronic | ICD-10-CM

## 2019-07-29 DIAGNOSIS — R94.30 ABNORMAL RESULT OF CARDIOVASCULAR FUNCTION STUDY, UNSPECIFIED: ICD-10-CM

## 2019-07-29 DIAGNOSIS — Z98.42 CATARACT EXTRACTION STATUS, LEFT EYE: Chronic | ICD-10-CM

## 2019-07-29 DIAGNOSIS — Z95.1 PRESENCE OF AORTOCORONARY BYPASS GRAFT: Chronic | ICD-10-CM

## 2019-07-29 LAB — GLUCOSE BLDC GLUCOMTR-MCNC: 124 MG/DL — HIGH (ref 70–99)

## 2019-07-29 PROCEDURE — C1894: CPT

## 2019-07-29 PROCEDURE — 75710 ARTERY X-RAYS ARM/LEG: CPT | Mod: XU

## 2019-07-29 PROCEDURE — 99153 MOD SED SAME PHYS/QHP EA: CPT

## 2019-07-29 PROCEDURE — 82962 GLUCOSE BLOOD TEST: CPT

## 2019-07-29 PROCEDURE — C1760: CPT

## 2019-07-29 PROCEDURE — C1769: CPT

## 2019-07-29 PROCEDURE — C1887: CPT

## 2019-07-29 PROCEDURE — 93455 CORONARY ART/GRFT ANGIO S&I: CPT

## 2019-07-29 PROCEDURE — 99152 MOD SED SAME PHYS/QHP 5/>YRS: CPT

## 2019-07-29 RX ORDER — SODIUM CHLORIDE 9 MG/ML
1000 INJECTION INTRAMUSCULAR; INTRAVENOUS; SUBCUTANEOUS
Refills: 0 | Status: DISCONTINUED | OUTPATIENT
Start: 2019-07-29 | End: 2019-08-15

## 2019-07-29 RX ORDER — BUDESONIDE AND FORMOTEROL FUMARATE DIHYDRATE 160; 4.5 UG/1; UG/1
2 AEROSOL RESPIRATORY (INHALATION)
Qty: 0 | Refills: 0 | DISCHARGE

## 2019-07-29 RX ADMIN — SODIUM CHLORIDE 250 MILLILITER(S): 9 INJECTION INTRAMUSCULAR; INTRAVENOUS; SUBCUTANEOUS at 14:00

## 2019-07-29 NOTE — DISCHARGE NOTE NURSING/CASE MANAGEMENT/SOCIAL WORK - NSDCDPATPORTLINK_GEN_ALL_CORE
You can access the [facility] Patient Portal, offered by [facility], by registering with the following website: [email or web address]

## 2019-07-29 NOTE — DISCHARGE NOTE PROVIDER - NSDCFUADDINST_GEN_ALL_CORE_FT
No heavy lifting, driving, sex, tub baths, swimming, or any activity that submerges the lower half of the body in water for 48 hours.  Limited walking and stairs for 48 hours.     Observe the site frequently.  If bleeding or a large lump (the size of a golf ball or bigger) occurs lie flat, apply continuous direct pressure just above the puncture site for at least 10 minutes, and notify your physician immediately.  If the bleeding cannot be controlled, call 911 immediately for assistance.  Notify your physician of pain, swelling or any drainage.    Notify your physician immediately if coldness, numbness, discoloration or pain in your foot occurs.

## 2019-07-29 NOTE — DISCHARGE NOTE PROVIDER - NSDCCPCAREPLAN_GEN_ALL_CORE_FT
PRINCIPAL DISCHARGE DIAGNOSIS  Diagnosis: CAD (coronary artery disease)  Assessment and Plan of Treatment: S/P [facility] no intervention

## 2019-07-29 NOTE — HISTORY OF PRESENT ILLNESS
[FreeTextEntry1] : Worsening cough and postnasal drip\par uses symbicort daily\par chronic exertional dyspnea\par no fever, chills, chest pain\par uses oral appliance  for snoring\par \par

## 2019-07-29 NOTE — REVIEW OF SYSTEMS
[As Noted in HPI] : as noted in HPI [Indigestion] : indigestion [Negative] : Dermatologic [FreeTextEntry9] : recent cath

## 2019-07-29 NOTE — DISCHARGE NOTE PROVIDER - HOSPITAL COURSE
85 yo male with known CAD, claudication, TAVR,  atherectomy/PTCA of Left main, PTCA of OM1, several stent with new complaints of SOBOE and at rest.  5/23/19 PET scan done which showed.  Abnormal myocardial perfusion study.  A moderate area of moderate ischemia involving mid to basal anterior, anteroseptal, anterolateral and  inferolateral wall.  TID ratio is elevated which is high risk marker  EF 57%.  Patient here for cath to r/o further CAD.  Now S/P [facility] no intervention see full report, via RFA with mynx closure device. Right groin site benign. Patient awake and alert without complaints. Denies chest pain, sob, palps,        Neuro: A&OX3    Lungs: CTA B/L    CV: S1, S2, no murmur, RRR    Abd: Soft    Right Groin: Soft, no bleeding, no hematoma    Extremity: + distal pulses            A/P: 86y Male s/p [facility] no intervention    1. Groin management discussed with patient    2. Continue current meds    3. Follow up as an outpatient with cardiologist    4. Bedrest x 2 hours    5. Discharge at 2030 if groin stable

## 2019-07-29 NOTE — DISCUSSION/SUMMARY
[FreeTextEntry1] : COPD Gold class II, asthma component by hx\par Spirometry more restrictive today, using Symbicort daily\par  lung exam without bronchospasm, normal sp02\par no acute pulmonary complaints\par change to Flovent , hold on Symbicort secondary to stable spirometry and ventricular ectopy\par Repeat CXR for atelectasis\par uses oral appliance fro [facility]AN[facility], will check home sleep study with appliance for efficacy\par We'll reevaluate in 3-4   months  or sooner if needed`\par Vaccinations this fall\par

## 2019-07-29 NOTE — DISCHARGE NOTE NURSING/CASE MANAGEMENT/SOCIAL WORK - NSDCPETBCESMAN_GEN_ALL_CORE
Anesthesia Evaluation     Patient summary reviewed   NPO Solid Status: > 8 hours  NPO Liquid Status: > 8 hours     Airway   Mallampati: II  TM distance: >3 FB  Dental      Pulmonary    (+) a smoker Current Smoked day of surgery,   Cardiovascular     ECG reviewed  Rhythm: regular  Rate: normal    (+) hypertension, past MI  >12 months, CAD, cardiac stents hyperlipidemia      Neuro/Psych  (+) psychiatric history Anxiety and Depression,    GI/Hepatic/Renal/Endo      Musculoskeletal     (+) back pain,   Abdominal    Substance History      OB/GYN          Other                                        Anesthesia Plan    ASA 3     general     intravenous induction   Anesthetic plan and risks discussed with patient.       If you are a smoker, it is important for your health to stop smoking. Please be aware that second hand smoke is also harmful.

## 2019-07-29 NOTE — CONSULT LETTER
[Dear  ___] : Dear  [unfilled], [Consult Letter:] : I had the pleasure of evaluating your patient, [unfilled]. [Please see my note below.] : Please see my note below. [Sincerely,] : Sincerely, [Consult Closing:] : Thank you very much for allowing me to participate in the care of this patient.  If you have any questions, please do not hesitate to contact me. [[facility], [facility]] : [facility], [facility] [DrCheli  ___] : Dr. BLANCHARD [Director, Respiratory Care] : Director, Respiratory Care [[facility]] : [facility] [FreeTextEntry3] : [facility] DO [facility]\par Pulmonary Critical Care\par Director Pulmonary Division\par Medical Director Respiratory Therapy\par [facility]\par \par

## 2019-07-29 NOTE — PHYSICAL EXAM
[General Appearance - Well Developed] : well developed [Neck Appearance] : the appearance of the neck was normal [General Appearance - Well Nourished] : well nourished [Heart Rate And Rhythm] : heart rate and rhythm were normal [Heart Sounds] : normal S1 and S2 [Arterial Pulses Normal] : the arterial pulses were normal [Respiration, Rhythm And Depth] : normal respiratory rhythm and effort [Exaggerated Use Of Accessory Muscles For Inspiration] : no accessory muscle use [No Focal Deficits] : no focal deficits [] : no rash [Oriented To Time, Place, And Person] : oriented to person, place, and time [Affect] : the affect was normal [Impaired Insight] : insight and judgment were intact [Memory Recent] : recent memory was not impaired [Mood] : the mood was normal [Cyanosis, Localized] : no localized cyanosis [Nail Clubbing] : no clubbing of the fingernails [FreeTextEntry2] : no calf tenderness [FreeTextEntry1] : no edema

## 2019-07-29 NOTE — DISCHARGE NOTE PROVIDER - CARE PROVIDER_API CALL
Kem Mcgill)  Cardiovascular Disease; Interventional Cardiology  [address] [place] [address]  Phone: [phone]  Fax: [phone]  Follow Up Time: 2 weeks

## 2019-08-06 DIAGNOSIS — I20.8 OTHER FORMS OF ANGINA PECTORIS: ICD-10-CM

## 2019-08-08 ENCOUNTER — APPOINTMENT (OUTPATIENT)
Dept: INTERNAL MEDICINE | Facility: CLINIC | Age: 84
End: 2019-08-08
Payer: MEDICARE

## 2019-08-08 VITALS
HEART RATE: 89 BPM | OXYGEN SATURATION: 94 % | SYSTOLIC BLOOD PRESSURE: 124 MMHG | HEIGHT: 68 IN | BODY MASS INDEX: 28.95 KG/M2 | WEIGHT: 191 LBS | DIASTOLIC BLOOD PRESSURE: 60 MMHG

## 2019-08-08 DIAGNOSIS — L25.9 UNSPECIFIED CONTACT DERMATITIS, UNSPECIFIED CAUSE: ICD-10-CM

## 2019-08-08 PROCEDURE — 99213 OFFICE O/P EST LOW 20 MIN: CPT

## 2019-08-08 NOTE — PHYSICAL EXAM
[No Acute Distress] : no acute distress [Well Nourished] : well nourished [de-identified] : Erythematous and pruritic rashes distributed in patches upon the anterior chest and abdomen was clear demarcated borders.

## 2019-08-08 NOTE — HISTORY OF PRESENT ILLNESS
[FreeTextEntry8] : Had ICD placed 1 week ago and has since developed itchy and red patches where adhesive tape was placed. Patient denies any other type of allergic symptoms. Denies any similar reactions in the past. Has been taking over-the-counter p.o. Benadryl as well as topical Benadryl without improvement.

## 2019-08-15 ENCOUNTER — APPOINTMENT (OUTPATIENT)
Dept: INTERNAL MEDICINE | Facility: CLINIC | Age: 84
End: 2019-08-15
Payer: MEDICARE

## 2019-08-15 VITALS
HEIGHT: 68 IN | WEIGHT: 187 LBS | SYSTOLIC BLOOD PRESSURE: 120 MMHG | BODY MASS INDEX: 28.34 KG/M2 | DIASTOLIC BLOOD PRESSURE: 70 MMHG | RESPIRATION RATE: 18 BRPM | HEART RATE: 72 BPM

## 2019-08-15 PROCEDURE — G0442 ANNUAL ALCOHOL SCREEN 15 MIN: CPT | Mod: 59

## 2019-08-15 PROCEDURE — 36415 COLL VENOUS BLD VENIPUNCTURE: CPT

## 2019-08-15 PROCEDURE — G0439: CPT

## 2019-08-15 PROCEDURE — G0444 DEPRESSION SCREEN ANNUAL: CPT | Mod: 59

## 2019-08-15 NOTE — ASSESSMENT
[FreeTextEntry1] : 86-year-old male with extensive medical history including ASHD/ischemic cardiopathy, type 2 diabetes with fair control, hypertension which is controlled and hyperlipidemia which is not optimal but patient intolerant to statin therapy.\par \par Patient with significant CAD with multiple stenting most recently May 2019.\par \par The patient is status post TAVR August 2017 with a permanent pacemaker.\par Patient with recent evidence of NSVT therefore now with permanent pacemaker/defibrillator\par \par PVD with status post bilateral leg PCI November 2017 in February 2018.\par \par Right shoulder issue therefore MRI of the right shoulder\par \par Low back pain with right leg radicular symptoms refer her for physical therapy\par \par Postnasal drip with cough therefore start Flonase and Claritin\par \par Plan is to continue present medications with patient to followup with cardiology.\par \par \par Followup with specialist as scheduled\par \par Colonoscopy November 2013\par Ophthalmology UTD\par Podiatry every 3 months\par Carotid per cardiology\par \par Patient up to date with vaccines. \par [facility] Is aware #2 in 2-6 m\par \par Followup in 3to4 months

## 2019-08-15 NOTE — COUNSELING
[None] : None [Good understanding] : Patient has a good understanding of lifestyle changes and the steps needed to achieve self management goals [de-identified] : continue diet and exercise as able

## 2019-08-15 NOTE — PHYSICAL EXAM
[General Appearance - Alert] : alert [General Appearance - In No Acute Distress] : in no acute distress [Sclera] : the sclera and conjunctiva were normal [PERRL With Normal Accommodation] : pupils were equal in size, round, and reactive to light [Outer Ear] : the ears and nose were normal in appearance [Extraocular Movements] : extraocular movements were intact [Neck Appearance] : the appearance of the neck was normal [Oropharynx] : the oropharynx was normal [Neck Cervical Mass (___cm)] : no neck mass was observed [Thyroid Diffuse Enlargement] : the thyroid was not enlarged [Jugular Venous Distention Increased] : there was no jugular-venous distention [Thyroid Nodule] : there were no palpable thyroid nodules [Heart Rate And Rhythm] : heart rate was normal and rhythm regular [Auscultation Breath Sounds / Voice Sounds] : lungs were clear to auscultation bilaterally [Heart Sounds] : normal S1 and S2 [Heart Sounds Pericardial Friction Rub] : no pericardial rub [Heart Sounds Gallop] : no gallops [Arterial Pulses Carotid] : carotid pulses were normal with no bruits [Abdominal Aorta] : the abdominal aorta was normal [Arterial Pulses Femoral] : femoral pulses were normal without bruits [Edema] : there was no peripheral edema [Veins - Varicosity Changes] : there were no varicosital changes [Abdomen Soft] : soft [Bowel Sounds] : normal bowel sounds [Abdomen Tenderness] : non-tender [Cervical Lymph Nodes Enlarged Posterior Bilaterally] : posterior cervical [Abdomen Mass (___ Cm)] : no abdominal mass palpated [Cervical Lymph Nodes Enlarged Anterior Bilaterally] : anterior cervical [Supraclavicular Lymph Nodes Enlarged Bilaterally] : supraclavicular [Axillary Lymph Nodes Enlarged Bilaterally] : axillary [Femoral Lymph Nodes Enlarged Bilaterally] : femoral [Inguinal Lymph Nodes Enlarged Bilaterally] : inguinal [Abnormal Walk] : normal gait [No Spinal Tenderness] : no spinal tenderness [Musculoskeletal - Swelling] : no joint swelling seen [Nail Clubbing] : no clubbing  or cyanosis of the fingernails [Motor Tone] : muscle strength and tone were normal [Skin Turgor] : normal skin turgor [Skin Color & Pigmentation] : normal skin color and pigmentation [Right Foot Was Examined] : right foot was examined [] : no rash [Left Foot Was Examined] : left foot was examined [Normal Appearance] : normal in appearance [Normal in Appearance] : normal in appearance [Normal] : normal [1+] : 1+ in the posterior tibialis [Cranial Nerves] : cranial nerves 2-12 were intact [No Focal Deficits] : no focal deficits [Oriented To Time, Place, And Person] : oriented to person, place, and time [Affect] : the affect was normal [Impaired Insight] : insight and judgment were intact [#1 Diminished] : number 1 was normal [#2 Diminished] : number 2 was normal [#3 Diminished] : number 3 was normal [#4 Diminished] : number 4 was normal [#6 Diminished] : number 6 was normal [#5 Diminished] : number 5 was normal [#7 Diminished] : number 7 was normal [#8 Diminished] : number 8 was normal [#10 Diminished] : number 10 was normal [#9 Diminished] : number 9 was normal [FreeTextEntry1] : Sees podiatry

## 2019-08-15 NOTE — HEALTH RISK ASSESSMENT
[Fair] :  ~his/her~ mood as fair [No falls in past year] : Patient reported no falls in the past year [0] : 2) Feeling down, depressed, or hopeless: Not at all (0) [None] : None [With Significant Other] : lives with significant other [# of Members in Household ___] :  household currently consist of [unfilled] member(s) [Retired] : retired [High School] : high school [] :  [# Of Children ___] : has [unfilled] children [Sexually Active] : sexually active [Feels Safe at Home] : Feels safe at home [Fully functional (bathing, dressing, toileting, transferring, walking, feeding)] : Fully functional (bathing, dressing, toileting, transferring, walking, feeding) [Fully functional (using the telephone, shopping, preparing meals, housekeeping, doing laundry, using] : Fully functional and needs no help or supervision to perform IADLs (using the telephone, shopping, preparing meals, housekeeping, doing laundry, using transportation, managing medications and managing finances) [Reports changes in vision] : Reports changes in vision [Smoke Detector] : smoke detector [Carbon Monoxide Detector] : carbon monoxide detector [Seat Belt] :  uses seat belt [Discussed at today's visit] : Advance Directives Discussed at today's visit [Name: ___] : Health Care Proxy's Name: [unfilled]  [No] : In the past 12 months have you used drugs other than those required for medical reasons? No [Reviewed no changes] : Reviewed no changes [] : No [Audit-CScore] : 0 [de-identified] : golf [de-identified] : good [[id number]] : 0 [Change in mental status noted] : No change in mental status noted [Reports changes in dental health] : Reports no changes in dental health [Reports changes in hearing] : Reports no changes in hearing [Guns at Home] : no guns at home [Sunscreen] : does not use sunscreen [AdvancecareDate] : 08/19

## 2019-08-15 NOTE — REVIEW OF SYSTEMS
[As noted in HPI] : as noted in HPI [As Noted in HPI] : as noted in HPI [Limb Pain] : limb pain [Negative] : Heme/Lymph [FreeTextEntry4] : postnasal drip [FreeTextEntry7] : Excess gas [FreeTextEntry9] : chronic low back pain 2 right leg

## 2019-08-15 NOTE — HISTORY OF PRESENT ILLNESS
[FreeTextEntry1] : 86-year-old male with extensive medical history including ASHD/ischemic cardiomyopathy with history of CABG in 2005 and multiple stents,, PVD with stenting to the left leg, type 2 diabetes, moderate aortic stenosis, hypertension and hyperlipidemia for which he is intolerant to statin therapy.\par \par The patient has had multiple significant cardiovascular issues including\par -August 2017 the patient had progressive aortic stenosis and had aTAVR with a permanent pacemaker\par -November 2017 had PCI of his right leg secondary to claudication and February 2018 hadn't of the left leg\par -April 2018 he had non-sustained VT with the addition of Toprol\par -June 2018 he had a cardiac catheterization with PCI to his left main and circumflex arteries.\par \par More recently the patient had:\par -May 2019 stenting to the left main and circumflex \par -abnormal cardiac PET scan July 2019 showing patent LIMA and SVG with moderate disease of the left main and circumflex.\par -Nonsustained VT therefore pacemaker upgraded to a pacemaker/ICD 2 weeks ago\par \par From a cardiac perspective he is doing better without any chest pain and decreased shortness of breath.\par \par The patient occasionally has right leg pain mainly in his thigh not activity related likely secondary to lumbar spinal stenosis with radiculopathy\par \par Recurrent cough especially when he lies down with feeling of postnasal drip.\par \par Patient also has chronic severe degenerative disc disease with chronic low back pain.

## 2019-08-16 ENCOUNTER — RESULT REVIEW (OUTPATIENT)
Age: 84
End: 2019-08-16

## 2019-08-16 LAB
ALBUMIN SERPL ELPH-MCNC: 4.3 G/DL
ALP BLD-CCNC: 60 U/L
ALT SERPL-CCNC: 9 U/L
ANION GAP SERPL CALC-SCNC: 12 MMOL/L
APPEARANCE: CLEAR
AST SERPL-CCNC: 15 U/L
BACTERIA: NEGATIVE
BASOPHILS # BLD AUTO: 0.03 K/UL
BASOPHILS NFR BLD AUTO: 0.3 %
BILIRUB SERPL-MCNC: 0.3 MG/DL
BILIRUBIN URINE: NEGATIVE
BLOOD URINE: NEGATIVE
BUN SERPL-MCNC: 30 MG/DL
CALCIUM SERPL-MCNC: 9.6 MG/DL
CHLORIDE SERPL-SCNC: 100 MMOL/L
CHOLEST SERPL-MCNC: 191 MG/DL
CHOLEST/HDLC SERPL: 3.5 RATIO
CO2 SERPL-SCNC: 27 MMOL/L
COLOR: NORMAL
CREAT SERPL-MCNC: 1.2 MG/DL
CREAT SPEC-SCNC: 68 MG/DL
EOSINOPHIL # BLD AUTO: 0.33 K/UL
EOSINOPHIL NFR BLD AUTO: 3.5 %
ESTIMATED AVERAGE GLUCOSE: 166 MG/DL
GLUCOSE QUALITATIVE U: NEGATIVE
GLUCOSE SERPL-MCNC: 157 MG/DL
HBA1C MFR BLD HPLC: 7.4 %
HCT VFR BLD CALC: 41 %
HDLC SERPL-MCNC: 55 MG/DL
HGB BLD-MCNC: 13.1 G/DL
HYALINE CASTS: 1 /LPF
IMM GRANULOCYTES NFR BLD AUTO: 0.4 %
KETONES URINE: NEGATIVE
LDLC SERPL CALC-MCNC: 108 MG/DL
LEUKOCYTE ESTERASE URINE: NEGATIVE
LYMPHOCYTES # BLD AUTO: 1.84 K/UL
LYMPHOCYTES NFR BLD AUTO: 19.7 %
MAGNESIUM SERPL-MCNC: 1.9 MG/DL
MAN DIFF?: NORMAL
MCHC RBC-ENTMCNC: 32 GM/DL
MCHC RBC-ENTMCNC: 32.4 PG
MCV RBC AUTO: 101.5 FL
MICROALBUMIN 24H UR DL<=1MG/L-MCNC: 2.7 MG/DL
MICROALBUMIN/CREAT 24H UR-RTO: 40 MG/G
MICROSCOPIC-UA: NORMAL
MONOCYTES # BLD AUTO: 0.92 K/UL
MONOCYTES NFR BLD AUTO: 9.9 %
NEUTROPHILS # BLD AUTO: 6.18 K/UL
NEUTROPHILS NFR BLD AUTO: 66.2 %
NITRITE URINE: NEGATIVE
PH URINE: 5.5
PLATELET # BLD AUTO: 196 K/UL
POTASSIUM SERPL-SCNC: 4.9 MMOL/L
PROT SERPL-MCNC: 7.2 G/DL
PROTEIN URINE: NEGATIVE
RBC # BLD: 4.04 M/UL
RBC # FLD: 13.7 %
RED BLOOD CELLS URINE: 1 /HPF
SODIUM SERPL-SCNC: 139 MMOL/L
SPECIFIC GRAVITY URINE: 1.02
SQUAMOUS EPITHELIAL CELLS: 0 /HPF
TRIGL SERPL-MCNC: 142 MG/DL
UROBILINOGEN URINE: NORMAL
VIT B12 SERPL-MCNC: 589 PG/ML
WBC # FLD AUTO: 9.34 K/UL
WHITE BLOOD CELLS URINE: 0 /HPF

## 2019-09-04 ENCOUNTER — APPOINTMENT (OUTPATIENT)
Dept: INTERNAL MEDICINE | Facility: CLINIC | Age: 84
End: 2019-09-04
Payer: MEDICARE

## 2019-09-04 ENCOUNTER — RESULT REVIEW (OUTPATIENT)
Age: 84
End: 2019-09-04

## 2019-09-04 VITALS
WEIGHT: 182 LBS | SYSTOLIC BLOOD PRESSURE: 120 MMHG | RESPIRATION RATE: 12 BRPM | DIASTOLIC BLOOD PRESSURE: 70 MMHG | HEART RATE: 75 BPM | BODY MASS INDEX: 27.58 KG/M2 | HEIGHT: 68 IN

## 2019-09-04 PROCEDURE — 99213 OFFICE O/P EST LOW 20 MIN: CPT

## 2019-09-11 NOTE — HISTORY OF PRESENT ILLNESS
[FreeTextEntry8] : The patient presents after an incident occurred when he was bowling last evening.\par The patient states that when he went to bowl his front foot did not slide it got stuck on the floor and the patient's momentum took in followup and he fell onto his right side especially his right arm and shoulder.\par Heart embarrassment he got up right away and continued to bowl but as he continued his pain increased especially in his right shoulder and just above his elbow.\par Today his pain is exquisite in his right shoulder with inability to raise his arm at all and now black and blue just above his elbow.\par The patient use a topical ointment but nothing by mouth for pain.

## 2019-09-11 NOTE — PHYSICAL EXAM
[No Acute Distress] : no acute distress [Well-Appearing] : well-appearing [Normal Rate] : normal rate  [Normal Gait] : normal gait [Regular Rhythm] : with a regular rhythm [Normal Affect] : the affect was normal [de-identified] : Exquisite tenderness to palpation anterior shoulder with minimal ability to raise his arm only to about 10°. No erythema, ecchymosis or swelling noted.\par \par Positive ecchymosis proximal biceps tendon area with exquisite tenderness to palpation and questionable indentation at insertion site of bicep muscle.

## 2019-09-11 NOTE — ADDENDUM
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

## 2019-09-11 NOTE — ASSESSMENT
[FreeTextEntry1] : Status post blunt trauma to right shoulder and upper extremity with concern for right rotator cuff tear and proximal biceps tendon tear.\par Therefore patient being sent for CAT scan of the right shoulder and upper extremity. MRI unable to be done secondary to AICD/permanent pacemaker.\par Recommend Tylenol for pain.

## 2019-09-13 ENCOUNTER — RX RENEWAL (OUTPATIENT)
Age: 84
End: 2019-09-13

## 2019-09-13 ENCOUNTER — APPOINTMENT (OUTPATIENT)
Dept: INTERNAL MEDICINE | Facility: CLINIC | Age: 84
End: 2019-09-13
Payer: MEDICARE

## 2019-09-13 VITALS
TEMPERATURE: 98.3 F | BODY MASS INDEX: 27.58 KG/M2 | HEIGHT: 68 IN | WEIGHT: 182 LBS | HEART RATE: 70 BPM | DIASTOLIC BLOOD PRESSURE: 74 MMHG | SYSTOLIC BLOOD PRESSURE: 120 MMHG

## 2019-09-13 PROCEDURE — 99213 OFFICE O/P EST LOW 20 MIN: CPT

## 2019-09-13 NOTE — PHYSICAL EXAM
[Normal Outer Ear/Nose] : the outer ears and nose were normal in appearance [No Acute Distress] : no acute distress [Normal TMs] : both tympanic membranes were normal [No Lymphadenopathy] : no lymphadenopathy [Normal Nasal Mucosa] : the nasal mucosa was normal [No Respiratory Distress] : no respiratory distress  [Normal Rate] : normal rate  [Regular Rhythm] : with a regular rhythm [Normal Affect] : the affect was normal [Normal Mood] : the mood was normal [de-identified] : +PND [de-identified] : +Scattered wheezing, no rales or rhonchi [de-identified] : +PPM

## 2019-09-13 NOTE — HISTORY OF PRESENT ILLNESS
[FreeTextEntry8] : Presents complaining of not feeling well for 5 days. Patient is complaining of postnasal drip with reactive cough that is keeping him up all night. Patient has noticed wheezing but has no dyspnea/shortness of breath or fever. Patient generally gets this q. year "need the cough syrup"

## 2019-09-13 NOTE — ASSESSMENT
[FreeTextEntry1] : Patient prescribed Ceftin 250 mg one b.i.d. #20, Hycodan cough syrup which he has had without any reactions but he is aware of sedation. He will also use nasal spray Astelin that he has a home  .Patient advised to rest/increase fluids and use supportive therapy. Patient will call if symptoms persist or worsen and return to the office as scheduled for regular followup.\par \par \par \par Dr. Burns was present in office building while I examined patient\par

## 2019-09-20 ENCOUNTER — RX RENEWAL (OUTPATIENT)
Age: 84
End: 2019-09-20

## 2019-10-07 ENCOUNTER — RX CHANGE (OUTPATIENT)
Age: 84
End: 2019-10-07

## 2019-10-08 ENCOUNTER — RX CHANGE (OUTPATIENT)
Age: 84
End: 2019-10-08

## 2019-10-09 ENCOUNTER — APPOINTMENT (OUTPATIENT)
Dept: INTERNAL MEDICINE | Facility: CLINIC | Age: 84
End: 2019-10-09
Payer: MEDICARE

## 2019-10-09 ENCOUNTER — RESULT REVIEW (OUTPATIENT)
Age: 84
End: 2019-10-09

## 2019-10-09 VITALS
HEIGHT: 68 IN | WEIGHT: 182 LBS | SYSTOLIC BLOOD PRESSURE: 120 MMHG | DIASTOLIC BLOOD PRESSURE: 78 MMHG | HEART RATE: 69 BPM | BODY MASS INDEX: 27.58 KG/M2

## 2019-10-09 PROCEDURE — 99213 OFFICE O/P EST LOW 20 MIN: CPT

## 2019-10-09 RX ORDER — BENZONATATE 200 MG/1
200 CAPSULE ORAL 3 TIMES DAILY
Qty: 21 | Refills: 0 | Status: DISCONTINUED | COMMUNITY
Start: 2019-09-13 | End: 2019-10-09

## 2019-10-09 RX ORDER — HYDROCODONE BITARTRATE AND HOMATROPINE METHYLBROMIDE 5; 1.5 MG/5ML; MG/5ML
5-1.5 SYRUP ORAL EVERY 6 HOURS
Qty: 120 | Refills: 0 | Status: DISCONTINUED | COMMUNITY
Start: 2019-09-13 | End: 2019-10-09

## 2019-10-09 RX ORDER — CEFUROXIME AXETIL 250 MG/1
250 TABLET ORAL
Qty: 20 | Refills: 0 | Status: DISCONTINUED | COMMUNITY
Start: 2019-09-13 | End: 2019-10-09

## 2019-10-10 ENCOUNTER — APPOINTMENT (OUTPATIENT)
Dept: ORTHOPEDIC SURGERY | Facility: CLINIC | Age: 84
End: 2019-10-10

## 2019-10-11 NOTE — ASSESSMENT
[FreeTextEntry1] : X-ray of the right hand and wrist ordered. Patient may warrant orthopedic evaluation pending imaging. If x-rays are negative then supportive therapy advised i.e. Tylenol/rest/wrapping with an Ace bandage and elevation. Further management pending imaging. Patient will return to the office as scheduled for regular followup\par \par Dr. Burns was present in office building while I examined patient\par

## 2019-10-11 NOTE — PHYSICAL EXAM
[No Acute Distress] : no acute distress [No Respiratory Distress] : no respiratory distress  [Clear to Auscultation] : lungs were clear to auscultation bilaterally [Normal Rate] : normal rate  [Normal Affect] : the affect was normal [Regular Rhythm] : with a regular rhythm [Normal Mood] : the mood was normal [de-identified] : +PPM [de-identified] : Right hand with soft tissue swelling noted/+ bruising to top of hand, no gross bony deformity.+ Pain with flexion and extension at the wrist. No cuts/wounds.

## 2019-10-11 NOTE — HISTORY OF PRESENT ILLNESS
[Spouse] : spouse [FreeTextEntry8] : Patient presents stating yesterday he was bowling and fell and landed on his right hand. Patient states it seemed okay and he continued to bowl the rest of his game. Patient states last night it became more painful, swollen and started to bruise. Patient states he has difficulty making a fist. Patient is right-hand dominant. Patient sustained no other injuries from fall. Patient's predominant pain is in the right wrist and hand, nothing higher up in the arm

## 2019-10-11 NOTE — ADDENDUM
[FreeTextEntry1] : X-ray of the right wrist and right hand are negative for fracture\par Medrol dose pk #1 AD given-aware can raise sugar\par \par Patient called on 10/11 stating hand feels much better, swelling is better

## 2019-10-15 ENCOUNTER — APPOINTMENT (OUTPATIENT)
Dept: ORTHOPEDIC SURGERY | Facility: CLINIC | Age: 84
End: 2019-10-15

## 2019-10-31 ENCOUNTER — APPOINTMENT (OUTPATIENT)
Dept: PULMONOLOGY | Facility: CLINIC | Age: 84
End: 2019-10-31

## 2019-11-04 ENCOUNTER — APPOINTMENT (OUTPATIENT)
Dept: ORTHOPEDIC SURGERY | Facility: CLINIC | Age: 84
End: 2019-11-04

## 2019-11-05 ENCOUNTER — APPOINTMENT (OUTPATIENT)
Dept: ORTHOPEDIC SURGERY | Facility: CLINIC | Age: 84
End: 2019-11-05

## 2019-11-06 ENCOUNTER — APPOINTMENT (OUTPATIENT)
Dept: ORTHOPEDIC SURGERY | Facility: CLINIC | Age: 84
End: 2019-11-06
Payer: MEDICARE

## 2019-11-06 VITALS
HEART RATE: 78 BPM | BODY MASS INDEX: 27.58 KG/M2 | HEIGHT: 68 IN | SYSTOLIC BLOOD PRESSURE: 109 MMHG | WEIGHT: 182 LBS | DIASTOLIC BLOOD PRESSURE: 54 MMHG

## 2019-11-06 DIAGNOSIS — M77.9 ENTHESOPATHY, UNSPECIFIED: ICD-10-CM

## 2019-11-06 PROCEDURE — 99204 OFFICE O/P NEW MOD 45 MIN: CPT

## 2019-11-15 ENCOUNTER — APPOINTMENT (OUTPATIENT)
Dept: INTERNAL MEDICINE | Facility: CLINIC | Age: 84
End: 2019-11-15
Payer: MEDICARE

## 2019-11-15 VITALS
WEIGHT: 186 LBS | SYSTOLIC BLOOD PRESSURE: 120 MMHG | DIASTOLIC BLOOD PRESSURE: 70 MMHG | HEIGHT: 68 IN | RESPIRATION RATE: 13 BRPM | HEART RATE: 90 BPM | BODY MASS INDEX: 28.19 KG/M2

## 2019-11-15 PROCEDURE — 99213 OFFICE O/P EST LOW 20 MIN: CPT | Mod: 25

## 2019-11-15 PROCEDURE — 69209 REMOVE IMPACTED EAR WAX UNI: CPT | Mod: 50

## 2019-11-15 RX ORDER — METHYLPREDNISOLONE 4 MG/1
4 TABLET ORAL
Qty: 1 | Refills: 0 | Status: DISCONTINUED | COMMUNITY
Start: 2019-10-10 | End: 2019-11-15

## 2019-11-15 NOTE — HISTORY OF PRESENT ILLNESS
[FreeTextEntry8] : Patient presents complaining of decreased hearing with history of cerumen impaction, requests irrigation\par Patient also would like Rx have blood work done at the lab prior to next appointment, scheduled for next week

## 2019-11-15 NOTE — ASSESSMENT
[FreeTextEntry1] : Rx given to have blood work done at the lab per patient request (prior to next appointment). Status post irrigation with excellent results. Patient to continue present medication and return to the office as scheduled for regular followup\par \par Dr. Burns was present in office building while I examined patient\par

## 2019-11-15 NOTE — PHYSICAL EXAM
[No Acute Distress] : no acute distress [Normal Outer Ear/Nose] : the outer ears and nose were normal in appearance [Normal Oropharynx] : the oropharynx was normal [Normal Nasal Mucosa] : the nasal mucosa was normal [No Respiratory Distress] : no respiratory distress  [Clear to Auscultation] : lungs were clear to auscultation bilaterally [Normal Rate] : normal rate  [Regular Rhythm] : with a regular rhythm [Normal Affect] : the affect was normal [Normal Mood] : the mood was normal [de-identified] : +Cerumen impaction bilateral status post irrigation with excellent results, patient tolerated well [de-identified] : +PPM

## 2019-11-20 ENCOUNTER — APPOINTMENT (OUTPATIENT)
Dept: ORTHOPEDIC SURGERY | Facility: CLINIC | Age: 84
End: 2019-11-20
Payer: MEDICARE

## 2019-11-20 VITALS
BODY MASS INDEX: 28.19 KG/M2 | WEIGHT: 186 LBS | HEIGHT: 68 IN | SYSTOLIC BLOOD PRESSURE: 141 MMHG | DIASTOLIC BLOOD PRESSURE: 73 MMHG | HEART RATE: 80 BPM

## 2019-11-20 PROCEDURE — 99214 OFFICE O/P EST MOD 30 MIN: CPT | Mod: 25

## 2019-11-20 PROCEDURE — 20605 DRAIN/INJ JOINT/BURSA W/O US: CPT | Mod: RT

## 2019-11-21 ENCOUNTER — APPOINTMENT (OUTPATIENT)
Dept: INTERNAL MEDICINE | Facility: CLINIC | Age: 84
End: 2019-11-21

## 2019-11-22 ENCOUNTER — RESULT REVIEW (OUTPATIENT)
Age: 84
End: 2019-11-22

## 2019-12-31 NOTE — ASU PATIENT PROFILE, ADULT - AS SC BRADEN [employer]
(4) no limitation Consent (Lip)/Introductory Paragraph: The rationale for Mohs was explained to the patient and consent was obtained. The risks, benefits and alternatives to therapy were discussed in detail. Specifically, the risks of lip deformity, changes in the oral aperture, infection, scarring, bleeding, prolonged wound healing, incomplete removal, allergy to anesthesia, nerve injury and recurrence were addressed. Prior to the procedure, the treatment site was clearly identified and confirmed by the patient. All components of Universal Protocol/PAUSE Rule completed.

## 2020-02-05 ENCOUNTER — RESULT CHARGE (OUTPATIENT)
Age: 85
End: 2020-02-05

## 2020-02-06 ENCOUNTER — APPOINTMENT (OUTPATIENT)
Dept: PULMONOLOGY | Facility: CLINIC | Age: 85
End: 2020-02-06
Payer: MEDICARE

## 2020-02-06 VITALS
HEART RATE: 85 BPM | DIASTOLIC BLOOD PRESSURE: 84 MMHG | OXYGEN SATURATION: 98 % | BODY MASS INDEX: 28.89 KG/M2 | WEIGHT: 190 LBS | SYSTOLIC BLOOD PRESSURE: 132 MMHG

## 2020-02-06 VITALS — BODY MASS INDEX: 28.89 KG/M2 | HEIGHT: 68 IN

## 2020-02-06 DIAGNOSIS — R06.09 OTHER FORMS OF DYSPNEA: ICD-10-CM

## 2020-02-06 PROCEDURE — 94060 EVALUATION OF WHEEZING: CPT

## 2020-02-06 PROCEDURE — 99215 OFFICE O/P EST HI 40 MIN: CPT | Mod: 25

## 2020-02-06 PROCEDURE — 94664 DEMO&/EVAL PT USE INHALER: CPT | Mod: 59

## 2020-02-06 RX ORDER — AZELASTINE HYDROCHLORIDE 137 UG/1
0.1 SPRAY, METERED NASAL TWICE DAILY
Qty: 3 | Refills: 1 | Status: DISCONTINUED | COMMUNITY
Start: 2017-04-12 | End: 2020-02-06

## 2020-02-06 RX ORDER — FLUTICASONE PROPIONATE 50 UG/1
50 SPRAY, METERED NASAL DAILY
Qty: 1 | Refills: 3 | Status: DISCONTINUED | COMMUNITY
Start: 2018-09-07 | End: 2020-02-06

## 2020-02-06 RX ORDER — CREAM BASE NO.31
CREAM (GRAM) MISCELLANEOUS
Qty: 1 | Refills: 0 | Status: DISCONTINUED | COMMUNITY
Start: 2019-11-06 | End: 2020-02-06

## 2020-02-06 RX ORDER — TRIAMCINOLONE ACETONIDE 1 MG/G
0.1 CREAM TOPICAL 3 TIMES DAILY
Qty: 1 | Refills: 1 | Status: DISCONTINUED | COMMUNITY
Start: 2019-08-08 | End: 2020-02-06

## 2020-02-06 RX ORDER — ALBUTEROL SULFATE 90 UG/1
108 (90 BASE) AEROSOL, METERED RESPIRATORY (INHALATION)
Qty: 1 | Refills: 6 | Status: DISCONTINUED | COMMUNITY
Start: 2019-03-05 | End: 2020-02-06

## 2020-02-06 RX ORDER — METHYLPREDNISOLONE ACETATE 40 MG/ML
40 INJECTION, SUSPENSION INTRA-ARTICULAR; INTRALESIONAL; INTRAMUSCULAR; SOFT TISSUE
Qty: 0.5 | Refills: 0 | Status: DISCONTINUED | COMMUNITY
Start: 2019-11-20 | End: 2020-02-06

## 2020-02-06 RX ORDER — FLUTICASONE PROPIONATE 100 UG/1
100 POWDER, METERED RESPIRATORY (INHALATION) TWICE DAILY
Qty: 3 | Refills: 2 | Status: DISCONTINUED | COMMUNITY
Start: 2019-07-24 | End: 2020-02-06

## 2020-02-06 NOTE — DISCUSSION/SUMMARY
[FreeTextEntry1] : COPD Gold class II, asthma component by hx\par worsening dyspnea and chest pain, saw Cardiology 12/19 stable by note\par Spirometry slightly decreased from 2018, using Symbicort daily\par  lung exam without bronchospasm, normal sp02\par continue  Symbicort , change to bid\par pred taper\par repeat CXR\par uses oral appliance fro FANNY, will check home sleep study with appliance for efficacy\par advised ER for clinical worsening angina, refuses, Cardiology contacted, has follow up\par We'll reevaluate in 3   months  or sooner if needed`\par \par

## 2020-02-06 NOTE — CONSULT LETTER
[Dear  ___] : Dear  [unfilled], [Consult Letter:] : I had the pleasure of evaluating your patient, [unfilled]. [Please see my note below.] : Please see my note below. [Sincerely,] : Sincerely, [Consult Closing:] : Thank you very much for allowing me to participate in the care of this patient.  If you have any questions, please do not hesitate to contact me. [DrCheli  ___] : Dr. BLANCHARD [[facility], [facility]] : [facility], [facility] [Director, Respiratory Care] : Director, Respiratory Care [[facility]] : [facility] [FreeTextEntry3] : [facility]\par Pulmonary Critical Care\par Director Pulmonary Division\par Medical Director Respiratory Therapy\par [facility]\par \par

## 2020-02-06 NOTE — HISTORY OF PRESENT ILLNESS
[FreeTextEntry1] : \par \par  [TextBox_4] : worsening exertional dyspnea with chest tightness\par uses symbicort daily \par some sputum, cant produce\par no fever, chills, chest pain\par no edema\par uses oral appliance  for snoring

## 2020-02-06 NOTE — PHYSICAL EXAM
[General Appearance - Well Developed] : well developed [Neck Appearance] : the appearance of the neck was normal [General Appearance - Well Nourished] : well nourished [Heart Rate And Rhythm] : heart rate and rhythm were normal [Heart Sounds] : normal S1 and S2 [Arterial Pulses Normal] : the arterial pulses were normal [Respiration, Rhythm And Depth] : normal respiratory rhythm and effort [Exaggerated Use Of Accessory Muscles For Inspiration] : no accessory muscle use [] : no rash [No Focal Deficits] : no focal deficits [Oriented To Time, Place, And Person] : oriented to person, place, and time [Impaired Insight] : insight and judgment were intact [Affect] : the affect was normal [Mood] : the mood was normal [Memory Recent] : recent memory was not impaired [Nail Clubbing] : no clubbing of the fingernails [Cyanosis, Localized] : no localized cyanosis [FreeTextEntry1] : no edema [FreeTextEntry2] : no calf tenderness

## 2020-02-06 NOTE — PROCEDURE
[FreeTextEntry1] : CXR 7/19 NAD\par spirometry moderate air flow obstruction, mild decrease from 2018

## 2020-02-19 ENCOUNTER — INPATIENT (INPATIENT)
Facility: HOSPITAL | Age: 85
LOS: 0 days | Discharge: HOME | DRG: 251 | End: 2020-02-20
Attending: INTERNAL MEDICINE | Admitting: INTERNAL MEDICINE
Payer: COMMERCIAL

## 2020-02-19 ENCOUNTER — TRANSCRIPTION ENCOUNTER (OUTPATIENT)
Age: 85
End: 2020-02-19

## 2020-02-19 VITALS
DIASTOLIC BLOOD PRESSURE: 63 MMHG | WEIGHT: 185.19 LBS | TEMPERATURE: 97 F | HEIGHT: 68 IN | RESPIRATION RATE: 18 BRPM | SYSTOLIC BLOOD PRESSURE: 133 MMHG | HEART RATE: 69 BPM | OXYGEN SATURATION: 97 %

## 2020-02-19 DIAGNOSIS — Z98.42 CATARACT EXTRACTION STATUS, LEFT EYE: Chronic | ICD-10-CM

## 2020-02-19 DIAGNOSIS — Z90.49 ACQUIRED ABSENCE OF OTHER SPECIFIED PARTS OF DIGESTIVE TRACT: Chronic | ICD-10-CM

## 2020-02-19 DIAGNOSIS — Z98.89 OTHER SPECIFIED POSTPROCEDURAL STATES: Chronic | ICD-10-CM

## 2020-02-19 DIAGNOSIS — Z98.61 CORONARY ANGIOPLASTY STATUS: Chronic | ICD-10-CM

## 2020-02-19 DIAGNOSIS — Z95.1 PRESENCE OF AORTOCORONARY BYPASS GRAFT: Chronic | ICD-10-CM

## 2020-02-19 DIAGNOSIS — Z96.60 PRESENCE OF UNSPECIFIED ORTHOPEDIC JOINT IMPLANT: Chronic | ICD-10-CM

## 2020-02-19 DIAGNOSIS — I25.10 ATHEROSCLEROTIC HEART DISEASE OF NATIVE CORONARY ARTERY WITHOUT ANGINA PECTORIS: ICD-10-CM

## 2020-02-19 DIAGNOSIS — Z95.3 PRESENCE OF XENOGENIC HEART VALVE: Chronic | ICD-10-CM

## 2020-02-19 DIAGNOSIS — Z95.0 PRESENCE OF CARDIAC PACEMAKER: Chronic | ICD-10-CM

## 2020-02-19 DIAGNOSIS — Z98.41 CATARACT EXTRACTION STATUS, RIGHT EYE: Chronic | ICD-10-CM

## 2020-02-19 DIAGNOSIS — I20.9 ANGINA PECTORIS, UNSPECIFIED: ICD-10-CM

## 2020-02-19 LAB
ANION GAP SERPL CALC-SCNC: 8 MMOL/L — SIGNIFICANT CHANGE UP (ref 5–17)
APTT BLD: 26.6 SEC — LOW (ref 27.5–36.3)
BLD GP AB SCN SERPL QL: SIGNIFICANT CHANGE UP
BUN SERPL-MCNC: 25 MG/DL — HIGH (ref 8–20)
CALCIUM SERPL-MCNC: 9.2 MG/DL — SIGNIFICANT CHANGE UP (ref 8.6–10.2)
CHLORIDE SERPL-SCNC: 100 MMOL/L — SIGNIFICANT CHANGE UP (ref 98–107)
CO2 SERPL-SCNC: 30 MMOL/L — HIGH (ref 22–29)
CREAT SERPL-MCNC: 1.11 MG/DL — SIGNIFICANT CHANGE UP (ref 0.5–1.3)
GLUCOSE SERPL-MCNC: 180 MG/DL — HIGH (ref 70–99)
HCT VFR BLD CALC: 37.3 % — LOW (ref 39–50)
HGB BLD-MCNC: 11.9 G/DL — LOW (ref 13–17)
INR BLD: 1.12 RATIO — SIGNIFICANT CHANGE UP (ref 0.88–1.16)
MAGNESIUM SERPL-MCNC: 2.1 MG/DL — SIGNIFICANT CHANGE UP (ref 1.8–2.6)
MCHC RBC-ENTMCNC: 31.8 PG — SIGNIFICANT CHANGE UP (ref 27–34)
MCHC RBC-ENTMCNC: 31.9 GM/DL — LOW (ref 32–36)
MCV RBC AUTO: 99.7 FL — SIGNIFICANT CHANGE UP (ref 80–100)
PLATELET # BLD AUTO: 176 K/UL — SIGNIFICANT CHANGE UP (ref 150–400)
POTASSIUM SERPL-MCNC: 4.6 MMOL/L — SIGNIFICANT CHANGE UP (ref 3.5–5.3)
POTASSIUM SERPL-SCNC: 4.6 MMOL/L — SIGNIFICANT CHANGE UP (ref 3.5–5.3)
PROTHROM AB SERPL-ACNC: 12.7 SEC — SIGNIFICANT CHANGE UP (ref 10–12.9)
RBC # BLD: 3.74 M/UL — LOW (ref 4.2–5.8)
RBC # FLD: 12.8 % — SIGNIFICANT CHANGE UP (ref 10.3–14.5)
SODIUM SERPL-SCNC: 138 MMOL/L — SIGNIFICANT CHANGE UP (ref 135–145)
WBC # BLD: 7.13 K/UL — SIGNIFICANT CHANGE UP (ref 3.8–10.5)
WBC # FLD AUTO: 7.13 K/UL — SIGNIFICANT CHANGE UP (ref 3.8–10.5)

## 2020-02-19 PROCEDURE — 93010 ELECTROCARDIOGRAM REPORT: CPT

## 2020-02-19 RX ORDER — PANTOPRAZOLE SODIUM 20 MG/1
40 TABLET, DELAYED RELEASE ORAL
Refills: 0 | Status: DISCONTINUED | OUTPATIENT
Start: 2020-02-19 | End: 2020-02-20

## 2020-02-19 RX ORDER — ALPRAZOLAM 0.25 MG
0.25 TABLET ORAL EVERY 6 HOURS
Refills: 0 | Status: DISCONTINUED | OUTPATIENT
Start: 2020-02-19 | End: 2020-02-20

## 2020-02-19 RX ORDER — TAMSULOSIN HYDROCHLORIDE 0.4 MG/1
0.4 CAPSULE ORAL AT BEDTIME
Refills: 0 | Status: DISCONTINUED | OUTPATIENT
Start: 2020-02-19 | End: 2020-02-20

## 2020-02-19 RX ORDER — CLOPIDOGREL BISULFATE 75 MG/1
75 TABLET, FILM COATED ORAL DAILY
Refills: 0 | Status: DISCONTINUED | OUTPATIENT
Start: 2020-02-20 | End: 2020-02-20

## 2020-02-19 RX ORDER — METOPROLOL TARTRATE 50 MG
25 TABLET ORAL DAILY
Refills: 0 | Status: DISCONTINUED | OUTPATIENT
Start: 2020-02-19 | End: 2020-02-20

## 2020-02-19 RX ORDER — LOSARTAN POTASSIUM 100 MG/1
25 TABLET, FILM COATED ORAL DAILY
Refills: 0 | Status: DISCONTINUED | OUTPATIENT
Start: 2020-02-19 | End: 2020-02-20

## 2020-02-19 RX ORDER — ZOLPIDEM TARTRATE 10 MG/1
5 TABLET ORAL AT BEDTIME
Refills: 0 | Status: DISCONTINUED | OUTPATIENT
Start: 2020-02-19 | End: 2020-02-20

## 2020-02-19 RX ORDER — EZETIMIBE 10 MG/1
10 TABLET ORAL DAILY
Refills: 0 | Status: DISCONTINUED | OUTPATIENT
Start: 2020-02-19 | End: 2020-02-20

## 2020-02-19 RX ORDER — FENTANYL CITRATE 50 UG/ML
25 INJECTION INTRAVENOUS ONCE
Refills: 0 | Status: DISCONTINUED | OUTPATIENT
Start: 2020-02-19 | End: 2020-02-19

## 2020-02-19 RX ORDER — ASPIRIN/CALCIUM CARB/MAGNESIUM 324 MG
325 TABLET ORAL DAILY
Refills: 0 | Status: DISCONTINUED | OUTPATIENT
Start: 2020-02-19 | End: 2020-02-20

## 2020-02-19 RX ORDER — LATANOPROST 0.05 MG/ML
1 SOLUTION/ DROPS OPHTHALMIC; TOPICAL AT BEDTIME
Refills: 0 | Status: DISCONTINUED | OUTPATIENT
Start: 2020-02-20 | End: 2020-02-20

## 2020-02-19 RX ADMIN — FENTANYL CITRATE 25 MICROGRAM(S): 50 INJECTION INTRAVENOUS at 18:00

## 2020-02-19 NOTE — DISCHARGE NOTE PROVIDER - NSDCFUSCHEDAPPT_GEN_ALL_CORE_FT
TIFFANIE ZELAYA ; 05/11/2020 ; [facility] [address] [place] [address] TIFFANIE ZELAYA ; 05/11/2020 ; [facility] [address] [place] [address]

## 2020-02-19 NOTE — H&P PST ADULT - NSICDXPASTMEDICALHX_GEN_ALL_CORE_FT
PAST MEDICAL HISTORY:  Abnormal positron emission tomography (PET) scan 2019 anterior, anteroseptal,anterolateral and iferolateral ischemia    Age-related incipient cataract of both eyes     Angina pectoris class II    Angina, class II     Aortic stenosis TAVR #29 Medtronic core valve 2017    Asthma     BPH (benign prostatic hyperplasia)     CAD (coronary artery disease) S/P Stenting x8    CAD (coronary artery disease) CABG LIMA LAD SVG OM1, PCI PDA LM/LCX with PTCA    CAD (coronary artery disease) PPM, PCI LM and LCX, CABG LIMA OAD and OM1    Carotid artery disease RCEA    Cataract     COPD, mild     Diabetes mellitus     MENDEZ (dyspnea on exertion)     Fatigue Profound w/exertion    Former smoker     Former smoker     H/O urinary frequency     Hematuria     History of BPH     Hyperlipidemia     Hypertension     MR (mitral regurgitation) moderate      FANNY (obstructive sleep apnea)     FANNY (obstructive sleep apnea) uses mouth peice    Osteoarthritis     PVD (peripheral vascular disease) intervention RLE    PVD (peripheral vascular disease)     PVD (peripheral vascular disease) R SFA and L SFA w/stents and PTA    SOB (shortness of breath)     Statin intolerance

## 2020-02-19 NOTE — PROGRESS NOTE ADULT - SUBJECTIVE AND OBJECTIVE BOX
This is a 87 year old male with PMH: HTN, HL, DM, PVD with RSFA and LSFA stenting   CABG LM/ LCX stent/ arthrectomy  , Carotid endarterectomy,, ICD, TAVR for severe AS. He presented to Cardiologist with c/o SOB, progressive exertional and at rest with associated Chest pain Angina CCS III-IV  Last episode of severe chest pain and SOB  on  . He does report some dietary indiscretion with over use of salt. He has noted bilateral leg edema and reports with ambualtion pain and claudication in LLE .ROS no palpitations syncope black or blood in his stool , no fever chills of sign of infection   Pt was referred for Cardiac cath   He presents today for C   s/p LHC and intervention done via RFA/RFV tolerated well          Vital Signs Last 24 Hrs  T(C): 36.2 (2020 10:24), Max: 36.2 (2020 10:24)  T(F): 97.1 (2020 10:24), Max: 97.1 (2020 10:24)  HR: 82 (2020 14:30) (69 - 82)  BP: 156/74 (2020 14:30) (133/63 - 170/77)    RR: 17 (2020 14:30) (15 - 18)  SpO2: 98% (2020 14:15) (97% - 98%)  Daily Height in cm: 172.72 (2020 10:24)        Medications:  aspirin enteric coated 325 milliGRAM(s) Oral daily  losartan 25 milliGRAM(s) Oral daily  metoprolol succinate ER 25 milliGRAM(s) Oral daily  pantoprazole    Tablet 40 milliGRAM(s) Oral before breakfast      General: A/ox 3, No acute Distress  Neck: Supple, NO JVD  Cardiac: S1 S2, No M/R/G  Pulmonary: CTAB, Breathing unlabored, No Rhonchi/Rales/Wheezing  Abdomen: Soft, Non -tender, +BS   Extremities: No Rashes, No edema  Femoral cath site with A/V sheath in place  no evidence of bleeding + pp hemostasis maintained   Neuro: A/o x 3, No focal deficits  Psch: normal mood , normal affect    LABS:                          11.9   7.13  )-----------( 176      ( 2020 10:53 )             37.3         138  |  100  |  25.0<H>  ----------------------------<  180<H>  4.6   |  30.0<H>  |  1.11    Ca    9.2      2020 10:53  Mg     2.1           PT/INR - ( 2020 10:53 )   PT: 12.7 sec;   INR: 1.12 ratio         PTT - ( 2020 10:53 )  PTT:26.6 sec    Cardiac Cath Lab - Adult:   [facility]  Department of Cardiology  [address] [place] [address]  [phone]  Cath Lab Report -- Comprehensive Report  Patient: TIFFANIE ZELAYA  Study date: 2020  Account number: [id number]  MR number: [id number]  :10/21/1932  Gender: Male  Race: W  Case Physician(s):  MD Kem Burks MD  Referring Physician:  Kem Mcgill M.D.  INDICATIONS: Progressive angina/class III-IV.  PROCEDURE:  --  Right heart catheterization.  --  Left heart catheterization with ventriculography.  --  Left coronary angiography.  --  Right coronary angiography.  --  Bypass graft angiography.  --  Aortography.  --  Sonosite.  --  Intervention on proximal circumflex: laser atherectomy.  --  Intervention on proximal LAD:balloon angioplasty.  TECHNIQUE: Cardiac catheterization performed electively.  Local anesthetic given. Right femoral artery access. Right femoral vein  access. Right heart catheterization. The procedure was performed utilizing  a catheter. Left heart catheterization. Ventriculography was performed.  Left coronary artery angiography. The vessel was injected utilizing a  catheter. Right coronary artery angiography. The vessel was injected  utilizing a catheter. Graft angiography. The vessel was injected utilizing  a catheter. Aortography. A catheter was placed and contrast was injected.  Sonosite. RADIATION EXPOSURE: 21.3 min. A laser atherectomy was performed  on the lesion in the proximal circumflex. There was no dissection. Laser  atherectomy was performed using a RX 1.7MM ELCA CORONARY catheter and 8  pass(es) across the lesion. Balloon angioplasty was performed, using a RX  3.5 X 15 X 137 PTCA ANGIOSCULPT balloon, with 4 inflations and a maximum  inflation pressure of 20 wero. Balloon angioplasty was performed, using a  NC EMERGE 4.00 X 20MM balloon, with 2 inflations and a maximum inflation  pressure of 20 wero. A balloon angioplasty was performed on the lesion in  the proximal LAD. There was no dissection. Balloon angioplasty was  performed, using a NC EMERGE 2.00 X 15MM balloon, with 1 inflations and a  maximum inflation pressure of 20 wero. Balloon angioplasty was performed,  using a NC EMERGE 3.00 X 15MM balloon, with 2 inflations and a maximum  inflation pressure of 18 wero.  CONTRAST GIVEN: Omnipaque 136 ml. Omnipaque 112 ml. An IABP was not used.  No mechanical ventricular support was required.  MEDICATIONS GIVEN: Midazolam, 1 mg, IV. Fentanyl, 25 mcg, IV. Heparin, 8000  units, IV. 1% Lidocaine, 10 ml, subcutaneously. 1% Lidocaine, 10 ml,  subcutaneously.  VENTRICLES: EF 55% w/ inferior hypokinesis w/ moderate MR.  CORONARY VESSELS: R dominant.  Moderate sized RCA w/ patent stent and moderate distal disease.  99% mid LM stenosis w/ dec flow into LCx/LCx fills via LAD ( retrograde ).  95% ostial LAD stenosis w/ large distal vessel filling via graft.  Grafts-1. LIMA to LAD-patent.  2. Radial to OM1-patent.  LAD:   --  Proximal LAD:  CX:   --  Proximal circumflex:  AORTA: Mildly dilated asc aorta w/ trace AI.  COMPLICATIONS: No complications occurred during the cath lab visit.  DIAGNOSTIC IMPRESSIONS: Mild pulmonary HTN/normal filling pressures/low  arterial sat c/w lung disease.  Laser atherectomy/PTCA of LM w/ 20% residual stenosis and ROEL III flow  maintained thruout.  PTCA of ostial LAD w/ 40% residual stenosis.  Patent LIMA to LAD.  Patent radial graft to OM1.  Minimal LV dysfunction w/ mod MR.  No LV/Ao gradient w/ trace AI.  DIAGNOSTIC RECOMMENDATIONS: Asa/plavix.  Outpt brachytherapy.  INTERVENTIONAL IMPRESSIONS: Mild pulmonary HTN/normal filling pressures/low  arterial sat c/w lung disease.  Laser atherectomy/PTCA of LM w/ 20% residual stenosis and ROEL III flow  maintained thruout.  PTCA of ostial LAD w/ 40% residual stenosis.  Patent LIMA to LAD.  Patent radial graft to OM1.  Minimal LV dysfunction w/ mod MR.  No LV/Ao gradient w/ trace AI.  INTERVENTIONAL RECOMMENDATIONS: Asa/plavix.  Outpt brachytherapy.  Prepared and signed by  Kem Mcgill MD  Signed 2020 13:49:31  HEMODYNAMICTABLES  Pressures:  Baseline  Pressures:  - HR: 77  Pressures:  - Rhythm:  Pressures:  -- Aortic Pressure (S/D/M): 128/58/88  Pressures:  -- Left Ventricle (s/edp): 123/23/--  Pressures:  -- Pulmonary Artery (S/D/M): 41/18/28  Pressures:  -- Pulmonary Capillary Wedge:   Pressures:  -- Right Atrium (a/v/M): 11/10/8  Pressures:  -- Right Ventricle (s/edp): 40/12/--  O2 Sats:  Baseline  O2 Sats:  - HR: 77  O2 Sats:  - Rhythm:  O2 Sats:  -- AO: 12.8/86.1/14.99  O2 Sats:  -- PA: 12.8/57.3/9.97  Outputs:  Baseline  Outputs:  -- CALCULATIONS[age]  Outputs:  -- CALCULATIONS: Body Surface Area: 1.98  Outputs:  -- CALCULATIONS: Height in cm: 173.00  Outputs:  -- CALCULATIONS: Sex: Male  Outputs:  -- CALCULATIONS: Weight in k.90  Outputs:  -- OUTPUTS: Blood Oxygen Difference: 5.01  Outputs:  -- OUTPUTS: CO by [place]: 4.94  Outputs:  -- OUTPUTS: [place] cardiac index: 2.49  Outputs:  -- OUTPUTS: O2 consumption: 247.43  Outputs:  -- OUTPUTS: Vo2 Indexed: 125.00  Outputs:  -- RESISTANCES: Left ventricular stroke work: 60.94  Outputs:  -- RESISTANCES: Left Ventricular Stroke Work index: 30.79  Outputs:  -- RESISTANCES: Pulmonary vascular index (dsc): 288.71  Outputs:  -- RESISTANCES: Pulmonary vascular index (Wood Units): 3.61  Outputs:  -- RESISTANCES: Pulmonary vascular resistance (dsc): 145.85  Outputs:  -- RESISTANCES: Pulmonary vascular resistance (Wood Units): 1.82  Outputs:  -- RESISTANCES: PVR_SVR Ratio: 0.11  Outputs:  -- RESISTANCES: Right ventricular stroke work: 17.43  Outputs:  -- RESISTANCES: Right ventricular stroke work index: 8.81  Outputs:  -- RESISTANCES: Systemic vascular index (dsc): 2566.27  Outputs:  -- RESISTANCES: Systemic vascular index (Wood Units): 32.09  Outputs:  -- RESISTANCES: Systemic vascular resistance (dsc): 1296.45  Outputs:  -- RESISTANCES: Systemic vascular resistance (Wood Units): 16.21  Outputs:  -- RESISTANCES: Total pulmonary index (dsc): 898.20  Outputs:  -- RESISTANCES: Total pulmonary index (Wood Units): 11.23  Outputs:  -- RESISTANCES: Total pulmonary resistance (dsc): 453.76  Outputs:  -- RESISTANCES: Total pulmonary resistance (Wood Units): 5.67  Outputs:  -- RESISTANCES: Total vascular index (Wood Units): 35.30  Outputs:  -- RESISTANCES: Total vascular resistance (dsc): 1426.09  Outputs:  -- RESISTANCES: Total vascular resistance (Wood Units): 17.83  Outputs:  -- RESISTANCES: Total vascular resistance index (dsc): 2822.90  Outputs:  -- RESISTANCES: TPR_TVR Ratio: 0.32  Outputs:  -- SHUNTS: Qs Indexed: 2.49  Outputs:  -- SHUNTS: Systemic flow: 4.94 (20 @ 11:49)        a

## 2020-02-19 NOTE — ASU PATIENT PROFILE, ADULT - PSH
History of CEA (carotid endarterectomy)  Bilateral  History of permanent cardiac pacemaker placement  [employer]  S/P CABG x 2  LIMA LAD SVG OM1  S/P cataract surgery, left    S/P cataract surgery, right    S/P cholecystectomy    S/P joint replacement  Bilateral knee replacement  S/P nasal surgery  Secondary to nasal fracture  S/P PTCA (percutaneous transluminal coronary angioplasty)  x 9  S/P tonsillectomy    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  #29 Medtronic core valve

## 2020-02-19 NOTE — PROGRESS NOTE ADULT - ASSESSMENT
This is a 87 year old male with PMH: HTN, HL, DM, PVD with RSFA and LSFA stenting   CABG LM/ LCX stent/ arthrectomy  , Carotid endarterectomy,, ICD, TAVR for severe AS. He presented to Cardiologist with c/o SOB, progressive exertional and at rest with associated Chest pain . Last episode of severe chest pain and SOB  on 2/16 . He does report some dietary indiscretion with over use of salt. He has noted bilateral leg edema and reports with ambulation pain and claudication in LLE .ROS no palpitations syncope black or blood in his stool , no fever chills of sign of infection   Pt was referred for Cardiac cath   s/p LHC   Mild pulmonary HTN/normal filling pressures/low arterial sat c/w lung disease.  Laser atherectomy/PTCA of LM w/ 20% residual stenosis and ROEL III flow maintained   PTCA of ostial LAD w/ 40% residual stenosis.  Patent LIMA to LAD.  Patent radial graft to OM1.  Minimal LV dysfunction w/ mod MR.  No LV/Ao gradient w/ trace AI.  Pt tolerated well done via RFA/RFV

## 2020-02-19 NOTE — PROGRESS NOTE ADULT - SUBJECTIVE AND OBJECTIVE BOX
Pt sp right and left cardiac catheterization on 2/19/2020. Pt denies pain or discomfort at this time.  Vss. # 6 arterial and # 7 venous sheath removed from the right groin.  Pt tolerated well.  Manual compression applied for 20 minutes.  Hemostasis achieved. No ooze or hematoma noted at site. Pedal pulses positive.

## 2020-02-19 NOTE — DISCHARGE NOTE PROVIDER - NSDCCPCAREPLAN_GEN_ALL_CORE_FT
PRINCIPAL DISCHARGE DIAGNOSIS  Diagnosis: CAD S/P percutaneous coronary angioplasty  Assessment and Plan of Treatment: Post intevervention of LM and LAD

## 2020-02-19 NOTE — DISCHARGE NOTE PROVIDER - CARE PROVIDER_API CALL
Kem Mcgill)  Cardiovascular Disease; Interventional Cardiology  [address] [place] [address]  Phone: [phone]  Fax: [phone]  Follow Up Time:

## 2020-02-19 NOTE — H&P PST ADULT - HISTORY OF PRESENT ILLNESS
This is a 87 year old male with PMH: HTN, HL, DM, PVD with RSFA and LSFA stenting   CABG LM/ LCX stent/ arthrectomy  , Carotid endarterectomy,, ICD, TAVR for severe AS. He presented to Cardiologist with c/o SOB, progress     Cardiac Cath Lab - Adult (07.29.19 @ 16:17) >  VENTRICLES: No LV gram ( preserved EF by PET )_.  CORONARY VESSELS: LM stent patent w/ 60% smooth stenosis .  LCx stent widely patent w/ large distal vessel w/ minimal disease.  OM1 occluded and fills via SVG.  Proximal LAD stent patent w/ moderate disease of the distal vessel.  LIMA to LAD patent.  SVG to OM1 patent.  LEFT LOWER EXTREMITY VESSELS: LLE angiography reveals patent L CFA w/ mild  SFA disease w/ distal SFA occlusion w/ collateral to PT.  COMPLICATIONS: No complications occurred during the cath lab visit.  DIAGNOSTIC IMPRESSIONS: Patent LIMA to LAD.  Patent SVG to OM.  Moderate disease of LM/LCx.  L SFA occlusion.VT.PPM. This is a 87 year old male with PMH: HTN, HL, DM, PVD with RSFA and LSFA stenting   CABG LM/ LCX stent/ arthrectomy  , Carotid endarterectomy,, ICD, TAVR for severe AS. He presented to Cardiologist with c/o SOB, progressive exertional and at rest with associated Chest pain . Last episode of severe chest pain and SOB  on 2/16 . He does report some dietary indiscretion with over use of salt. He has noted bilateral leg edema and reports with ambualtion pain and claudication in LLE .ROS no palpitations syncope black or blood in his stool , no fever chills of sign of infection   Pt was referred for Cardiac cath   He presents today for RHC/LHC   Currently feels well   Mallampati 2  ASA 3  BRA 1.2%   GFR      Cardiac Cath Lab - Adult (07.29.19 @ 16:17) >  VENTRICLES: No LV gram ( preserved EF by PET )_.  CORONARY VESSELS: LM stent patent w/ 60% smooth stenosis .  LCx stent widely patent w/ large distal vessel w/ minimal disease.  OM1 occluded and fills via SVG.  Proximal LAD stent patent w/ moderate disease of the distal vessel.  LIMA to LAD patent.  SVG to OM1 patent.  LEFT LOWER EXTREMITY VESSELS: LLE angiography reveals patent L CFA w/ mild  SFA disease w/ distal SFA occlusion w/ collateral to PT.  COMPLICATIONS: No complications occurred during the cath lab visit.  DIAGNOSTIC IMPRESSIONS: Patent LIMA to LAD.  Patent SVG to OM.  Moderate disease of LM/LCx.  L SFA occlusion.VT.PPM.     GREGORIA Echo Doppler (08.11.17 @ 11:08) >   Summary:   1. Intraoperative GREGORIA for 29 mm CoreValve Evolut Pro TF TAVR.   2. Baseline: Normal biventricular systolic function. Estimated LVEF =   65-70%. Severe degenerative aortic stenosis with mild aortic   insufficiency. Moderate, non-mobile atheroma in descending thoracic aorta.   3. Post-Implant: Normal biventricualr systolic function. Low-riding   CoreValve Evolut, with mild paravalvular regurgitation. EOA = 1.4 sq cm,   dimensionless index = 0.40. Left ventricular ejection fraction, by visual   estimation, is 65 to 70%. Nosignificant mitral stenosis. No pericardial   effusion. No aortic trauma. This is a 87 year old male with PMH: HTN, HL, DM, PVD with RSFA and LSFA stenting   CABG LM/ LCX stent/ arthrectomy  , Carotid endarterectomy,, ICD, TAVR for severe AS. He presented to Cardiologist with c/o SOB, progressive exertional and at rest with associated Chest pain . Last episode of severe chest pain and SOB  on 2/16 . He does report some dietary indiscretion with over use of salt. He has noted bilateral leg edema and reports with ambualtion pain and claudication in LLE .ROS no palpitations syncope black or blood in his stool , no fever chills of sign of infection   Pt was referred for Cardiac cath   He presents today for [facility]   Currently feels well   Mallampati 2  ASA 3  BRA 1.2%   GFR      Cardiac Cath Lab - Adult (07.29.19 @ 16:17) >  VENTRICLES: No LV gram ( preserved EF by PET )_.  CORONARY VESSELS: LM stent patent w/ 60% smooth stenosis .  LCx stent widely patent w/ large distal vessel w/ minimal disease.  OM1 occluded and fills via SVG.  Proximal LAD stent patent w/ moderate disease of the distal vessel.  LIMA to LAD patent.  SVG to OM1 patent.  LEFT LOWER EXTREMITY VESSELS: LLE angiography reveals patent L CFA w/ mild  SFA disease w/ distal SFA occlusion w/ collateral to PT.  COMPLICATIONS: No complications occurred during the cath lab visit.  DIAGNOSTIC IMPRESSIONS: Patent LIMA to LAD.  Patent SVG to OM.  Moderate disease of LM/LCx.  L SFA occlusion.VT.PPM.     GREGORIA Echo Doppler (08.11.17 @ 11:08) >   Summary:   1. Intraoperative GREGORIA for 29 mm CoreValve Evolut Pro TF TAVR.   2. Baseline: Normal biventricular systolic function. Estimated LVEF =   65-70%. Severe degenerative aortic stenosis with mild aortic   insufficiency. Moderate, non-mobile atheroma in descending thoracic aorta.   3. Post-Implant: Normal biventricualr systolic function. Low-riding   CoreValve Evolut, with mild paravalvular regurgitation. EOA = 1.4 sq cm,   dimensionless index = 0.40. Left ventricular ejection fraction, by visual   estimation, is 65 to 70%. Nosignificant mitral stenosis. No pericardial   effusion. No aortic trauma.

## 2020-02-19 NOTE — DISCHARGE NOTE PROVIDER - HOSPITAL COURSE
s/p LHC     Mild pulmonary HTN/normal filling pressures/low arterial sat c/w lung disease.    Laser atherectomy/PTCA of LM w/ 20% residual stenosis and ROEL III flow maintained     PTCA of ostial LAD w/ 40% residual stenosis.    Patent LIMA to LAD.    Patent radial graft to OM1.    Minimal LV dysfunction w/ mod MR.    No LV/Ao gradient w/ trace AI. s/p LHC     Mild pulmonary HTN/normal filling pressures/low arterial sat c/w lung disease.    Laser atherectomy/PTCA of LM w/ 20% residual stenosis and ROEL III flow maintained     PTCA of ostial LAD w/ 40% residual stenosis.    Patent LIMA to LAD.    Patent radial graft to OM1.    Minimal LV dysfunction w/ mod MR.    No LV/Ao gradient w/ trace AI.    no overnight events Vss

## 2020-02-19 NOTE — PROGRESS NOTE ADULT - PROBLEM SELECTOR PLAN 1
Post intervention of LM and ostial LAD   Admit to Hospital due to comorbidities LM/ostial LAD intervention, age 87 and groin access   Post procedure orders and observations   DAPT: ASA and Plavix   No statin therapy due to allergy   Continue Zetia   Beta blocker therapy   Continue ARB   Groin  precautions maintained   Bedrest for 4 hours   Ambulate if stable   Cardiac rehab information provided / referral and communication to cardiac rehab completed   Follow up with cardiologist 1-week post

## 2020-02-19 NOTE — H&P PST ADULT - ASSESSMENT
This is a 87 year old male with PMH: HTN, HL, DM, PVD with RSFA and LSFA stenting   CABG LM/ LCX stent/ arthrectomy  , Carotid endarterectomy,, ICD, TAVR for severe AS. He presented to Cardiologist with c/o SOB, progressive exertional and at rest with associated Chest pain . Last episode of severe chest pain and SOB  on 2/16 . He does report some dietary indiscretion with over use of salt. He has noted bilateral leg edema and reports with ambualtion pain and claudication in LLE .ROS no palpitations syncope black or blood in his stool , no fever chills of sign of infection   Pt was referred for Cardiac cath   He presents today for RHC/LHC   PRE-PROCEDURE ASSESSMENT  cardiac cath   -Patient seen and examined  -Labs reviewed  -Pre-procedure teaching completed with patient and family  -Informed consent witnessed  -Questions answered  _ Pt took asa and Plavix This is a 87 year old male with PMH: HTN, HL, DM, PVD with RSFA and LSFA stenting   CABG LM/ LCX stent/ arthrectomy  , Carotid endarterectomy,, ICD, TAVR for severe AS. He presented to Cardiologist with c/o SOB, progressive exertional and at rest with associated Chest pain . Last episode of severe chest pain and SOB  on 2/16 . He does report some dietary indiscretion with over use of salt. He has noted bilateral leg edema and reports with ambualtion pain and claudication in LLE .ROS no palpitations syncope black or blood in his stool , no fever chills of sign of infection   Pt was referred for Cardiac cath   He presents today for [facility]   PRE-PROCEDURE ASSESSMENT  cardiac cath   -Patient seen and examined  -Labs reviewed  -Pre-procedure teaching completed with patient and family  -Informed consent witnessed  -Questions answered  _ Pt took asa and Plavix

## 2020-02-19 NOTE — DISCHARGE NOTE PROVIDER - NSDCMRMEDTOKEN_GEN_ALL_CORE_FT
alfuzosin 10 mg oral tablet, extended release: 1 tab(s) orally once a day (at bedtime)  aspirin 325 mg oral delayed release tablet: 1 tab(s) orally once a day  clopidogrel 75 mg oral tablet: 1 tab(s) orally once a day MDD:75  Take with aspirin 325 mg po daily  latanoprost 0.005% ophthalmic solution: 1 drop(s) to each affected eye once a day (at bedtime)  losartan 25 mg oral tablet: 1 tab(s) orally once a day  Metoprolol Succinate ER 25 mg oral tablet, extended release: 1 tab(s) orally once a day  multivitamin: 1 tab(s) orally once a day  napncon A  eye drops: 1 drop(s) in each eye once a day  pantoprazole 40 mg oral delayed release tablet: 1 tab(s) orally once a day  repaglinide 0.5 mg oral tablet: 1 tab(s) orally 3 times a day (before meals)  Zetia 10 mg oral tablet: 1 tab(s) orally once a day alfuzosin 10 mg oral tablet, extended release: 1 tab(s) orally once a day (at bedtime)  aspirin 325 mg oral delayed release tablet: 1 tab(s) orally once a day  clopidogrel 75 mg oral tablet: 1 tab(s) orally once a day MDD:75  Take with aspirin 325 mg po daily  latanoprost 0.005% ophthalmic solution: 1 drop(s) to each affected eye once a day (at bedtime)  losartan 25 mg oral tablet: 1 tab(s) orally once a day  Metoprolol Succinate ER 25 mg oral tablet, extended release: 1 tab(s) orally once a day  multivitamin: 1 tab(s) orally once a day  napncon A  eye drops: 1 drop(s) in each eye once a day  pantoprazole 40 mg oral delayed release tablet: 1 tab(s) orally once a day  repaglinide 0.5 mg oral tablet: 1 tab(s) orally 3 times a day (before meals)  tamsulosin 0.4 mg oral capsule: 1 cap(s) orally once a day (at bedtime)  Zetia 10 mg oral tablet: 1 tab(s) orally once a day

## 2020-02-20 ENCOUNTER — TRANSCRIPTION ENCOUNTER (OUTPATIENT)
Age: 85
End: 2020-02-20

## 2020-02-20 VITALS
HEART RATE: 90 BPM | SYSTOLIC BLOOD PRESSURE: 128 MMHG | RESPIRATION RATE: 16 BRPM | DIASTOLIC BLOOD PRESSURE: 60 MMHG | TEMPERATURE: 97 F

## 2020-02-20 DIAGNOSIS — I25.10 ATHEROSCLEROTIC HEART DISEASE OF NATIVE CORONARY ARTERY WITHOUT ANGINA PECTORIS: ICD-10-CM

## 2020-02-20 LAB
ALBUMIN SERPL ELPH-MCNC: 3.5 G/DL — SIGNIFICANT CHANGE UP (ref 3.3–5.2)
ALP SERPL-CCNC: 49 U/L — SIGNIFICANT CHANGE UP (ref 40–120)
ALT FLD-CCNC: 11 U/L — SIGNIFICANT CHANGE UP
ANION GAP SERPL CALC-SCNC: 10 MMOL/L — SIGNIFICANT CHANGE UP (ref 5–17)
AST SERPL-CCNC: 18 U/L — SIGNIFICANT CHANGE UP
BASOPHILS # BLD AUTO: 0.01 K/UL — SIGNIFICANT CHANGE UP (ref 0–0.2)
BASOPHILS NFR BLD AUTO: 0.1 % — SIGNIFICANT CHANGE UP (ref 0–2)
BILIRUB SERPL-MCNC: 0.3 MG/DL — LOW (ref 0.4–2)
BUN SERPL-MCNC: 26 MG/DL — HIGH (ref 8–20)
CALCIUM SERPL-MCNC: 9.1 MG/DL — SIGNIFICANT CHANGE UP (ref 8.6–10.2)
CHLORIDE SERPL-SCNC: 98 MMOL/L — SIGNIFICANT CHANGE UP (ref 98–107)
CO2 SERPL-SCNC: 27 MMOL/L — SIGNIFICANT CHANGE UP (ref 22–29)
CREAT SERPL-MCNC: 1.22 MG/DL — SIGNIFICANT CHANGE UP (ref 0.5–1.3)
EOSINOPHIL # BLD AUTO: 0.24 K/UL — SIGNIFICANT CHANGE UP (ref 0–0.5)
EOSINOPHIL NFR BLD AUTO: 2.4 % — SIGNIFICANT CHANGE UP (ref 0–6)
GLUCOSE SERPL-MCNC: 218 MG/DL — HIGH (ref 70–99)
HCT VFR BLD CALC: 36.8 % — LOW (ref 39–50)
HGB BLD-MCNC: 11.9 G/DL — LOW (ref 13–17)
IMM GRANULOCYTES NFR BLD AUTO: 0.3 % — SIGNIFICANT CHANGE UP (ref 0–1.5)
LYMPHOCYTES # BLD AUTO: 1.31 K/UL — SIGNIFICANT CHANGE UP (ref 1–3.3)
LYMPHOCYTES # BLD AUTO: 12.9 % — LOW (ref 13–44)
MCHC RBC-ENTMCNC: 31.9 PG — SIGNIFICANT CHANGE UP (ref 27–34)
MCHC RBC-ENTMCNC: 32.3 GM/DL — SIGNIFICANT CHANGE UP (ref 32–36)
MCV RBC AUTO: 98.7 FL — SIGNIFICANT CHANGE UP (ref 80–100)
MONOCYTES # BLD AUTO: 1.05 K/UL — HIGH (ref 0–0.9)
MONOCYTES NFR BLD AUTO: 10.4 % — SIGNIFICANT CHANGE UP (ref 2–14)
NEUTROPHILS # BLD AUTO: 7.5 K/UL — HIGH (ref 1.8–7.4)
NEUTROPHILS NFR BLD AUTO: 73.9 % — SIGNIFICANT CHANGE UP (ref 43–77)
PLATELET # BLD AUTO: 174 K/UL — SIGNIFICANT CHANGE UP (ref 150–400)
POTASSIUM SERPL-MCNC: 4.6 MMOL/L — SIGNIFICANT CHANGE UP (ref 3.5–5.3)
POTASSIUM SERPL-SCNC: 4.6 MMOL/L — SIGNIFICANT CHANGE UP (ref 3.5–5.3)
PROT SERPL-MCNC: 6.1 G/DL — LOW (ref 6.6–8.7)
RBC # BLD: 3.73 M/UL — LOW (ref 4.2–5.8)
RBC # FLD: 12.6 % — SIGNIFICANT CHANGE UP (ref 10.3–14.5)
SODIUM SERPL-SCNC: 135 MMOL/L — SIGNIFICANT CHANGE UP (ref 135–145)
WBC # BLD: 10.14 K/UL — SIGNIFICANT CHANGE UP (ref 3.8–10.5)
WBC # FLD AUTO: 10.14 K/UL — SIGNIFICANT CHANGE UP (ref 3.8–10.5)

## 2020-02-20 PROCEDURE — C1885: CPT

## 2020-02-20 PROCEDURE — 92924 PRQ TRLUML C ATHRC 1 ART&/BR: CPT

## 2020-02-20 PROCEDURE — 99153 MOD SED SAME PHYS/QHP EA: CPT

## 2020-02-20 PROCEDURE — 93010 ELECTROCARDIOGRAM REPORT: CPT

## 2020-02-20 PROCEDURE — 85027 COMPLETE CBC AUTOMATED: CPT

## 2020-02-20 PROCEDURE — 86850 RBC ANTIBODY SCREEN: CPT

## 2020-02-20 PROCEDURE — 36415 COLL VENOUS BLD VENIPUNCTURE: CPT

## 2020-02-20 PROCEDURE — 92920 PRQ TRLUML C ANGIOP 1ART&/BR: CPT | Mod: LD

## 2020-02-20 PROCEDURE — 99152 MOD SED SAME PHYS/QHP 5/>YRS: CPT

## 2020-02-20 PROCEDURE — 86900 BLOOD TYPING SEROLOGIC ABO: CPT

## 2020-02-20 PROCEDURE — 86901 BLOOD TYPING SEROLOGIC RH(D): CPT

## 2020-02-20 PROCEDURE — 85610 PROTHROMBIN TIME: CPT

## 2020-02-20 PROCEDURE — 85730 THROMBOPLASTIN TIME PARTIAL: CPT

## 2020-02-20 PROCEDURE — 80048 BASIC METABOLIC PNL TOTAL CA: CPT

## 2020-02-20 PROCEDURE — C1887: CPT

## 2020-02-20 PROCEDURE — 80053 COMPREHEN METABOLIC PANEL: CPT

## 2020-02-20 PROCEDURE — 93567 NJX CAR CTH SPRVLV AORTGRPHY: CPT

## 2020-02-20 PROCEDURE — C1889: CPT

## 2020-02-20 PROCEDURE — C1769: CPT

## 2020-02-20 PROCEDURE — 93461 R&L HRT ART/VENTRICLE ANGIO: CPT

## 2020-02-20 PROCEDURE — 93005 ELECTROCARDIOGRAM TRACING: CPT

## 2020-02-20 PROCEDURE — C1725: CPT

## 2020-02-20 PROCEDURE — 83735 ASSAY OF MAGNESIUM: CPT

## 2020-02-20 PROCEDURE — C1894: CPT

## 2020-02-20 RX ORDER — TAMSULOSIN HYDROCHLORIDE 0.4 MG/1
1 CAPSULE ORAL
Qty: 0 | Refills: 0 | DISCHARGE
Start: 2020-02-20

## 2020-02-20 RX ADMIN — PANTOPRAZOLE SODIUM 40 MILLIGRAM(S): 20 TABLET, DELAYED RELEASE ORAL at 07:59

## 2020-02-20 RX ADMIN — Medication 0.25 MILLIGRAM(S): at 00:10

## 2020-02-20 RX ADMIN — EZETIMIBE 10 MILLIGRAM(S): 10 TABLET ORAL at 00:10

## 2020-02-20 RX ADMIN — Medication 325 MILLIGRAM(S): at 07:59

## 2020-02-20 RX ADMIN — TAMSULOSIN HYDROCHLORIDE 0.4 MILLIGRAM(S): 0.4 CAPSULE ORAL at 00:10

## 2020-02-20 RX ADMIN — CLOPIDOGREL BISULFATE 75 MILLIGRAM(S): 75 TABLET, FILM COATED ORAL at 07:59

## 2020-02-20 RX ADMIN — Medication 25 MILLIGRAM(S): at 07:59

## 2020-02-20 RX ADMIN — LOSARTAN POTASSIUM 25 MILLIGRAM(S): 100 TABLET, FILM COATED ORAL at 07:59

## 2020-02-20 NOTE — PROGRESS NOTE ADULT - ASSESSMENT
This is a 87 year old male with PMH: HTN, HL, DM, PVD with RSFA and LSFA stenting   CABG LM/ LCX stent/ arthrectomy  , Carotid endarterectomy,, ICD, TAVR for severe AS. He presented to Cardiologist with c/o SOB, progressive exertional and at rest with associated Chest pain . Last episode of severe chest pain and SOB  on 2/16 . He does report some dietary indiscretion with over use of salt. He has noted bilateral leg edema and reports with ambualtion pain and claudication in LLE .ROS no palpitations syncope black or blood in his stool , no fever chills of sign of infection   Pt was referred for Cardiac cath   s/p LHC done via RFA/RFV tolerated well   s/p Mild pulmonary HTN/normal filling pressures/low arterial sat c/w lung disease.  Laser atherectomy/PTCA of LM w/ 20% residual stenosis and ROEL III flow maintained   PTCA of ostial LAD w/ 40% residual stenosis.  Patent LIMA to LAD.  Patent radial graft to OM1.  Minimal LV dysfunction w/ mod MR.  No LV/Ao gradient w/ trace AI.  Admitted due to comorbidities   No overnight events

## 2020-02-20 NOTE — DISCHARGE NOTE NURSING/CASE MANAGEMENT/SOCIAL WORK - PATIENT PORTAL LINK FT
You can access the FollowMyHealth Patient Portal offered by [facility] by registering at the following website: [email or web address]. By joining [employer]’s FollowMyHealth portal, you will also be able to view your health information using other applications (apps) compatible with our system.

## 2020-02-20 NOTE — PROGRESS NOTE ADULT - PROBLEM SELECTOR PLAN 1
a/p   Post procedure orders and observations   DAPT: ASA and Plavix   Statin therapy   Beta blocker therapy   ACE-1/ ARB   Groin  precautions maintained   Bedrest for   Ambulate if stable   Cardiac rehab information provided / referral and communication to cardiac rehab completed   Follow up with cardiologist a/p   Post procedure orders and observations   DAPT: ASA and Plavix   Statin therapy   Beta blocker / ARB therapy continue   Groin  precautions maintained   Bedrest for   Ambulate if stable   Cardiac rehab information provided / referral and communication to cardiac rehab completed   Follow up with cardiologist Dr Mcgill

## 2020-02-20 NOTE — PROGRESS NOTE ADULT - SUBJECTIVE AND OBJECTIVE BOX
This is a 87 year old male with PMH: HTN, HL, DM, PVD with RSFA and LSFA stenting   CABG LM/ LCX stent/ arthrectomy  , Carotid endarterectomy,, ICD, TAVR for severe AS. He presented to Cardiologist with c/o SOB, progressive exertional and at rest with associated Chest pain . Last episode of severe chest pain and SOB  on  . He does report some dietary indiscretion with over use of salt. He has noted bilateral leg edema and reports with ambualtion pain and claudication in LLE .ROS no palpitations syncope black or blood in his stool , no fever chills of sign of infection   Pt was referred for Cardiac cath              Vital Signs Last 24 Hrs  T(C): 36.3 (2020 07:50), Max: 36.3 (2020 07:50)  T(F): 97.4 (2020 07:50), Max: 97.4 (2020 07:50)  HR: 90 (2020 07:50) (69 - 90)  BP: 128/60 (2020 07:50) (128/60 - 174/80)  RR: 16 (2020 07:50) (14 - 20)  SpO2: 95% (2020 19:30) (95% - 98%)  Daily Height in cm: 172.72 (2020 10:24)        Medications:  ALPRAZolam 0.25 milliGRAM(s) Oral every 6 hours PRN  aspirin enteric coated 325 milliGRAM(s) Oral daily  clopidogrel Tablet 75 milliGRAM(s) Oral daily  ezetimibe 10 milliGRAM(s) Oral daily  latanoprost 0.005% Ophthalmic Solution 1 Drop(s) Both EYES at bedtime  losartan 25 milliGRAM(s) Oral daily  metoprolol succinate ER 25 milliGRAM(s) Oral daily  pantoprazole    Tablet 40 milliGRAM(s) Oral before breakfast  tamsulosin 0.4 milliGRAM(s) Oral at bedtime  zolpidem 5 milliGRAM(s) Oral at bedtime PRN      General: A/ox 3, No acute Distress  Neck: Supple, NO JVD  Cardiac: S1 S2, No M/R/G  Pulmonary: CTAB, Breathing unlabored, No Rhonchi/Rales/Wheezing  Abdomen: Soft, Non -tender, +BS   Extremities: No Rashes, No edema  Femoral cath site no evidence of bleeding + pp hemostasis maintained   Neuro: A/o x 3, No focal deficits  Psch: normal mood , normal affect    LABS:                          11.9   10.14 )-----------( 174      ( 2020 06:31 )             36.8     02-20    135  |  98  |  26.0<H>  ----------------------------<  218<H>  4.6   |  27.0  |  1.22    Ca    9.1      2020 06:31  Mg     2.1     02-19    TPro  6.1<L>  /  Alb  3.5  /  TBili  0.3<L>  /  DBili  x   /  AST  18  /  ALT  11  /  AlkPhos  49  02-20    PT/INR - ( 2020 10:53 )   PT: 12.7 sec;   INR: 1.12 ratio         PTT - ( 2020 10:53 )  PTT:26.6 sec    Cardiac Cath Lab - Adult:   [facility]  Department of Cardiology  [address] [place] [address]  [phone]  Cath Lab Report -- Comprehensive Report  Patient: TIFFANIE ZELAYA  Study date: 2020  Account number: [id number]  MR number: [id number]  :10/21/1932  Gender: Male  Race: W  Case Physician(s):  MD Kem Burks MD  Referring Physician:  Kem Mcgill M.D.  INDICATIONS: Progressive angina/class III-IV.  PROCEDURE:  --  Right heart catheterization.  --  Left heart catheterization with ventriculography.  --  Left coronary angiography.  --  Right coronary angiography.  --  Bypass graft angiography.  --  Aortography.  --  Sonosite.  --  Intervention on proximal circumflex: laser atherectomy.  --  Intervention on proximal LAD:balloon angioplasty.  TECHNIQUE: Cardiac catheterization performed electively.  Local anesthetic given. Right femoral artery access. Right femoral vein  access. Right heart catheterization. The procedure was performed utilizing  a catheter. Left heart catheterization. Ventriculography was performed.  Left coronary artery angiography. The vessel was injected utilizing a  catheter. Right coronary artery angiography. The vessel was injected  utilizing a catheter. Graft angiography. The vessel was injected utilizing  a catheter. Aortography. A catheter was placed and contrast was injected.  Sonosite. RADIATION EXPOSURE: 21.3 min. A laser atherectomy was performed  on the lesion in the proximal circumflex. There was no dissection. Laser  atherectomy was performed using a RX 1.7MM ELCA CORONARY catheter and 8  pass(es) across the lesion. Balloon angioplasty was performed, using a RX  3.5 X 15 X 137 PTCA ANGIOSCULPT balloon, with 4 inflations and a maximum  inflation pressure of 20 wero. Balloon angioplasty was performed, using a  NC EMERGE 4.00 X 20MM balloon, with 2 inflations and a maximum inflation  pressure of 20 wero. A balloon angioplasty was performed on the lesion in  the proximal LAD. There was no dissection. Balloon angioplasty was  performed, using a NC EMERGE 2.00 X 15MM balloon, with 1 inflations and a  maximum inflation pressure of 20 wero. Balloon angioplasty was performed,  using a NC EMERGE 3.00 X 15MM balloon, with 2 inflations and a maximum  inflation pressure of 18 wero.  CONTRAST GIVEN: Omnipaque 136 ml. Omnipaque 112 ml. An IABP was not used.  No mechanical ventricular support was required.  MEDICATIONS GIVEN: Midazolam, 1 mg, IV. Fentanyl, 25 mcg, IV. Heparin, 8000  units, IV. 1% Lidocaine, 10 ml, subcutaneously. 1% Lidocaine, 10 ml,  subcutaneously.  VENTRICLES: EF 55% w/ inferior hypokinesis w/ moderate MR.  CORONARY VESSELS: R dominant.  Moderate sized RCA w/ patent stent and moderate distal disease.  99% mid LM stenosis w/ dec flow into LCx/LCx fills via LAD ( retrograde ).  95% ostial LAD stenosis w/ large distal vessel filling via graft.  Grafts-1. LIMA to LAD-patent.  2. Radial to OM1-patent.  LAD:   --  Proximal LAD:  CX:   --  Proximal circumflex:  AORTA: Mildly dilated asc aorta w/ trace AI.  COMPLICATIONS: No complications occurred during the cath lab visit.  DIAGNOSTIC IMPRESSIONS: Mild pulmonary HTN/normal filling pressures/low  arterial sat c/w lung disease.  Laser atherectomy/PTCA of LM w/ 20% residual stenosis and ROEL III flow  maintained thruout.  PTCA of ostial LAD w/ 40% residual stenosis.  Patent LIMA to LAD.  Patent radial graft to OM1.  Minimal LV dysfunction w/ mod MR.  No LV/Ao gradient w/ trace AI.  DIAGNOSTIC RECOMMENDATIONS: Asa/plavix.  Outpt brachytherapy.  INTERVENTIONAL IMPRESSIONS: Mild pulmonary HTN/normal filling pressures/low  arterial sat c/w lung disease.  Laser atherectomy/PTCA of LM w/ 20% residual stenosis and ROEL III flow  maintained thruout.  PTCA of ostial LAD w/ 40% residual stenosis.  Patent LIMA to LAD.  Patent radial graft to OM1.  Minimal LV dysfunction w/ mod MR.  No LV/Ao gradient w/ trace AI.  INTERVENTIONAL RECOMMENDATIONS: Asa/plavix.  Outpt brachytherapy.  Prepared and signed by  Kem Mcgill MD  Signed 2020 13:49:31  HEMODYNAMICTABLES  Pressures:  Baseline  Pressures:  - HR: 77  Pressures:  - Rhythm:  Pressures:  -- Aortic Pressure (S/D/M): 128/58/88  Pressures:  -- Left Ventricle (s/edp): 123/23/--  Pressures:  -- Pulmonary Artery (S/D/M): 41/18/28  Pressures:  -- Pulmonary Capillary Wedge: //19  Pressures:  -- Right Atrium (a/v/M): 11/10/8  Pressures:  -- Right Ventricle (s/edp): 40/12/--  O2 Sats:  Baseline  O2 Sats:  - HR: 77  O2 Sats:  - Rhythm:  O2 Sats:  -- AO: 12.8/86.1/14.99  O2 Sats:  -- PA: 12.8/57.3/9.97  Outputs:  Baseline  Outputs:  -- CALCULATIONS[age]  Outputs:  -- CALCULATIONS: Body Surface Area: 1.98  Outputs:  -- CALCULATIONS: Height in cm: 173.00  Outputs:  -- CALCULATIONS: Sex: Male  Outputs:  -- CALCULATIONS: Weight in k.90  Outputs:  -- OUTPUTS: Blood Oxygen Difference: 5.01  Outputs:  -- OUTPUTS: CO by [place]: 4.94  Outputs:  -- OUTPUTS: [place] cardiac index: 2.49  Outputs:  -- OUTPUTS: O2 consumption: 247.43  Outputs:  -- OUTPUTS: Vo2 Indexed: 125.00  Outputs:  -- RESISTANCES: Left ventricular stroke work: 60.94  Outputs:  -- RESISTANCES: Left Ventricular Stroke Work index: 30.79  Outputs:  -- RESISTANCES: Pulmonary vascular index (dsc): 288.71  Outputs:  -- RESISTANCES: Pulmonary vascular index (Wood Units): 3.61  Outputs:  -- RESISTANCES: Pulmonary vascular resistance (dsc): 145.85  Outputs:  -- RESISTANCES: Pulmonary vascular resistance (Wood Units): 1.82  Outputs:  -- RESISTANCES: PVR_SVR Ratio: 0.11  Outputs:  -- RESISTANCES: Right ventricular stroke work: 17.43  Outputs:  -- RESISTANCES: Right ventricular stroke work index: 8.81  Outputs:  -- RESISTANCES: Systemic vascular index (dsc): 2566.27  Outputs:  -- RESISTANCES: Systemic vascular index (Wood Units): 32.09  Outputs:  -- RESISTANCES: Systemic vascular resistance (dsc): 1296.45  Outputs:  -- RESISTANCES: Systemic vascular resistance (Wood Units): 16.21  Outputs:  -- RESISTANCES: Total pulmonary index (dsc): 898.20  Outputs:  -- RESISTANCES: Total pulmonary index (Wood Units): 11.23  Outputs:  -- RESISTANCES: Total pulmonary resistance (dsc): 453.76  Outputs:  -- RESISTANCES: Total pulmonary resistance (Wood Units): 5.67  Outputs:  -- RESISTANCES: Total vascular index (Wood Units): 35.30  Outputs:  -- RESISTANCES: Total vascular resistance (dsc): 1426.09  Outputs:  -- RESISTANCES: Total vascular resistance (Wood Units): 17.83  Outputs:  -- RESISTANCES: Total vascular resistance index (dsc): 2822.90  Outputs:  -- RESISTANCES: TPR_TVR Ratio: 0.32  Outputs:  -- SHUNTS: Qs Indexed: 2.49  Outputs:  -- SHUNTS: Systemic flow: 4.94 (20 @ 11:49) This is a 87 year old male with PMH: HTN, HL, DM, PVD with RSFA and LSFA stenting   CABG LM/ LCX stent/ arthrectomy  , Carotid endarterectomy,, ICD, TAVR for severe AS. He presented to Cardiologist with c/o SOB, progressive exertional and at rest with associated Chest pain . Last episode of severe chest pain and SOB  on  . He does report some dietary indiscretion with over use of salt. He has noted bilateral leg edema and reports with ambualtion pain and claudication in LLE .ROS no palpitations syncope black or blood in his stool , no fever chills of sign of infection   Pt was referred for Cardiac cath              Vital Signs Last 24 Hrs  T(C): 36.3 (2020 07:50), Max: 36.3 (2020 07:50)  T(F): 97.4 (2020 07:50), Max: 97.4 (2020 07:50)  HR: 90 (2020 07:50) (69 - 90)  BP: 128/60 (2020 07:50) (128/60 - 174/80)  RR: 16 (2020 07:50) (14 - 20)  SpO2: 95% (2020 19:30) (95% - 98%)  Daily Height in cm: 172.72 (2020 10:24)    Telemetry : No events   EKG AV paced rate 88      Medications:  ALPRAZolam 0.25 milliGRAM(s) Oral every 6 hours PRN  aspirin enteric coated 325 milliGRAM(s) Oral daily  clopidogrel Tablet 75 milliGRAM(s) Oral daily  ezetimibe 10 milliGRAM(s) Oral daily  latanoprost 0.005% Ophthalmic Solution 1 Drop(s) Both EYES at bedtime  losartan 25 milliGRAM(s) Oral daily  metoprolol succinate ER 25 milliGRAM(s) Oral daily  pantoprazole    Tablet 40 milliGRAM(s) Oral before breakfast  tamsulosin 0.4 milliGRAM(s) Oral at bedtime  zolpidem 5 milliGRAM(s) Oral at bedtime PRN      General: A/ox 3, No acute Distress  Neck: Supple, NO JVD  Cardiac: S1 S2, 3/6   Pulmonary: CTAB, Breathing unlabored, No Rhonchi/Rales/Wheezing  Abdomen: Soft, Non -tender, +BS   Extremities: No Rashes, No edema  Right Femoral cath site no evidence of bleeding + pp hemostasis maintained   Neuro: A/o x 3, No focal deficits  Psch: normal mood , normal affect    LABS:                          11.9   10.14 )-----------( 174      ( 2020 06:31 )             36.8     02-20    135  |  98  |  26.0<H>  ----------------------------<  218<H>  4.6   |  27.0  |  1.22    Ca    9.1      2020 06:31  Mg     2.1     02-19    TPro  6.1<L>  /  Alb  3.5  /  TBili  0.3<L>  /  DBili  x   /  AST  18  /  ALT  11  /  AlkPhos  49  02-20    PT/INR - ( 2020 10:53 )   PT: 12.7 sec;   INR: 1.12 ratio         PTT - ( 2020 10:53 )  PTT:26.6 sec    Cardiac Cath Lab - Adult:   [facility]  Department of Cardiology  [address] [place] [address]  [phone]  Cath Lab Report -- Comprehensive Report  Patient: TIFFANIE ZELAYA  Study date: 2020  Account number: [id number]  MR number: [id number]  :10/21/1932  Gender: Male  Race: W  Case Physician(s):  MD Kem Burks MD  Referring Physician:  Kem Mcgill M.D.  INDICATIONS: Progressive angina/class III-IV.  PROCEDURE:  --  Right heart catheterization.  --  Left heart catheterization with ventriculography.  --  Left coronary angiography.  --  Right coronary angiography.  --  Bypass graft angiography.  --  Aortography.  --  Sonosite.  --  Intervention on proximal circumflex: laser atherectomy.  --  Intervention on proximal LAD:balloon angioplasty.  TECHNIQUE: Cardiac catheterization performed electively.  Local anesthetic given. Right femoral artery access. Right femoral vein  access. Right heart catheterization. The procedure was performed utilizing  a catheter. Left heart catheterization. Ventriculography was performed.  Left coronary artery angiography. The vessel was injected utilizing a  catheter. Right coronary artery angiography. The vessel was injected  utilizing a catheter. Graft angiography. The vessel was injected utilizing  a catheter. Aortography. A catheter was placed and contrast was injected.  Sonosite. RADIATION EXPOSURE: 21.3 min. A laser atherectomy was performed  on the lesion in the proximal circumflex. There was no dissection. Laser  atherectomy was performed using a RX 1.7MM ELCA CORONARY catheter and 8  pass(es) across the lesion. Balloon angioplasty was performed, using a RX  3.5 X 15 X 137 PTCA ANGIOSCULPT balloon, with 4 inflations and a maximum  inflation pressure of 20 wero. Balloon angioplasty was performed, using a  NC EMERGE 4.00 X 20MM balloon, with 2 inflations and a maximum inflation  pressure of 20 wero. A balloon angioplasty was performed on the lesion in  the proximal LAD. There was no dissection. Balloon angioplasty was  performed, using a NC EMERGE 2.00 X 15MM balloon, with 1 inflations and a  maximum inflation pressure of 20 wero. Balloon angioplasty was performed,  using a NC EMERGE 3.00 X 15MM balloon, with 2 inflations and a maximum  inflation pressure of 18 wero.  CONTRAST GIVEN: Omnipaque 136 ml. Omnipaque 112 ml. An IABP was not used.  No mechanical ventricular support was required.  MEDICATIONS GIVEN: Midazolam, 1 mg, IV. Fentanyl, 25 mcg, IV. Heparin, 8000  units, IV. 1% Lidocaine, 10 ml, subcutaneously. 1% Lidocaine, 10 ml,  subcutaneously.  VENTRICLES: EF 55% w/ inferior hypokinesis w/ moderate MR.  CORONARY VESSELS: R dominant.  Moderate sized RCA w/ patent stent and moderate distal disease.  99% mid LM stenosis w/ dec flow into LCx/LCx fills via LAD ( retrograde ).  95% ostial LAD stenosis w/ large distal vessel filling via graft.  Grafts-1. LIMA to LAD-patent.  2. Radial to OM1-patent.  LAD:   --  Proximal LAD:  CX:   --  Proximal circumflex:  AORTA: Mildly dilated asc aorta w/ trace AI.  COMPLICATIONS: No complications occurred during the cath lab visit.  DIAGNOSTIC IMPRESSIONS: Mild pulmonary HTN/normal filling pressures/low  arterial sat c/w lung disease.  Laser atherectomy/PTCA of LM w/ 20% residual stenosis and ROEL III flow  maintained thruout.  PTCA of ostial LAD w/ 40% residual stenosis.  Patent LIMA to LAD.  Patent radial graft to OM1.  Minimal LV dysfunction w/ mod MR.  No LV/Ao gradient w/ trace AI.  DIAGNOSTIC RECOMMENDATIONS: Asa/plavix.  Outpt brachytherapy.  INTERVENTIONAL IMPRESSIONS: Mild pulmonary HTN/normal filling pressures/low  arterial sat c/w lung disease.  Laser atherectomy/PTCA of LM w/ 20% residual stenosis and ROEL III flow  maintained thruout.  PTCA of ostial LAD w/ 40% residual stenosis.  Patent LIMA to LAD.  Patent radial graft to OM1.  Minimal LV dysfunction w/ mod MR.  No LV/Ao gradient w/ trace AI.  INTERVENTIONAL RECOMMENDATIONS: Asa/plavix.  Outpt brachytherapy.  Prepared and signed by  Kem Mcgill MD  Signed 2020 13:49:31  HEMODYNAMICTABLES  Pressures:  Baseline  Pressures:  - HR: 77  Pressures:  - Rhythm:  Pressures:  -- Aortic Pressure (S/D/M): 128/58/88  Pressures:  -- Left Ventricle (s/edp): 123/23/--  Pressures:  -- Pulmonary Artery (S/D/M): 41/18/28  Pressures:  -- Pulmonary Capillary Wedge: //19  Pressures:  -- Right Atrium (a/v/M): 11/10/8  Pressures:  -- Right Ventricle (s/edp): 40/12/--  O2 Sats:  Baseline  O2 Sats:  - HR: 77  O2 Sats:  - Rhythm:  O2 Sats:  -- AO: 12.8/86.1/14.99  O2 Sats:  -- PA: 12.8/57.3/9.97  Outputs:  Baseline  Outputs:  -- CALCULATIONS[age]  Outputs:  -- CALCULATIONS: Body Surface Area: 1.98  Outputs:  -- CALCULATIONS: Height in cm: 173.00  Outputs:  -- CALCULATIONS: Sex: Male  Outputs:  -- CALCULATIONS: Weight in k.90  Outputs:  -- OUTPUTS: Blood Oxygen Difference: 5.01  Outputs:  -- OUTPUTS: CO by [place]: 4.94  Outputs:  -- OUTPUTS: [place] cardiac index: 2.49  Outputs:  -- OUTPUTS: O2 consumption: 247.43  Outputs:  -- OUTPUTS: Vo2 Indexed: 125.00  Outputs:  -- RESISTANCES: Left ventricular stroke work: 60.94  Outputs:  -- RESISTANCES: Left Ventricular Stroke Work index: 30.79  Outputs:  -- RESISTANCES: Pulmonary vascular index (dsc): 288.71  Outputs:  -- RESISTANCES: Pulmonary vascular index (Wood Units): 3.61  Outputs:  -- RESISTANCES: Pulmonary vascular resistance (dsc): 145.85  Outputs:  -- RESISTANCES: Pulmonary vascular resistance (Wood Units): 1.82  Outputs:  -- RESISTANCES: PVR_SVR Ratio: 0.11  Outputs:  -- RESISTANCES: Right ventricular stroke work: 17.43  Outputs:  -- RESISTANCES: Right ventricular stroke work index: 8.81  Outputs:  -- RESISTANCES: Systemic vascular index (dsc): 2566.27  Outputs:  -- RESISTANCES: Systemic vascular index (Wood Units): 32.09  Outputs:  -- RESISTANCES: Systemic vascular resistance (dsc): 1296.45  Outputs:  -- RESISTANCES: Systemic vascular resistance (Wood Units): 16.21  Outputs:  -- RESISTANCES: Total pulmonary index (dsc): 898.20  Outputs:  -- RESISTANCES: Total pulmonary index (Wood Units): 11.23  Outputs:  -- RESISTANCES: Total pulmonary resistance (dsc): 453.76  Outputs:  -- RESISTANCES: Total pulmonary resistance (Wood Units): 5.67  Outputs:  -- RESISTANCES: Total vascular index (Wood Units): 35.30  Outputs:  -- RESISTANCES: Total vascular resistance (dsc): 1426.09  Outputs:  -- RESISTANCES: Total vascular resistance (Wood Units): 17.83  Outputs:  -- RESISTANCES: Total vascular resistance index (dsc): 2822.90  Outputs:  -- RESISTANCES: TPR_TVR Ratio: 0.32  Outputs:  -- SHUNTS: Qs Indexed: 2.49  Outputs:  -- SHUNTS: Systemic flow: 4.94 (20 @ 11:49)

## 2020-02-26 ENCOUNTER — OUTPATIENT (OUTPATIENT)
Dept: OUTPATIENT SERVICES | Facility: HOSPITAL | Age: 85
LOS: 1 days | End: 2020-02-26
Payer: MEDICARE

## 2020-02-26 VITALS
WEIGHT: 190.04 LBS | HEART RATE: 78 BPM | DIASTOLIC BLOOD PRESSURE: 72 MMHG | OXYGEN SATURATION: 97 % | SYSTOLIC BLOOD PRESSURE: 159 MMHG | RESPIRATION RATE: 18 BRPM | HEIGHT: 68 IN | TEMPERATURE: 98 F

## 2020-02-26 DIAGNOSIS — Z90.49 ACQUIRED ABSENCE OF OTHER SPECIFIED PARTS OF DIGESTIVE TRACT: Chronic | ICD-10-CM

## 2020-02-26 DIAGNOSIS — Z98.89 OTHER SPECIFIED POSTPROCEDURAL STATES: Chronic | ICD-10-CM

## 2020-02-26 DIAGNOSIS — Z98.42 CATARACT EXTRACTION STATUS, LEFT EYE: Chronic | ICD-10-CM

## 2020-02-26 DIAGNOSIS — Z95.810 PRESENCE OF AUTOMATIC (IMPLANTABLE) CARDIAC DEFIBRILLATOR: Chronic | ICD-10-CM

## 2020-02-26 DIAGNOSIS — Z95.1 PRESENCE OF AORTOCORONARY BYPASS GRAFT: Chronic | ICD-10-CM

## 2020-02-26 DIAGNOSIS — I25.10 ATHEROSCLEROTIC HEART DISEASE OF NATIVE CORONARY ARTERY WITHOUT ANGINA PECTORIS: ICD-10-CM

## 2020-02-26 DIAGNOSIS — Z96.60 PRESENCE OF UNSPECIFIED ORTHOPEDIC JOINT IMPLANT: Chronic | ICD-10-CM

## 2020-02-26 DIAGNOSIS — Z98.890 OTHER SPECIFIED POSTPROCEDURAL STATES: Chronic | ICD-10-CM

## 2020-02-26 DIAGNOSIS — Z98.41 CATARACT EXTRACTION STATUS, RIGHT EYE: Chronic | ICD-10-CM

## 2020-02-26 DIAGNOSIS — Z98.61 CORONARY ANGIOPLASTY STATUS: Chronic | ICD-10-CM

## 2020-02-26 DIAGNOSIS — Z95.0 PRESENCE OF CARDIAC PACEMAKER: Chronic | ICD-10-CM

## 2020-02-26 DIAGNOSIS — Z95.3 PRESENCE OF XENOGENIC HEART VALVE: Chronic | ICD-10-CM

## 2020-02-26 LAB
ALBUMIN SERPL ELPH-MCNC: 4.3 G/DL — SIGNIFICANT CHANGE UP (ref 3.3–5)
ALP SERPL-CCNC: 54 U/L — SIGNIFICANT CHANGE UP (ref 40–120)
ALT FLD-CCNC: 11 U/L — SIGNIFICANT CHANGE UP (ref 10–45)
ANION GAP SERPL CALC-SCNC: 11 MMOL/L — SIGNIFICANT CHANGE UP (ref 5–17)
AST SERPL-CCNC: 26 U/L — SIGNIFICANT CHANGE UP (ref 10–40)
BILIRUB SERPL-MCNC: 0.6 MG/DL — SIGNIFICANT CHANGE UP (ref 0.2–1.2)
BUN SERPL-MCNC: 22 MG/DL — SIGNIFICANT CHANGE UP (ref 7–23)
CALCIUM SERPL-MCNC: 9.4 MG/DL — SIGNIFICANT CHANGE UP (ref 8.4–10.5)
CHLORIDE SERPL-SCNC: 98 MMOL/L — SIGNIFICANT CHANGE UP (ref 96–108)
CO2 SERPL-SCNC: 27 MMOL/L — SIGNIFICANT CHANGE UP (ref 22–31)
CREAT SERPL-MCNC: 1.17 MG/DL — SIGNIFICANT CHANGE UP (ref 0.5–1.3)
GLUCOSE BLDC GLUCOMTR-MCNC: 122 MG/DL — HIGH (ref 70–99)
GLUCOSE BLDC GLUCOMTR-MCNC: 157 MG/DL — HIGH (ref 70–99)
GLUCOSE BLDC GLUCOMTR-MCNC: 99 MG/DL — SIGNIFICANT CHANGE UP (ref 70–99)
GLUCOSE SERPL-MCNC: 133 MG/DL — HIGH (ref 70–99)
HCT VFR BLD CALC: 40.3 % — SIGNIFICANT CHANGE UP (ref 39–50)
HGB BLD-MCNC: 12.7 G/DL — LOW (ref 13–17)
MCHC RBC-ENTMCNC: 31.4 PG — SIGNIFICANT CHANGE UP (ref 27–34)
MCHC RBC-ENTMCNC: 31.5 GM/DL — LOW (ref 32–36)
MCV RBC AUTO: 99.5 FL — SIGNIFICANT CHANGE UP (ref 80–100)
NRBC # BLD: 0 /100 WBCS — SIGNIFICANT CHANGE UP (ref 0–0)
PLATELET # BLD AUTO: 205 K/UL — SIGNIFICANT CHANGE UP (ref 150–400)
POTASSIUM SERPL-MCNC: 4.7 MMOL/L — SIGNIFICANT CHANGE UP (ref 3.5–5.3)
POTASSIUM SERPL-SCNC: 4.7 MMOL/L — SIGNIFICANT CHANGE UP (ref 3.5–5.3)
PROT SERPL-MCNC: 7.7 G/DL — SIGNIFICANT CHANGE UP (ref 6–8.3)
RBC # BLD: 4.05 M/UL — LOW (ref 4.2–5.8)
RBC # FLD: 12.9 % — SIGNIFICANT CHANGE UP (ref 10.3–14.5)
SODIUM SERPL-SCNC: 136 MMOL/L — SIGNIFICANT CHANGE UP (ref 135–145)
WBC # BLD: 8.4 K/UL — SIGNIFICANT CHANGE UP (ref 3.8–10.5)
WBC # FLD AUTO: 8.4 K/UL — SIGNIFICANT CHANGE UP (ref 3.8–10.5)

## 2020-02-26 PROCEDURE — 82962 GLUCOSE BLOOD TEST: CPT

## 2020-02-26 PROCEDURE — 80053 COMPREHEN METABOLIC PANEL: CPT

## 2020-02-26 PROCEDURE — 99221 1ST HOSP IP/OBS SF/LOW 40: CPT | Mod: 25

## 2020-02-26 PROCEDURE — 77470 SPECIAL RADIATION TREATMENT: CPT | Mod: 26

## 2020-02-26 PROCEDURE — 99153 MOD SED SAME PHYS/QHP EA: CPT

## 2020-02-26 PROCEDURE — 85027 COMPLETE CBC AUTOMATED: CPT

## 2020-02-26 PROCEDURE — C1753: CPT

## 2020-02-26 PROCEDURE — 99202 OFFICE O/P NEW SF 15 MIN: CPT

## 2020-02-26 PROCEDURE — C1887: CPT

## 2020-02-26 PROCEDURE — 93005 ELECTROCARDIOGRAM TRACING: CPT

## 2020-02-26 PROCEDURE — C1725: CPT

## 2020-02-26 PROCEDURE — 77770 HDR RDNCL NTRSTL/ICAV BRCHTX: CPT | Mod: 26

## 2020-02-26 PROCEDURE — 77470 SPECIAL RADIATION TREATMENT: CPT

## 2020-02-26 PROCEDURE — 92979 ENDOLUMINL IVUS OCT C EA: CPT | Mod: LC

## 2020-02-26 PROCEDURE — C1728: CPT

## 2020-02-26 PROCEDURE — 77770 HDR RDNCL NTRSTL/ICAV BRCHTX: CPT

## 2020-02-26 PROCEDURE — 77370 RADIATION PHYSICS CONSULT: CPT

## 2020-02-26 PROCEDURE — 77318 BRACHYTX ISODOSE COMPLEX: CPT | Mod: 26

## 2020-02-26 PROCEDURE — 77263 THER RADIOLOGY TX PLNG CPLX: CPT

## 2020-02-26 PROCEDURE — 93010 ELECTROCARDIOGRAM REPORT: CPT

## 2020-02-26 PROCEDURE — 99152 MOD SED SAME PHYS/QHP 5/>YRS: CPT

## 2020-02-26 PROCEDURE — C1894: CPT

## 2020-02-26 PROCEDURE — 93010 ELECTROCARDIOGRAM REPORT: CPT | Mod: 77

## 2020-02-26 PROCEDURE — C1769: CPT

## 2020-02-26 PROCEDURE — 92978 ENDOLUMINL IVUS OCT C 1ST: CPT | Mod: LC

## 2020-02-26 PROCEDURE — 92920 PRQ TRLUML C ANGIOP 1ART&/BR: CPT | Mod: LC

## 2020-02-26 PROCEDURE — 77290 THER RAD SIMULAJ FIELD CPLX: CPT | Mod: 26

## 2020-02-26 PROCEDURE — 77290 THER RAD SIMULAJ FIELD CPLX: CPT

## 2020-02-26 PROCEDURE — 92974 CATH PLACE CARDIO BRACHYTX: CPT

## 2020-02-26 PROCEDURE — 77318 BRACHYTX ISODOSE COMPLEX: CPT

## 2020-02-26 PROCEDURE — C1717: CPT

## 2020-02-26 RX ORDER — DEXTROSE 50 % IN WATER 50 %
25 SYRINGE (ML) INTRAVENOUS ONCE
Refills: 0 | Status: DISCONTINUED | OUTPATIENT
Start: 2020-02-26 | End: 2020-03-12

## 2020-02-26 RX ORDER — CLOPIDOGREL BISULFATE 75 MG/1
75 TABLET, FILM COATED ORAL DAILY
Refills: 0 | Status: DISCONTINUED | OUTPATIENT
Start: 2020-02-26 | End: 2020-03-12

## 2020-02-26 RX ORDER — SODIUM CHLORIDE 9 MG/ML
250 INJECTION INTRAMUSCULAR; INTRAVENOUS; SUBCUTANEOUS ONCE
Refills: 0 | Status: COMPLETED | OUTPATIENT
Start: 2020-02-26 | End: 2020-02-26

## 2020-02-26 RX ORDER — METOPROLOL TARTRATE 50 MG
25 TABLET ORAL DAILY
Refills: 0 | Status: DISCONTINUED | OUTPATIENT
Start: 2020-02-26 | End: 2020-03-12

## 2020-02-26 RX ORDER — PANTOPRAZOLE SODIUM 20 MG/1
40 TABLET, DELAYED RELEASE ORAL
Refills: 0 | Status: DISCONTINUED | OUTPATIENT
Start: 2020-02-26 | End: 2020-03-12

## 2020-02-26 RX ORDER — INSULIN LISPRO 100/ML
VIAL (ML) SUBCUTANEOUS AT BEDTIME
Refills: 0 | Status: DISCONTINUED | OUTPATIENT
Start: 2020-02-26 | End: 2020-03-12

## 2020-02-26 RX ORDER — DEXTROSE 50 % IN WATER 50 %
15 SYRINGE (ML) INTRAVENOUS ONCE
Refills: 0 | Status: DISCONTINUED | OUTPATIENT
Start: 2020-02-26 | End: 2020-03-12

## 2020-02-26 RX ORDER — LOSARTAN POTASSIUM 100 MG/1
25 TABLET, FILM COATED ORAL DAILY
Refills: 0 | Status: DISCONTINUED | OUTPATIENT
Start: 2020-02-26 | End: 2020-03-12

## 2020-02-26 RX ORDER — SODIUM CHLORIDE 9 MG/ML
1000 INJECTION, SOLUTION INTRAVENOUS
Refills: 0 | Status: DISCONTINUED | OUTPATIENT
Start: 2020-02-26 | End: 2020-03-12

## 2020-02-26 RX ORDER — GLUCAGON INJECTION, SOLUTION 0.5 MG/.1ML
1 INJECTION, SOLUTION SUBCUTANEOUS ONCE
Refills: 0 | Status: DISCONTINUED | OUTPATIENT
Start: 2020-02-26 | End: 2020-03-12

## 2020-02-26 RX ORDER — LATANOPROST 0.05 MG/ML
1 SOLUTION/ DROPS OPHTHALMIC; TOPICAL AT BEDTIME
Refills: 0 | Status: DISCONTINUED | OUTPATIENT
Start: 2020-02-26 | End: 2020-03-12

## 2020-02-26 RX ORDER — DEXTROSE 50 % IN WATER 50 %
12.5 SYRINGE (ML) INTRAVENOUS ONCE
Refills: 0 | Status: DISCONTINUED | OUTPATIENT
Start: 2020-02-26 | End: 2020-03-12

## 2020-02-26 RX ORDER — ASPIRIN/CALCIUM CARB/MAGNESIUM 324 MG
81 TABLET ORAL DAILY
Refills: 0 | Status: DISCONTINUED | OUTPATIENT
Start: 2020-02-26 | End: 2020-03-12

## 2020-02-26 RX ORDER — INSULIN LISPRO 100/ML
VIAL (ML) SUBCUTANEOUS
Refills: 0 | Status: DISCONTINUED | OUTPATIENT
Start: 2020-02-26 | End: 2020-03-12

## 2020-02-26 RX ADMIN — SODIUM CHLORIDE 250 MILLILITER(S): 9 INJECTION INTRAMUSCULAR; INTRAVENOUS; SUBCUTANEOUS at 10:00

## 2020-02-26 RX ADMIN — Medication 2: at 17:00

## 2020-02-26 NOTE — H&P CARDIOLOGY - PSH
History of CEA (carotid endarterectomy)  Bilateral  History of permanent cardiac pacemaker placement  [employer]  S/P appendectomy    S/P CABG x 2  LIMA LAD SVG OM1  S/P cataract surgery, left    S/P cataract surgery, right    S/P cholecystectomy    S/P cholecystectomy    S/P hernia repair    S/P ICD (internal cardiac defibrillator) procedure    S/P joint replacement  Bilateral knee replacement  S/P nasal surgery  Secondary to nasal fracture  S/P PTCA (percutaneous transluminal coronary angioplasty)  x 9  S/P tonsillectomy    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  #29 [employer]onic core valve

## 2020-02-26 NOTE — H&P CARDIOLOGY - HISTORY OF PRESENT ILLNESS
86 yo  male PMhx DM, HTN, HLD, CAD w/ stents, FANNY wears a mouth piece but no machine at night , as per Defibrillator teaching., bilateral CEA, CABG 88 yo  male PMhx DM, HTN, HLD, CAD w/ stents, FANNY wears a mouth piece but no machine at night , as per Defibrillator teaching., bilateral CEA, CABG sent by Dr Minor for brachytherapy. Patient had a PTCA angioplasty and laser last week at [facility], prior to that he was occasionally having CP at rest and with exertion combined with MENDEZ . Since the procedure last week patient has been feeling better. Currently patient denies fever, chills,  CP, SOB, N/V/D abdominal pain.

## 2020-04-27 ENCOUNTER — APPOINTMENT (OUTPATIENT)
Dept: INTERNAL MEDICINE | Facility: CLINIC | Age: 85
End: 2020-04-27
Payer: MEDICARE

## 2020-04-27 VITALS
HEART RATE: 76 BPM | WEIGHT: 191 LBS | BODY MASS INDEX: 28.95 KG/M2 | HEIGHT: 68 IN | SYSTOLIC BLOOD PRESSURE: 124 MMHG | DIASTOLIC BLOOD PRESSURE: 80 MMHG

## 2020-04-27 PROCEDURE — 99213 OFFICE O/P EST LOW 20 MIN: CPT | Mod: 25

## 2020-04-27 PROCEDURE — 36415 COLL VENOUS BLD VENIPUNCTURE: CPT

## 2020-04-27 RX ORDER — METHYLPREDNISOLONE 4 MG/1
4 TABLET ORAL
Qty: 1 | Refills: 3 | Status: DISCONTINUED | COMMUNITY
Start: 2020-02-06 | End: 2020-04-27

## 2020-04-28 LAB
ALBUMIN SERPL ELPH-MCNC: 4.3 G/DL
ALP BLD-CCNC: 52 U/L
ALT SERPL-CCNC: 12 U/L
ANION GAP SERPL CALC-SCNC: 13 MMOL/L
AST SERPL-CCNC: 17 U/L
BASOPHILS # BLD AUTO: 0.02 K/UL
BASOPHILS NFR BLD AUTO: 0.3 %
BILIRUB SERPL-MCNC: 0.4 MG/DL
BUN SERPL-MCNC: 30 MG/DL
CALCIUM SERPL-MCNC: 9.2 MG/DL
CHLORIDE SERPL-SCNC: 100 MMOL/L
CO2 SERPL-SCNC: 25 MMOL/L
CREAT SERPL-MCNC: 1.22 MG/DL
DEPRECATED D DIMER PPP IA-ACNC: 346 NG/ML DDU
EOSINOPHIL # BLD AUTO: 0.19 K/UL
EOSINOPHIL NFR BLD AUTO: 2.5 %
GLUCOSE SERPL-MCNC: 196 MG/DL
HCT VFR BLD CALC: 41.1 %
HGB BLD-MCNC: 12.9 G/DL
IMM GRANULOCYTES NFR BLD AUTO: 0.7 %
LYMPHOCYTES # BLD AUTO: 1.56 K/UL
LYMPHOCYTES NFR BLD AUTO: 20.5 %
MAN DIFF?: NORMAL
MCHC RBC-ENTMCNC: 31.4 GM/DL
MCHC RBC-ENTMCNC: 31.9 PG
MCV RBC AUTO: 101.7 FL
MONOCYTES # BLD AUTO: 0.67 K/UL
MONOCYTES NFR BLD AUTO: 8.8 %
NEUTROPHILS # BLD AUTO: 5.11 K/UL
NEUTROPHILS NFR BLD AUTO: 67.2 %
NT-PROBNP SERPL-MCNC: 1085 PG/ML
PLATELET # BLD AUTO: 183 K/UL
POTASSIUM SERPL-SCNC: 4.6 MMOL/L
PROT SERPL-MCNC: 6.7 G/DL
RBC # BLD: 4.04 M/UL
RBC # FLD: 13.5 %
SODIUM SERPL-SCNC: 139 MMOL/L
WBC # FLD AUTO: 7.6 K/UL

## 2020-04-29 NOTE — ADDENDUM
[FreeTextEntry1] : Labs show\par -D Dimer elevated at 346\par -BNP elevated at 1085\par Plan\par -STAT doppler b/l and cardio eval, apts made for pt\par \par \par Venous Doppler negative for DVT

## 2020-04-29 NOTE — PHYSICAL EXAM
[No Acute Distress] : no acute distress [No Respiratory Distress] : no respiratory distress  [Clear to Auscultation] : lungs were clear to auscultation bilaterally [Normal Rate] : normal rate  [Regular Rhythm] : with a regular rhythm [Normal Affect] : the affect was normal [Normal Mood] : the mood was normal [de-identified] : +murmur [de-identified] : +1-+2 edema b/l ankles/feet, negative homans

## 2020-04-29 NOTE — HISTORY OF PRESENT ILLNESS
[FreeTextEntry8] : Patient presents complaining of lower extremity edema for at least a few weeks. Patient is asymptomatic without calf pain/leg pain/orthopnea/dyspnea/CP. Patient states the nurse at the cardiology office saw it but MD did not. Patient had catheterization 2/2020, moderate MR+.

## 2020-04-29 NOTE — ASSESSMENT
[FreeTextEntry1] : Venipuncture done in our office, Labs sent out.May warrant cardiology/vascular evaluation versus Doppler bilateral pending labs. Patient advised to elevate and monitor symptoms. Further management pending blood work. Patient will return to the office as scheduled for regular followup\par \par \par Dr. Burns was present in office building while I examined patient\par

## 2020-07-13 ENCOUNTER — APPOINTMENT (OUTPATIENT)
Dept: ORTHOPEDIC SURGERY | Facility: CLINIC | Age: 85
End: 2020-07-13

## 2020-07-29 ENCOUNTER — APPOINTMENT (OUTPATIENT)
Dept: ORTHOPEDIC SURGERY | Facility: CLINIC | Age: 85
End: 2020-07-29
Payer: MEDICARE

## 2020-07-29 VITALS
BODY MASS INDEX: 28.79 KG/M2 | SYSTOLIC BLOOD PRESSURE: 159 MMHG | HEIGHT: 68 IN | HEART RATE: 50 BPM | DIASTOLIC BLOOD PRESSURE: 80 MMHG | WEIGHT: 190 LBS

## 2020-07-29 VITALS — TEMPERATURE: 96.9 F

## 2020-07-29 DIAGNOSIS — M18.11 UNILATERAL PRIMARY OSTEOARTHRITIS OF FIRST CARPOMETACARPAL JOINT, RIGHT HAND: ICD-10-CM

## 2020-07-29 PROCEDURE — 20600 DRAIN/INJ JOINT/BURSA W/O US: CPT | Mod: F5

## 2020-07-29 PROCEDURE — 99214 OFFICE O/P EST MOD 30 MIN: CPT | Mod: 25

## 2020-08-02 NOTE — PHYSICAL EXAM
[de-identified] : No new XRs were taken during today's visit.\par \par Outside X-rays of right hand and right wrist ([facility] 3 views of each) from 10/9/19 showed mild degenerative changes in thumb CMC and multiple IP joints with no acute fracture or dislocation.  [de-identified] : GENERAL: Awake, alert, cooperative, answers questions appropriately. No acute distress.  Ambulates independently with a normal gait.\par SKIN: Warm, dry, intact. Color and turgor normal. \par LUNGS: Demonstrates unlabored breathing on room air with no accessory muscle use.\par EXTREMITIES: Warm and well-perfused. \par NEUROLOGICAL: Grossly intact.\par \par FOCUSED UPPER EXTREMITY EXAM: \par \par RUE:\par -skin intact without any erythema or ecchymosis; mild joint swelling thumb CMC joint, mild shoulder deformity, no hyperextension of thumb MP joint\par -no thenar atrophy; no intrinsics wasting; 5/5 strength thumb opposition; 5/5 strength intrinsics\par -no tenderness to palpation over the A1 pulleys\par -no tenderness to palpation along first dorsal wrist compartment\par -no tenderness to palpation at the anatomic snuffbox\par -mild to moderate tenderness to palpation at the base of the affected thumb\par -mild pain with passive thumb circumduction with positive grind test\par -mild pain with resisted thumb extension with negative Finkelstein test\par -no tenderness to palpation in the SL interval; negative scaphoid shift with gentle passive motion\par -no tenderness at the fovea\par -able to make full fist, free AROM in all fingers, no scissoring\par -full wrist ROM; full forearm supination/pronation; no ECU subluxation; DRUJ stable and nontender\par -negative carpal tunnel compression test, negative Phalen's, negative Tinel's at wrist\par -sensation intact in all digits and in the radial, ulnar, and median nerve distributions\par -Brisk capillary refill, fingers warm well perfused\par

## 2020-08-02 NOTE — HISTORY OF PRESENT ILLNESS
[FreeTextEntry1] : 07/29/2020\par Mr. TIFFANIE ZELAYA returns for follow up.  His ulnar sided wrist pain improved dramatically after the injection.  He now feels pain at the base of the thumb.  He also reports intermittent neck and upper shoulder pain without paresthesia or weakness.\par \par 11/20/2019\par Mr. TIFFANIE ZELAYA returns for follow up of right wrist pain.  He has been to therapy with slight improvement.  The brace also helped him, the cream did not help too much.  He continues to have pain in the ulnar wrist and is wondering whether he could get a cortisone injection today.  Denies paresthesia.\par \par 11/06/2019\par Mr. TIFFANIE ZELAYA is a pleasant 87 year old male, RHD, retired [employer] worker.\par \par He presents to the office with his wife for evaluation of right wrist pain.  He fell more than two months ago with significant bruising (likely from taking blood thinners) and swelling in the hand, wrist and forearm which slowly resolved with time.  Since that injury he had had pain in the right wrist.  He had outside imaging done and was referred to our office for further evaluation.\par \par He denies prior injury.\par Currently his pain is intermittent, achy, without radiation.\par He has paresthesia in radial digits\par He endorses night symptoms that wake him up.\par No relieving or exacerbating factors.\par \par He is diabetic and his hemoglobin A1c reportedly was 7.5% in October of 2019.\par He denies history of thyroid disease.\par He denies current tobacco use.\par He denies recreational drug use.\par He does not take any narcotic pain medication.

## 2020-08-02 NOTE — REVIEW OF SYSTEMS
[Joint Pain] : joint pain [Joint Stiffness] : joint stiffness [Joint Swelling] : joint swelling [Fatigue] : fatigue [Frequency] : urinary frequency [Negative] : Heme/Lymph

## 2020-08-02 NOTE — DISCUSSION/SUMMARY
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 \par -We discussed topical Voltaren gel that's OTC now, and he can use it as needed \par -I recommended a steroid injection to the affected basal joint for symptom relief and he agreed to proceed and tolerated the injection well.\par -It is hoped that the injection gives a long-lasting beneficial therapeutic effect, but if his symptoms are not fully resolved in the next 4-6 weeks the patient was encouraged to return to my office for reevaluation and consideration of other forms of treatment.\par -I also provided him with home exercises and educational material \par -for his neck pain I referred him to physiatry for further management\par -I will see him as needed\par -Patient and family had the opportunity to ask questions and were in agreement with the plan\par -Over 50% of the time spent with the patient was on counseling the patient on the above diagnosis, treatment plan and prognosis.

## 2020-08-02 NOTE — PROCEDURE
[FreeTextEntry1] : My impression is that the patient has right basal joint osteoarthritis. We discussed treatment options and he elected to undergo a basal joint injection. The risks, benefits, and alternatives were discussed with the patient. The risks included, but were not limited to pain, nerve injury, infection, subcutaneous atrophy, skin depigmentation etc. Patient was advised that his finger stick glucose would likely increase for the next couple of days after the injection. The injection site was identified with a formal time out.  Under informed consent and sterile conditions each affected basal joint was injected with a combination of 20 mg Depo-Medrol and 1/2 cc 1% plain lidocaine.  A sterile bandage was applied at each injection site and patient tolerated the injection well.  It is my hope that this significantly alleviates the patient's symptoms.

## 2020-08-12 ENCOUNTER — RX RENEWAL (OUTPATIENT)
Age: 85
End: 2020-08-12

## 2020-08-13 ENCOUNTER — APPOINTMENT (OUTPATIENT)
Dept: PHYSICAL MEDICINE AND REHAB | Facility: CLINIC | Age: 85
End: 2020-08-13
Payer: MEDICARE

## 2020-08-13 VITALS
TEMPERATURE: 97.2 F | DIASTOLIC BLOOD PRESSURE: 77 MMHG | SYSTOLIC BLOOD PRESSURE: 164 MMHG | WEIGHT: 190 LBS | BODY MASS INDEX: 28.79 KG/M2 | HEIGHT: 68 IN

## 2020-08-13 PROCEDURE — 20553 NJX 1/MLT TRIGGER POINTS 3/>: CPT

## 2020-08-13 PROCEDURE — 99204 OFFICE O/P NEW MOD 45 MIN: CPT | Mod: 25

## 2020-08-13 NOTE — HISTORY OF PRESENT ILLNESS
[FreeTextEntry1] : Mr. TIFFANIE ZELAYA is a 87 year old male\par extensive cardiac hx - AICD + stent\par \par Location: neck and left shoulder pain\par Inciting Event: no triggering event\par Onset: acute - x 1 month\par Frequency: intermittent\par Quality: sharp\par Severity:\par Aggravating Factor: worse during AM and PM\par Relieving factor: heat\par Associated symptoms: denies radicular pain, paresthesia, dexterity difficulty, weakness, ataxia\par \par Prior Treatments\par PT/OT:  had in the past with some benefit\par Medications: no NSAIDs - on plavix\par

## 2020-08-13 NOTE — PROCEDURE
[de-identified] : Patient has demonstrated limited relief from NSAIDS and rest. After discussion of the risks and benefits, the patient elected to proceed with a trigger point injection into the \par \par left cervical paraspinals, trapezius, and sternocleidomastoid\par \par I confirmed no prior adverse reactions, no active infections, and no relevant allergies. The skin was prepped in the usual sterile manner. \par The sites were injected with local anesthetic followed by local needling. The injection was completed without complication and a bandage was applied. The patient tolerated the procedure well and was given post-injection instructions. \par \par RecommendL Cold Tx x 48 hours, analgesics prn. \par \par Medications used: 1 ml of 1% Lidocaine per site\par

## 2020-08-13 NOTE — PROCEDURE
[de-identified] : Patient has demonstrated limited relief from NSAIDS and rest. After discussion of the risks and benefits, the patient elected to proceed with a trigger point injection into the \par \par left cervical paraspinals, trapezius, and sternocleidomastoid\par \par I confirmed no prior adverse reactions, no active infections, and no relevant allergies. The skin was prepped in the usual sterile manner. \par The sites were injected with local anesthetic followed by local needling. The injection was completed without complication and a bandage was applied. The patient tolerated the procedure well and was given post-injection instructions. \par \par RecommendL Cold Tx x 48 hours, analgesics prn. \par \par Medications used: 1 ml of 1% Lidocaine per site\par

## 2020-08-13 NOTE — PHYSICAL EXAM
[FreeTextEntry1] : General: NAD, alert\par Psych: normal mood and affect\par HEENT: NC/AT, normal visual tracking\par Pulmonary: no resp distress, chest expansion appears symmetrical\par CV: extremities are warm and perfused\par Abd: non-distended\par Ext: no c/c/e\par normal skin color and appearance\par \par Cervical Spine/Shoulder:\par Gait: non-antalgic\par Inspection: neck slighly forward flexed\par Tenderness to palpation: left cervical paraspinals, upper trapezius\par ROM neck: pain with extension and oblique\par MMT: 5/5 bilateral upper extremities\par Reflexes: symmetric bilateral biceps, triceps, brachioradialis\par Sensory: intact to light touch in all dermatomes of the bilateral upper extremities.\par Provocative testing:\par Spurlings negative \par shoulder impingement: negative\par Garcia’s negative

## 2020-08-13 NOTE — ASSESSMENT
[FreeTextEntry1] : Trigger point injection performed today.\par Neck pain arthritic in nature.  Recommend PT - however he doesn't want to start for neck, because he's currently in cardiac rehab.  Medication management is limited d/t cardiac history (on Plavix); considered muscle relaxant but has BPH.  He can take tylenol.  Reviewed some simple home exercises/stretches.  Other modalities: heat compress, massage, acupuncture. \par \par Consider adding small dose of Kenalog with trigger point injection on next visit if pain not well controlled

## 2020-08-20 ENCOUNTER — APPOINTMENT (OUTPATIENT)
Dept: PHYSICAL MEDICINE AND REHAB | Facility: CLINIC | Age: 85
End: 2020-08-20
Payer: MEDICARE

## 2020-08-20 ENCOUNTER — APPOINTMENT (OUTPATIENT)
Dept: PULMONOLOGY | Facility: CLINIC | Age: 85
End: 2020-08-20

## 2020-08-20 VITALS
SYSTOLIC BLOOD PRESSURE: 160 MMHG | WEIGHT: 190 LBS | HEIGHT: 68 IN | DIASTOLIC BLOOD PRESSURE: 72 MMHG | BODY MASS INDEX: 28.79 KG/M2

## 2020-08-20 PROCEDURE — 99213 OFFICE O/P EST LOW 20 MIN: CPT | Mod: 25

## 2020-08-20 PROCEDURE — 20553 NJX 1/MLT TRIGGER POINTS 3/>: CPT

## 2020-08-24 NOTE — PROCEDURE
[de-identified] : Patient has demonstrated limited relief from NSAIDS and rest. After discussion of the risks and benefits, the patient elected to proceed with a trigger point injection into the \par \par left cervical paraspinals, trapezius, and sternocleidomastoid\par \par I confirmed no prior adverse reactions, no active infections, and no relevant allergies. The skin was prepped in the usual sterile manner. \par The sites were injected with local anesthetic followed by local needling. The injection was completed without complication and a bandage was applied. The patient tolerated the procedure well and was given post-injection instructions. \par \par RecommendL Cold Tx x 48 hours, analgesics prn. \par \par Medications used: 4cc of 1% Lidocaine and 10mg of kenalog\par

## 2020-08-24 NOTE — PHYSICAL EXAM
[FreeTextEntry1] : General: NAD, alert\par Psych: normal mood and affect\par HEENT: NC/AT, normal visual tracking\par Pulmonary: no resp distress, chest expansion appears symmetrical\par CV: extremities are warm and perfused\par Abd: non-distended\par Ext: no c/c/e\par normal skin color and appearance\par \par Cervical Spine/Shoulder:\par Gait: non-antalgic\par Inspection: neck forward flexed\par Tenderness to palpation: left cervical paraspinals, left upper trapezius\par ROM neck: pain with extension and bilateral oblique (left with pain, right with contralateral pulling discomfort)\par MMT: 5/5 bilateral upper extremities\par Reflexes: symmetric bilateral biceps, triceps, brachioradialis\par Sensory: intact to light touch in all dermatomes of the bilateral upper extremities.\par Provocative testing:\par Spurlings negative \par shoulder impingement: negative\par Garcia’s negative

## 2020-08-24 NOTE — HISTORY OF PRESENT ILLNESS
[FreeTextEntry1] : Mr. TIFFANIE ZELAYA is a 87 year old male\par extensive cardiac hx - AICD + stent\par \par Location: neck > left shoulder pain\par Inciting Event: no triggering event\par Onset: 1 month\par Frequency: intermittent\par Quality: sharp\par Severity: 3/10\par Aggravating Factor: worse during AM and PM\par Relieving factor: heat\par Associated symptoms: denies radicular pain, paresthesia, dexterity difficulty, weakness, ataxia\par \par Prior Treatments\par Trigger point inj 8/13/20 - with some benefits - able to sleep now\par PT/OT: on[facility] Card[facility]\par Medications: no NSAIDs - on plavix\par

## 2020-08-24 NOTE — ASSESSMENT
[FreeTextEntry1] : Neck pain arthritic in nature.  Recommend PT - however he doesn't want to start for neck, because ongoing cardiac rehab.  Medication management is limited d/t cardiac history (on Plavix); considered muscle relaxant but has BPH.  He can take tylenol PRN. Other modalities: heat compress, massage, acupuncture. Trigger point injection from last visit (just lidocaine) with some benefit. Trigger point injection repeated today (lidocaine + 10mg kenalog).  Reviewed again some simple home exercises/stretches.  \par F/u in 2-4 weeks - he may benefit from MBB/RFA - but if trigger point injection is providing optimal relief may manage this way given his medical condition.

## 2020-08-27 ENCOUNTER — APPOINTMENT (OUTPATIENT)
Dept: PHYSICAL MEDICINE AND REHAB | Facility: CLINIC | Age: 85
End: 2020-08-27
Payer: MEDICARE

## 2020-08-27 VITALS
WEIGHT: 190 LBS | HEIGHT: 68 IN | BODY MASS INDEX: 28.79 KG/M2 | DIASTOLIC BLOOD PRESSURE: 68 MMHG | TEMPERATURE: 97.4 F | SYSTOLIC BLOOD PRESSURE: 162 MMHG

## 2020-08-27 PROCEDURE — 99213 OFFICE O/P EST LOW 20 MIN: CPT | Mod: 25

## 2020-08-27 PROCEDURE — 20553 NJX 1/MLT TRIGGER POINTS 3/>: CPT

## 2020-08-30 NOTE — ASSESSMENT
[FreeTextEntry1] : Trigger point injection performed today.\par Neck pain arthritic in nature.  Recommend PT - however he doesn't want to start for neck, because he's currently in cardiac rehab.  Medication management is limited d/t cardiac history (on Plavix); considered muscle relaxant but has BPH.  He can take tylenol.  Reviewed some simple home exercises/stretches.  Other modalities: heat compress, massage, acupuncture. \par \par May need cervical MBB/RFA in future. Also recommend acupuncture.

## 2020-08-30 NOTE — PROCEDURE
[de-identified] : Patient has demonstrated limited relief from NSAIDS and rest. After discussion of the risks and benefits, the patient elected to proceed with a trigger point injection into the \par \par left cervical paraspinals, trapezius, and sternocleidomastoid\par \par I confirmed no prior adverse reactions, no active infections, and no relevant allergies. The skin was prepped in the usual sterile manner. \par The sites were injected with local anesthetic followed by local needling. The injection was completed without complication and a bandage was applied. The patient tolerated the procedure well and was given post-injection instructions. \par \par RecommendL Cold Tx x 48 hours, analgesics prn. \par \par Medications used: 1 ml of 1% Lidocaine per site\par

## 2020-08-30 NOTE — HISTORY OF PRESENT ILLNESS
[FreeTextEntry1] : Patient following up on neck pain. Reports improvement with trigger point injections. However some pain returned after doing exercise this past week. Pain is moderate to severe. Will repeat TP Injection today.

## 2020-08-30 NOTE — PHYSICAL EXAM
[FreeTextEntry1] : General: NAD, alert\par Psych: normal mood and affect\par HEENT: NC/AT, normal visual tracking\par Pulmonary: no resp distress, chest expansion appears symmetrical\par CV: extremities are warm and perfused\par Abd: non-distended\par Ext: no c/c/e\par normal skin color and appearance\par \par Cervical Spine/Shoulder:\par Gait: non-antalgic\par Inspection: neck forward flexed\par Tenderness to palpation: left cervical paraspinals, left upper trapezius\par ROM neck: pain with extension and bilateral oblique (left with pain, right with contralateral pulling discomfort)\par MMT: 5/5 bilateral upper extremities\par Reflexes: symmetric bilateral biceps, triceps, brachioradialis\par Sensory: intact to light touch in all dermatomes of the bilateral upper extremities.\par Provocative testing:\par Spurlings negative \par shoulder impingement: negative\par Garcia’s negative.

## 2020-09-01 ENCOUNTER — APPOINTMENT (OUTPATIENT)
Dept: ENDOCRINOLOGY | Facility: CLINIC | Age: 85
End: 2020-09-01
Payer: MEDICARE

## 2020-09-01 VITALS
HEIGHT: 68 IN | BODY MASS INDEX: 28.19 KG/M2 | SYSTOLIC BLOOD PRESSURE: 150 MMHG | DIASTOLIC BLOOD PRESSURE: 68 MMHG | WEIGHT: 186 LBS

## 2020-09-01 LAB — GLUCOSE BLDC GLUCOMTR-MCNC: 207

## 2020-09-01 PROCEDURE — 82962 GLUCOSE BLOOD TEST: CPT

## 2020-09-01 PROCEDURE — 99204 OFFICE O/P NEW MOD 45 MIN: CPT | Mod: 25

## 2020-09-01 RX ORDER — PREDNISOLONE ACETATE 10 MG/ML
1 SUSPENSION/ DROPS OPHTHALMIC
Qty: 5 | Refills: 0 | Status: ACTIVE | COMMUNITY
Start: 2020-06-15

## 2020-09-01 RX ORDER — METOPROLOL SUCCINATE 25 MG/1
25 TABLET, EXTENDED RELEASE ORAL
Qty: 90 | Refills: 1 | Status: DISCONTINUED | COMMUNITY
Start: 2018-04-10 | End: 2020-09-01

## 2020-09-01 NOTE — REVIEW OF SYSTEMS
[Fatigue] : no fatigue [Decreased Appetite] : appetite not decreased [Recent Weight Gain (___ Lbs)] : no recent weight gain [Recent Weight Loss (___ Lbs)] : no recent weight loss [Visual Field Defect] : no visual field defect [Dry Eyes] : no dryness [Dysphagia] : no dysphagia [Neck Pain] : no neck pain [Dysphonia] : no dysphonia [Chest Pain] : no chest pain [Palpitations] : no palpitations [Lower Ext Edema] : no lower extremity edema [Shortness Of Breath] : no shortness of breath [Cough] : no cough [Orthopnea] : no orthopnea [Nausea] : no nausea [Constipation] : no constipation [Abdominal Pain] : no abdominal pain [Vomiting] : no vomiting [Diarrhea] : no diarrhea [Polyuria] : no polyuria [Dysuria] : no dysuria [Joint Pain] : no joint pain [Muscle Weakness] : no muscle weakness [Myalgia] : no myalgia  [Acanthosis] : no acanthosis  [Acne] : no acne [Dry Skin] : no dry skin [Headaches] : no headaches [Dizziness] : no dizziness [Tremors] : no tremors [Depression] : no depression [Insomnia] : no insomnia [Anxiety] : no anxiety [Polydipsia] : no polydipsia [Cold Intolerance] : no cold intolerance [Heat Intolerance] : no heat intolerance [Easy Bleeding] : no ~M tendency for easy bleeding [Easy Bruising] : no tendency for easy bruising [Swelling] : no swelling

## 2020-09-01 NOTE — HISTORY OF PRESENT ILLNESS
[FreeTextEntry1] : Keila Martins is a 87 yr old male, who presents today for a consultation in regards type 2  diabetes.  \par Per patient , has been diabetic since 20 years.\par Family history includes mother with diabetes\par \par Currently taking  metformin 500 mg daily, on 2000 mg he was getting diarrhea, also on repaglinide 1 mg tid\par FS in office 207\par current severity: uncontrolled\par A1C 7.3% in 11/19  \par \par \par Home Glucose Monitoring\par Frequency: 4 x a day\par BG Documentation:  brought log\par BG Readings -   Fasting  90s to 130s\par                              Lunch 110s to 150s rare 200s  \par                              Dinner  110s to 150s rare 200s\par                               HS     110s to 150s rare 180s and 200s\par \par Dioet is fine, eats toast and eggs for breakfast, lunch is a sandwich, dinner is home made.\par Avoids sugary drinks\par \par Diabetic History\par ER Visits:  none\par Hospitalizations:   none\par Diet Therapy: watching his diet\par Diabetic Education:   no\par Exercise:   not active, admits to being lazy\par Last eye exam:3 /2020\par 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

## 2020-09-01 NOTE — ASSESSMENT
[FreeTextEntry1] : Keila Martins is a 87 yr old male, who presents today for a consultation in regards type 2  diabetes.  \par Per patient , has been diabetic since 20 years.\par Wife accompanying him today, answered a lot of questions.\par \par Currently taking  metformin 500 mg daily, on 2000 mg he was getting diarrhea, also on repaglinide 1 mg tid\par FS in office 207\par current severity: uncontrolled\par A1C 7.3% in 11/19  \par \par Discussed with him that at his age lows are more dangerous than high, occasional 200s is ok.\par \par Medication changes:\par None this visit.\par Diet and activity reviewed.\par  To check sugars  once  a day  at different times.\par Diet and exercise reviewed.\par Hypoglycemia reviewed.\par  Eyes: has  active issues, has  retinopathy,gets yearly eye exam.\par Feet: no active issues, no neuropathy.  Diabetic foot care reviewed.\par  Lipids:  on statin/ anti-lipid treatment. Last LDL: 98, will get for this year\par  Renal/ B/P : B/P wnl , on ACE/ ARB. has mild proteinuria.  Will repeat for this year.\par Weight:     Balanced diet and exercise reviewed.\par \par  Diabetes counselling: \par The patient was counseled on diabetes foot care, long term vascular complications of diabetes, carbohydrate consistent diet, importance of diet and exercise to improve glycemic control, achieve weight loss and improve cardiovascular health and action and use of short and long-acting insulin. Patient was referred to ophthalmology for retinopathy screening. ADA diet (30-50 gm carbohydrates with each meal, 15 grams with snacks. Balance with lean proteins and low fat diet.) Exercise daily per ability, work up to 30 minutes a day. Medication, risks and benefits reviewed. Glucose testing and insulin administration for glycemic management.\par \par \par FOLLOWUP@6 months\par

## 2020-09-01 NOTE — PHYSICAL EXAM
[Alert] : alert [Well Nourished] : well nourished [No Acute Distress] : no acute distress [Normal Sclera/Conjunctiva] : normal sclera/conjunctiva [No Proptosis] : no proptosis [No Lid Lag] : no lid lag [No Neck Mass] : no neck mass was observed [Supple] : the neck was supple [Thyroid Not Enlarged] : the thyroid was not enlarged [No Thyroid Nodules] : no palpable thyroid nodules [No Respiratory Distress] : no respiratory distress [No Accessory Muscle Use] : no accessory muscle use [Normal Rate and Effort] : normal respiratory rate and effort [Clear to Auscultation] : lungs were clear to auscultation bilaterally [Normal S1, S2] : normal S1 and S2 [No Murmurs] : no murmurs [Normal Rate] : heart rate was normal [Regular Rhythm] : with a regular rhythm [Normal Bowel Sounds] : normal bowel sounds [Not Tender] : non-tender [Not Distended] : not distended [Soft] : abdomen soft [Normal Supraclavicular Nodes] : no supraclavicular lymphadenopathy [Normal Anterior Cervical Nodes] : no anterior cervical lymphadenopathy [Normal Posterior Cervical Nodes] : no posterior cervical lymphadenopathy [No Stigmata of Cushings Syndrome] : no stigmata of Cushings Syndrome [Normal Gait] : normal gait [No Clubbing, Cyanosis] : no clubbing  or cyanosis of the fingernails [No Joint Swelling] : no joint swelling seen [No Rash] : no rash [No Skin Lesions] : no skin lesions [Right Foot Was Examined] : right foot ~C was examined [Left Foot Was Examined] : left foot ~C was examined [Normal] : normal [2+] : 2+ in the dorsalis pedis [No Motor Deficits] : the motor exam was normal [No Sensory Deficits] : the sensory exam was normal to light touch and pinprick [No Tremors] : no tremors [Oriented x3] : oriented to person, place, and time [Normal Affect] : the affect was normal [Normal Insight/Judgement] : insight and judgment were intact [Normal Mood] : the mood was normal [Acanthosis Nigricans] : no acanthosis nigricans [Diminished Throughout Both Feet] : normal tactile sensation with monofilament testing throughout both feet

## 2020-09-02 ENCOUNTER — LABORATORY RESULT (OUTPATIENT)
Age: 85
End: 2020-09-02

## 2020-09-03 ENCOUNTER — APPOINTMENT (OUTPATIENT)
Dept: PHYSICAL MEDICINE AND REHAB | Facility: CLINIC | Age: 85
End: 2020-09-03
Payer: MEDICARE

## 2020-09-03 VITALS
SYSTOLIC BLOOD PRESSURE: 144 MMHG | HEIGHT: 68 IN | DIASTOLIC BLOOD PRESSURE: 72 MMHG | WEIGHT: 186 LBS | BODY MASS INDEX: 28.19 KG/M2 | TEMPERATURE: 97.2 F

## 2020-09-03 PROCEDURE — 99213 OFFICE O/P EST LOW 20 MIN: CPT

## 2020-09-03 NOTE — HISTORY OF PRESENT ILLNESS
[FreeTextEntry1] : Patient following up on neck pain. S/p trigger point injections (with and without steroid) with significant benefits. Was looking for repeat trigger point (with steroid)

## 2020-09-03 NOTE — ASSESSMENT
[FreeTextEntry1] : No Trigger point injection today - educated on risk of repeated steroid use\par Neck pain arthritic in nature.  Recommend PT - however he doesn't want to start for neck, because he's currently in cardiac rehab. Reviewed simple home exercises.  Medication management is limited d/t cardiac history (on Plavix); considered muscle relaxant but has BPH.  He can take tylenol.  Reviewed some simple home exercises/stretches.  Other modalities: heat compress, massage, acupuncture. \par \par May benefit from cervical facet joint injection.  No RFA because it's contraindicated in AICD

## 2020-09-10 ENCOUNTER — APPOINTMENT (OUTPATIENT)
Dept: PHYSICAL MEDICINE AND REHAB | Facility: CLINIC | Age: 85
End: 2020-09-10
Payer: MEDICARE

## 2020-09-10 VITALS
DIASTOLIC BLOOD PRESSURE: 71 MMHG | HEIGHT: 68 IN | BODY MASS INDEX: 28.19 KG/M2 | SYSTOLIC BLOOD PRESSURE: 142 MMHG | WEIGHT: 186 LBS | TEMPERATURE: 97.1 F

## 2020-09-10 DIAGNOSIS — M79.18 MYALGIA, OTHER SITE: ICD-10-CM

## 2020-09-10 PROCEDURE — 99213 OFFICE O/P EST LOW 20 MIN: CPT | Mod: 25

## 2020-09-10 PROCEDURE — 20553 NJX 1/MLT TRIGGER POINTS 3/>: CPT

## 2020-09-10 NOTE — ASSESSMENT
[FreeTextEntry1] : Trigger point injection today - educated on risk of repeated steroid use\par Neck pain arthritic in nature.  Recommend PT - however he doesn't want to start for neck, because he's currently in cardiac rehab. Reviewed simple home exercises.  Medication management is limited d/t cardiac history (on Plavix); considered muscle relaxant but has BPH.  He can take tylenol. Suggest trying OTC topicals - voltaren 1% vs Lidocaine 4%. Reviewed some simple home exercises/stretches.  Other modalities: heat compress, massage, acupuncture. \par \par Discussed risks/benefits of cervical facet joint injection.  I'm aware that patient is on bleed thinner given extensive cardiac history, will not require him to hold plavix and ASA prior to procedure.  No RFA because it's contraindicated in AICD

## 2020-09-10 NOTE — HISTORY OF PRESENT ILLNESS
[FreeTextEntry1] : Patient following up on neck pain. S/p trigger point injections (with and without steroid) with significant benefits.  However now the pain has regressed - rated at 6-7/10.  Reports that pain overnight and AM are the worse.  Heat/hot shower helps.

## 2020-09-10 NOTE — PROCEDURE
[de-identified] : Patient has demonstrated limited relief from NSAIDS and rest. After discussion of the risks and benefits, the patient elected to proceed with a trigger point injection into the \par \par left cervical paraspinals, trapezius, and sternocleidomastoid\par \par I confirmed no prior adverse reactions, no active infections, and no relevant allergies. The skin was prepped in the usual sterile manner. \par The sites were injected with local anesthetic followed by local needling. The injection was completed without complication and a bandage was applied. The patient tolerated the procedure well and was given post-injection instructions. \par \par Recommend Cold Tx x 48 hours, analgesics prn. \par \par Medications used: kenalog 20mg + 4 ml of 1% Lidocaine\par

## 2020-09-10 NOTE — PHYSICAL EXAM
[FreeTextEntry1] : General: NAD, alert\par Psych: normal mood and affect\par HEENT: NC/AT, normal visual tracking\par Pulmonary: no resp distress, chest expansion appears symmetrical\par CV: extremities are warm and perfused\par Abd: non-distended\par Ext: no c/c/e\par normal skin color and appearance\par \par Cervical Spine/Shoulder:\par Gait: non-antalgic\par Inspection: neck forward flexed\par Tenderness to palpation: left cervical paraspinals, left upper trapezius\par ROM neck: pain with extension and bilateral oblique (left with pain, right with contralateral pulling discomfort)\par MMT: 5/5 bilateral upper extremities\par Reflexes: symmetric bilateral biceps, triceps, brachioradialis\par Sensory: intact to light touch in all dermatomes of the bilateral upper extremities.\par Provocative testing:\par Spurlings negative \par shoulder impingement: negative\par Garcia’s negative.  DISPLAY PLAN FREE TEXT

## 2020-09-14 ENCOUNTER — APPOINTMENT (OUTPATIENT)
Dept: INTERNAL MEDICINE | Facility: CLINIC | Age: 85
End: 2020-09-14
Payer: MEDICARE

## 2020-09-14 VITALS
RESPIRATION RATE: 13 BRPM | BODY MASS INDEX: 27.58 KG/M2 | SYSTOLIC BLOOD PRESSURE: 140 MMHG | HEART RATE: 78 BPM | DIASTOLIC BLOOD PRESSURE: 80 MMHG | TEMPERATURE: 97 F | WEIGHT: 182 LBS | HEIGHT: 68 IN

## 2020-09-14 DIAGNOSIS — I73.9 PERIPHERAL VASCULAR DISEASE, UNSPECIFIED: Chronic | ICD-10-CM

## 2020-09-14 PROCEDURE — 36415 COLL VENOUS BLD VENIPUNCTURE: CPT

## 2020-09-14 PROCEDURE — G0439: CPT

## 2020-09-14 PROCEDURE — G0442 ANNUAL ALCOHOL SCREEN 15 MIN: CPT | Mod: 59

## 2020-09-14 PROCEDURE — 69209 REMOVE IMPACTED EAR WAX UNI: CPT

## 2020-09-14 PROCEDURE — 90662 IIV NO PRSV INCREASED AG IM: CPT

## 2020-09-14 PROCEDURE — G0008: CPT

## 2020-09-14 RX ORDER — FUROSEMIDE 20 MG/1
20 TABLET ORAL
Qty: 30 | Refills: 0 | Status: DISCONTINUED | COMMUNITY
Start: 2020-04-28 | End: 2020-09-14

## 2020-09-14 RX ORDER — REPAGLINIDE 1 MG/1
1 TABLET ORAL 3 TIMES DAILY
Qty: 90 | Refills: 0 | Status: ACTIVE | COMMUNITY
Start: 2019-02-19

## 2020-09-14 RX ORDER — EZETIMIBE 10 MG/1
10 TABLET ORAL DAILY
Qty: 90 | Refills: 1 | Status: ACTIVE | COMMUNITY

## 2020-09-14 RX ORDER — OXYBUTYNIN CHLORIDE 5 MG/1
5 TABLET ORAL
Qty: 30 | Refills: 0 | Status: DISCONTINUED | COMMUNITY
Start: 2020-08-27

## 2020-09-14 RX ORDER — ALFUZOSIN HYDROCHLORIDE 10 MG/1
10 TABLET, EXTENDED RELEASE ORAL
Refills: 0 | Status: ACTIVE | COMMUNITY

## 2020-09-14 NOTE — HEALTH RISK ASSESSMENT
[Fair] :  ~his/her~ mood as fair [No falls in past year] : Patient reported no falls in the past year [No] : In the past 12 months have you used drugs other than those required for medical reasons? No [0] : 2) Feeling down, depressed, or hopeless: Not at all (0) [# of Members in Household ___] :  household currently consist of [unfilled] member(s) [With Significant Other] : lives with significant other [None] : None [Retired] : retired [High School] : high school [Sexually Active] : sexually active [# Of Children ___] : has [unfilled] children [] :  [Feels Safe at Home] : Feels safe at home [Fully functional (bathing, dressing, toileting, transferring, walking, feeding)] : Fully functional (bathing, dressing, toileting, transferring, walking, feeding) [Fully functional (using the telephone, shopping, preparing meals, housekeeping, doing laundry, using] : Fully functional and needs no help or supervision to perform IADLs (using the telephone, shopping, preparing meals, housekeeping, doing laundry, using transportation, managing medications and managing finances) [Smoke Detector] : smoke detector [Reports changes in vision] : Reports changes in vision [Seat Belt] :  uses seat belt [Carbon Monoxide Detector] : carbon monoxide detector [Reviewed no changes] : Reviewed no changes [Discussed at today's visit] : Advance Directives Discussed at today's visit [Name: ___] : Health Care Proxy's Name: [unfilled]  [] : No [de-identified] : golf [Audit-CScore] : 0 [de-identified] : good [LHI9Nencc] : 0 [Change in mental status noted] : No change in mental status noted [Guns at Home] : no guns at home [Reports changes in dental health] : Reports no changes in dental health [Reports changes in hearing] : Reports no changes in hearing [Sunscreen] : does not use sunscreen [AdvancecareDate] : 08/19

## 2020-09-14 NOTE — COUNSELING
[Good understanding] : Patient has a good understanding of lifestyle changes and the steps needed to achieve self management goals [None] : None [de-identified] : continue diet and exercise as able

## 2020-09-14 NOTE — PHYSICAL EXAM
[General Appearance - Alert] : alert [General Appearance - In No Acute Distress] : in no acute distress [Sclera] : the sclera and conjunctiva were normal [PERRL With Normal Accommodation] : pupils were equal in size, round, and reactive to light [Extraocular Movements] : extraocular movements were intact [Neck Cervical Mass (___cm)] : no neck mass was observed [Outer Ear] : the ears and nose were normal in appearance [Oropharynx] : the oropharynx was normal [Neck Appearance] : the appearance of the neck was normal [Jugular Venous Distention Increased] : there was no jugular-venous distention [Thyroid Diffuse Enlargement] : the thyroid was not enlarged [Thyroid Nodule] : there were no palpable thyroid nodules [Auscultation Breath Sounds / Voice Sounds] : lungs were clear to auscultation bilaterally [Heart Rate And Rhythm] : heart rate was normal and rhythm regular [Heart Sounds Pericardial Friction Rub] : no pericardial rub [Heart Sounds] : normal S1 and S2 [Heart Sounds Gallop] : no gallops [Arterial Pulses Femoral] : femoral pulses were normal without bruits [Arterial Pulses Carotid] : carotid pulses were normal with no bruits [Abdominal Aorta] : the abdominal aorta was normal [Edema] : there was no peripheral edema [Veins - Varicosity Changes] : there were no varicosital changes [Abdomen Tenderness] : non-tender [Abdomen Soft] : soft [Bowel Sounds] : normal bowel sounds [Cervical Lymph Nodes Enlarged Posterior Bilaterally] : posterior cervical [Cervical Lymph Nodes Enlarged Anterior Bilaterally] : anterior cervical [Abdomen Mass (___ Cm)] : no abdominal mass palpated [Femoral Lymph Nodes Enlarged Bilaterally] : femoral [Supraclavicular Lymph Nodes Enlarged Bilaterally] : supraclavicular [Inguinal Lymph Nodes Enlarged Bilaterally] : inguinal [Axillary Lymph Nodes Enlarged Bilaterally] : axillary [No Spinal Tenderness] : no spinal tenderness [Nail Clubbing] : no clubbing  or cyanosis of the fingernails [Abnormal Walk] : normal gait [Musculoskeletal - Swelling] : no joint swelling seen [Motor Tone] : muscle strength and tone were normal [Right Foot Was Examined] : right foot was examined [Skin Color & Pigmentation] : normal skin color and pigmentation [Skin Turgor] : normal skin turgor [] : no rash [Normal Appearance] : normal in appearance [Left Foot Was Examined] : left foot was examined [Normal in Appearance] : normal in appearance [1+] : 1+ in the dorsalis pedis [Normal] : normal [Cranial Nerves] : cranial nerves 2-12 were intact [Oriented To Time, Place, And Person] : oriented to person, place, and time [Impaired Insight] : insight and judgment were intact [No Focal Deficits] : no focal deficits [Affect] : the affect was normal [#1 Diminished] : number 1 was normal [#2 Diminished] : number 2 was normal [#3 Diminished] : number 3 was normal [#4 Diminished] : number 4 was normal [#5 Diminished] : number 5 was normal [#7 Diminished] : number 7 was normal [#6 Diminished] : number 6 was normal [#8 Diminished] : number 8 was normal [#9 Diminished] : number 9 was normal [#10 Diminished] : number 10 was normal [FreeTextEntry1] : Sees podiatry

## 2020-09-14 NOTE — ASSESSMENT
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 Followup with pain management.\par \par Postnasal drip with cough therefore start Flonase and Claritin\par \par Bilateral ear irrigation with excellent results and removal of all cerumen. Patient tolerated the procedure well\par Plan is to continue present medications with patient to followup with cardiology.\par Followup with specialist as scheduled\par \par Colonoscopy November 2013\par Ophthalmology UTD\par Podiatry every 3 months\par Carotid per cardiology\par \par Patient up to date with vaccines. \par Recieved Shingrix x 2\par high-dose influenza vaccine given left deltoid\par Venipuncture done today in the office\par Followup in 3to4 months

## 2020-09-14 NOTE — HISTORY OF PRESENT ILLNESS
[FreeTextEntry1] : 87-year-old male with extensive medical history including ASHD/ischemic cardiomyopathy with history of CABG in 2005 and multiple stents,, PVD with stenting to the left leg, type 2 diabetes, moderate aortic stenosis, hypertension and hyperlipidemia for which he is intolerant to statin therapy.\par \par The patient has had multiple significant cardiovascular issues including\par -August 2017 the patient had progressive aortic stenosis and had a TAVR with a permanent pacemaker\par -November 2017 had PCI of his right leg secondary to claudication and February 2018 hadn't of the left leg\par -April 2018 he had non-sustained VT with the addition of Toprol\par -June 2018 he had a cardiac catheterization with PCI to his left main and circumflex arteries.\par -May 2019 stenting to the left main and circumflex \par -abnormal cardiac PET scan July 2019 showing patent LIMA and SVG with moderate disease of the left main and circumflex.\par -Nonsustained VT therefore pacemaker upgraded to a pacemaker/ICD 2019\par \par More recently the patient had PTCA to the left main and ostial branch February 2020.\par The patient is currently in cardiac rehabilitation.\par \par \par From a cardiac perspective he is doing better without any chest pain and decreased shortness of breath.\par \par The patient occasionally has right leg pain mainly in his thigh not activity related likely secondary to lumbar spinal stenosis with radiculopathy.\par More significantly he is having neck pain and upper back pain secondary to cervical degenerative disc disease for which she has received injections without benefit. Followup procedure is being planned.\par \par The patient also complains of decreased hearing bilaterally

## 2020-09-14 NOTE — REVIEW OF SYSTEMS
[As noted in HPI] : as noted in HPI [As Noted in HPI] : as noted in HPI [Limb Pain] : limb pain [Negative] : Heme/Lymph [FreeTextEntry4] : postnasal drip [FreeTextEntry7] : Excess gas [FreeTextEntry9] : Chronic low back pain and neck pain

## 2020-09-15 LAB
ALBUMIN SERPL ELPH-MCNC: 4.4 G/DL
ALP BLD-CCNC: 58 U/L
ALT SERPL-CCNC: 11 U/L
ANION GAP SERPL CALC-SCNC: 12 MMOL/L
APPEARANCE: CLEAR
AST SERPL-CCNC: 16 U/L
BACTERIA: NEGATIVE
BASOPHILS # BLD AUTO: 0.03 K/UL
BASOPHILS NFR BLD AUTO: 0.3 %
BILIRUB SERPL-MCNC: 0.5 MG/DL
BILIRUBIN URINE: NEGATIVE
BLOOD URINE: NEGATIVE
BUN SERPL-MCNC: 26 MG/DL
CALCIUM SERPL-MCNC: 9.6 MG/DL
CHLORIDE SERPL-SCNC: 99 MMOL/L
CO2 SERPL-SCNC: 25 MMOL/L
COLOR: YELLOW
CREAT SERPL-MCNC: 1.13 MG/DL
EOSINOPHIL # BLD AUTO: 0.22 K/UL
EOSINOPHIL NFR BLD AUTO: 2.2 %
GLUCOSE QUALITATIVE U: NEGATIVE
GLUCOSE SERPL-MCNC: 97 MG/DL
HCT VFR BLD CALC: 41.8 %
HGB BLD-MCNC: 13 G/DL
HYALINE CASTS: 1 /LPF
IMM GRANULOCYTES NFR BLD AUTO: 0.2 %
KETONES URINE: NEGATIVE
LEUKOCYTE ESTERASE URINE: NEGATIVE
LYMPHOCYTES # BLD AUTO: 1.83 K/UL
LYMPHOCYTES NFR BLD AUTO: 18.7 %
MAGNESIUM SERPL-MCNC: 2 MG/DL
MAN DIFF?: NORMAL
MCHC RBC-ENTMCNC: 31.1 GM/DL
MCHC RBC-ENTMCNC: 31.6 PG
MCV RBC AUTO: 101.7 FL
MICROSCOPIC-UA: NORMAL
MONOCYTES # BLD AUTO: 0.84 K/UL
MONOCYTES NFR BLD AUTO: 8.6 %
NEUTROPHILS # BLD AUTO: 6.84 K/UL
NEUTROPHILS NFR BLD AUTO: 70 %
NITRITE URINE: NEGATIVE
PH URINE: 5
PLATELET # BLD AUTO: 224 K/UL
POTASSIUM SERPL-SCNC: 4.7 MMOL/L
PROT SERPL-MCNC: 7 G/DL
PROTEIN URINE: NEGATIVE
RBC # BLD: 4.11 M/UL
RBC # FLD: 13.7 %
RED BLOOD CELLS URINE: 0 /HPF
SARS-COV-2 IGG SERPL IA-ACNC: 0.09 INDEX
SARS-COV-2 IGG SERPL QL IA: NEGATIVE
SODIUM SERPL-SCNC: 137 MMOL/L
SPECIFIC GRAVITY URINE: 1.01
SQUAMOUS EPITHELIAL CELLS: 0 /HPF
UROBILINOGEN URINE: NORMAL
VIT B12 SERPL-MCNC: 696 PG/ML
WBC # FLD AUTO: 9.78 K/UL
WHITE BLOOD CELLS URINE: 0 /HPF

## 2020-09-29 ENCOUNTER — LABORATORY RESULT (OUTPATIENT)
Age: 85
End: 2020-09-29

## 2020-09-29 NOTE — H&P CARDIOLOGY - PMH
----- Message from JABIER Muñoz sent at 9/27/2020  7:46 AM CDT -----  Please let him know both his aortic ultrasound and his CT lung scanning were negative.  Repeat CT lung scan in 1 year.  
2nd attempt to reach patient, message left.   
Left message for patient to call back regarding results.  
Patient returned call and updated  
Abnormal positron emission tomography (PET) scan  2019 anterior, anteroseptal,anterolateral and iferolateral ischemia  Age-related incipient cataract of both eyes    Angina pectoris  class II  Angina, class II    Aortic stenosis  TAVR #29 Medtronic core valve 2017  Asthma    BPH (benign prostatic hyperplasia)    CAD (coronary artery disease)  S/P Stenting x8  CAD (coronary artery disease)  PPM, PCI LM and LCX, CABG LIMA OAD and OM1  CAD (coronary artery disease)  CABG LIMA LAD SVG OM1, PCI PDA LM/LCX with PTCA  Carotid artery disease  RCEA  Cataract    COPD, mild    Diabetes mellitus    MENDEZ (dyspnea on exertion)    Fatigue  Profound w/exertion  Former smoker    Former smoker    H/O urinary frequency    Hematuria    History of BPH    Hyperlipidemia    Hypertension    MR (mitral regurgitation)  moderate    FANNY (obstructive sleep apnea)    FANNY (obstructive sleep apnea)  uses mouth peice  Osteoarthritis    PVD (peripheral vascular disease)    PVD (peripheral vascular disease)  R SFA and L SFA w/stents and PTA  PVD (peripheral vascular disease)  intervention RLE  SOB (shortness of breath)    Statin intolerance

## 2020-09-30 LAB
BSA DERIVED: 1.73 M2
CREAT 24H UR-MCNC: 1.1 G/24 H
CREAT 24H UR-MCNC: 1.1 G/24 H
CREAT ?TM UR-MCNC: 46 MG/DL
CREAT ?TM UR-MCNC: 46 MG/DL
CREAT CL 24H UR+SERPL-VRATE: 69 ML/MIN
PROT 24H UR-MRATE: 10 MG/DL
PROT ?TM UR-MCNC: 24 HR
PROT ?TM UR-MCNC: 24 HR
PROT UR-MCNC: 242 MG/24 H
SPECIMEN VOL 24H UR: 2425 ML
SPECIMEN VOL 24H UR: 2425 ML

## 2020-10-01 ENCOUNTER — APPOINTMENT (OUTPATIENT)
Dept: PHYSICAL MEDICINE AND REHAB | Facility: CLINIC | Age: 85
End: 2020-10-01

## 2020-11-02 ENCOUNTER — APPOINTMENT (OUTPATIENT)
Dept: PULMONOLOGY | Facility: CLINIC | Age: 85
End: 2020-11-02

## 2020-11-05 ENCOUNTER — NON-APPOINTMENT (OUTPATIENT)
Age: 85
End: 2020-11-05

## 2020-11-10 ENCOUNTER — APPOINTMENT (OUTPATIENT)
Dept: DISASTER EMERGENCY | Facility: CLINIC | Age: 85
End: 2020-11-10

## 2020-11-11 LAB — SARS-COV-2 N GENE NPH QL NAA+PROBE: NOT DETECTED

## 2020-11-13 ENCOUNTER — TRANSCRIPTION ENCOUNTER (OUTPATIENT)
Age: 85
End: 2020-11-13

## 2020-11-13 ENCOUNTER — OUTPATIENT (OUTPATIENT)
Dept: OUTPATIENT SERVICES | Facility: HOSPITAL | Age: 85
LOS: 1 days | End: 2020-11-13
Payer: MEDICARE

## 2020-11-13 VITALS
HEART RATE: 106 BPM | OXYGEN SATURATION: 96 % | DIASTOLIC BLOOD PRESSURE: 70 MMHG | SYSTOLIC BLOOD PRESSURE: 159 MMHG | RESPIRATION RATE: 17 BRPM

## 2020-11-13 VITALS
SYSTOLIC BLOOD PRESSURE: 115 MMHG | DIASTOLIC BLOOD PRESSURE: 58 MMHG | OXYGEN SATURATION: 97 % | HEART RATE: 61 BPM | RESPIRATION RATE: 20 BRPM | TEMPERATURE: 98 F

## 2020-11-13 DIAGNOSIS — I48.91 UNSPECIFIED ATRIAL FIBRILLATION: ICD-10-CM

## 2020-11-13 DIAGNOSIS — Z90.49 ACQUIRED ABSENCE OF OTHER SPECIFIED PARTS OF DIGESTIVE TRACT: Chronic | ICD-10-CM

## 2020-11-13 DIAGNOSIS — Z98.42 CATARACT EXTRACTION STATUS, LEFT EYE: Chronic | ICD-10-CM

## 2020-11-13 DIAGNOSIS — Z95.810 PRESENCE OF AUTOMATIC (IMPLANTABLE) CARDIAC DEFIBRILLATOR: Chronic | ICD-10-CM

## 2020-11-13 DIAGNOSIS — Z98.89 OTHER SPECIFIED POSTPROCEDURAL STATES: Chronic | ICD-10-CM

## 2020-11-13 DIAGNOSIS — Z95.1 PRESENCE OF AORTOCORONARY BYPASS GRAFT: Chronic | ICD-10-CM

## 2020-11-13 DIAGNOSIS — Z98.890 OTHER SPECIFIED POSTPROCEDURAL STATES: Chronic | ICD-10-CM

## 2020-11-13 DIAGNOSIS — Z95.3 PRESENCE OF XENOGENIC HEART VALVE: Chronic | ICD-10-CM

## 2020-11-13 DIAGNOSIS — Z98.41 CATARACT EXTRACTION STATUS, RIGHT EYE: Chronic | ICD-10-CM

## 2020-11-13 DIAGNOSIS — Z98.61 CORONARY ANGIOPLASTY STATUS: Chronic | ICD-10-CM

## 2020-11-13 DIAGNOSIS — Z95.0 PRESENCE OF CARDIAC PACEMAKER: Chronic | ICD-10-CM

## 2020-11-13 DIAGNOSIS — Z96.60 PRESENCE OF UNSPECIFIED ORTHOPEDIC JOINT IMPLANT: Chronic | ICD-10-CM

## 2020-11-13 LAB
ANION GAP SERPL CALC-SCNC: 11 MMOL/L — SIGNIFICANT CHANGE UP (ref 5–17)
APTT BLD: 34.8 SEC — SIGNIFICANT CHANGE UP (ref 27.5–35.5)
BUN SERPL-MCNC: 27 MG/DL — HIGH (ref 8–20)
CALCIUM SERPL-MCNC: 9.1 MG/DL — SIGNIFICANT CHANGE UP (ref 8.6–10.2)
CHLORIDE SERPL-SCNC: 102 MMOL/L — SIGNIFICANT CHANGE UP (ref 98–107)
CO2 SERPL-SCNC: 26 MMOL/L — SIGNIFICANT CHANGE UP (ref 22–29)
CREAT SERPL-MCNC: 1.28 MG/DL — SIGNIFICANT CHANGE UP (ref 0.5–1.3)
GLUCOSE SERPL-MCNC: 162 MG/DL — HIGH (ref 70–99)
HCT VFR BLD CALC: 30.1 % — LOW (ref 39–50)
HGB BLD-MCNC: 9.7 G/DL — LOW (ref 13–17)
INR BLD: 2 RATIO — HIGH (ref 0.88–1.16)
MAGNESIUM SERPL-MCNC: 2.1 MG/DL — SIGNIFICANT CHANGE UP (ref 1.6–2.6)
MCHC RBC-ENTMCNC: 30.9 PG — SIGNIFICANT CHANGE UP (ref 27–34)
MCHC RBC-ENTMCNC: 32.2 GM/DL — SIGNIFICANT CHANGE UP (ref 32–36)
MCV RBC AUTO: 95.9 FL — SIGNIFICANT CHANGE UP (ref 80–100)
PLATELET # BLD AUTO: 199 K/UL — SIGNIFICANT CHANGE UP (ref 150–400)
POTASSIUM SERPL-MCNC: 4.6 MMOL/L — SIGNIFICANT CHANGE UP (ref 3.5–5.3)
POTASSIUM SERPL-SCNC: 4.6 MMOL/L — SIGNIFICANT CHANGE UP (ref 3.5–5.3)
PROTHROM AB SERPL-ACNC: 22.4 SEC — HIGH (ref 10.6–13.6)
RBC # BLD: 3.14 M/UL — LOW (ref 4.2–5.8)
RBC # FLD: 14.1 % — SIGNIFICANT CHANGE UP (ref 10.3–14.5)
SODIUM SERPL-SCNC: 139 MMOL/L — SIGNIFICANT CHANGE UP (ref 135–145)
WBC # BLD: 7.66 K/UL — SIGNIFICANT CHANGE UP (ref 3.8–10.5)
WBC # FLD AUTO: 7.66 K/UL — SIGNIFICANT CHANGE UP (ref 3.8–10.5)

## 2020-11-13 PROCEDURE — 83735 ASSAY OF MAGNESIUM: CPT

## 2020-11-13 PROCEDURE — 93325 DOPPLER ECHO COLOR FLOW MAPG: CPT

## 2020-11-13 PROCEDURE — 93005 ELECTROCARDIOGRAM TRACING: CPT

## 2020-11-13 PROCEDURE — 85730 THROMBOPLASTIN TIME PARTIAL: CPT

## 2020-11-13 PROCEDURE — 93312 ECHO TRANSESOPHAGEAL: CPT

## 2020-11-13 PROCEDURE — 93320 DOPPLER ECHO COMPLETE: CPT

## 2020-11-13 PROCEDURE — 85027 COMPLETE CBC AUTOMATED: CPT

## 2020-11-13 PROCEDURE — 93010 ELECTROCARDIOGRAM REPORT: CPT

## 2020-11-13 PROCEDURE — 87040 BLOOD CULTURE FOR BACTERIA: CPT

## 2020-11-13 PROCEDURE — 85610 PROTHROMBIN TIME: CPT

## 2020-11-13 PROCEDURE — 36415 COLL VENOUS BLD VENIPUNCTURE: CPT

## 2020-11-13 PROCEDURE — 80048 BASIC METABOLIC PNL TOTAL CA: CPT

## 2020-11-13 RX ORDER — GLUCAGON INJECTION, SOLUTION 0.5 MG/.1ML
1 INJECTION, SOLUTION SUBCUTANEOUS ONCE
Refills: 0 | Status: DISCONTINUED | OUTPATIENT
Start: 2020-11-13 | End: 2020-11-27

## 2020-11-13 RX ORDER — METOPROLOL TARTRATE 50 MG
1 TABLET ORAL
Qty: 0 | Refills: 0 | DISCHARGE

## 2020-11-13 RX ORDER — SODIUM CHLORIDE 9 MG/ML
1000 INJECTION, SOLUTION INTRAVENOUS
Refills: 0 | Status: DISCONTINUED | OUTPATIENT
Start: 2020-11-13 | End: 2020-11-27

## 2020-11-13 RX ORDER — INSULIN LISPRO 100/ML
VIAL (ML) SUBCUTANEOUS
Refills: 0 | Status: DISCONTINUED | OUTPATIENT
Start: 2020-11-13 | End: 2020-11-27

## 2020-11-13 RX ORDER — DEXTROSE 50 % IN WATER 50 %
25 SYRINGE (ML) INTRAVENOUS ONCE
Refills: 0 | Status: DISCONTINUED | OUTPATIENT
Start: 2020-11-13 | End: 2020-11-27

## 2020-11-13 RX ORDER — ASPIRIN/CALCIUM CARB/MAGNESIUM 324 MG
1 TABLET ORAL
Qty: 0 | Refills: 0 | DISCHARGE

## 2020-11-13 RX ORDER — APIXABAN 2.5 MG/1
5 TABLET, FILM COATED ORAL EVERY 12 HOURS
Refills: 0 | Status: DISCONTINUED | OUTPATIENT
Start: 2020-11-13 | End: 2020-11-27

## 2020-11-13 RX ORDER — FONDAPARINUX SODIUM 2.5 MG/.5ML
1 INJECTION, SOLUTION SUBCUTANEOUS
Qty: 0 | Refills: 0 | DISCHARGE

## 2020-11-13 RX ORDER — APIXABAN 2.5 MG/1
1 TABLET, FILM COATED ORAL
Qty: 60 | Refills: 1
Start: 2020-11-13 | End: 2021-01-11

## 2020-11-13 RX ORDER — LOSARTAN POTASSIUM 100 MG/1
1 TABLET, FILM COATED ORAL
Qty: 0 | Refills: 0 | DISCHARGE

## 2020-11-13 RX ORDER — DEXTROSE 50 % IN WATER 50 %
12.5 SYRINGE (ML) INTRAVENOUS ONCE
Refills: 0 | Status: DISCONTINUED | OUTPATIENT
Start: 2020-11-13 | End: 2020-11-27

## 2020-11-13 RX ORDER — DEXTROSE 50 % IN WATER 50 %
15 SYRINGE (ML) INTRAVENOUS ONCE
Refills: 0 | Status: DISCONTINUED | OUTPATIENT
Start: 2020-11-13 | End: 2020-11-27

## 2020-11-13 NOTE — DISCHARGE NOTE NURSING/CASE MANAGEMENT/SOCIAL WORK - PATIENT PORTAL LINK FT
You can access the FollowMyHealth Patient Portal offered by [facility] by registering at the following website: [email or web address]. By joining [facility]’s FollowMyHealth portal, you will also be able to view your health information using other applications (apps) compatible with our system.

## 2020-11-13 NOTE — PROGRESS NOTE ADULT - SUBJECTIVE AND OBJECTIVE BOX
s/p GREGORIA and successful DCCV this morning. GREGORIA shows a mobile echodensity on the tip of the right atrial ICD lead. Vegetation vs thrombus. Will plan for blood cultures today and will switch Xarelto to Eliquis 5 mg BID and then close outpatient follow up with Dr. Mcgill.

## 2020-11-13 NOTE — ASU PATIENT PROFILE, ADULT - BLOOD AVOIDANCE/RESTRICTIONS, PROFILE
1.) Do you have an Advance directive, living will, or power of  for health care document that contains your wishes for end of life care?:  No     4.) During the past 4 weeks, what was the hardest physical activity you could do for at least 2 minutes?:  Light     5.) Do you do moderate to strenuous exercise (brisk walk) for about 20 minutes for 3 or more days per week?:  No, I usually do not exercise this much     8.) During the past 4 weeks, has your physical and emotional health limited your social activities with family, friends, neighbors, or other groups?:  Quite a bit     11e.) Tiredness or Fatigue:  Often          Zoroastrianism beliefs

## 2020-11-13 NOTE — DISCHARGE NOTE PROVIDER - NSDCMRMEDTOKEN_GEN_ALL_CORE_FT
alfuzosin 10 mg oral tablet, extended release: 1 tab(s) orally once a day (at bedtime)  apixaban 5 mg oral tablet: 1 tab(s) orally every 12 hours to start 11/13 PM.   DISCONTINUE Xarelto immediately.   clopidogrel 75 mg oral tablet: 1 tab(s) orally once a day MDD:75  Take with aspirin 325 mg po daily  furosemide 20 mg oral tablet: 1 tab(s) orally once a day, As Needed leg swelling or weight gain &gt;3 lbs in 24 hours.   latanoprost 0.005% ophthalmic solution: 1 drop(s) to each affected eye once a day (at bedtime)  metFORMIN 500 mg oral tablet, extended release: 1 tab(s) orally once a day  multivitamin: 1 tab(s) orally once a day  pantoprazole 40 mg oral delayed release tablet: 1 tab(s) orally once a day  repaglinide 0.5 mg oral tablet: 1 tab(s) orally 3 times a day (before meals)  Symbicort 80 mcg-4.5 mcg/inh inhalation aerosol: 2 puff(s) inhaled 2 times a day  Tylenol 500 mg oral tablet: 2 tab(s) orally every 6 hours, As Needed  Zetia 10 mg oral tablet: 1 tab(s) orally once a day

## 2020-11-13 NOTE — DISCHARGE NOTE PROVIDER - CARE PROVIDER_API CALL
Geoff Robb, Joshua LITTLE)  Internal Medicine  [address] [place] [address]  Phone: [phone]  Fax: [phone]  Follow Up Time:

## 2020-11-13 NOTE — DISCHARGE NOTE PROVIDER - HOSPITAL COURSE
88 HTN, HL, DM, PVD with RSFA and LSFA stenting, Carotid endarterectomy, CAD s/p remote CABG & multiple PCIs (most recently 2/2020), remote DC PPM for high degree AV block  with episodes of VT resulting in upgrade to ICD ([employer]) with 2 episodes of VT since implant per pt, severe AS s/p remote TAVR, now with symptomatic persistent atrial flutter. He presented electively 11/13/20 and underwent GREGORIA negative for intracardiac thrombus and DCCV with restoration of sinus rhythm. A mobile echodensity on the tip of the right atrial ICD lead was visualized on GREGORIA,  which could represented vegetation or thrombus. Accordingly, blood cultures were sent and Xarelto discontinued in favor of Eliquis. The patient was observed per post procedure protocol, then discharged home with a plan for outpatient follow up.

## 2020-11-13 NOTE — H&P PST ADULT - HISTORY OF PRESENT ILLNESS
88 HTN, HL, DM, PVD with RSFA and LSFA stenting, Carotid endarterectomy, CAD s/p remote CABG & multiple PCIs (most recently 2/2020), remote DC PPM for high degree AV block  with episodes of VT resulting in upgrade to ICD ([employer]) with 2 episodes of VT since implant per pt, severe AS s/p remote TAVR, now with symptomatic persistent atrial flutter. Presents for elective GREGORIA/DCCV. Pt reports significant exertional dyspnea, weakness and PND since being in AF. Patient currently denies chest pain, shortness of breath at rest, near/true syncope, fevers/chills, N/V/D, or other cardiac or constitutional symptoms.

## 2020-11-13 NOTE — DISCHARGE NOTE PROVIDER - NSDCCPCAREPLAN_GEN_ALL_CORE_FT
PRINCIPAL DISCHARGE DIAGNOSIS  Diagnosis: Atrial fibrillation and flutter  Assessment and Plan of Treatment:

## 2020-11-13 NOTE — DISCHARGE NOTE PROVIDER - NSDCFUADDINST_GEN_ALL_CORE_FT
Dr. Perry's office will contact you to schedule a follow up appointment. Please call [phone] with questions or concerns.

## 2020-11-13 NOTE — H&P PST ADULT - NSICDXPASTSURGICALHX_GEN_ALL_CORE_FT
PAST SURGICAL HISTORY:  History of CEA (carotid endarterectomy) Bilateral    History of permanent cardiac pacemaker placement [employer]    S/P appendectomy     S/P CABG x 2 LIMA LAD SVG OM1    S/P cataract surgery, left     S/P cataract surgery, right     S/P cholecystectomy     S/P cholecystectomy     S/P hernia repair     S/P ICD (internal cardiac defibrillator) procedure     S/P joint replacement Bilateral knee replacement    S/P nasal surgery Secondary to nasal fracture    S/P PTCA (percutaneous transluminal coronary angioplasty) x 9    S/P tonsillectomy     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 #29 Medtronic core valve

## 2020-11-13 NOTE — H&P PST ADULT - ASSESSMENT
88 HTN, HL, DM, PVD with RSFA and LSFA stenting, Carotid endarterectomy, CAD s/p remote CABG & multiple PCIs (most recently 2/2020), remote DC PPM for high degree AV block  with episodes of VT resulting in upgrade to ICD ([employer]) with 2 episodes of VT since implant per pt, severe AS s/p remote TAVR, now with symptomatic persistent atrial flutter. Presents for elective GREGORIA/DCCV.    Plan:   Labs pending.   Consent w/ Dr. Perry.   NPO.   Xarelto 15mg started ~ 2 weeks ago - pt endorses compliance. Will confirm CrCl today to guide dosing.   Discharge teaching initiated.    88 HTN, HL, DM, PVD with RSFA and LSFA stenting, Carotid endarterectomy, CAD s/p remote CABG & multiple PCIs (most recently 2/2020), remote DC PPM for high degree AV block  with episodes of VT resulting in upgrade to ICD ([employer]) with 2 episodes of VT since implant per pt, severe AS s/p remote TAVR, now with symptomatic persistent atrial flutter. Presents for elective GREGORIA/DCCV.    Plan:   Labs pending.   Consent w/ Dr. Perry.   NPO.   Xarelto 15mg qPM started (CrCl <50) ~ 2 weeks ago - pt endorses compliance.   Discharge teaching initiated.

## 2020-11-13 NOTE — DISCHARGE NOTE PROVIDER - NSDCCPTREATMENT_GEN_ALL_CORE_FT
Pt was calling for a referral for a new pain management doctor. Pt says he had operation on left elbow. Pt has pain in neck and lower back. Pt was seeing Dr Addison Aden and she wasn't helping him much, so he wants to see somebody different. Please advise pt.
PRINCIPAL PROCEDURE  Procedure: Cardioversion, with GREGORIA if indicated  Findings and Treatment: -Take each dose of your anticoagulation medication (blood thinner) exactly as prescribed.   - Do not drive, operate heavy machinery or make important decisions for 24 hours following the procedure.  - You may resume all other activities the day after the procedure.  Call your doctor if:   -you develop a fever, cough up blood, have any chest pain, palpitations or difficulty breathing. You may take a throat lozenge if you develop a sore throat or try warm salt water gargle.   - your rapid heart rhythm returns.  - you have any questions or concerns regarding the procedure.  If you experience increased difficulty breathing or chest pain, or if you faint or have dizzy spells, please seek immediate medical attention.

## 2020-11-13 NOTE — PROGRESS NOTE ADULT - SUBJECTIVE AND OBJECTIVE BOX
Pt doing well s/p uncomplicated GREGORIA & DCCV. Denies complaint. Mobile echodensity noted on RA lead tip during GREGORIA - fibrin vs veg vs thrombus.     Exam:   VSS, NAD, A&O x 3  Skin: no erythema/edema/blistering at defib pad sites.   Card: S1/S2, RRR, no m/g/r  Resp: lungs CTA b/l  Abd: S/NT/ND  Ext: 2+ edema to mid calves, distal pulses intact    ICD: SR w/ CHB & Vpacing; measurements stable & appropriate; parameters confirmed.     EKG: SR w/ V pacing    Assessment:   88y Male h/o 88 HTN, HL, DM, PVD with RSFA and LSFA stenting, Carotid endarterectomy, CAD s/p remote CABG & multiple PCIs (most recently 2/2020), remote DC PPM for high degree AV block  with episodes of VT resulting in upgrade to ICD ([employer]) with 2 episodes of VT since implant per pt, severe AS s/p remote TAVR, now with symptomatic persistent atrial flutter. Now s/p GREGORIA negative for intracardiac thrombus and DCCV with restoration of sinus rhythm.     Plan:   A mobile echodensity on the tip of the right atrial ICD lead was visualized on GREGORIA,  which could represented fibrin deposit, vegetation or thrombus. Will obtain blood cultures, discontinue Xarelto in favor of Eliquis.   Observation on telemetry per post op protocol.    Resume PO intake at 1130A.  Ambulate w/ assist once fully awake & back to baseline mental status w/ VSS.  Transition to Eliquis starting this PM. Importance of strict compliance with anticoagulation regimen reinforced with pt.   Resume other home medications. Pt w/ PRN lasix Rx at home - advised to take 20mg daily x 3 days, then continue PRN for increased leg swelling or weight gain >3lbs in 24 hours.   Anticipate d/c home once all criteria met, with outpt f/up in 1 month.   Pt & wife requesting f/up with Dr. Perry - office will call to schedule.    Pt doing well s/p uncomplicated GREGORIA & DCCV. Denies complaint. Mobile echodensity noted on RA lead tip during GREGORIA - fibrin vs veg vs thrombus.     Exam:   VSS, NAD, A&O x 3  Skin: no erythema/edema/blistering at defib pad sites.   Card: S1/S2, RRR, no m/g/r  Resp: lungs CTA b/l  Abd: S/NT/ND  Ext: 2+ edema to mid calves, distal pulses intact    ICD: SR at 90s bpm w/ CHB & Vpacing; measurements stable & appropriate; parameters confirmed.     EKG: SR w/ V pacing    Assessment:   88y Male h/o 88 HTN, HL, DM, PVD with RSFA and LSFA stenting, Carotid endarterectomy, CAD s/p remote CABG & multiple PCIs (most recently 2/2020), remote DC PPM for high degree AV block  with episodes of VT resulting in upgrade to ICD ([employer]) with 2 episodes of VT since implant per pt, severe AS s/p remote TAVR, now with symptomatic persistent atrial flutter. Now s/p GREGORIA negative for intracardiac thrombus and DCCV with restoration of sinus rhythm.     Plan:   A mobile echodensity on the tip of the right atrial ICD lead was visualized on GREGORIA,  which could represented fibrin deposit, vegetation or thrombus. Will obtain blood cultures, discontinue Xarelto in favor of Eliquis.   Observation on telemetry per post op protocol.    Resume PO intake at 1130A.  Ambulate w/ assist once fully awake & back to baseline mental status w/ VSS.  Transition to Eliquis starting this PM. Importance of strict compliance with anticoagulation regimen reinforced with pt.   Resume other home medications. Pt w/ PRN lasix Rx at home - advised to take 20mg daily x 3 days, then continue PRN for increased leg swelling or weight gain >3lbs in 24 hours.   Anticipate d/c home once all criteria met, with outpt f/up in 1 month.   Pt & wife requesting f/up with Dr. Perry - office will call to schedule.

## 2020-11-17 ENCOUNTER — APPOINTMENT (OUTPATIENT)
Dept: INTERNAL MEDICINE | Facility: CLINIC | Age: 85
End: 2020-11-17
Payer: MEDICARE

## 2020-11-17 VITALS
DIASTOLIC BLOOD PRESSURE: 58 MMHG | HEIGHT: 68 IN | TEMPERATURE: 97.3 F | HEART RATE: 80 BPM | RESPIRATION RATE: 14 BRPM | OXYGEN SATURATION: 94 % | SYSTOLIC BLOOD PRESSURE: 110 MMHG | WEIGHT: 184 LBS | BODY MASS INDEX: 27.89 KG/M2

## 2020-11-17 PROCEDURE — 36415 COLL VENOUS BLD VENIPUNCTURE: CPT

## 2020-11-17 PROCEDURE — 99214 OFFICE O/P EST MOD 30 MIN: CPT | Mod: 25

## 2020-11-17 RX ORDER — METHYLPREDNISOLONE ACETATE 40 MG/ML
40 INJECTION, SUSPENSION INTRA-ARTICULAR; INTRALESIONAL; INTRAMUSCULAR; SOFT TISSUE
Qty: 0.5 | Refills: 0 | Status: DISCONTINUED | COMMUNITY
Start: 2020-07-29 | End: 2020-11-17

## 2020-11-17 NOTE — REVIEW OF SYSTEMS
[Fatigue] : fatigue [Recent Change In Weight] : ~T no recent weight change [Chest Pain] : no chest pain [Palpitations] : no palpitations [Lower Ext Edema] : lower extremity edema [Skin Rash] : skin rash [Anxiety] : anxiety [Negative] : Heme/Lymph

## 2020-11-17 NOTE — HISTORY OF PRESENT ILLNESS
[FreeTextEntry8] : Patient presents today with his wife and acute visit secondary to not feeling well.\par This was on the recommendation of his podiatrist who saw the patient today who needed and removal of the toenail and seemed confused to the podiatrist.\par \par The patient had a cardioversion for atrial fibrillation 4 days ago and states since then has not felt well.\par \par Also he saw pain management for cervical pain and was treated with 10 days of steroids twice with improvement of his neck pain but since has felt anxious, nervous and at times afraid to sleep at night.\par Patient is unsure why.\par \par The patient's wife states she's not noticed him to be confused or any focal deficits but very anxious and not himself.\par Also the patient states he feels weak causing him to be a bit off balance.\par \par He also complains about an area on his buttocks which is painful when he sits. [Spouse] : spouse

## 2020-11-17 NOTE — ASSESSMENT
[FreeTextEntry1] : Patient with increased anxiety and nervousness potentially as a result of recent 2 courses of steroids completed about a week ago.\par I explained to the patient and his wife that ill effects of steroids should went away but can take some time.\par \par Status post cardioversion for atrial fibrillation but patient clinically in sinus rhythm.\par \par Left buttock rash compatible with dermatitis possibly fungal therefore treat with Lotrisone cream twice a day for 10 days.\par \par CMP and CBC will be checked\par Venipuncture today in the office\par Told to report any negative symptoms.

## 2020-11-17 NOTE — PHYSICAL EXAM
[No Acute Distress] : no acute distress [Normal Sclera/Conjunctiva] : normal sclera/conjunctiva [No JVD] : no jugular venous distention [No Respiratory Distress] : no respiratory distress  [Normal Rate] : normal rate  [Regular Rhythm] : with a regular rhythm [Normal Gait] : normal gait [de-identified] : Chronically ill-appearing [de-identified] : +1 bilateral edema [de-identified] : In aspect left buttock with mildly erythematous somewhat raised minimally tender rash without discharge [de-identified] : Anxious

## 2020-11-18 LAB
CULTURE RESULTS: SIGNIFICANT CHANGE UP
CULTURE RESULTS: SIGNIFICANT CHANGE UP
SPECIMEN SOURCE: SIGNIFICANT CHANGE UP
SPECIMEN SOURCE: SIGNIFICANT CHANGE UP

## 2020-11-19 ENCOUNTER — INPATIENT (INPATIENT)
Facility: HOSPITAL | Age: 85
LOS: 1 days | Discharge: HOME | DRG: 811 | End: 2020-11-21
Attending: HOSPITALIST | Admitting: HOSPITALIST
Payer: MEDICARE

## 2020-11-19 VITALS
SYSTOLIC BLOOD PRESSURE: 159 MMHG | OXYGEN SATURATION: 97 % | HEIGHT: 68 IN | DIASTOLIC BLOOD PRESSURE: 78 MMHG | RESPIRATION RATE: 18 BRPM | HEART RATE: 68 BPM | TEMPERATURE: 98 F

## 2020-11-19 DIAGNOSIS — R06.00 DYSPNEA, UNSPECIFIED: ICD-10-CM

## 2020-11-19 DIAGNOSIS — Z98.890 OTHER SPECIFIED POSTPROCEDURAL STATES: Chronic | ICD-10-CM

## 2020-11-19 DIAGNOSIS — Z95.810 PRESENCE OF AUTOMATIC (IMPLANTABLE) CARDIAC DEFIBRILLATOR: Chronic | ICD-10-CM

## 2020-11-19 DIAGNOSIS — Z95.3 PRESENCE OF XENOGENIC HEART VALVE: Chronic | ICD-10-CM

## 2020-11-19 DIAGNOSIS — Z90.49 ACQUIRED ABSENCE OF OTHER SPECIFIED PARTS OF DIGESTIVE TRACT: Chronic | ICD-10-CM

## 2020-11-19 DIAGNOSIS — Z98.61 CORONARY ANGIOPLASTY STATUS: Chronic | ICD-10-CM

## 2020-11-19 DIAGNOSIS — Z96.60 PRESENCE OF UNSPECIFIED ORTHOPEDIC JOINT IMPLANT: Chronic | ICD-10-CM

## 2020-11-19 DIAGNOSIS — Z95.0 PRESENCE OF CARDIAC PACEMAKER: Chronic | ICD-10-CM

## 2020-11-19 DIAGNOSIS — Z98.89 OTHER SPECIFIED POSTPROCEDURAL STATES: Chronic | ICD-10-CM

## 2020-11-19 DIAGNOSIS — Z95.1 PRESENCE OF AORTOCORONARY BYPASS GRAFT: Chronic | ICD-10-CM

## 2020-11-19 DIAGNOSIS — K62.5 HEMORRHAGE OF ANUS AND RECTUM: ICD-10-CM

## 2020-11-19 DIAGNOSIS — Z98.42 CATARACT EXTRACTION STATUS, LEFT EYE: Chronic | ICD-10-CM

## 2020-11-19 DIAGNOSIS — Z98.41 CATARACT EXTRACTION STATUS, RIGHT EYE: Chronic | ICD-10-CM

## 2020-11-19 LAB
ALBUMIN SERPL ELPH-MCNC: 4 G/DL — SIGNIFICANT CHANGE UP (ref 3.3–5.2)
ALBUMIN SERPL ELPH-MCNC: 4.3 G/DL
ALP BLD-CCNC: 54 U/L
ALP SERPL-CCNC: 53 U/L — SIGNIFICANT CHANGE UP (ref 40–120)
ALT FLD-CCNC: 17 U/L — SIGNIFICANT CHANGE UP
ALT SERPL-CCNC: 19 U/L
ANION GAP SERPL CALC-SCNC: 11 MMOL/L
ANION GAP SERPL CALC-SCNC: 8 MMOL/L — SIGNIFICANT CHANGE UP (ref 5–17)
APTT BLD: 33.3 SEC — SIGNIFICANT CHANGE UP (ref 27.5–35.5)
AST SERPL-CCNC: 16 U/L
AST SERPL-CCNC: 16 U/L — SIGNIFICANT CHANGE UP
BASOPHILS # BLD AUTO: 0.02 K/UL
BASOPHILS # BLD AUTO: 0.02 K/UL — SIGNIFICANT CHANGE UP (ref 0–0.2)
BASOPHILS NFR BLD AUTO: 0.3 %
BASOPHILS NFR BLD AUTO: 0.3 % — SIGNIFICANT CHANGE UP (ref 0–2)
BILIRUB SERPL-MCNC: 0.3 MG/DL — LOW (ref 0.4–2)
BILIRUB SERPL-MCNC: 0.4 MG/DL
BLD GP AB SCN SERPL QL: SIGNIFICANT CHANGE UP
BUN SERPL-MCNC: 21 MG/DL
BUN SERPL-MCNC: 25 MG/DL — HIGH (ref 8–20)
CALCIUM SERPL-MCNC: 8.6 MG/DL — SIGNIFICANT CHANGE UP (ref 8.6–10.2)
CALCIUM SERPL-MCNC: 9.2 MG/DL
CHLORIDE SERPL-SCNC: 101 MMOL/L
CHLORIDE SERPL-SCNC: 101 MMOL/L — SIGNIFICANT CHANGE UP (ref 98–107)
CO2 SERPL-SCNC: 28 MMOL/L
CO2 SERPL-SCNC: 29 MMOL/L — SIGNIFICANT CHANGE UP (ref 22–29)
CREAT SERPL-MCNC: 1.09 MG/DL — SIGNIFICANT CHANGE UP (ref 0.5–1.3)
CREAT SERPL-MCNC: 1.19 MG/DL
EOSINOPHIL # BLD AUTO: 0.11 K/UL
EOSINOPHIL # BLD AUTO: 0.11 K/UL — SIGNIFICANT CHANGE UP (ref 0–0.5)
EOSINOPHIL NFR BLD AUTO: 1.5 %
EOSINOPHIL NFR BLD AUTO: 1.6 % — SIGNIFICANT CHANGE UP (ref 0–6)
FERRITIN SERPL-MCNC: 29 NG/ML
FOLATE SERPL-MCNC: >20 NG/ML
GLUCOSE BLDC GLUCOMTR-MCNC: 115 MG/DL — HIGH (ref 70–99)
GLUCOSE BLDC GLUCOMTR-MCNC: 153 MG/DL — HIGH (ref 70–99)
GLUCOSE SERPL-MCNC: 173 MG/DL
GLUCOSE SERPL-MCNC: 187 MG/DL — HIGH (ref 70–99)
HCT VFR BLD CALC: 30.1 % — LOW (ref 39–50)
HCT VFR BLD CALC: 32.5 %
HGB BLD-MCNC: 9.3 G/DL — LOW (ref 13–17)
HGB BLD-MCNC: 9.9 G/DL
IMM GRANULOCYTES NFR BLD AUTO: 0.3 %
IMM GRANULOCYTES NFR BLD AUTO: 0.3 % — SIGNIFICANT CHANGE UP (ref 0–1.5)
INR BLD: 1.89 RATIO — HIGH (ref 0.88–1.16)
IRON SATN MFR SERPL: 5 %
IRON SERPL-MCNC: 22 UG/DL
LYMPHOCYTES # BLD AUTO: 1.35 K/UL
LYMPHOCYTES # BLD AUTO: 1.39 K/UL — SIGNIFICANT CHANGE UP (ref 1–3.3)
LYMPHOCYTES # BLD AUTO: 20.1 % — SIGNIFICANT CHANGE UP (ref 13–44)
LYMPHOCYTES NFR BLD AUTO: 17.8 %
MAGNESIUM SERPL-MCNC: 2 MG/DL
MAN DIFF?: NORMAL
MCHC RBC-ENTMCNC: 29.8 PG — SIGNIFICANT CHANGE UP (ref 27–34)
MCHC RBC-ENTMCNC: 29.9 PG
MCHC RBC-ENTMCNC: 30.5 GM/DL
MCHC RBC-ENTMCNC: 30.9 GM/DL — LOW (ref 32–36)
MCV RBC AUTO: 96.5 FL — SIGNIFICANT CHANGE UP (ref 80–100)
MCV RBC AUTO: 98.2 FL
MONOCYTES # BLD AUTO: 0.84 K/UL
MONOCYTES # BLD AUTO: 0.97 K/UL — HIGH (ref 0–0.9)
MONOCYTES NFR BLD AUTO: 11.1 %
MONOCYTES NFR BLD AUTO: 14 % — SIGNIFICANT CHANGE UP (ref 2–14)
NEUTROPHILS # BLD AUTO: 4.42 K/UL — SIGNIFICANT CHANGE UP (ref 1.8–7.4)
NEUTROPHILS # BLD AUTO: 5.24 K/UL
NEUTROPHILS NFR BLD AUTO: 63.7 % — SIGNIFICANT CHANGE UP (ref 43–77)
NEUTROPHILS NFR BLD AUTO: 69 %
NT-PROBNP SERPL-SCNC: 691 PG/ML — HIGH (ref 0–300)
OB PNL STL: NEGATIVE — SIGNIFICANT CHANGE UP
PLATELET # BLD AUTO: 207 K/UL — SIGNIFICANT CHANGE UP (ref 150–400)
PLATELET # BLD AUTO: 237 K/UL
POTASSIUM SERPL-MCNC: 4.3 MMOL/L — SIGNIFICANT CHANGE UP (ref 3.5–5.3)
POTASSIUM SERPL-SCNC: 4.3 MMOL/L — SIGNIFICANT CHANGE UP (ref 3.5–5.3)
POTASSIUM SERPL-SCNC: 4.7 MMOL/L
PROT SERPL-MCNC: 6.6 G/DL
PROT SERPL-MCNC: 6.9 G/DL — SIGNIFICANT CHANGE UP (ref 6.6–8.7)
PROTHROM AB SERPL-ACNC: 21.3 SEC — HIGH (ref 10.6–13.6)
RAPID RVP RESULT: SIGNIFICANT CHANGE UP
RBC # BLD: 3.12 M/UL — LOW (ref 4.2–5.8)
RBC # BLD: 3.31 M/UL
RBC # FLD: 13.9 % — SIGNIFICANT CHANGE UP (ref 10.3–14.5)
RBC # FLD: 14 %
SARS-COV-2 RNA SPEC QL NAA+PROBE: SIGNIFICANT CHANGE UP
SODIUM SERPL-SCNC: 138 MMOL/L — SIGNIFICANT CHANGE UP (ref 135–145)
SODIUM SERPL-SCNC: 140 MMOL/L
TIBC SERPL-MCNC: 415 UG/DL
TROPONIN T SERPL-MCNC: 0.04 NG/ML — SIGNIFICANT CHANGE UP (ref 0–0.06)
TROPONIN T SERPL-MCNC: 0.04 NG/ML — SIGNIFICANT CHANGE UP (ref 0–0.06)
TROPONIN T SERPL-MCNC: 0.06 NG/ML — SIGNIFICANT CHANGE UP (ref 0–0.06)
UIBC SERPL-MCNC: 393 UG/DL
VIT B12 SERPL-MCNC: 821 PG/ML
WBC # BLD: 6.93 K/UL — SIGNIFICANT CHANGE UP (ref 3.8–10.5)
WBC # FLD AUTO: 6.93 K/UL — SIGNIFICANT CHANGE UP (ref 3.8–10.5)
WBC # FLD AUTO: 7.58 K/UL

## 2020-11-19 PROCEDURE — 99223 1ST HOSP IP/OBS HIGH 75: CPT | Mod: AI

## 2020-11-19 PROCEDURE — 99285 EMERGENCY DEPT VISIT HI MDM: CPT | Mod: GC

## 2020-11-19 PROCEDURE — 99497 ADVNCD CARE PLAN 30 MIN: CPT | Mod: 25

## 2020-11-19 PROCEDURE — 99223 1ST HOSP IP/OBS HIGH 75: CPT

## 2020-11-19 PROCEDURE — 71045 X-RAY EXAM CHEST 1 VIEW: CPT | Mod: 26

## 2020-11-19 PROCEDURE — 93970 EXTREMITY STUDY: CPT | Mod: 26

## 2020-11-19 PROCEDURE — 93010 ELECTROCARDIOGRAM REPORT: CPT

## 2020-11-19 RX ORDER — GLUCAGON INJECTION, SOLUTION 0.5 MG/.1ML
1 INJECTION, SOLUTION SUBCUTANEOUS ONCE
Refills: 0 | Status: DISCONTINUED | OUTPATIENT
Start: 2020-11-19 | End: 2020-11-21

## 2020-11-19 RX ORDER — ONDANSETRON 8 MG/1
4 TABLET, FILM COATED ORAL EVERY 6 HOURS
Refills: 0 | Status: DISCONTINUED | OUTPATIENT
Start: 2020-11-19 | End: 2020-11-21

## 2020-11-19 RX ORDER — ALBUTEROL 90 UG/1
2 AEROSOL, METERED ORAL EVERY 6 HOURS
Refills: 0 | Status: DISCONTINUED | OUTPATIENT
Start: 2020-11-19 | End: 2020-11-21

## 2020-11-19 RX ORDER — BUDESONIDE AND FORMOTEROL FUMARATE DIHYDRATE 160; 4.5 UG/1; UG/1
2 AEROSOL RESPIRATORY (INHALATION)
Refills: 0 | Status: DISCONTINUED | OUTPATIENT
Start: 2020-11-19 | End: 2020-11-21

## 2020-11-19 RX ORDER — FUROSEMIDE 40 MG
40 TABLET ORAL DAILY
Refills: 0 | Status: DISCONTINUED | OUTPATIENT
Start: 2020-11-19 | End: 2020-11-21

## 2020-11-19 RX ORDER — FUROSEMIDE 40 MG
20 TABLET ORAL DAILY
Refills: 0 | Status: DISCONTINUED | OUTPATIENT
Start: 2020-11-19 | End: 2020-11-19

## 2020-11-19 RX ORDER — LATANOPROST 0.05 MG/ML
1 SOLUTION/ DROPS OPHTHALMIC; TOPICAL AT BEDTIME
Refills: 0 | Status: DISCONTINUED | OUTPATIENT
Start: 2020-11-19 | End: 2020-11-21

## 2020-11-19 RX ORDER — MORPHINE SULFATE 50 MG/1
2 CAPSULE, EXTENDED RELEASE ORAL EVERY 4 HOURS
Refills: 0 | Status: DISCONTINUED | OUTPATIENT
Start: 2020-11-19 | End: 2020-11-21

## 2020-11-19 RX ORDER — ACETAMINOPHEN 500 MG
650 TABLET ORAL EVERY 4 HOURS
Refills: 0 | Status: DISCONTINUED | OUTPATIENT
Start: 2020-11-19 | End: 2020-11-21

## 2020-11-19 RX ORDER — APIXABAN 2.5 MG/1
5 TABLET, FILM COATED ORAL
Refills: 0 | Status: DISCONTINUED | OUTPATIENT
Start: 2020-11-19 | End: 2020-11-21

## 2020-11-19 RX ORDER — DEXTROSE 50 % IN WATER 50 %
25 SYRINGE (ML) INTRAVENOUS ONCE
Refills: 0 | Status: DISCONTINUED | OUTPATIENT
Start: 2020-11-19 | End: 2020-11-21

## 2020-11-19 RX ORDER — SODIUM CHLORIDE 9 MG/ML
1000 INJECTION, SOLUTION INTRAVENOUS
Refills: 0 | Status: DISCONTINUED | OUTPATIENT
Start: 2020-11-19 | End: 2020-11-21

## 2020-11-19 RX ORDER — PANTOPRAZOLE SODIUM 20 MG/1
40 TABLET, DELAYED RELEASE ORAL
Refills: 0 | Status: DISCONTINUED | OUTPATIENT
Start: 2020-11-19 | End: 2020-11-21

## 2020-11-19 RX ORDER — ALPRAZOLAM 0.25 MG
0.25 TABLET ORAL
Refills: 0 | Status: DISCONTINUED | OUTPATIENT
Start: 2020-11-19 | End: 2020-11-21

## 2020-11-19 RX ORDER — CLOPIDOGREL BISULFATE 75 MG/1
75 TABLET, FILM COATED ORAL DAILY
Refills: 0 | Status: DISCONTINUED | OUTPATIENT
Start: 2020-11-19 | End: 2020-11-20

## 2020-11-19 RX ORDER — DEXTROSE 50 % IN WATER 50 %
15 SYRINGE (ML) INTRAVENOUS ONCE
Refills: 0 | Status: DISCONTINUED | OUTPATIENT
Start: 2020-11-19 | End: 2020-11-21

## 2020-11-19 RX ORDER — TAMSULOSIN HYDROCHLORIDE 0.4 MG/1
0.8 CAPSULE ORAL AT BEDTIME
Refills: 0 | Status: DISCONTINUED | OUTPATIENT
Start: 2020-11-19 | End: 2020-11-21

## 2020-11-19 RX ORDER — DEXTROSE 50 % IN WATER 50 %
12.5 SYRINGE (ML) INTRAVENOUS ONCE
Refills: 0 | Status: DISCONTINUED | OUTPATIENT
Start: 2020-11-19 | End: 2020-11-21

## 2020-11-19 RX ORDER — INSULIN LISPRO 100/ML
VIAL (ML) SUBCUTANEOUS
Refills: 0 | Status: DISCONTINUED | OUTPATIENT
Start: 2020-11-19 | End: 2020-11-21

## 2020-11-19 RX ADMIN — Medication 40 MILLIGRAM(S): at 16:56

## 2020-11-19 RX ADMIN — Medication 0.25 MILLIGRAM(S): at 22:41

## 2020-11-19 RX ADMIN — TAMSULOSIN HYDROCHLORIDE 0.8 MILLIGRAM(S): 0.4 CAPSULE ORAL at 22:40

## 2020-11-19 RX ADMIN — Medication 1 TABLET(S): at 17:18

## 2020-11-19 RX ADMIN — Medication 650 MILLIGRAM(S): at 22:40

## 2020-11-19 RX ADMIN — CLOPIDOGREL BISULFATE 75 MILLIGRAM(S): 75 TABLET, FILM COATED ORAL at 17:18

## 2020-11-19 RX ADMIN — Medication 650 MILLIGRAM(S): at 23:40

## 2020-11-19 RX ADMIN — APIXABAN 5 MILLIGRAM(S): 2.5 TABLET, FILM COATED ORAL at 17:18

## 2020-11-19 NOTE — CONSULT NOTE ADULT - PROBLEM SELECTOR RECOMMENDATION 9
intermittent and not currently present. Possibly hemorrhoidal but clearly precipitated by anticoagulation. Colonic neoplasm needs to be rule out as well. He should undergo a colonoscopy but should be off eliquis for 48 hours and would need cardiac clearance. intermittent and not currently present. Possibly hemorrhoidal but clearly precipitated by anticoagulation. Colonic neoplasm needs to be rule out as well. He should undergo a colonoscopy but should be off eliquis for 48 hours and plavix for at least 5 days and would need cardiac clearance.

## 2020-11-19 NOTE — ED ADULT NURSE NOTE - CHIEF COMPLAINT QUOTE
Patient sent in by cardiology for dizziness and bloody stools. Dizziness since last hospitalization. Patient with recent cardioversion at [facility], now with demand pacing. EMS states loop recorded read afib and cardiologist wants hbg evaluated.

## 2020-11-19 NOTE — H&P ADULT - ASSESSMENT
88 year old male patient with dyspnea and weakness      -Admit to Medicine      # Acute Respiratory failure  -etiology unclear  -DDX: pleural effusion, CHF exacerbation, COPD exacerbation, anemia  no hypoxia noted  -give supplemental as necessary   -albuterol prn  -get morning echo  -trend troponins  -cardiology following    #Anemia   -pt endorsed bloody bowel movements but guaiac in the ED  -h&H same as it was 6 days prior  -trend cbc    #CAD  -on asa, statin, BB    #PVD  -on asa, statin    #BPH  -on    # hx of COPD    #HLD  -on statin    # HTN  -   88 year old male patient with dyspnea and weakness      -Admit to Medicine      # Acute Respiratory failure  -etiology unclear  -DDX: pleural effusion, CHF exacerbation, COPD exacerbation, anemia,PE(less likely)  no hypoxia noted  -give supplemental as necessary   -albuterol prn  -get morning echo  -trend troponins  -cardiology following    #Anemia   -pt endorsed bloody bowel movements but guaiac in the ED  -h&H same as it was 6 days prior  -trend cbc  -consider GI evaluation if further drops in hb noted    #Dizziness/ Unsteady gait  -pt with recent cardioversion  -unable to fully assess neurological function  -consider MRI if symptoms persist  -get PT evaluation    #Hx of Afib  -s/p recent cardioversion  -on eliquis  -currently rate controlled  -will defer any discontinuation of Eliquis to cardiology  #CAD  -on asa plavix, eliquis    #PVD  -on asa, plavix    #DM2  -hold oral hypoglycemics  -ISS    # hx of COPD    #HLD  -on statin    # HTN  -on lasix    #Hx of BPH  -on flomax    #Weakness/ Debility  -PT evaluation    #Advanced Directives  -full code    #DVT ppx  -on eliquis     88 year old male patient with dyspnea and weakness      -Admit to Medicine      # Acute Respiratory failure  -etiology unclear but CHF to be considered  -trial of IV lasix  -DDX: pleural effusion, CHF exacerbation, COPD exacerbation, anemia,PE(less likely)  no hypoxia noted  -give supplemental as necessary   -albuterol prn  -get morning echo  -trend troponins  -cardiology following    #Anemia   -pt endorsed bloody bowel movements but guaiac in the ED  -h&H same as it was 6 days prior  -trend cbc  -consider GI evaluation if further drops in hb noted    #Dizziness/ Unsteady gait  -pt with recent cardioversion  -unable to fully assess neurological function  -consider MRI if symptoms persist  -get PT evaluation    #Hx of Afib  -s/p recent cardioversion  -on eliquis  -currently rate controlled  -will defer any discontinuation of Eliquis to cardiology  #CAD  -on asa plavix, eliquis    #PVD  -on asa, plavix    #DM2  -hold oral hypoglycemics  -ISS    # hx of COPD    #HLD  -on statin    # HTN  -on lasix    #Hx of BPH  -on flomax    #Weakness/ Debility  -PT evaluation    #Anxiety  -states he is extremely anxious/afraid at night  -xanax prn at night    #Advanced Directives  -full code    #DVT ppx  -on eliquis     88 year old male patient with pertinent history of  HTN, HL, DM, PVD with RSFA and LSFA stenting, Carotid endarterectomy, CAD s/p remote CABG & multiple PCIs (most recently 2/2020),  severe AS s/p remote TAVR,  PAF S/P PPM S/P RECENT GREGORIA with DCCV (November 13th) with a questionable thrombus VS veg in RV lead so far negative BC x5 days at that time changed from xarelto to eliquis 5 mg PO BID presented to the ED complaining of BRPBR in small aounts since the past week along with progressively worsening dyspnea on exertion, orthopnea, PND and dizziness. Patient states he was initially started on xarelto when Afib was first discovered but it was later switched to Eliquis after the cardioversion was done. States his symptoms became profound shortly after starting eliquis. States he feels very dizzy and unsteady on his feet. Pt saw his PCP two days prior and had routine labs done and was notified that there was a drop in his Hb. Endorsed occasional dark stools. Pt has not had a Colonoscopy in over 20 years. He denied any associated chest pain, palpitations, slurred speech, facial drooping, aphasia, visual changes, arm or leg weakness. Endorsed b/l lower extremity edema  In the ED patient with negative stool guaiac. Hg was found to be 9.4 with baseline being 13. No episodes of hypoxia noted            # Othopnea, PND, exertional dyspnea, LE edema 2/2 Acute on chronic CHF-  -Admit to tele  CXR with pulm vasc congestion  -trial of IV lasix  Strict I & O  Daily weight  Fluid restriction < 1L  Echo  TnI x 3  -cardiology following      #Anemia 2/2 LGI bleeding probably hemorrhoidal  -pt endorsed bloody bowel movements but guaiac in the ED. Likely intermittent bleeding due to oral AC  Hg 9.3  (baseline 13)  -trend cbc  -GI consult noted. Will need to hold plavix & eliquis for 5 days. Needs cardiac clearence      #Hx of Afib/ PPM with a questionable thrombus VS veg in RV lead  -s/p recent cardioversion  -on eliquis  -currently rate controlled  -will defer any discontinuation of Eliquis to cardiology    #CAD  -on plavix, [id number]   ASA stopped as outpatient per cardiology  Not on BB     #PVD  -on plavix & eliquis    #DM2  -hold oral hypoglycemics (metformin 500 bid & repaglinide 1mg tid)  -ISS  f/u FS    # hx of COPD- stable  c/w symbicort    #HLD  -on statin    # HTN  -on lasix      #Hx of BPH  -on alfuzosin    #Weakness/ Debility  -PT evaluation    #Anxiety  -states he is extremely anxious/afraid at night  -xanax prn at night    #Advanced Directives  -full code    #DVT ppx  -on eliquis     88 year old male patient with pertinent history of  HTN, HL, DM, PVD with RSFA and LSFA stenting, Carotid endarterectomy, CAD s/p remote CABG & multiple PCIs (most recently 2/2020),  severe AS s/p remote TAVR,  PAF S/P PPM S/P RECENT GREGORIA with DCCV (November 13th) with a questionable thrombus VS veg in RV lead so far negative BC x5 days at that time changed from xarelto to eliquis 5 mg PO BID presented to the ED complaining of BRPBR in small aounts since the past week along with progressively worsening dyspnea on exertion, orthopnea, PND and dizziness. Patient states he was initially started on xarelto when Afib was first discovered but it was later switched to Eliquis after the cardioversion was done. States his symptoms became profound shortly after starting eliquis. States he feels very dizzy and unsteady on his feet. Pt saw his PCP two days prior and had routine labs done and was notified that there was a drop in his Hb. Endorsed occasional dark stools. Pt has not had a Colonoscopy in over 20 years. He denied any associated chest pain, palpitations, slurred speech, facial drooping, aphasia, visual changes, arm or leg weakness. Endorsed b/l lower extremity edema  In the ED patient with negative stool guaiac. Hg was found to be 9.4 with baseline being 13. No episodes of hypoxia noted            # Othopnea, PND, exertional dyspnea, LE edema 2/2 Acute on chronic CHF-  -Admit to tele  CXR with pulm vasc congestion  -trial of IV lasix  Strict I & O  Daily weight  Fluid restriction < 1L  Echo  TnI x 3  -cardiology following      #Anemia 2/2 LGI bleeding probably hemorrhoidal  -pt endorsed bloody bowel movements but guaiac in the ED. Likely intermittent bleeding due to oral AC  Hg 9.3  (baseline 13)  -trend cbc  -GI consult noted. Will need to hold plavix for 5 days & eliquis for at least 48 hrs. Needs cardiac clearence      #Hx of Afib/ PPM with a questionable thrombus VS veg in RV lead  -s/p recent cardioversion  -on eliquis  -currently rate controlled  -will defer any discontinuation of Eliquis to cardiology    #CAD  -on plavix, [id number]   ASA stopped as outpatient per cardiology  Not on BB     #PVD  -on plavix & eliquis    #DM2  -hold oral hypoglycemics (metformin 500 bid & repaglinide 1mg tid)  -ISS  f/u FS    # hx of COPD- stable  c/w symbicort    #HLD  -on statin    # HTN  -on lasix      #Hx of BPH  -on alfuzosin    #Weakness/ Debility  -PT evaluation    #Anxiety  -states he is extremely anxious/afraid at night  -xanax prn at night    #Advanced Directives  -full code    #DVT ppx  -on eliquis

## 2020-11-19 NOTE — ED ADULT TRIAGE NOTE - CHIEF COMPLAINT QUOTE
Patient sent in by cardiology for dizziness and bloody stools. Dizziness since last hospitalization. Patient with recent cardioversion at [facility], now with demand pacing. EMS states loop recorded read afib and cardiologist wants hbg evaluated.

## 2020-11-19 NOTE — ED PROVIDER NOTE - PSH
History of CEA (carotid endarterectomy)  Bilateral  History of permanent cardiac pacemaker placement  [employer]  S/P appendectomy    S/P CABG x 2  LIMA LAD SVG OM1  S/P cataract surgery, left    S/P cataract surgery, right    S/P cholecystectomy    S/P cholecystectomy    S/P hernia repair    S/P ICD (internal cardiac defibrillator) procedure    S/P joint replacement  Bilateral knee replacement  S/P nasal surgery  Secondary to nasal fracture  S/P PTCA (percutaneous transluminal coronary angioplasty)  x 9  S/P tonsillectomy    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  #29 Medtronic core valve

## 2020-11-19 NOTE — ED PROVIDER NOTE - OBJECTIVE STATEMENT
87 y/o M pt with hx of HTN, DM, HLD, CAD s/p CABG s/p AICD, AV replacement, afib recently s/p cardioversion 1 week prior presenting today with c/o increasing weakness over the past week. Pt states that after successful cardioversion last week he was switched from Xarelto to Eliquis. Since then he has noted increasing dyspnea on exertion and weakness. Notes dark, bloody stools over the past week as well. Had bloodwork checked several days ago and was told to come to the ED due to acute drop in hgb to 9. Pt sees [facility]ology. No hx of GI bleeds in the past. Pt denies any chest pain, fevers, n/v/d, abdominal pain, dysuria, headache, cough, congestion, sore throat, neck pain, back pain, numbness, tingling, dizziness, syncope, or other complaint.

## 2020-11-19 NOTE — CONSULT NOTE ADULT - SUBJECTIVE AND OBJECTIVE BOX
Patient is a 88y old  Male who presents with a chief complaint of Acute Respiratory Failure  Weakness (19 Nov 2020 15:57)      HPI:  88 year old male patient with pertinent history of Afib(started 3 weeks ago) and recent Cardioversion with GREGORIA( November 13th) presented to the ED complaining of progressively worsening dyspnea on exertion, orthopnea, PND, dizziness and anxiety. Patient states he was initially started on xarelto when Afib was first discovered but it was later switched to Eliquis after the cardioversion was done. States his symptoms became profound shortly after starting eliquis. States he feels very dizzy and unsteady on his feet. Pt saw his PCP two days prior and had routine labs done and was notified that there was a drop in his Hb. Endorsed occasional dark stools. Pt has not had a Colonoscopy in over 20 years. He denied any associated chest pain, palpitations, slurred speech, facial drooping, aphasia, visual changes, arm or leg weakness. Endorsed b/l lower extremity edema      In the ED patient with negative stool guaiac No episodes of hypoxia noted (19 Nov 2020 15:57)    On my discussion with patient he notes chronic MENDEZ without change. His major complaints was dizziness and feeling shakey early this morning which has resolved. He notes a daily BM without change but for the past week he notes occasional BRBPR into the commode with his BMs that appeared dark. However the bleeding has been intermittent about every other day. Denies any abdominal pain, nausea or vomiting. Last colonoscopy many years ago. On 11/13 hgb was 9.7 and today it is 9.3 with heme neg stool. Denies any dyspeptic symptoms.      REVIEW OF SYSTEMS:    CONSTITUTIONAL: No fever, weight loss, or fatigue  EYES: No eye pain, visual disturbances, or discharge  ENMT:  No difficulty hearing, tinnitus, vertigo; No sinus or throat pain  NECK: No pain or stiffness  RESPIRATORY: No cough, wheezing, chills or hemoptysis; No shortness of breath  CARDIOVASCULAR: No chest pain, palpitations, dizziness, or leg swelling  GASTROINTESTINAL: as above  NEUROLOGICAL: No headaches, memory loss, loss of strength, numbness, or tremors  SKIN: No itching, burning, rashes, or lesions   LYMPH NODES: No enlarged glands  MUSCULOSKELETAL: No joint pain or swelling; No muscle, back, or extremity pain  PSYCHIATRIC: No depression, anxiety, mood swings, or difficulty sleeping  HEME/LYMPH: No easy bruising, or bleeding gums  ALLERY AND IMMUNOLOGIC: No hives or eczema      PAST MEDICAL & SURGICAL HISTORY:  Afib    Statin intolerance    PVD (peripheral vascular disease)  R SFA and L SFA w/stents and PTA    Former smoker    H/O urinary frequency    FANNY (obstructive sleep apnea)    Angina, class II    Hematuria    History of BPH    Cataract    COPD, mild    MR (mitral regurgitation)  moderate      CAD (coronary artery disease)  PPM, PCI LM and LCX, CABG LIMA OAD and OM1    Abnormal positron emission tomography (PET) scan  2019 anterior, anteroseptal,anterolateral and iferolateral ischemia    Osteoarthritis    Age-related incipient cataract of both eyes    PVD (peripheral vascular disease)  intervention RLE    CAD (coronary artery disease)  CABG LIMA LAD SVG OM1, PCI PDA LM/LCX with PTCA    Carotid artery disease  RCEA      CAD (coronary artery disease)  S/P Stenting x8    Asthma    Aortic stenosis  TAVR #29 Medtronic core valve 2017    Diabetes mellitus    Hyperlipidemia    Hypertension    S/P cholecystectomy    S/P appendectomy    S/P ICD (internal cardiac defibrillator) procedure    S/P hernia repair    History of permanent cardiac pacemaker placement  [employer]    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  #29 Medtronic core valve    S/P CABG x 2  LIMA LAD SVG OM1        S/P nasal surgery  Secondary to nasal fracture    S/P joint replacement  Bilateral knee replacement    S/P cataract surgery, right    S/P cataract surgery, left    History of CEA (carotid endarterectomy)  Bilateral    S/P tonsillectomy        FAMILY HISTORY:  Family history of stroke (Sibling)    Family history of acute myocardial infarction (Sibling)    Family history of depression (Sibling)        SOCIAL HISTORY:  Smoking Status: [ ] Current, [x ] Former, [ ] Never  Pack Years:  Alcohol Use: rre    Home Medications:  alfuzosin 10 mg oral tablet, extended release: 1 tab(s) orally once a day (at bedtime) (13 Nov 2020 10:55)  furosemide 20 mg oral tablet: 1 tab(s) orally once a day, As Needed leg swelling or weight gain &gt;3 lbs in 24 hours.  (13 Nov 2020 10:55)  latanoprost 0.005% ophthalmic solution: 1 drop(s) to each affected eye once a day (at bedtime) (13 Nov 2020 10:55)  metFORMIN 500 mg oral tablet, extended release: 1 tab(s) orally once a day (13 Nov 2020 10:55)  multivitamin: 1 tab(s) orally once a day (13 Nov 2020 10:55)  pantoprazole 40 mg oral delayed release tablet: 1 tab(s) orally once a day (13 Nov 2020 10:55)  repaglinide 0.5 mg oral tablet: 1 tab(s) orally 3 times a day (before meals) (13 Nov 2020 10:55)  Symbicort 80 mcg-4.5 mcg/inh inhalation aerosol: 2 puff(s) inhaled 2 times a day (13 Nov 2020 10:55)  Tylenol 500 mg oral tablet: 2 tab(s) orally every 6 hours, As Needed (13 Nov 2020 10:55)  Zetia 10 mg oral tablet: 1 tab(s) orally once a day (13 Nov 2020 10:55)      MEDICATIONS:  MEDICATIONS  (STANDING):  apixaban 5 milliGRAM(s) Oral two times a day  budesonide  80 MICROgram(s)/formoterol 4.5 MICROgram(s) Inhaler 2 Puff(s) Inhalation two times a day  clopidogrel Tablet 75 milliGRAM(s) Oral daily  dextrose 40% Gel 15 Gram(s) Oral once  dextrose 5%. 1000 milliLiter(s) (50 mL/Hr) IV Continuous <Continuous>  dextrose 5%. 1000 milliLiter(s) (100 mL/Hr) IV Continuous <Continuous>  dextrose 50% Injectable 25 Gram(s) IV Push once  dextrose 50% Injectable 12.5 Gram(s) IV Push once  dextrose 50% Injectable 25 Gram(s) IV Push once  furosemide   Injectable 40 milliGRAM(s) IV Push daily  glucagon  Injectable 1 milliGRAM(s) IntraMuscular once  insulin lispro (ADMELOG) corrective regimen sliding scale   SubCutaneous three times a day before meals  latanoprost 0.005% Ophthalmic Solution 1 Drop(s) Both EYES at bedtime  multivitamin Oral Tab/Cap - Peds 1 Tablet(s) Oral daily  pantoprazole    Tablet 40 milliGRAM(s) Oral before breakfast  tamsulosin 0.8 milliGRAM(s) Oral at bedtime    MEDICATIONS  (PRN):  acetaminophen   Tablet .. 650 milliGRAM(s) Oral every 4 hours PRN Mild Pain (1 - 3)  ALBUTerol    90 MICROgram(s) HFA Inhaler 2 Puff(s) Inhalation every 6 hours PRN Shortness of Breath and/or Wheezing  ALPRAZolam 0.25 milliGRAM(s) Oral two times a day PRN anxiety  morphine  - Injectable 2 milliGRAM(s) IV Push every 4 hours PRN Moderate Pain (4 - 6)  ondansetron Injectable 4 milliGRAM(s) IV Push every 6 hours PRN Nausea      Allergies    statins (Muscle Pain)    Intolerances        Vital Signs Last 24 Hrs  T(C): 36.3 (19 Nov 2020 14:18), Max: 36.8 (19 Nov 2020 13:14)  T(F): 97.4 (19 Nov 2020 14:18), Max: 98.3 (19 Nov 2020 13:14)  HR: 68 (19 Nov 2020 13:14) (68 - 68)  BP: 173/75 (19 Nov 2020 14:18) (159/78 - 173/75)  BP(mean): --  RR: 18 (19 Nov 2020 14:18) (18 - 18)  SpO2: 98% (19 Nov 2020 14:18) (97% - 98%)        PHYSICAL EXAM:    General: Well developed; well nourished; in no acute distress  HEENT: MMM, conjunctiva and sclera clear  Lungs: Clear  Heart: Rhythm Regular, No Murmurs  Gastrointestinal: Soft, non-tender non-distended; Normal bowel sounds; No rebound or guarding; No Organomegaly & No Masses  Extremities: Normal range of motion, No clubbing, cyanosis, 2+ pedal edema  Neurological: Alert and oriented x3, Non-focal  Skin: Warm and dry. No obvious rash  Rectal: No Masses, light brown soft stool      LABS:                        9.3    6.93  )-----------( 207      ( 19 Nov 2020 14:15 )             30.1     11-19    138  |  101  |  25.0<H>  ----------------------------<  187<H>  4.3   |  29.0  |  1.09    Ca    8.6      19 Nov 2020 14:15    TPro  6.9  /  Alb  4.0  /  TBili  0.3<L>  /  DBili  x   /  AST  16  /  ALT  17  /  AlkPhos  53  11-19        \

## 2020-11-19 NOTE — H&P ADULT - NSICDXPASTMEDICALHX_GEN_ALL_CORE_FT
PAST MEDICAL HISTORY:  Abnormal positron emission tomography (PET) scan 2019 anterior, anteroseptal,anterolateral and iferolateral ischemia    Afib     Age-related incipient cataract of both eyes     Angina pectoris class II    Angina, class II     Aortic stenosis TAVR #29 Medtronic core valve 2017    Asthma     BPH (benign prostatic hyperplasia)     CAD (coronary artery disease) S/P Stenting x8    CAD (coronary artery disease) CABG LIMA LAD SVG OM1, PCI PDA LM/LCX with PTCA    CAD (coronary artery disease) PPM, PCI LM and LCX, CABG LIMA OAD and OM1    Carotid artery disease RCEA    Cataract     COPD, mild     Diabetes mellitus     MENDEZ (dyspnea on exertion)     Fatigue Profound w/exertion    Former smoker     Former smoker     H/O urinary frequency     Hematuria     History of BPH     Hyperlipidemia     Hypertension     MR (mitral regurgitation) moderate      FANNY (obstructive sleep apnea)     FANNY (obstructive sleep apnea) uses mouth peice    Osteoarthritis     PVD (peripheral vascular disease) intervention RLE    PVD (peripheral vascular disease)     PVD (peripheral vascular disease) R SFA and L SFA w/stents and PTA    SOB (shortness of breath)     Statin intolerance

## 2020-11-19 NOTE — H&P ADULT - ATTENDING COMMENTS
I have personally seen and examined patient.  Above note reviewed and discussed with tele hospitalist, modified where appropriate.         PHYSICAL EXAM-  GENERAL: NAD, well-groomed, well-developed  HEAD:  Atraumatic, Normocephalic  EYES: EOMI, PERRLA, conjunctiva and sclera clear  NECK: Supple, No JVD  NERVOUS SYSTEM:  Alert & Oriented X3, Motor Strength 5/5 B/L upper and lower extremities; DTRs 2+ intact and symmetric  CHEST/LUNG: Clear to auscultation bilaterally; No rales, rhonchi, wheezing, or rubs  HEART: Regular rate and rhythm; No murmurs, rubs, or gallops, +PPM  ABDOMEN: Soft, Nontender, ND, + BS  Ext: 2+ pulses b/l, ++ edema b/l, left toe with dressing c/d/i

## 2020-11-19 NOTE — CONSULT NOTE ADULT - SUBJECTIVE AND OBJECTIVE BOX
[facility], THE HEART CENTER                                   [address] [place] [address]                                                      PHONE: [phone]                                                         FAX: [phone]  [email or web address]  ---------------------------------------------------------------------------------------------------------------------------------    Reason for Consult: SOB    HPI:  TIFFANIE ZELAYA is an 88y Male with Hx of CAD s/p CABG s/p bilateral CEA  PAF S/P PPM S/P RECENT GREGORIA CV witha questionable thrombus VS veg in RV lead at that time changed from xarelto to eliquis presenting to [facility] c.o SOB MENDEZ Weakness found to be anemic with a HB of 9    PAST MEDICAL & SURGICAL HISTORY:  Statin intolerance    PVD (peripheral vascular disease)  R SFA and L SFA w/stents and PTA    Former smoker    H/O urinary frequency    FANNY (obstructive sleep apnea)    Angina, class II    Hematuria    History of BPH    Cataract    COPD, mild    MR (mitral regurgitation)  moderate      CAD (coronary artery disease)  PPM, PCI LM and LCX, CABG LIMA OAD and OM1    Abnormal positron emission tomography (PET) scan  2019 anterior, anteroseptal,anterolateral and iferolateral ischemia    Osteoarthritis    Age-related incipient cataract of both eyes    PVD (peripheral vascular disease)  intervention RLE    CAD (coronary artery disease)  CABG LIMA LAD SVG OM1, PCI PDA LM/LCX with PTCA    Carotid artery disease  RCEA    Former smoker    Fatigue  Profound w/exertion    MENDEZ (dyspnea on exertion)    SOB (shortness of breath)    BPH (benign prostatic hyperplasia)    Angina pectoris  class II    PVD (peripheral vascular disease)    FANNY (obstructive sleep apnea)  uses mouth peice    CAD (coronary artery disease)  S/P Stenting x8    Asthma    Aortic stenosis  TAVR #29 Medtronic core valve 2017    Diabetes mellitus    Hyperlipidemia    Hypertension    S/P cholecystectomy    S/P appendectomy    S/P ICD (internal cardiac defibrillator) procedure    S/P hernia repair    History of permanent cardiac pacemaker placement  [employer]    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  #29 Medtronic core valve    S/P CABG x 2  LIMA LAD SVG OM1    S/P PTCA (percutaneous transluminal coronary angioplasty)  x 9    S/P nasal surgery  Secondary to nasal fracture    S/P joint replacement  Bilateral knee replacement    S/P cataract surgery, right    S/P cataract surgery, left    History of CEA (carotid endarterectomy)  Bilateral    S/P cholecystectomy    S/P tonsillectomy        statins (Muscle Pain)      MEDICATIONS  (STANDING):    MEDICATIONS  (PRN):      Social History:  Cigarettes:                    Alchohol:                 Illicit Drug Abuse:      REVIEW OF SYSTEMS:    Constitutional: No fever, weight loss or fatigue  Eyes: No eye pain, visual disturbances, or discharge  ENMT:  No difficulty hearing, tinnitus, vertigo; No sinus or throat pain  Neck: No pain or stiffness  Respiratory: No cough, wheezing, chills or hemoptysis  Cardiovascular: No chest pain, palpitations, shortness of breath, dizziness or leg swelling  Gastrointestinal: No abdominal or epigastric pain. No nausea, vomiting or hematemesis; No diarrhea or constipation. No melena or hematochezia.  Genitourinary: No dysuria, frequency, hematuria or incontinence  Rectal: No pain, hemorrhoids or incontinence  Neurological: No headaches, memory loss, loss of strength, numbness or tremors  Skin: No itching, burning, rashes or lesions   Lymph Nodes: No enlarged glands  Endocrine: No heat or cold intolerance; No hair loss  Musculoskeletal: No joint pain or swelling; No muscle, back or extremity pain  Psychiatric: No depression, anxiety, mood swings or difficulty sleeping  Heme/Lymph: No easy bruising or bleeding gums  Allergy and Immunologic: No hives or eczema    Vital Signs Last 24 Hrs  T(C): 36.8 (2020 13:14), Max: 36.8 (2020 13:14)  T(F): 98.3 (2020 13:14), Max: 98.3 (2020 13:14)  HR: 68 (2020 13:14) (68 - 68)  BP: 159/78 (2020 13:14) (159/78 - 159/78)  BP(mean): --  RR: 18 (2020 13:14) (18 - 18)  SpO2: 97% (2020 13:14) (97% - 97%)  ICU Vital Signs Last 24 Hrs  TIFFANIE GARCIA  I&O's Detail    I&O's Summary    Drug Dosing Weight  TIFFANIE BOWMANROSALIA      PHYSICAL EXAM:  General: Appears well developed, well nourished alert and cooperative.  HEENT: Head; normocephalic, atraumatic.  Eyes: Pupils reactive, cornea wnl.  Neck: Supple, no nodes adenopathy, no NVD or carotid bruit or thyromegaly.  CARDIOVASCULAR: Normal S1 and S2, No murmur, rub, gallop or lift.   LUNGS: No rales, rhonchi or wheeze. Normal breath sounds bilaterally.  ABDOMEN: Soft, nontender without mass or organomegaly. bowel sounds normoactive.  EXTREMITIES: No clubbing, cyanosis or edema. Distal pulses wnl.   SKIN: warm and dry with normal turgor.  NEURO: Alert/oriented x 3/normal motor exam. No pathologic reflexes.    PSYCH: normal affect.        LABS:                        9.3    6.93  )-----------( 207      ( 2020 14:15 )             30.1     -    138  |  101  |  25.0<H>  ----------------------------<  187<H>  4.3   |  29.0  |  1.09    Ca    8.6      2020 14:15    TPro  6.9  /  Alb  4.0  /  TBili  0.3<L>  /  DBili  x   /  AST  16  /  ALT  17  /  AlkPhos  53  -    TIFFANIE GARCIA  CARDIAC MARKERS ( 2020 14:15 )  x     / 0.04 ng/mL / x     / x     / x          PT/INR - ( 2020 14:15 )   PT: 21.3 sec;   INR: 1.89 ratio         PTT - ( 2020 14:15 )  PTT:33.3 sec      RADIOLOGY & ADDITIONAL STUDIES:    INTERPRETATION OF TELEMETRY (personally reviewed):    ECG: V pacing    ECHO:   < from: GREG[facility]IA Echo Doppler (20 @ 09:34) >  PHYSICIAN INTERPRETATION:  Left Ventricle: Normal left ventricular size and wall thicknesses, with normal systolic and diastolic function.  Right Ventricle: Normal right ventricular size and function.  Left Atrium: The left atrium is normal in size. Normal left atrial size. No left atrial appendage thrombus is seen.  Right Atrium: The right atrium is normal in size. There is a mobile echodensity seen in the right atrium that appears to be attached to the tip of the right atrial PPM lead. The echodensity measures 1.4 x 0.49 cm at the largest. This may represent a small thrombus or vegetation.  Pericardium: There is no evidence of pericardial effusion.  Mitral Valve: The mitral valve is normal in structure. Moderate mitral valve regurgitation is seen.  Tricuspid Valve: The tricuspid valve is normal in structure. Mild tricuspid regurgitation is visualized.  Aortic Valve: The aortic valve is a normally functioning bioprosthetic valve. Trivial aortic valve regurgitation is seen.  Pulmonic Valve: Structurally normal pulmonic valve, with normal leaflet excursion. Trace pulmonic valve regurgitation.  Aorta: The aortic root is normal in size and structure. There is mild aortic root calcification.      Summary:   1. Normal left ventricular size and wall thicknesses, with normal systolic and diastolic function.   2. Normal right ventricular size and function.   3. Normal left atrial size.   4. There is a mobile echodensity seen in the right atrium that appears to be attached to the tip of the right atrial PPM lead. The echodensity measures 1.4 x 0.49 cm at the largest. This may represent a small thrombus or vegetation.   5. Moderate mitral valve regurgitation.   6. Mild tricuspid regurgitation.   7. Aortic valve is normally functioning bioprosthetic valve.   8. No left atrial appendage thrombus.   9. There is mild aortic root calcification.  10. After the exclusion of a left atrial appendage thrombus, a successful direct current cardioversion was performed at 200J, resulting in normal sinus rhythm.    Tristin Ramirez MD Electronically signed on 2020 at 10:33:36 AM      < end of copied text >        CARDIAC CATHETERIZATION:   < from: Cardiac Cath Lab - Adult (20 @ 11:49) >  VENTRICLES: No left ventriculogram was performed.  CORONARY VESSELS: The coronary circulation is co-dominant.  LM:   --  LM: Stents open with distal 30%.  LAD:   --  LAD: The LAD is open with open stents with diffuse distal  disease.  CX:   --  Proximal circumflex: There was a 75 % stenosis.  RCA:   --  RCA: This vessel was not injected.  COMPLICATIONS: There were no complications.  DIAGNOSTIC IMPRESSIONS: There is significant ISR of the distalLM and  proximal LCx of 75% with good runoff.  DIAGNOSTIC RECOMMENDATIONS: In view of the clinical history of recurrent  ISR and the angiographic findings, PCI and Brachytherapy will be  performed.  INTERVENTIONAL IMPRESSIONS: A successful IVUS guided PCI of the LM and LCx  was performed followed by Brachytherapy delivering 23Gr for 5min and  56sec. The lesion was reduced to 5%.  INTERVENTIONAL RECOMMENDATIONS: Continue DAPT and risk factor modification  with high intensity statin therapy.  Prepared and signed by  Alejandro Chung M.D.  Signed 2020 20:35:59  HEMODYNAMIC TABLES  Pressures:  Baseline  Pressures:  - HR: 83  Pressures:  - Rhythm:  Pressures:  -- Aortic Pressure (S/D/M): 163/65/106  Outputs:  Baseline  Outputs:  -- CALCULATIONS[age]  Outputs:  -- CALCULATIONS: Body Surface Area: 1.98  Outputs:  -- CALCULATIONS: Height in cm: 173.00  Outputs:  -- CALCULATIONS: Sex: Male  Outputs:  -- CALCULATIONS: Weight in k.90  Outputs:  -- OUTPUTS: O2 consumption:247.43  Outputs:  -- OUTPUTS: Vo2 Indexed: 125.00    < end of copied text >    ACTIVE PROBLEMS:  HEALTH ISSUES - PROBLEM Dx:          Assessment and Plan:    In summary,     Telemetry monitoring  Serial cardiac markers and EKgs  2DEchocardiogram  Continue present cardiac therapy    ARLYN RADER MD, [facility], [facility], [facility]                                   [address] [place] [address]                                                      PHONE: [phone]                                                         FAX: [phone]  [email or web address]  ---------------------------------------------------------------------------------------------------------------------------------    Reason for Consult: SOB    HPI:  TIFFANIE ZELAYA is an 88y Male with Hx of CAD s/p CABG s/p bilateral CEA  PAF S/P PPM S/P RECENT GREGORIA CV with a questionable thrombus VS veg in RV lead so far negative BC x5 days at that time changed from xarelto to eliquis 5 mg PO BID presenting to [facility] c.o SOB MENDEZ weakness found to be anemic with a Hgb of 9.3 He recently felt dizzy and with evidence of LE Edema.   Seen at the ER with his wife , discussed over the phone with daughter that is a RN        PAST MEDICAL & SURGICAL HISTORY:  Statin intolerance    PVD (peripheral vascular disease)  R SFA and L SFA w/stents and PTA    Former smoker    H/O urinary frequency    FANNY (obstructive sleep apnea)    Angina, class II    Hematuria    History of BPH    Cataract    COPD, mild    MR (mitral regurgitation)  moderate      CAD (coronary artery disease)  PPM, PCI LM and LCX, CABG LIMA OAD and OM1    Abnormal positron emission tomography (PET) scan  2019 anterior, anteroseptal,anterolateral and iferolateral ischemia    Osteoarthritis    Age-related incipient cataract of both eyes    PVD (peripheral vascular disease)  intervention RLE    CAD (coronary artery disease)  CABG LIMA LAD SVG OM1, PCI PDA LM/LCX with PTCA    Carotid artery disease  RCEA    Former smoker    Fatigue  Profound w/exertion    MENDEZ (dyspnea on exertion)    SOB (shortness of breath)    BPH (benign prostatic hyperplasia)    Angina pectoris  class II    PVD (peripheral vascular disease)    FANNY (obstructive sleep apnea)  uses mouth peice    CAD (coronary artery disease)  S/P Stenting x8    Asthma    Aortic stenosis  TAVR #29 Medtronic core valve 2017    Diabetes mellitus    Hyperlipidemia    Hypertension    S/P cholecystectomy    S/P appendectomy    S/P ICD (internal cardiac defibrillator) procedure    S/P hernia repair    History of permanent cardiac pacemaker placement  [employer]    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  #29 Medtronic core valve    S/P CABG x 2  LIMA LAD SVG OM1    S/P PTCA (percutaneous transluminal coronary angioplasty)  x 9    S/P nasal surgery  Secondary to nasal fracture    S/P joint replacement  Bilateral knee replacement    S/P cataract surgery, right    S/P cataract surgery, left    History of CEA (carotid endarterectomy)  Bilateral    S/P cholecystectomy    S/P tonsillectomy        statins (Muscle Pain)      MEDICATIONS  (STANDING):    MEDICATIONS  (PRN):      Social History:  Cigarettes:                    Alchohol:                 Illicit Drug Abuse:      REVIEW OF SYSTEMS:    Constitutional: No fever, weight loss or fatigue  Eyes: No eye pain, visual disturbances, or discharge  ENMT:  No difficulty hearing, tinnitus, vertigo; No sinus or throat pain  Neck: No pain or stiffness  Respiratory: No cough, wheezing, chills or hemoptysis  Cardiovascular: No chest pain, palpitations, shortness of breath, dizziness or leg swelling  Gastrointestinal: No abdominal or epigastric pain. No nausea, vomiting or hematemesis; No diarrhea or constipation. No melena or hematochezia.  Genitourinary: No dysuria, frequency, hematuria or incontinence  Rectal: No pain, hemorrhoids or incontinence  Neurological: No headaches, memory loss, loss of strength, numbness or tremors  Skin: No itching, burning, rashes or lesions   Lymph Nodes: No enlarged glands  Endocrine: No heat or cold intolerance; No hair loss  Musculoskeletal: No joint pain or swelling; No muscle, back or extremity pain  Psychiatric: No depression, anxiety, mood swings or difficulty sleeping  Heme/Lymph: No easy bruising or bleeding gums  Allergy and Immunologic: No hives or eczema    Vital Signs Last 24 Hrs  T(C): 36.8 (2020 13:14), Max: 36.8 (2020 13:14)  T(F): 98.3 (2020 13:14), Max: 98.3 (2020 13:14)  HR: 68 (2020 13:14) (68 - 68)  BP: 159/78 (2020 13:14) (159/78 - 159/78)  BP(mean): --  RR: 18 (2020 13:14) (18 - 18)  SpO2: 97% (2020 13:14) (97% - 97%)  ICU Vital Signs Last 24 Hrs  TIFFANIE ZELAYA  I&O's Detail    I&O's Summary    Drug Dosing Weight  TIFFANIE GARCIA      PHYSICAL EXAM:  General: Appears well developed, well nourished alert and cooperative.  HEENT: Head; normocephalic, atraumatic.  Eyes: Pupils reactive, cornea wnl.  Neck: Supple, no nodes adenopathy, no NVD or carotid bruit or thyromegaly.  CARDIOVASCULAR: Normal S1 and S2, No murmur, rub, gallop or lift.   LUNGS: No rales, rhonchi or wheeze. Normal breath sounds bilaterally.  ABDOMEN: Soft, nontender without mass or organomegaly. bowel sounds normoactive.  EXTREMITIES: No clubbing, cyanosis or edema. Distal pulses wnl.   SKIN: warm and dry with normal turgor.  NEURO: Alert/oriented x 3/normal motor exam. No pathologic reflexes.    PSYCH: normal affect.        LABS:                        9.3    6.93  )-----------( 207      ( 2020 14:15 )             30.1     -    138  |  101  |  25.0<H>  ----------------------------<  187<H>  4.3   |  29.0  |  1.09    Ca    8.6      2020 14:15    TPro  6.9  /  Alb  4.0  /  TBili  0.3<L>  /  DBili  x   /  AST  16  /  ALT  17  /  AlkPhos  53  -    TIFFANIE ZELAYA  CARDIAC MARKERS ( 2020 14:15 )  x     / 0.04 ng/mL / x     / x     / x          PT/INR - ( 2020 14:15 )   PT: 21.3 sec;   INR: 1.89 ratio         PTT - ( 2020 14:15 )  PTT:33.3 sec      RADIOLOGY & ADDITIONAL STUDIES:    INTERPRETATION OF TELEMETRY (personally reviewed):    ECG: V pacing    ECHO:   < from: GREG[facility]IA Echo Doppler (20 @ 09:34) >  PHYSICIAN INTERPRETATION:  Left Ventricle: Normal left ventricular size and wall thicknesses, with normal systolic and diastolic function.  Right Ventricle: Normal right ventricular size and function.  Left Atrium: The left atrium is normal in size. Normal left atrial size. No left atrial appendage thrombus is seen.  Right Atrium: The right atrium is normal in size. There is a mobile echodensity seen in the right atrium that appears to be attached to the tip of the right atrial PPM lead. The echodensity measures 1.4 x 0.49 cm at the largest. This may represent a small thrombus or vegetation.  Pericardium: There is no evidence of pericardial effusion.  Mitral Valve: The mitral valve is normal in structure. Moderate mitral valve regurgitation is seen.  Tricuspid Valve: The tricuspid valve is normal in structure. Mild tricuspid regurgitation is visualized.  Aortic Valve: The aortic valve is a normally functioning bioprosthetic valve. Trivial aortic valve regurgitation is seen.  Pulmonic Valve: Structurally normal pulmonic valve, with normal leaflet excursion. Trace pulmonic valve regurgitation.  Aorta: The aortic root is normal in size and structure. There is mild aortic root calcification.      Summary:   1. Normal left ventricular size and wall thicknesses, with normal systolic and diastolic function.   2. Normal right ventricular size and function.   3. Normal left atrial size.   4. There is a mobile echodensity seen in the right atrium that appears to be attached to the tip of the right atrial PPM lead. The echodensity measures 1.4 x 0.49 cm at the largest. This may represent a small thrombus or vegetation.   5. Moderate mitral valve regurgitation.   6. Mild tricuspid regurgitation.   7. Aortic valve is normally functioning bioprosthetic valve.   8. No left atrial appendage thrombus.   9. There is mild aortic root calcification.  10. After the exclusion of a left atrial appendage thrombus, a successful direct current cardioversion was performed at 200J, resulting in normal sinus rhythm.    Tristin Ramirez MD Electronically signed on 2020 at 10:33:36 AM      < end of copied text >        CARDIAC CATHETERIZATION:   < from: Cardiac Cath Lab - Adult (02.26.20 @ 11:49) >  VENTRICLES: No left ventriculogram was performed.  CORONARY VESSELS: The coronary circulation is co-dominant.  LM:   --  LM: Stents open with distal 30%.  LAD:   --  LAD: The LAD is open with open stents with diffuse distal  disease.  CX:   --  Proximal circumflex: There was a 75 % stenosis.  RCA:   --  RCA: This vessel was not injected.  COMPLICATIONS: There were no complications.  DIAGNOSTIC IMPRESSIONS: There is significant ISR of the distalLM and  proximal LCx of 75% with good runoff.  DIAGNOSTIC RECOMMENDATIONS: In view of the clinical history of recurrent  ISR and the angiographic findings, PCI and Brachytherapy will be  performed.  INTERVENTIONAL IMPRESSIONS: A successful IVUS guided PCI of the LM and LCx  was performed followed by Brachytherapy delivering 23Gr for 5min and  56sec. The lesion was reduced to 5%.  INTERVENTIONAL RECOMMENDATIONS: Continue DAPT and risk factor modification  with high intensity statin therapy.  Prepared and signed by  Alejandro Chung M.D.  Signed 2020 20:35:59  HEMODYNAMIC TABLES  Pressures:  Baseline  Pressures:  - HR: 83  Pressures:  - Rhythm:  Pressures:  -- Aortic Pressure (S/D/M): 163/65/106  Outputs:  Baseline  Outputs:  -- CALCULATIONS[age]  Outputs:  -- CALCULATIONS: Body Surface Area: 1.98  Outputs:  -- CALCULATIONS: Height in cm: 173.00  Outputs:  -- CALCULATIONS: Sex: Male  Outputs:  -- CALCULATIONS: Weight in k.90  Outputs:  -- OUTPUTS: O2 consumption:247.43  Outputs:  -- OUTPUTS: Vo2 Indexed: 125.00    < end of copied text >    ACTIVE PROBLEMS:  HEALTH ISSUES - PROBLEM Dx:          Assessment and Plan:    In summary,   TIFFANIE ZELAYA is an 88y Male with Hx of CAD s/p CABG s/p bilateral CEA  PAF S/P PPM S/P RECENT GREGORIA CV with a questionable thrombus VS veg in RV lead so far negative BC x5 days at that time changed from xarelto to eliquis 5 mg PO BID presenting to [facility] c.o SOB MENDEZ weakness found to be anemic with a Hgb of 9.3 He recently felt dizzy and with evidence of LE Edema.   Seen at the ER with his wife , discussed over the phone with daughter that is a RN      Telemetry monitoring  Monitor closely H/H , GI evaluation  Serial cardiac markers and EKgs  2DEchocardiogram FUP RV lead thrombus VS veg  Will continue Anticoagulation with eliquis 5 mg po BID for now in view of recent DCCV  IV lasix 40 mg x1 then 40 mg po daily  Continue present cardiac therapy    ARLYN RADER MD, FACC, [facility]

## 2020-11-19 NOTE — ED PROVIDER NOTE - PROGRESS NOTE DETAILS
Discussed with cariology at bedside, requesting admission for echo, serial cbc's, and possible further workup for etiology of weakness and sob. Reviewed all results with pt as well as plans for admission. Pt is comfortable with plan for admission. Questions answered. - Amando Jim, PGY-2

## 2020-11-19 NOTE — ED PROVIDER NOTE - PHYSICAL EXAMINATION
Gen: no acute distress  Head: normocephalic, atraumatic  Lung: CTAB, no respiratory distress, no wheezing, rales, rhonchi  CV: normal s1/s2, rrr  Abd: soft, non-tender, non-distended  Rectal: soft brown stool in rectal vault, solitary external hemmorrhoid at the 5'oclock position with no active bleeding, no internal hemorrhoids (Larissa Fernandez RN present)  MSK: No edema, no visible deformities, full range of motion in all 4 extremities  Neuro: No focal neurologic deficits  Skin: No rash   Psych: normal affect

## 2020-11-19 NOTE — ED ADULT NURSE NOTE - PSH
History of CEA (carotid endarterectomy)  Bilateral  History of permanent cardiac pacemaker placement  [employer]  S/P appendectomy    S/P CABG x 2  LIMA LAD SVG OM1  S/P cataract surgery, left    S/P cataract surgery, right    S/P cholecystectomy    S/P cholecystectomy    S/P hernia repair    S/P ICD (internal cardiac defibrillator) procedure    S/P joint replacement  Bilateral knee replacement  S/P nasal surgery  Secondary to nasal fracture  S/P PTCA (percutaneous transluminal coronary angioplasty)  x 9  S/P tonsillectomy    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  #29 Medtronic core valve

## 2020-11-19 NOTE — ED ADULT NURSE NOTE - NSIMPLEMENTINTERV_GEN_ALL_ED
Implemented All Universal Safety Interventions:  [place] to call system. Call bell, personal items and telephone within reach. Instruct patient to call for assistance. Room bathroom lighting operational. Non-slip footwear when patient is off stretcher. Physically safe environment: no spills, clutter or unnecessary equipment. Stretcher in lowest position, wheels locked, appropriate side rails in place.

## 2020-11-19 NOTE — H&P ADULT - HISTORY OF PRESENT ILLNESS
88 year old male patient with pertinent history of Afib(started 3 weeks ago) and recent Cardioversion with GREGORIA( November 13th) presented to the ED complaining of progressively worsening dyspnea on exertion, orthopnea, PND, dizziness and anxiety. Patient states he was initially started on xarelto when Afib was first discovered but it was later switched to Eliquis after the cardioversion was done. States his symptoms became profound shortly after starting eliquis. States he feels very dizzy and unsteady on his feet. Pt saw his PCP two days prior and had routine labs done and was notified that there was a drop in his Hb. Endorsed occasional dark stools. Pt has not had a Colonoscopy in over 20 years. He denied any associated chest pain, palpitations, slurred speech, facial drooping, aphasia, visual changes, arm or leg weakness.      In the ED patient with negative stool guaiac No episodes of hypoxia noted 88 year old male patient with pertinent history of Afib(started 3 weeks ago) and recent Cardioversion with GREGORIA( November 13th) presented to the ED complaining of progressively worsening dyspnea on exertion, orthopnea, PND, dizziness and anxiety. Patient states he was initially started on xarelto when Afib was first discovered but it was later switched to Eliquis after the cardioversion was done. States his symptoms became profound shortly after starting eliquis. States he feels very dizzy and unsteady on his feet. Pt saw his PCP two days prior and had routine labs done and was notified that there was a drop in his Hb. Endorsed occasional dark stools. Pt has not had a Colonoscopy in over 20 years. He denied any associated chest pain, palpitations, slurred speech, facial drooping, aphasia, visual changes, arm or leg weakness. Endorsed b/l lower extremity edema      In the ED patient with negative stool guaiac No episodes of hypoxia noted 88 year old male patient with pertinent history of  HTN, HL, DM, PVD with RSFA and LSFA stenting, Carotid endarterectomy, CAD s/p remote CABG & multiple PCIs (most recently 2/2020),  severe AS s/p remote TAVR,  PAF S/P PPM S/P RECENT GREGORIA with DCCV (November 13th). with a questionable thrombus VS veg in RV lead so far negative BC x5 days at that time changed from xarelto to eliquis 5 mg PO BID presented to the ED complaining of BRPBR in small aounts since the past week along with progressively worsening dyspnea on exertion, orthopnea, PND and dizziness. Patient states he was initially started on xarelto when Afib was first discovered but it was later switched to Eliquis after the cardioversion was done. States his symptoms became profound shortly after starting eliquis. States he feels very dizzy and unsteady on his feet. Pt saw his PCP two days prior and had routine labs done and was notified that there was a drop in his Hb. Endorsed occasional dark stools. Pt has not had a Colonoscopy in over 20 years. He denied any associated chest pain, palpitations, slurred speech, facial drooping, aphasia, visual changes, arm or leg weakness. Endorsed b/l lower extremity edema  In the ED patient with negative stool guaiac. Hg was found to be 9.4 with baseline being 13. No episodes of hypoxia noted

## 2020-11-19 NOTE — ED PROVIDER NOTE - CLINICAL SUMMARY MEDICAL DECISION MAKING FREE TEXT BOX
Pt with very extensive and signficiant cardiac hx presenting with increasing weakness and MENDEZ after starting on eliquis last week with +blood noted in stool. Will recheck labs, coags, t/s, trop, bnp, ekg, cxr, and will reeval.

## 2020-11-19 NOTE — H&P ADULT - NSICDXPASTSURGICALHX_GEN_ALL_CORE_FT
PAST SURGICAL HISTORY:  History of CEA (carotid endarterectomy) Bilateral    History of permanent cardiac pacemaker placement [employer]    S/P appendectomy     S/P CABG x 2 LIMA LAD SVG OM1    S/P cataract surgery, left     S/P cataract surgery, right     S/P cholecystectomy     S/P cholecystectomy     S/P hernia repair     S/P ICD (internal cardiac defibrillator) procedure     S/P joint replacement Bilateral knee replacement    S/P nasal surgery Secondary to nasal fracture    S/P PTCA (percutaneous transluminal coronary angioplasty) x 9    S/P tonsillectomy     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 #29 Medtronic core valve

## 2020-11-19 NOTE — GOALS OF CARE CONVERSATION - ADVANCED CARE PLANNING - CONVERSATION DETAILS
> Goals of care - I had a lengthy conversation with pt.  We discussed the comorbid conditions, hospital care, and prognosis.  I also discussed advanced directives with the family - made aware of limited prognosis if patient were to be intubated or resuscitated, in consideration of age and comorbid condition. Pt is FULL code. Total time spent on patient care discussion = 45 minutes

## 2020-11-19 NOTE — ED ADULT NURSE NOTE - OBJECTIVE STATEMENT
Assumed pt care at 1345.  Pt c/o weakness and bloody stools, states he was told to come to ED for r/o GI bleed r/t low hgb of 9 as per cardiologist.  No acute s/s of respiratory distress noted or reported at this time, will continue to monitor

## 2020-11-19 NOTE — H&P ADULT - NSHPPHYSICALEXAM_GEN_ALL_CORE
Vital Signs Last 24 Hrs  T(C): 36.3 (19 Nov 2020 14:18), Max: 36.8 (19 Nov 2020 13:14)  T(F): 97.4 (19 Nov 2020 14:18), Max: 98.3 (19 Nov 2020 13:14)  HR: 68 (19 Nov 2020 13:14) (68 - 68)  BP: 173/75 (19 Nov 2020 14:18) (159/78 - 173/75)  BP(mean): --  RR: 18 (19 Nov 2020 14:18) (18 - 18)  SpO2: 98% (19 Nov 2020 14:18) (97% - 98%)

## 2020-11-20 ENCOUNTER — TRANSCRIPTION ENCOUNTER (OUTPATIENT)
Age: 85
End: 2020-11-20

## 2020-11-20 DIAGNOSIS — I50.33 ACUTE ON CHRONIC DIASTOLIC (CONGESTIVE) HEART FAILURE: ICD-10-CM

## 2020-11-20 LAB
A1C WITH ESTIMATED AVERAGE GLUCOSE RESULT: 7.3 % — HIGH (ref 4–5.6)
ALBUMIN SERPL ELPH-MCNC: 3.8 G/DL — SIGNIFICANT CHANGE UP (ref 3.3–5.2)
ALP SERPL-CCNC: 50 U/L — SIGNIFICANT CHANGE UP (ref 40–120)
ALT FLD-CCNC: 15 U/L — SIGNIFICANT CHANGE UP
ANION GAP SERPL CALC-SCNC: 11 MMOL/L — SIGNIFICANT CHANGE UP (ref 5–17)
AST SERPL-CCNC: 16 U/L — SIGNIFICANT CHANGE UP
BASOPHILS # BLD AUTO: 0.02 K/UL — SIGNIFICANT CHANGE UP (ref 0–0.2)
BASOPHILS NFR BLD AUTO: 0.3 % — SIGNIFICANT CHANGE UP (ref 0–2)
BILIRUB SERPL-MCNC: 0.4 MG/DL — SIGNIFICANT CHANGE UP (ref 0.4–2)
BUN SERPL-MCNC: 22 MG/DL — HIGH (ref 8–20)
CALCIUM SERPL-MCNC: 8.9 MG/DL — SIGNIFICANT CHANGE UP (ref 8.6–10.2)
CHLORIDE SERPL-SCNC: 97 MMOL/L — LOW (ref 98–107)
CO2 SERPL-SCNC: 31 MMOL/L — HIGH (ref 22–29)
CREAT SERPL-MCNC: 1.18 MG/DL — SIGNIFICANT CHANGE UP (ref 0.5–1.3)
EOSINOPHIL # BLD AUTO: 0.21 K/UL — SIGNIFICANT CHANGE UP (ref 0–0.5)
EOSINOPHIL NFR BLD AUTO: 3.3 % — SIGNIFICANT CHANGE UP (ref 0–6)
ESTIMATED AVERAGE GLUCOSE: 163 MG/DL — HIGH (ref 68–114)
GLUCOSE BLDC GLUCOMTR-MCNC: 143 MG/DL — HIGH (ref 70–99)
GLUCOSE BLDC GLUCOMTR-MCNC: 247 MG/DL — HIGH (ref 70–99)
GLUCOSE BLDC GLUCOMTR-MCNC: 311 MG/DL — HIGH (ref 70–99)
GLUCOSE BLDC GLUCOMTR-MCNC: 430 MG/DL — HIGH (ref 70–99)
GLUCOSE BLDC GLUCOMTR-MCNC: 470 MG/DL — CRITICAL HIGH (ref 70–99)
GLUCOSE BLDC GLUCOMTR-MCNC: 72 MG/DL — SIGNIFICANT CHANGE UP (ref 70–99)
GLUCOSE SERPL-MCNC: 160 MG/DL — HIGH (ref 70–99)
HCT VFR BLD CALC: 31 % — LOW (ref 39–50)
HGB BLD-MCNC: 9.6 G/DL — LOW (ref 13–17)
IMM GRANULOCYTES NFR BLD AUTO: 0.3 % — SIGNIFICANT CHANGE UP (ref 0–1.5)
INR BLD: 2.3 RATIO — HIGH (ref 0.88–1.16)
LYMPHOCYTES # BLD AUTO: 1.59 K/UL — SIGNIFICANT CHANGE UP (ref 1–3.3)
LYMPHOCYTES # BLD AUTO: 25.4 % — SIGNIFICANT CHANGE UP (ref 13–44)
MAGNESIUM SERPL-MCNC: 1.9 MG/DL — SIGNIFICANT CHANGE UP (ref 1.6–2.6)
MCHC RBC-ENTMCNC: 29.3 PG — SIGNIFICANT CHANGE UP (ref 27–34)
MCHC RBC-ENTMCNC: 31 GM/DL — LOW (ref 32–36)
MCV RBC AUTO: 94.5 FL — SIGNIFICANT CHANGE UP (ref 80–100)
MONOCYTES # BLD AUTO: 0.95 K/UL — HIGH (ref 0–0.9)
MONOCYTES NFR BLD AUTO: 15.2 % — HIGH (ref 2–14)
NEUTROPHILS # BLD AUTO: 3.48 K/UL — SIGNIFICANT CHANGE UP (ref 1.8–7.4)
NEUTROPHILS NFR BLD AUTO: 55.5 % — SIGNIFICANT CHANGE UP (ref 43–77)
PHOSPHATE SERPL-MCNC: 3.5 MG/DL — SIGNIFICANT CHANGE UP (ref 2.4–4.7)
PLATELET # BLD AUTO: 227 K/UL — SIGNIFICANT CHANGE UP (ref 150–400)
POTASSIUM SERPL-MCNC: 4.2 MMOL/L — SIGNIFICANT CHANGE UP (ref 3.5–5.3)
POTASSIUM SERPL-SCNC: 4.2 MMOL/L — SIGNIFICANT CHANGE UP (ref 3.5–5.3)
PROT SERPL-MCNC: 6.5 G/DL — LOW (ref 6.6–8.7)
PROTHROM AB SERPL-ACNC: 25.6 SEC — HIGH (ref 10.6–13.6)
RBC # BLD: 3.28 M/UL — LOW (ref 4.2–5.8)
RBC # FLD: 13.5 % — SIGNIFICANT CHANGE UP (ref 10.3–14.5)
SARS-COV-2 IGG SERPL QL IA: NEGATIVE — SIGNIFICANT CHANGE UP
SARS-COV-2 IGM SERPL IA-ACNC: <0.1 INDEX — SIGNIFICANT CHANGE UP
SODIUM SERPL-SCNC: 139 MMOL/L — SIGNIFICANT CHANGE UP (ref 135–145)
TROPONIN T SERPL-MCNC: 0.06 NG/ML — SIGNIFICANT CHANGE UP (ref 0–0.06)
WBC # BLD: 6.27 K/UL — SIGNIFICANT CHANGE UP (ref 3.8–10.5)
WBC # FLD AUTO: 6.27 K/UL — SIGNIFICANT CHANGE UP (ref 3.8–10.5)

## 2020-11-20 PROCEDURE — 99232 SBSQ HOSP IP/OBS MODERATE 35: CPT

## 2020-11-20 PROCEDURE — 93010 ELECTROCARDIOGRAM REPORT: CPT

## 2020-11-20 PROCEDURE — 99233 SBSQ HOSP IP/OBS HIGH 50: CPT

## 2020-11-20 RX ORDER — FUROSEMIDE 40 MG
1 TABLET ORAL
Qty: 0 | Refills: 0 | DISCHARGE

## 2020-11-20 RX ORDER — FUROSEMIDE 40 MG
10 TABLET ORAL ONCE
Refills: 0 | Status: COMPLETED | OUTPATIENT
Start: 2020-11-20 | End: 2020-11-20

## 2020-11-20 RX ORDER — FUROSEMIDE 40 MG
1 TABLET ORAL
Qty: 30 | Refills: 0
Start: 2020-11-20 | End: 2020-12-19

## 2020-11-20 RX ORDER — APIXABAN 2.5 MG/1
1 TABLET, FILM COATED ORAL
Qty: 60 | Refills: 1
Start: 2020-11-20 | End: 2021-01-18

## 2020-11-20 RX ORDER — FUROSEMIDE 40 MG
20 TABLET ORAL ONCE
Refills: 0 | Status: DISCONTINUED | OUTPATIENT
Start: 2020-11-20 | End: 2020-11-20

## 2020-11-20 RX ADMIN — APIXABAN 5 MILLIGRAM(S): 2.5 TABLET, FILM COATED ORAL at 05:09

## 2020-11-20 RX ADMIN — TAMSULOSIN HYDROCHLORIDE 0.8 MILLIGRAM(S): 0.4 CAPSULE ORAL at 21:16

## 2020-11-20 RX ADMIN — Medication 650 MILLIGRAM(S): at 06:09

## 2020-11-20 RX ADMIN — Medication 1 TABLET(S): at 12:52

## 2020-11-20 RX ADMIN — Medication 650 MILLIGRAM(S): at 05:09

## 2020-11-20 RX ADMIN — APIXABAN 5 MILLIGRAM(S): 2.5 TABLET, FILM COATED ORAL at 18:50

## 2020-11-20 RX ADMIN — BUDESONIDE AND FORMOTEROL FUMARATE DIHYDRATE 2 PUFF(S): 160; 4.5 AEROSOL RESPIRATORY (INHALATION) at 10:45

## 2020-11-20 RX ADMIN — Medication 0.25 MILLIGRAM(S): at 21:16

## 2020-11-20 RX ADMIN — PANTOPRAZOLE SODIUM 40 MILLIGRAM(S): 20 TABLET, DELAYED RELEASE ORAL at 05:09

## 2020-11-20 RX ADMIN — Medication 6: at 12:51

## 2020-11-20 RX ADMIN — Medication 40 MILLIGRAM(S): at 05:09

## 2020-11-20 RX ADMIN — LATANOPROST 1 DROP(S): 0.05 SOLUTION/ DROPS OPHTHALMIC; TOPICAL at 00:13

## 2020-11-20 RX ADMIN — LATANOPROST 1 DROP(S): 0.05 SOLUTION/ DROPS OPHTHALMIC; TOPICAL at 21:16

## 2020-11-20 RX ADMIN — Medication 2: at 09:05

## 2020-11-20 NOTE — DISCHARGE NOTE PROVIDER - PROVIDER TOKENS
PROVIDER:[TOKEN:[[id number]]],PROVIDER:[TOKEN:[[id number]]],PROVIDER:[TOKEN:[[id number]]]

## 2020-11-20 NOTE — PROGRESS NOTE ADULT - SUBJECTIVE AND OBJECTIVE BOX
Chief Complaint: This is a 88y old man patient being seen in follow-up consultation for anemia, GIB.    HPI / 24H events:  Patient seeing and evaluated at bedside, reporting 2 small dark stools with some red blood. Hemoglobin 9.6, tolerating regular diet. Denies nausea, vomiting, abdominal pain, chest pain, shortness of breath, hematemesis.    ROS: A 14-point review of systems was reviewed and was otherwise negative save what was reported in the HPI.    PAST MEDICAL/SURGICAL HISTORY:  Afib    Statin intolerance    PVD (peripheral vascular disease)  R SFA and L SFA w/stents and PTA    Former smoker    H/O urinary frequency    FANNY (obstructive sleep apnea)    Angina, class II    Hematuria    History of BPH    Cataract    COPD, mild    MR (mitral regurgitation)  moderate      CAD (coronary artery disease)  PPM, PCI LM and LCX, CABG LIMA OAD and OM1    Abnormal positron emission tomography (PET) scan  2019 anterior, anteroseptal,anterolateral and iferolateral ischemia    Osteoarthritis    Age-related incipient cataract of both eyes    PVD (peripheral vascular disease)  intervention RLE    CAD (coronary artery disease)  CABG LIMA LAD SVG OM1, PCI PDA LM/LCX with PTCA    Carotid artery disease  RCEA    Former smoker    Fatigue  Profound w/exertion    MENDEZ (dyspnea on exertion)    SOB (shortness of breath)    BPH (benign prostatic hyperplasia)    Angina pectoris  class II    PVD (peripheral vascular disease)    FANNY (obstructive sleep apnea)  uses mouth peice    CAD (coronary artery disease)  S/P Stenting x8    Asthma    Aortic stenosis  TAVR #29 Medtronic core valve 2017    Diabetes mellitus    Hyperlipidemia    Hypertension    S/P cholecystectomy    S/P appendectomy    S/P ICD (internal cardiac defibrillator) procedure    S/P hernia repair    History of permanent cardiac pacemaker placement  [email or web address]    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  #29 Medtronic core valve    S/P CABG x 2  LIMA LAD SVG OM1    S/P PTCA (percutaneous transluminal coronary angioplasty)  x 9    S/P nasal surgery  Secondary to nasal fracture    S/P joint replacement  Bilateral knee replacement    S/P cataract surgery, right    S/P cataract surgery, left    History of CEA (carotid endarterectomy)  Bilateral    S/P cholecystectomy    S/P tonsillectomy      MEDICATIONS  (STANDING):  apixaban 5 milliGRAM(s) Oral two times a day  budesonide  80 MICROgram(s)/formoterol 4.5 MICROgram(s) Inhaler 2 Puff(s) Inhalation two times a day  clopidogrel Tablet 75 milliGRAM(s) Oral daily  dextrose 40% Gel 15 Gram(s) Oral once  dextrose 5%. 1000 milliLiter(s) (50 mL/Hr) IV Continuous <Continuous>  dextrose 5%. 1000 milliLiter(s) (100 mL/Hr) IV Continuous <Continuous>  dextrose 50% Injectable 25 Gram(s) IV Push once  dextrose 50% Injectable 12.5 Gram(s) IV Push once  dextrose 50% Injectable 25 Gram(s) IV Push once  furosemide   Injectable 40 milliGRAM(s) IV Push daily  glucagon  Injectable 1 milliGRAM(s) IntraMuscular once  insulin lispro (ADMELOG) corrective regimen sliding scale   SubCutaneous three times a day before meals  latanoprost 0.005% Ophthalmic Solution 1 Drop(s) Both EYES at bedtime  multivitamin Oral Tab/Cap - Peds 1 Tablet(s) Oral daily  pantoprazole    Tablet 40 milliGRAM(s) Oral before breakfast  tamsulosin 0.8 milliGRAM(s) Oral at bedtime    MEDICATIONS  (PRN):  acetaminophen   Tablet .. 650 milliGRAM(s) Oral every 4 hours PRN Mild Pain (1 - 3)  ALBUTerol    90 MICROgram(s) HFA Inhaler 2 Puff(s) Inhalation every 6 hours PRN Shortness of Breath and/or Wheezing  ALPRAZolam 0.25 milliGRAM(s) Oral two times a day PRN anxiety  morphine  - Injectable 2 milliGRAM(s) IV Push every 4 hours PRN Moderate Pain (4 - 6)  ondansetron Injectable 4 milliGRAM(s) IV Push every 6 hours PRN Nausea    statins (Muscle Pain)    T(C): 36.9 (11-20-20 @ 07:15), Max: 36.9 (11-20-20 @ 07:15)  HR: 91 (11-20-20 @ 07:15) (56 - 93)  BP: 106/46 (11-20-20 @ 07:15) (106/46 - 173/75)  RR: 18 (11-20-20 @ 07:15) (18 - 20)  SpO2: 96% (11-20-20 @ 07:15) (90% - 98%)    I&O's Summary    19 Nov 2020 07:01  -  20 Nov 2020 07:00  --------------------------------------------------------  IN: 0 mL / OUT: 600 mL / NET: -600 mL      PHYSICAL EXAM:  Constitutional: Found sitting comfortably , elderly looking,  in no apparent distress  Eyes: Sclerae anicteric, conjunctivae normal  ENMT: Mucus membranes moist, no oropharyngeal thrush notedy  Respiratory: Breathing nonlabored; clear to auscultation  Cardiovascular: Regular rate and rhythm  Gastrointestinal: Soft, nontender, nondistended, normoactive bowel sounds.  Extremities: No clubbing, cyanosis or edema  Neurological: Alert and oriented to person, place and time; no asterixis  Skin: No jaundice  Musculoskeletal: No significant peripheral atrophy  Psychiatric: Affect and mood appropriate                   9.6    6.27  )-----------( 227      ( 11-20 @ 06:57 )             31.0                9.3    6.93  )-----------( 207      ( 11-19 @ 14:15 )             30.1       11-20    139  |  97<L>  |  22.0<H>  ----------------------------<  160<H>  4.2   |  31.0<H>  |  1.18    Ca    8.9      20 Nov 2020 06:57  Phos  3.5     11-20  Mg     1.9     11-20    TPro  6.5<L>  /  Alb  3.8  /  TBili  0.4  /  DBili  x   /  AST  16  /  ALT  15  /  AlkPhos  50  11-20    LIVER FUNCTIONS - ( 20 Nov 2020 06:57 )  Alb: 3.8 g/dL / Pro: 6.5 g/dL / ALK PHOS: 50 U/L / ALT: 15 U/L / AST: 16 U/L / GGT: x           PT/INR - ( 20 Nov 2020 06:57 )   PT: 25.6 sec;   INR: 2.30 ratio      PTT - ( 19 Nov 2020 14:15 )  PTT:33.3 sec    IMAGING: I personally reviewed the Chest X-Ray, and I agree with the radiologist's interpretation as described below:    Portable chest x-ray is compared to 8/12/2017    Left-sided AICD/pacemaker is again noted. Sternal wires are again seen. The heart is not enlarged. There is persistent blunting of left CP angle which may represent pleural reaction. Right CP angle is clear. There is no infiltrate. There is osteopenia of the bony structures.    Impression: Persistent left CP angle blunting likely representing pleural reaction.  AICD  Sternal wires

## 2020-11-20 NOTE — DISCHARGE NOTE NURSING/CASE MANAGEMENT/SOCIAL WORK - NSDCFUADDAPPT_GEN_ALL_CORE_FT
Pt will make own follow up appointment with Dr Mcgill, cardiology.    Rx: [facility]. Pt has no concern about picking up his medication.

## 2020-11-20 NOTE — DISCHARGE NOTE PROVIDER - NSDCMRMEDTOKEN_GEN_ALL_CORE_FT
alfuzosin 10 mg oral tablet, extended release: 1 tab(s) orally once a day (at bedtime)  apixaban 5 mg oral tablet: 1 tab(s) orally every 12 hours to start 11/13 PM.   DISCONTINUE Xarelto immediately.   furosemide 40 mg oral tablet: 1 tab(s) orally once a day  latanoprost 0.005% ophthalmic solution: 1 drop(s) to each affected eye once a day (at bedtime)  metFORMIN 500 mg oral tablet, extended release: 1 tab(s) orally once a day  multivitamin: 1 tab(s) orally once a day  pantoprazole 40 mg oral delayed release tablet: 1 tab(s) orally once a day  repaglinide 0.5 mg oral tablet: 1 tab(s) orally 3 times a day (before meals)  Symbicort 80 mcg-4.5 mcg/inh inhalation aerosol: 2 puff(s) inhaled 2 times a day  Tylenol 500 mg oral tablet: 2 tab(s) orally every 6 hours, As Needed  Zetia 10 mg oral tablet: 1 tab(s) orally once a day   alfuzosin 10 mg oral tablet, extended release: 1 tab(s) orally once a day (at bedtime)  apixaban 5 mg oral tablet: 1 tab(s) orally every 12 hours  furosemide 40 mg oral tablet: 1 tab(s) orally once a day  latanoprost 0.005% ophthalmic solution: 1 drop(s) to each affected eye once a day (at bedtime)  metFORMIN 500 mg oral tablet, extended release: 1 tab(s) orally once a day  multivitamin: 1 tab(s) orally once a day  pantoprazole 40 mg oral delayed release tablet: 1 tab(s) orally once a day  repaglinide 0.5 mg oral tablet: 1 tab(s) orally 3 times a day (before meals)  Symbicort 80 mcg-4.5 mcg/inh inhalation aerosol: 2 puff(s) inhaled 2 times a day  Tylenol 500 mg oral tablet: 2 tab(s) orally every 6 hours, As Needed  Zetia 10 mg oral tablet: 1 tab(s) orally once a day

## 2020-11-20 NOTE — DISCHARGE NOTE NURSING/CASE MANAGEMENT/SOCIAL WORK - NSDCPEELIQUISFU_GEN_ALL_CORE
Please call her and let her know to...  Quarantine for 14 days  Rest and drink plenty of fluids  Vitamin c and zinc otc  Monitor symptoms  Go to the ER if SOA or difficulty breathing  Schedule a video visit with Dr Maldonado if needed    Go for blood tests as directed. Your doctor will do lab tests at regular visits to monitor the effects of this medicine. Please follow up with your doctor and keep your health care provider appointments.

## 2020-11-20 NOTE — DISCHARGE NOTE PROVIDER - HOSPITAL COURSE
88 year old male patient with pertinent history of  HTN, HL, DM, PVD with RSFA and LSFA stenting, Carotid endarterectomy, CAD s/p remote CABG & multiple PCIs (most recently 2/2020),  severe AS s/p remote TAVR,  PAF S/P PPM S/P RECENT GREGORIA with DCCV (November 13th) with a questionable thrombus VS veg in RV lead so far negative BC x5 days at that time changed from xarelto to eliquis 5 mg PO BID presented to the ED complaining of BRPBR in small aounts since the past week along with progressively worsening dyspnea on exertion, orthopnea, PND and dizziness. Patient states he was initially started on xarelto when Afib was first discovered but it was later switched to Eliquis after the cardioversion was done. States his symptoms became profound shortly after starting eliquis. States he feels very dizzy and unsteady on his feet. Pt saw his PCP two days prior and had routine labs done and was notified that there was a drop in his Hb. Endorsed occasional dark stools. Pt has not had a Colonoscopy in over 20 years. He denied any associated chest pain, palpitations, slurred speech, facial drooping, aphasia, visual changes, arm or leg weakness. Endorsed b/l lower extremity edema  In the ED patient with negative stool guaiac. Hg was found to be 9.4 with baseline being 13. No episodes of hypoxia noted            # Othopnea, PND, exertional dyspnea, LE edema 2/2 Acute on chronic diastolic CHF-  CXR with pulm vasc congestion  -s/p IV lasix x 2 doses, switched to PO lasix 40mg daily as per cardio  Leg edema & SOB much improved. 96% on RA  Fluid restriction < 1L  Echo: 70 to 75%. moderate concentric left ventricular hypertrophy. Mild-moderate tricuspid regurgitation. Bioprosthesis in the aortic position.  TnI x 3 -ve  -cardiology following      #Anemia 2/2 LGI bleeding probably hemorrhoidal  -pt endorsed bloody bowel movements but guaiac in the ED. Likely intermittent bleeding due to oral AC. No further bleeding noted  Hg 9.3 -->9.6 (baseline 13)  Now patient reporting less frequency and decrease amount of dark stool with minimal red blood.   Due to pt having symptomatic anemia i.e. presyncope & exertional SOB will transfuse 1 PRBC along with lasix 10 IVP  -GI consult noted. Will need to hold plavix for 5 days & eliquis for at least 48 hrs. Needs cardiac clearence  As per cardio- needs colonoscopy as outpatient after completion of 4 weeks of eliquis 5 mg po BID if possible  Will continue Anticoagulation with eliquis 5 mg po BID for now in view of recent DCCV and potential lead thrombus. Blood cultures negatives  Will DC plavix at present        #Hx of Afib/ PPM with a questionable thrombus VS veg in RV lead  -s/p recent cardioversion  -on eliquis  -currently rate controlled      #CAD  d/c plavix  [id number]   Not on BB     #PVD  -on eliquis    #DM2  -hold oral hypoglycemics (metformin 500 bid & repaglinide 1mg tid)  -ISS  f/u FS    # hx of COPD- stable  c/w symbicort    #HLD  -on statin    # HTN  -on lasix      #Hx of BPH  -on alfuzosin    #Anxiety  -xanax prn at night      PHYSICAL EXAM-  GENERAL: NAD, well-groomed, well-developed  HEAD:  Atraumatic, Normocephalic  EYES: EOMI, PERRLA, conjunctiva and sclera clear  NECK: Supple, No JVD  NERVOUS SYSTEM:  Alert & Oriented X3, Motor Strength 5/5 B/L upper and lower extremities; DTRs 2+ intact and symmetric  CHEST/LUNG: Clear to auscultation bilaterally; No rales, rhonchi, wheezing, or rubs  HEART: Regular rate and rhythm; No murmurs, rubs, or gallops, +PPM  ABDOMEN: Soft, Nontender, ND, + BS  Ext: 2+ pulses b/l, ++ edema b/l, left toe with dressing c/d/i.      88 year old male patient with pertinent history of  HTN, HL, DM, PVD with RSFA and LSFA stenting, Carotid endarterectomy, CAD s/p remote CABG & multiple PCIs (most recently 2/2020),  severe AS s/p remote TAVR,  PAF S/P PPM S/P RECENT GREGORIA with DCCV (November 13th) with a questionable thrombus VS veg in RV lead so far negative BC x5 days at that time changed from xarelto to eliquis 5 mg PO BID presented to the ED complaining of BRPBR in small aounts since the past week along with progressively worsening dyspnea on exertion, orthopnea, PND and dizziness. Patient states he was initially started on xarelto when Afib was first discovered but it was later switched to Eliquis after the cardioversion was done. States his symptoms became profound shortly after starting eliquis. States he feels very dizzy and unsteady on his feet. Pt saw his PCP two days prior and had routine labs done and was notified that there was a drop in his Hb. Endorsed occasional dark stools. Pt has not had a Colonoscopy in over 20 years. He denied any associated chest pain, palpitations, slurred speech, facial drooping, aphasia, visual changes, arm or leg weakness. Endorsed b/l lower extremity edema  In the ED patient with negative stool guaiac. Hg was found to be 9.4 with baseline being 13. No episodes of hypoxia noted    # Othopnea, PND, exertional dyspnea, LE edema 2/2 Acute on chronic diastolic CHF-  CXR with pulm vasc congestion  -s/p IV lasix x 2 doses, switched to PO lasix 40mg daily as per cardio  Leg edema & SOB much improved. 96% on RA  Fluid restriction < 1L  Echo: 70 to 75%. moderate concentric left ventricular hypertrophy. Mild-moderate tricuspid regurgitation. Bioprosthesis in the aortic position.  TnI x 3 -ve  -cardiology following      #Anemia 2/2 LGI bleeding probably hemorrhoidal  -pt endorsed bloody bowel movements but guaiac in the ED. Likely intermittent bleeding due to oral AC. No further bleeding noted  Hg 9.3 -->9.6 (baseline 13)  Now patient reporting less frequency and decrease amount of dark stool with minimal red blood.   Due to pt having symptomatic anemia i.e. presyncope & exertional SOB will transfuse 1 PRBC along with lasix 10 IVP  -GI consult noted. Will need to hold plavix for 5 days & eliquis for at least 48 hrs. Needs cardiac clearence  As per cardio- needs colonoscopy as outpatient after completion of 4 weeks of eliquis 5 mg po BID if possible  Will continue Anticoagulation with eliquis 5 mg po BID for now in view of recent DCCV and potential lead thrombus. Blood cultures negatives  Will DC plavix at present        #Hx of Afib/ PPM with a questionable thrombus VS veg in RV lead  -s/p recent cardioversion  -on eliquis  -currently rate controlled      #CAD  d/c plavix  [id number]   Not on BB     #PVD  -on eliquis    #DM2  -hold oral hypoglycemics (metformin 500 bid & repaglinide 1mg tid)  -ISS  f/u FS    # hx of COPD- stable  c/w symbicort    #HLD  -on statin    # HTN  -on lasix      #Hx of BPH  -on alfuzosin    #Anxiety  -xanax prn at night      PHYSICAL EXAM-  GENERAL: NAD, well-groomed, well-developed  HEAD:  Atraumatic, Normocephalic  EYES: EOMI, PERRLA, conjunctiva and sclera clear  NECK: Supple, No JVD  NERVOUS SYSTEM:  Alert & Oriented X3, Motor Strength 5/5 B/L upper and lower extremities; DTRs 2+ intact and symmetric  CHEST/LUNG: Clear to auscultation bilaterally; No rales, rhonchi, wheezing, or rubs  HEART: Regular rate and rhythm; No murmurs, rubs, or gallops, +PPM  ABDOMEN: Soft, Nontender, ND, + BS  Ext: 2+ pulses b/l, ++ edema b/l, left toe with dressing c/d/i.     Plan discussed with pt, wife, Dr. Burns, cardio & GI. All abgreeable to the plan. Dr. Burns to see pt in office on Monday for CBC check     88M with a prior cardioversion and possible right ventricular lead thrombus on apixaban who presented with rectal bleeding and increased dyspnea on exertion. He was also noted to have anemia on by his primary care physician. On presentation, H/H(9.6/31), INF(2.3), BUN/Cr(22/1.18), Fecal occult blood testing was negative in the emergency department. Lower extremity doppler was without deep venous thrombosis. The patient was seen by Cardiology in consultation with recommendations to continue on anticoagulation due to the recent cardioversion. Intravenous furosemide was administered for acute on chronic diastolic heart failure. Echocardiogram noted hyperdynamic global left ventricular systolic function, ejection fraction of 70 to 75%, moderately enlarged left atrium, no pericardial effusion. The patient was seen by Gastroenterology in consultation and the rectal bleeding was though to be intermittent with recommendations for endoscopic evaluation after clopidogrel has been held for five days and apixaban was held for two days. The patient was seen by Cardiology and clopidogrel was discontinued. Packed red blood cells were transfused for anemia. Repeat laboratory studies noted stable blood count. The patient was discharged home with instructions to follow up with his primary care physician to repeat laboratory studies to monitor the blood count. He was also to follow up with Cardiology and Gastroenterology for eventual colonoscopy.      35 minutes total time

## 2020-11-20 NOTE — PROGRESS NOTE ADULT - SUBJECTIVE AND OBJECTIVE BOX
CHIEF COMPLAINT/INTERVAL HISTORY:    Patient is a 88y old  Male who presents with a chief complaint of anemia  SOB (20 Nov 2020 14:44)      HPI:  88 year old male patient with pertinent history of  HTN, HL, DM, PVD with RSFA and LSFA stenting, Carotid endarterectomy, CAD s/p remote CABG & multiple PCIs (most recently 2/2020),  severe AS s/p remote TAVR,  PAF S/P PPM S/P RECENT GREGORIA with DCCV (November 13th). with a questionable thrombus VS veg in RV lead so far negative BC x5 days at that time changed from xarelto to eliquis 5 mg PO BID presented to the ED complaining of BRPBR in small aounts since the past week along with progressively worsening dyspnea on exertion, orthopnea, PND and dizziness. Patient states he was initially started on xarelto when Afib was first discovered but it was later switched to Eliquis after the cardioversion was done. States his symptoms became profound shortly after starting eliquis. States he feels very dizzy and unsteady on his feet. Pt saw his PCP two days prior and had routine labs done and was notified that there was a drop in his Hb. Endorsed occasional dark stools. Pt has not had a Colonoscopy in over 20 years. He denied any associated chest pain, palpitations, slurred speech, facial drooping, aphasia, visual changes, arm or leg weakness. Endorsed b/l lower extremity edema  In the ED patient with negative stool guaiac. Hg was found to be 9.4 with baseline being 13. No episodes of hypoxia noted (19 Nov 2020 15:57)      SUBJECTIVE & OBJECTIVE/ ROS: Pt seen and examined at bedside. Pt with no further bleeding per rectum since yesterday. Dizziness & exertional SOB continues. No chest pain, palpitations,  light headedness/dizziness, cough, fevers/chills, abdominal pain, n/v, diarrhea/constipation, dysuria or increased urinary frequency.     ICU Vital Signs Last 24 Hrs  T(C): 36.9 (20 Nov 2020 07:15), Max: 36.9 (20 Nov 2020 07:15)  T(F): 98.4 (20 Nov 2020 07:15), Max: 98.4 (20 Nov 2020 07:15)  HR: 91 (20 Nov 2020 07:15) (56 - 93)  BP: 106/46 (20 Nov 2020 07:15) (106/46 - 169/70)  BP(mean): --  ABP: --  ABP(mean): --  RR: 18 (20 Nov 2020 07:15) (18 - 20)  SpO2: 96% (20 Nov 2020 07:15) (90% - 97%)      REVIEW OF SYSTEMS:    CONSTITUTIONAL: No weakness, fevers or chills  EYES/ENT: No visual changes;  No vertigo or throat pain   NECK: No pain or stiffness  RESPIRATORY: No cough, wheezing, hemoptysis; No shortness of breath  CARDIOVASCULAR: No chest pain or palpitations  GASTROINTESTINAL: No abdominal or epigastric pain. No nausea, vomiting, or hematemesis; No diarrhea or constipation. No melena or hematochezia.  GENITOURINARY: No dysuria, frequency or hematuria  NEUROLOGICAL: No numbness or weakness  SKIN: No itching, rashes        MEDICATIONS  (STANDING):  apixaban 5 milliGRAM(s) Oral two times a day  budesonide  80 MICROgram(s)/formoterol 4.5 MICROgram(s) Inhaler 2 Puff(s) Inhalation two times a day  dextrose 40% Gel 15 Gram(s) Oral once  dextrose 5%. 1000 milliLiter(s) (50 mL/Hr) IV Continuous <Continuous>  dextrose 5%. 1000 milliLiter(s) (100 mL/Hr) IV Continuous <Continuous>  dextrose 50% Injectable 25 Gram(s) IV Push once  dextrose 50% Injectable 12.5 Gram(s) IV Push once  dextrose 50% Injectable 25 Gram(s) IV Push once  furosemide   Injectable 40 milliGRAM(s) IV Push daily  furosemide   Injectable 10 milliGRAM(s) IV Push once  glucagon  Injectable 1 milliGRAM(s) IntraMuscular once  insulin lispro (ADMELOG) corrective regimen sliding scale   SubCutaneous three times a day before meals  latanoprost 0.005% Ophthalmic Solution 1 Drop(s) Both EYES at bedtime  multivitamin Oral Tab/Cap - Peds 1 Tablet(s) Oral daily  pantoprazole    Tablet 40 milliGRAM(s) Oral before breakfast  tamsulosin 0.8 milliGRAM(s) Oral at bedtime    MEDICATIONS  (PRN):  acetaminophen   Tablet .. 650 milliGRAM(s) Oral every 4 hours PRN Mild Pain (1 - 3)  ALBUTerol    90 MICROgram(s) HFA Inhaler 2 Puff(s) Inhalation every 6 hours PRN Shortness of Breath and/or Wheezing  ALPRAZolam 0.25 milliGRAM(s) Oral two times a day PRN anxiety  morphine  - Injectable 2 milliGRAM(s) IV Push every 4 hours PRN Moderate Pain (4 - 6)  ondansetron Injectable 4 milliGRAM(s) IV Push every 6 hours PRN Nausea      LABS:                        9.6    6.27  )-----------( 227      ( 20 Nov 2020 06:57 )             31.0     11-20    139  |  97<L>  |  22.0<H>  ----------------------------<  160<H>  4.2   |  31.0<H>  |  1.18    Ca    8.9      20 Nov 2020 06:57  Phos  3.5     11-20  Mg     1.9     11-20    TPro  6.5<L>  /  Alb  3.8  /  TBili  0.4  /  DBili  x   /  AST  16  /  ALT  15  /  AlkPhos  50  11-20    PT/INR - ( 20 Nov 2020 06:57 )   PT: 25.6 sec;   INR: 2.30 ratio         PTT - ( 19 Nov 2020 14:15 )  PTT:33.3 sec      CAPILLARY BLOOD GLUCOSE      POCT Blood Glucose.: 143 mg/dL (20 Nov 2020 15:18)  POCT Blood Glucose.: 311 mg/dL (20 Nov 2020 14:02)  POCT Blood Glucose.: 410 mg/dL (20 Nov 2020 13:31)  POCT Blood Glucose.: 470 mg/dL (20 Nov 2020 12:48)  POCT Blood Glucose.: 430 mg/dL (20 Nov 2020 12:48)  POCT Blood Glucose.: 247 mg/dL (20 Nov 2020 09:02)  POCT Blood Glucose.: 153 mg/dL (19 Nov 2020 22:43)  POCT Blood Glucose.: 115 mg/dL (19 Nov 2020 16:52)      RECENT CULTURES:        11-19-20 @ 07:01  -  11-20-20 @ 07:00  --------------------------------------------------------  IN: 0 mL / OUT: 600 mL / NET: -600 mL          RADIOLOGY & ADDITIONAL TESTS:      PHYSICAL EXAM:    PHYSICAL EXAM-  GENERAL: NAD, well-groomed, well-developed  HEAD:  Atraumatic, Normocephalic  EYES: EOMI, PERRLA, conjunctiva and sclera clear  NECK: Supple, No JVD  NERVOUS SYSTEM:  Alert & Oriented X3, Motor Strength 5/5 B/L upper and lower extremities; DTRs 2+ intact and symmetric  CHEST/LUNG: Clear to auscultation bilaterally; No rales, rhonchi, wheezing, or rubs  HEART: Regular rate and rhythm; No murmurs, rubs, or gallops, +PPM  ABDOMEN: Soft, Nontender, ND, + BS  Ext: 2+ pulses b/l, ++ edema b/l, left toe with dressing c/d/i.

## 2020-11-20 NOTE — DISCHARGE NOTE PROVIDER - CARE PROVIDERS DIRECT ADDRESSES
[email or web address]

## 2020-11-20 NOTE — DISCHARGE NOTE PROVIDER - NSDCFUSCHEDAPPT_GEN_ALL_CORE_FT
TIFFANIE ZELAYA ; 12/15/2020 ; [facility] [address] TIFFANIE ZELAYA ; 11/24/2020 ; [facility] [address] [facility]  TIFFANIE ZELAYA ; 12/15/2020 ; [facility] [address] [facility]

## 2020-11-20 NOTE — DISCHARGE NOTE PROVIDER - CARE PROVIDER_API CALL
Jaspal Burns  INTERNAL MEDICINE  [address] [facility], [place] [address]  Phone: [phone]  Fax: [phone]  Follow Up Time:     Minh Peraza; MPH)  Cardiology; Internal Medicine  [address] [place] [address]  Phone: [phone]  Fax: [phone]  Follow Up Time:     Joshua Lehman  GASTROENTEROLOGY  [address] [facility], [address]  [place] [address]  Phone: [phone]  Fax: [phone]  Follow Up Time:

## 2020-11-20 NOTE — DISCHARGE NOTE PROVIDER - NSDCCPCAREPLAN_GEN_ALL_CORE_FT
PRINCIPAL DISCHARGE DIAGNOSIS  Diagnosis: Dyspnea  Assessment and Plan of Treatment: Follow up with your primary doctor on Monday      SECONDARY DISCHARGE DIAGNOSES  Diagnosis: Other iron deficiency anemias  Assessment and Plan of Treatment: Follow up with your primary doctor on Monday for CBC check. Take over the counter iron suppliments with vitamin c. If any worsening bleeding then go to the ER immediately.     PRINCIPAL DISCHARGE DIAGNOSIS  Diagnosis: Other iron deficiency anemias  Assessment and Plan of Treatment: Follow up with your primary doctor on Monday for CBC check. Take over the counter iron suppliments with vitamin c. If any worsening bleeding then go to the ER immediately. Follow up with Cardiology and Gastroenterology for eventual colonoscopy.      SECONDARY DISCHARGE DIAGNOSES  Diagnosis: Afib  Assessment and Plan of Treatment: Contiue on apixaban for now and follwo up with Cardiology for further management.    Diagnosis: Acute on chronic diastolic heart failure  Assessment and Plan of Treatment: Follow up with your cardiologist within 1 week of discharge. Take lasix 40mg daily.

## 2020-11-20 NOTE — DISCHARGE NOTE NURSING/CASE MANAGEMENT/SOCIAL WORK - PATIENT PORTAL LINK FT
You can access the FollowMyHealth Patient Portal offered by [facility] by registering at the following website: [email or web address]. By joining [employer] [facility]’s FollowMyHealth portal, you will also be able to view your health information using other applications (apps) compatible with our system.

## 2020-11-20 NOTE — PROGRESS NOTE ADULT - ASSESSMENT
88 year old male patient with pertinent history of  HTN, HL, DM, PVD with RSFA and LSFA stenting, Carotid endarterectomy, CAD s/p remote CABG & multiple PCIs (most recently 2/2020),  severe AS s/p remote TAVR,  PAF S/P PPM S/P RECENT GREGORIA with DCCV (November 13th) with a questionable thrombus VS veg in RV lead so far negative BC x5 days at that time changed from xarelto to eliquis 5 mg PO BID presented to the ED complaining of BRPBR in small aounts since the past week along with progressively worsening dyspnea on exertion, orthopnea, PND and dizziness. Patient states he was initially started on xarelto when Afib was first discovered but it was later switched to Eliquis after the cardioversion was done. States his symptoms became profound shortly after starting eliquis. States he feels very dizzy and unsteady on his feet. Pt saw his PCP two days prior and had routine labs done and was notified that there was a drop in his Hb. Endorsed occasional dark stools. Pt has not had a Colonoscopy in over 20 years. He denied any associated chest pain, palpitations, slurred speech, facial drooping, aphasia, visual changes, arm or leg weakness. Endorsed b/l lower extremity edema  In the ED patient with negative stool guaiac. Hg was found to be 9.4 with baseline being 13. No episodes of hypoxia noted    # Othopnea, PND, exertional dyspnea, LE edema 2/2 Acute on chronic diastolic CHF-  CXR with pulm vasc congestion  -s/p IV lasix x 2 doses, switched to PO lasix 40mg daily as per cardio  Leg edema & SOB much improved. 96% on RA  Fluid restriction < 1L  Echo: 70 to 75%. moderate concentric left ventricular hypertrophy. Mild-moderate tricuspid regurgitation. Bioprosthesis in the aortic position.  TnI x 3 -ve  -cardiology following      #Anemia 2/2 LGI bleeding probably hemorrhoidal  -pt endorsed bloody bowel movements but guaiac in the ED. Likely intermittent bleeding due to oral AC. No further bleeding noted  Hg 9.3 -->9.6 (baseline 13)  Now patient reporting less frequency and decrease amount of dark stool with minimal red blood.   Due to pt having symptomatic anemia i.e. presyncope & exertional SOB will transfuse 1 PRBC along with lasix 10 IVP  -GI consult noted. Will need to hold plavix for 5 days & eliquis for at least 48 hrs. Needs cardiac clearence  As per cardio- needs colonoscopy as outpatient after completion of 4 weeks of eliquis 5 mg po BID if possible  Will continue Anticoagulation with eliquis 5 mg po BID for now in view of recent DCCV and potential lead thrombus. Blood cultures negatives  Will DC plavix at present        #Hx of Afib/ PPM with a questionable thrombus VS veg in RV lead  -s/p recent cardioversion  -on eliquis  -currently rate controlled      #CAD  d/c plavix  [id number]   Not on BB     #PVD  -on eliquis    #DM2  -hold oral hypoglycemics (metformin 500 bid & repaglinide 1mg tid)  -ISS  f/u FS    # hx of COPD- stable  c/w symbicort    #HLD  -on statin    # HTN  -on lasix      #Hx of BPH  -on alfuzosin    #Anxiety  -xanax prn at night    Plan discussed with pt, wife, Dr. Burns, cardio & GI. All abgreeable to the plan. Dr. Burns to see pt in office on Monday for CBC check      Dispo: D/c after blood transfusion if stable

## 2020-11-20 NOTE — PROGRESS NOTE ADULT - ATTENDING COMMENTS
Patient seen and evaluated at bed side. Doing ok. Colonoscopy to be scheduled as outpatient. Call GI prn

## 2020-11-20 NOTE — PROGRESS NOTE ADULT - SUBJECTIVE AND OBJECTIVE BOX
[facility], THE [facility]                                   [address] [place] [address]                                                      PHONE: [phone]                                                         FAX: [phone]  [email or web address]  ---------------------------------------------------------------------------------------------------------------------------------    Reason for Consult: SOB    HPI:  TIFFANIE ZELAYA is an 88y Male with Hx of CAD s/p CABG s/p bilateral CEA  PAF S/P PPM S/P RECENT GREGORIA CV with a questionable thrombus VS veg in RV lead so far negative BC x5 days at that time changed from xarelto to eliquis 5 mg PO BID presenting to [facility] c.o SOB MENDEZ weakness found to be anemic with a Hgb of 9.3 He recently felt dizzy and with evidence of LE Edema.   Seen at the ER with his wife , discussed over the phone with daughter that is a RN      Pt seen OOB no CP   Hb 9.6 on Eliquis 5 mg PO BID  GREGORIA reviewed       PAST MEDICAL & SURGICAL HISTORY:  Statin intolerance    PVD (peripheral vascular disease)  R SFA and L SFA w/stents and PTA    Former smoker    H/O urinary frequency    FANNY (obstructive sleep apnea)    Angina, class II    Hematuria    History of BPH    Cataract    COPD, mild    MR (mitral regurgitation)  moderate      CAD (coronary artery disease)  PPM, PCI LM and LCX, CABG LIMA OAD and OM1    Abnormal positron emission tomography (PET) scan  2019 anterior, anteroseptal,anterolateral and iferolateral ischemia    Osteoarthritis    Age-related incipient cataract of both eyes    PVD (peripheral vascular disease)  intervention RLE    CAD (coronary artery disease)  CABG LIMA LAD SVG OM1, PCI PDA LM/LCX with PTCA    Carotid artery disease  RCEA    Former smoker    Fatigue  Profound w/exertion    MENDEZ (dyspnea on exertion)    SOB (shortness of breath)    BPH (benign prostatic hyperplasia)    Angina pectoris  class II    PVD (peripheral vascular disease)    FANNY (obstructive sleep apnea)  uses mouth peice    CAD (coronary artery disease)  S/P Stenting x8    Asthma    Aortic stenosis  TAVR #29 Medtronic core valve 2017    Diabetes mellitus    Hyperlipidemia    Hypertension    S/P cholecystectomy    S/P appendectomy    S/P ICD (internal cardiac defibrillator) procedure    S/P hernia repair    History of permanent cardiac pacemaker placement  HemoShear    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  #29 Medtronic core valve    S/P CABG x 2  LIMA LAD SVG OM1    S/P PTCA (percutaneous transluminal coronary angioplasty)  x 9    S/P nasal surgery  Secondary to nasal fracture    S/P joint replacement  Bilateral knee replacement    S/P cataract surgery, right    S/P cataract surgery, left    History of CEA (carotid endarterectomy)  Bilateral    S/P cholecystectomy    S/P tonsillectomy        statins (Muscle Pain)      MEDICATIONS  (STANDING):    MEDICATIONS  (PRN):      Social History:  Cigarettes:                    Alchohol:                 Illicit Drug Abuse:      REVIEW OF SYSTEMS:    Constitutional: No fever, weight loss or fatigue  Eyes: No eye pain, visual disturbances, or discharge  ENMT:  No difficulty hearing, tinnitus, vertigo; No sinus or throat pain  Neck: No pain or stiffness  Respiratory: No cough, wheezing, chills or hemoptysis  Cardiovascular: No chest pain, palpitations, shortness of breath, dizziness or leg swelling  Gastrointestinal: No abdominal or epigastric pain. No nausea, vomiting or hematemesis; No diarrhea or constipation. No melena or hematochezia.  Genitourinary: No dysuria, frequency, hematuria or incontinence  Rectal: No pain, hemorrhoids or incontinence  Neurological: No headaches, memory loss, loss of strength, numbness or tremors  Skin: No itching, burning, rashes or lesions   Lymph Nodes: No enlarged glands  Endocrine: No heat or cold intolerance; No hair loss  Musculoskeletal: No joint pain or swelling; No muscle, back or extremity pain  Psychiatric: No depression, anxiety, mood swings or difficulty sleeping  Heme/Lymph: No easy bruising or bleeding gums  Allergy and Immunologic: No hives or eczema    Vital Signs Last 24 Hrs  T(C): 36.8 (2020 13:14), Max: 36.8 (2020 13:14)  T(F): 98.3 (2020 13:14), Max: 98.3 (2020 13:14)  HR: 68 (2020 13:14) (68 - 68)  BP: 159/78 (2020 13:14) (159/78 - 159/78)  BP(mean): --  RR: 18 (2020 13:14) (18 - 18)  SpO2: 97% (2020 13:14) (97% - 97%)  ICU Vital Signs Last 24 Hrs  TIFFANIE ZELAYA  I&O's Detail    I&O's Summary    Drug Dosing Weight  TIFFANIE ZELAYA      PHYSICAL EXAM:  General: Appears well developed, well nourished alert and cooperative.  HEENT: Head; normocephalic, atraumatic.  Eyes: Pupils reactive, cornea wnl.  Neck: Supple, no nodes adenopathy, no NVD or carotid bruit or thyromegaly.  CARDIOVASCULAR: Normal S1 and S2, No murmur, rub, gallop or lift.   LUNGS: No rales, rhonchi or wheeze. Normal breath sounds bilaterally.  ABDOMEN: Soft, nontender without mass or organomegaly. bowel sounds normoactive.  EXTREMITIES: No clubbing, cyanosis or edema. Distal pulses wnl.   SKIN: warm and dry with normal turgor.  NEURO: Alert/oriented x 3/normal motor exam. No pathologic reflexes.    PSYCH: normal affect.        LABS:                        9.3    6.93  )-----------( 207      ( 2020 14:15 )             30.1     -    138  |  101  |  25.0<H>  ----------------------------<  187<H>  4.3   |  29.0  |  1.09    Ca    8.6      2020 14:15    TPro  6.9  /  Alb  4.0  /  TBili  0.3<L>  /  DBili  x   /  AST  16  /  ALT  17  /  AlkPhos  53  -    TIFFANIE ZELAYA  CARDIAC MARKERS ( 2020 14:15 )  x     / 0.04 ng/mL / x     / x     / x          PT/INR - ( 2020 14:15 )   PT: 21.3 sec;   INR: 1.89 ratio         PTT - ( 2020 14:15 )  PTT:33.3 sec      RADIOLOGY & ADDITIONAL STUDIES:    INTERPRETATION OF TELEMETRY (personally reviewed):    ECG: V pacing    ECHO:   < from: GREGORIA Echo Doppler (20 @ 09:34) >  PHYSICIAN INTERPRETATION:  Left Ventricle: Normal left ventricular size and wall thicknesses, with normal systolic and diastolic function.  Right Ventricle: Normal right ventricular size and function.  Left Atrium: The left atrium is normal in size. Normal left atrial size. No left atrial appendage thrombus is seen.  Right Atrium: The right atrium is normal in size. There is a mobile echodensity seen in the right atrium that appears to be attached to the tip of the right atrial PPM lead. The echodensity measures 1.4 x 0.49 cm at the largest. This may represent a small thrombus or vegetation.  Pericardium: There is no evidence of pericardial effusion.  Mitral Valve: The mitral valve is normal in structure. Moderate mitral valve regurgitation is seen.  Tricuspid Valve: The tricuspid valve is normal in structure. Mild tricuspid regurgitation is visualized.  Aortic Valve: The aortic valve is a normally functioning bioprosthetic valve. Trivial aortic valve regurgitation is seen.  Pulmonic Valve: Structurally normal pulmonic valve, with normal leaflet excursion. Trace pulmonic valve regurgitation.  Aorta: The aortic root is normal in size and structure. There is mild aortic root calcification.      Summary:   1. Normal left ventricular size and wall thicknesses, with normal systolic and diastolic function.   2. Normal right ventricular size and function.   3. Normal left atrial size.   4. There is a mobile echodensity seen in the right atrium that appears to be attached to the tip of the right atrial PPM lead. The echodensity measures 1.4 x 0.49 cm at the largest. This may represent a small thrombus or vegetation.   5. Moderate mitral valve regurgitation.   6. Mild tricuspid regurgitation.   7. Aortic valve is normally functioning bioprosthetic valve.   8. No left atrial appendage thrombus.   9. There is mild aortic root calcification.  10. After the exclusion of a left atrial appendage thrombus, a successful direct current cardioversion was performed at 200J, resulting in normal sinus rhythm.    Tristin Ramirez MD Electronically signed on 2020 at 10:33:36 AM      < end of copied text >        CARDIAC CATHETERIZATION:   < from: Cardiac Cath Lab - Adult (20 @ 11:49) >  VENTRICLES: No left ventriculogram was performed.  CORONARY VESSELS: The coronary circulation is co-dominant.  LM:   --  LM: Stents open with distal 30%.  LAD:   --  LAD: The LAD is open with open stents with diffuse distal  disease.  CX:   --  Proximal circumflex: There was a 75 % stenosis.  RCA:   --  RCA: This vessel was not injected.  COMPLICATIONS: There were no complications.  DIAGNOSTIC IMPRESSIONS: There is significant ISR of the distalLM and  proximal LCx of 75% with good runoff.  DIAGNOSTIC RECOMMENDATIONS: In view of the clinical history of recurrent  ISR and the angiographic findings, PCI and Brachytherapy will be  performed.  INTERVENTIONAL IMPRESSIONS: A successful IVUS guided PCI of the LM and LCx  was performed followed by Brachytherapy delivering 23Gr for 5min and  56sec. The lesion was reduced to 5%.  INTERVENTIONAL RECOMMENDATIONS: Continue DAPT and risk factor modification  with high intensity statin therapy.  Prepared and signed by  Alejandro Chung M.D.  Signed 2020 20:35:59  HEMODYNAMIC TABLES  Pressures:  Baseline  Pressures:  - HR: 83  Pressures:  - Rhythm:  Pressures:  -- Aortic Pressure (S/D/M): 163/65/106  Outputs:  Baseline  Outputs:  -- CALCULATIONS[age]  Outputs:  -- CALCULATIONS: Body Surface Area: 1.98  Outputs:  -- CALCULATIONS: Height in cm: 173.00  Outputs:  -- CALCULATIONS: Sex: Male  Outputs:  -- CALCULATIONS: Weight in k.90  Outputs:  -- OUTPUTS: O2 consumption:247.43  Outputs:  -- OUTPUTS: Vo2 Indexed: 125.00    < end of copied text >    ACTIVE PROBLEMS:  HEALTH ISSUES - PROBLEM Dx:          Assessment and Plan:    In summary,   TIFFANIE ZELAYA is an 88y Male with Hx of CAD s/p CABG s/p bilateral CEA  PAF S/P PPM S/P RECENT GREGORIA CV with a questionable thrombus VS veg in RV lead so far negative BC x5 days at that time changed from xarelto to eliquis 5 mg PO BID presenting to [facility] c.o SOB MENDEZ weakness found to be anemic with a Hgb of 9.3 He recently felt dizzy and with evidence of LE Edema.   Seen at the ER with his wife , discussed over the phone with daughter that is a RN      Telemetry monitoring  Monitor closely H/H , GI FUP will perform if H/H stable colonoscopy as outpatient in three after completion of 4 weeks of eliquis 5 mg po BID if possible  Will continue Anticoagulation with eliquis 5 mg po BID for now in view of recent DCCV and potential lead thrombus. Blood cultures negatives  Will DC plavix at present  IV lasix 40 mg today change to po daily in AM  Monitor for 48 hrs for any evidence of bleeding with daily CBC   Continue remaining cardiac therapy      ARLYN RADER MD, FACC, RUTH

## 2020-11-20 NOTE — PROGRESS NOTE ADULT - PROBLEM SELECTOR PLAN 1
Clearly precipitated by AC.  Now patient reporting less frequency and decrease amount of dark stool with minimal red blood.   Colonic neoplasm needs to be rule out as well.   Patient should colonoscopy but should be off Eliquis for 48 hours and Plavix for at least 5 days and would need to be clear by cardiac.

## 2020-11-21 VITALS
HEART RATE: 85 BPM | OXYGEN SATURATION: 92 % | SYSTOLIC BLOOD PRESSURE: 139 MMHG | TEMPERATURE: 98 F | RESPIRATION RATE: 18 BRPM | DIASTOLIC BLOOD PRESSURE: 67 MMHG

## 2020-11-21 PROBLEM — I48.91 UNSPECIFIED ATRIAL FIBRILLATION: Chronic | Status: ACTIVE | Noted: 2020-11-19

## 2020-11-21 LAB
BASOPHILS # BLD AUTO: 0.03 K/UL — SIGNIFICANT CHANGE UP (ref 0–0.2)
BASOPHILS NFR BLD AUTO: 0.3 % — SIGNIFICANT CHANGE UP (ref 0–2)
EOSINOPHIL # BLD AUTO: 0.27 K/UL — SIGNIFICANT CHANGE UP (ref 0–0.5)
EOSINOPHIL NFR BLD AUTO: 3 % — SIGNIFICANT CHANGE UP (ref 0–6)
GLUCOSE BLDC GLUCOMTR-MCNC: 219 MG/DL — HIGH (ref 70–99)
HCT VFR BLD CALC: 31.7 % — LOW (ref 39–50)
HGB BLD-MCNC: 10.2 G/DL — LOW (ref 13–17)
IMM GRANULOCYTES NFR BLD AUTO: 0.3 % — SIGNIFICANT CHANGE UP (ref 0–1.5)
LYMPHOCYTES # BLD AUTO: 1.72 K/UL — SIGNIFICANT CHANGE UP (ref 1–3.3)
LYMPHOCYTES # BLD AUTO: 18.9 % — SIGNIFICANT CHANGE UP (ref 13–44)
MCHC RBC-ENTMCNC: 29.8 PG — SIGNIFICANT CHANGE UP (ref 27–34)
MCHC RBC-ENTMCNC: 32.2 GM/DL — SIGNIFICANT CHANGE UP (ref 32–36)
MCV RBC AUTO: 92.7 FL — SIGNIFICANT CHANGE UP (ref 80–100)
MONOCYTES # BLD AUTO: 1.14 K/UL — HIGH (ref 0–0.9)
MONOCYTES NFR BLD AUTO: 12.5 % — SIGNIFICANT CHANGE UP (ref 2–14)
NEUTROPHILS # BLD AUTO: 5.9 K/UL — SIGNIFICANT CHANGE UP (ref 1.8–7.4)
NEUTROPHILS NFR BLD AUTO: 65 % — SIGNIFICANT CHANGE UP (ref 43–77)
PLATELET # BLD AUTO: 230 K/UL — SIGNIFICANT CHANGE UP (ref 150–400)
RBC # BLD: 3.42 M/UL — LOW (ref 4.2–5.8)
RBC # FLD: 13.8 % — SIGNIFICANT CHANGE UP (ref 10.3–14.5)
WBC # BLD: 9.09 K/UL — SIGNIFICANT CHANGE UP (ref 3.8–10.5)
WBC # FLD AUTO: 9.09 K/UL — SIGNIFICANT CHANGE UP (ref 3.8–10.5)

## 2020-11-21 PROCEDURE — 84484 ASSAY OF TROPONIN QUANT: CPT

## 2020-11-21 PROCEDURE — 86769 SARS-COV-2 COVID-19 ANTIBODY: CPT

## 2020-11-21 PROCEDURE — 82962 GLUCOSE BLOOD TEST: CPT

## 2020-11-21 PROCEDURE — 86901 BLOOD TYPING SEROLOGIC RH(D): CPT

## 2020-11-21 PROCEDURE — 86850 RBC ANTIBODY SCREEN: CPT

## 2020-11-21 PROCEDURE — 99285 EMERGENCY DEPT VISIT HI MDM: CPT

## 2020-11-21 PROCEDURE — 83735 ASSAY OF MAGNESIUM: CPT

## 2020-11-21 PROCEDURE — 82272 OCCULT BLD FECES 1-3 TESTS: CPT

## 2020-11-21 PROCEDURE — 93970 EXTREMITY STUDY: CPT

## 2020-11-21 PROCEDURE — 80053 COMPREHEN METABOLIC PANEL: CPT

## 2020-11-21 PROCEDURE — 93005 ELECTROCARDIOGRAM TRACING: CPT

## 2020-11-21 PROCEDURE — 99239 HOSP IP/OBS DSCHRG MGMT >30: CPT

## 2020-11-21 PROCEDURE — P9016: CPT

## 2020-11-21 PROCEDURE — 85610 PROTHROMBIN TIME: CPT

## 2020-11-21 PROCEDURE — 36415 COLL VENOUS BLD VENIPUNCTURE: CPT

## 2020-11-21 PROCEDURE — 93306 TTE W/DOPPLER COMPLETE: CPT

## 2020-11-21 PROCEDURE — 85730 THROMBOPLASTIN TIME PARTIAL: CPT

## 2020-11-21 PROCEDURE — 85025 COMPLETE CBC W/AUTO DIFF WBC: CPT

## 2020-11-21 PROCEDURE — 94640 AIRWAY INHALATION TREATMENT: CPT

## 2020-11-21 PROCEDURE — 83880 ASSAY OF NATRIURETIC PEPTIDE: CPT

## 2020-11-21 PROCEDURE — 83036 HEMOGLOBIN GLYCOSYLATED A1C: CPT

## 2020-11-21 PROCEDURE — 86900 BLOOD TYPING SEROLOGIC ABO: CPT

## 2020-11-21 PROCEDURE — 86922 COMPATIBILITY TEST ANTIGLOB: CPT

## 2020-11-21 PROCEDURE — 84100 ASSAY OF PHOSPHORUS: CPT

## 2020-11-21 PROCEDURE — 36430 TRANSFUSION BLD/BLD COMPNT: CPT

## 2020-11-21 PROCEDURE — 0225U NFCT DS DNA&RNA 21 SARSCOV2: CPT

## 2020-11-21 PROCEDURE — 71045 X-RAY EXAM CHEST 1 VIEW: CPT

## 2020-11-21 RX ADMIN — APIXABAN 5 MILLIGRAM(S): 2.5 TABLET, FILM COATED ORAL at 05:08

## 2020-11-21 RX ADMIN — Medication 2: at 08:59

## 2020-11-21 RX ADMIN — PANTOPRAZOLE SODIUM 40 MILLIGRAM(S): 20 TABLET, DELAYED RELEASE ORAL at 05:08

## 2020-11-21 RX ADMIN — BUDESONIDE AND FORMOTEROL FUMARATE DIHYDRATE 2 PUFF(S): 160; 4.5 AEROSOL RESPIRATORY (INHALATION) at 08:51

## 2020-11-21 NOTE — PROGRESS NOTE ADULT - SUBJECTIVE AND OBJECTIVE BOX
TIFFANIE HOYOSMILAGRO  ----------------------------------------  The patient was seen and evaluated for anemia. Offers no complaints. Tolerating breakfast.    Vital Signs Last 24 Hrs  T(C): 36.6 (21 Nov 2020 08:17), Max: 36.8 (20 Nov 2020 19:07)  T(F): 97.8 (21 Nov 2020 08:17), Max: 98.3 (21 Nov 2020 04:24)  HR: 86 (21 Nov 2020 08:51) (85 - 90)  BP: 139/67 (21 Nov 2020 08:17) (113/69 - 145/57)  BP(mean): --  RR: 18 (21 Nov 2020 08:17) (14 - 18)  SpO2: 92% (21 Nov 2020 08:17) (92% - 95%)    CAPILLARY BLOOD GLUCOSE  POCT Blood Glucose.: 72 mg/dL (20 Nov 2020 17:19)  POCT Blood Glucose.: 143 mg/dL (20 Nov 2020 15:18)  POCT Blood Glucose.: 311 mg/dL (20 Nov 2020 14:02)  POCT Blood Glucose.: 410 mg/dL (20 Nov 2020 13:31)  POCT Blood Glucose.: 470 mg/dL (20 Nov 2020 12:48)  POCT Blood Glucose.: 430 mg/dL (20 Nov 2020 12:48)  POCT Blood Glucose.: 247 mg/dL (20 Nov 2020 09:02)    PHYSICAL EXAMINATION:  ----------------------------------------  General appearance: No acute distress, Awake, Alert  HEENT: Normocephalic, Atraumatic, Conjunctiva clear, EOMI  Neck: Supple, No JVD, No tenderness  Lungs: Breath sound equal bilaterally, No wheezes, No rales  Cardiovascular: S1S2, Regular rhythm  Abdomen: Soft, Nontender, Nondistended, No guarding/rebound, Positive bowel sounds  Extremities: No clubbing, No cyanosis, No calf tenderness, Pedal edema  Neuro: Strength equal bilaterally, No tremors  Psychiatric: Appropriate mood, Normal affect      LABORATORY STUDIES:  ----------------------------------------             10.2   9.09  )-----------( 230      ( 21 Nov 2020 04:24 )             31.7     11-20    139  |  97<L>  |  22.0<H>  ----------------------------<  160<H>  4.2   |  31.0<H>  |  1.18    Ca    8.9      20 Nov 2020 06:57  Phos  3.5     11-20  Mg     1.9     11-20    TPro  6.5<L>  /  Alb  3.8  /  TBili  0.4  /  DBili  x   /  AST  16  /  ALT  15  /  AlkPhos  50  11-20    LIVER FUNCTIONS - ( 20 Nov 2020 06:57 )  Alb: 3.8 g/dL / Pro: 6.5 g/dL / ALK PHOS: 50 U/L / ALT: 15 U/L / AST: 16 U/L / GGT: x           PT/INR - ( 20 Nov 2020 06:57 )   PT: 25.6 sec;   INR: 2.30 ratio    PTT - ( 19 Nov 2020 14:15 )  PTT:33.3 sec    CARDIAC MARKERS ( 20 Nov 2020 06:57 )  x     / 0.06 ng/mL / x     / x     / x      CARDIAC MARKERS ( 19 Nov 2020 23:07 )  x     / 0.06 ng/mL / x     / x     / x      CARDIAC MARKERS ( 19 Nov 2020 17:22 )  x     / 0.04 ng/mL / x     / x     / x      CARDIAC MARKERS ( 19 Nov 2020 14:15 )  x     / 0.04 ng/mL / x     / x     / x        MEDICATIONS  (STANDING):  apixaban 5 milliGRAM(s) Oral two times a day  budesonide  80 MICROgram(s)/formoterol 4.5 MICROgram(s) Inhaler 2 Puff(s) Inhalation two times a day  dextrose 40% Gel 15 Gram(s) Oral once  dextrose 5%. 1000 milliLiter(s) (50 mL/Hr) IV Continuous <Continuous>  dextrose 5%. 1000 milliLiter(s) (100 mL/Hr) IV Continuous <Continuous>  dextrose 50% Injectable 25 Gram(s) IV Push once  dextrose 50% Injectable 12.5 Gram(s) IV Push once  dextrose 50% Injectable 25 Gram(s) IV Push once  furosemide   Injectable 40 milliGRAM(s) IV Push daily  glucagon  Injectable 1 milliGRAM(s) IntraMuscular once  insulin lispro (ADMELOG) corrective regimen sliding scale   SubCutaneous three times a day before meals  latanoprost 0.005% Ophthalmic Solution 1 Drop(s) Both EYES at bedtime  multivitamin Oral Tab/Cap - Peds 1 Tablet(s) Oral daily  pantoprazole    Tablet 40 milliGRAM(s) Oral before breakfast  tamsulosin 0.8 milliGRAM(s) Oral at bedtime    MEDICATIONS  (PRN):  acetaminophen   Tablet .. 650 milliGRAM(s) Oral every 4 hours PRN Mild Pain (1 - 3)  ALBUTerol    90 MICROgram(s) HFA Inhaler 2 Puff(s) Inhalation every 6 hours PRN Shortness of Breath and/or Wheezing  ALPRAZolam 0.25 milliGRAM(s) Oral two times a day PRN anxiety  morphine  - Injectable 2 milliGRAM(s) IV Push every 4 hours PRN Moderate Pain (4 - 6)  ondansetron Injectable 4 milliGRAM(s) IV Push every 6 hours PRN Nausea      ASSESSMENT / PLAN:  ----------------------------------------  88 year old male patient with pertinent history of  HTN, HL, DM, PVD with RSFA and LSFA stenting, Carotid endarterectomy, CAD s/p remote CABG & multiple PCIs (most recently 2/2020),  severe AS s/p remote TAVR,  PAF S/P PPM S/P RECENT GREGORIA with DCCV (November 13th) with a questionable thrombus VS veg in RV lead so far negative BC x5 days at that time changed from xarelto to eliquis 5 mg PO BID presented to the ED complaining of BRPBR in small aounts since the past week along with progressively worsening dyspnea on exertion, orthopnea, PND and dizziness. Patient states he was initially started on xarelto when Afib was first discovered but it was later switched to Eliquis after the cardioversion was done. States his symptoms became profound shortly after starting eliquis. States he feels very dizzy and unsteady on his feet. Pt saw his PCP two days prior and had routine labs done and was notified that there was a drop in his Hb. Endorsed occasional dark stools. Pt has not had a Colonoscopy in over 20 years. He denied any associated chest pain, palpitations, slurred speech, facial drooping, aphasia, visual changes, arm or leg weakness. Endorsed b/l lower extremity edema  In the ED patient with negative stool guaiac. Hg was found to be 9.4 with baseline being 13. No episodes of hypoxia noted    Acute on chronic diastolic heart failure - To transition from intravenous furosemide to oral furosemide. To follow up with Cardiology. Serial troponin levels were not elevated.    Acute blood lost anemia - Intermittent. Suspected to be secondary to hemorrhoids. Status post transfusion of packed red blood cells. Thought to benefit for eventual endoscopic evaluation once anticoagulation can be held. Clopidogrel was discontinued.    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 Planned for completion of four weeks of anticoagulation prior to holding the medication for colonoscopy.    Coronary artery disease - Clopidogrel held in the setting of possible gastrointestinal hemorrhage. Documented to have intolerance for statin therapy.    Diabetes - Insulin coverage, close monitoring of blood glucose levels.    COPD - On budesonide Inhaled bronchodilator therapy as needed.    BPH - On tamsulosin.

## 2020-11-23 ENCOUNTER — NON-APPOINTMENT (OUTPATIENT)
Age: 85
End: 2020-11-23

## 2020-11-23 ENCOUNTER — APPOINTMENT (OUTPATIENT)
Dept: INTERNAL MEDICINE | Facility: CLINIC | Age: 85
End: 2020-11-23
Payer: MEDICARE

## 2020-11-23 VITALS
DIASTOLIC BLOOD PRESSURE: 62 MMHG | HEART RATE: 95 BPM | HEIGHT: 68 IN | OXYGEN SATURATION: 97 % | TEMPERATURE: 97.4 F | RESPIRATION RATE: 18 BRPM | SYSTOLIC BLOOD PRESSURE: 126 MMHG | BODY MASS INDEX: 26.83 KG/M2 | WEIGHT: 177 LBS

## 2020-11-23 PROCEDURE — 36415 COLL VENOUS BLD VENIPUNCTURE: CPT

## 2020-11-23 PROCEDURE — 99495 TRANSJ CARE MGMT MOD F2F 14D: CPT | Mod: 25

## 2020-11-23 RX ORDER — LOSARTAN POTASSIUM 50 MG/1
50 TABLET, FILM COATED ORAL DAILY
Qty: 90 | Refills: 1 | Status: DISCONTINUED | COMMUNITY
End: 2020-11-23

## 2020-11-23 NOTE — ASSESSMENT
[FreeTextEntry1] : Status post hospitalization for iron deficiency anemia with negative Hemoccult.\par Status post transfusion one unit packed red blood cell.\par Patient continues to feel weak possibly secondary to increased Lasix therefore decreased to 20 mg daily.\par Otherwise continue present treatment.\par \par Blood work done to assess CBC and CMP therefore venipuncture done today in the office.\par followup with cardiology who needs to clear patient to discontinue Eliquis that colonoscopy can be done\par \par Followup in one week Improved

## 2020-11-23 NOTE — HISTORY OF PRESENT ILLNESS
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.\par \par Patient evaluated in the ED with fecal occult blood negative.\par Duplex negative for DVT.\par \par Patient seen by cardiology who recommended to continue anticoagulation\par Patient also felt to have acute on chronic CHF therefore IV Lasix was given.\par Echo showed hyperdynamic left ventricular function with no pericardial effusion.\par \par GI consultation was gotten with recommendation for endoscopic evaluation after Plavix has been held for 5 days and apixaban held for 2 days.\par Cardiology agreed to discontinue Plavix.\par \par Admission hemoglobin = 9.3.One unit packed red blood cells was given and hemoglobin noted to be stable.Discharge hemoglobin = 10.2\par \par Patient stable for discharge on November 21 with planned followup to monitor CBC and followup with cardiology and gastroenterology for eventual colonoscopy. \par \par since discharge the patient states he continues not to feel well with weakness and just not feeling right.His edema is better with Lasix increased to 40 mg daily but states he does not feel well on it and feels is making his glucose is elevated.\par \par no further dark stools.\par No overt bleeding.

## 2020-11-23 NOTE — PHYSICAL EXAM
[No Acute Distress] : no acute distress [No JVD] : no jugular venous distention [No Respiratory Distress] : no respiratory distress  [No Accessory Muscle Use] : no accessory muscle use [Clear to Auscultation] : lungs were clear to auscultation bilaterally [Normal Rate] : normal rate  [Regular Rhythm] : with a regular rhythm [Normal Gait] : normal gait [Normal Affect] : the affect was normal [de-identified] : weak [de-identified] : +1 bilateral edema (decreased)

## 2020-11-24 ENCOUNTER — NON-APPOINTMENT (OUTPATIENT)
Age: 85
End: 2020-11-24

## 2020-11-24 LAB
ALBUMIN SERPL ELPH-MCNC: 4.1 G/DL
ALP BLD-CCNC: 61 U/L
ALT SERPL-CCNC: 15 U/L
ANION GAP SERPL CALC-SCNC: 10 MMOL/L
AST SERPL-CCNC: 18 U/L
BASOPHILS # BLD AUTO: 0.03 K/UL
BASOPHILS NFR BLD AUTO: 0.4 %
BILIRUB SERPL-MCNC: 0.4 MG/DL
BUN SERPL-MCNC: 29 MG/DL
CALCIUM SERPL-MCNC: 8.9 MG/DL
CHLORIDE SERPL-SCNC: 96 MMOL/L
CO2 SERPL-SCNC: 30 MMOL/L
CREAT SERPL-MCNC: 1.34 MG/DL
EOSINOPHIL # BLD AUTO: 0.1 K/UL
EOSINOPHIL NFR BLD AUTO: 1.3 %
GLUCOSE SERPL-MCNC: 282 MG/DL
HCT VFR BLD CALC: 34.7 %
HGB BLD-MCNC: 10.5 G/DL
IMM GRANULOCYTES NFR BLD AUTO: 0.3 %
LYMPHOCYTES # BLD AUTO: 1.58 K/UL
LYMPHOCYTES NFR BLD AUTO: 20.1 %
MAGNESIUM SERPL-MCNC: 1.9 MG/DL
MAN DIFF?: NORMAL
MCHC RBC-ENTMCNC: 29.1 PG
MCHC RBC-ENTMCNC: 30.3 GM/DL
MCV RBC AUTO: 96.1 FL
MONOCYTES # BLD AUTO: 0.78 K/UL
MONOCYTES NFR BLD AUTO: 9.9 %
NEUTROPHILS # BLD AUTO: 5.36 K/UL
NEUTROPHILS NFR BLD AUTO: 68 %
PLATELET # BLD AUTO: 235 K/UL
POTASSIUM SERPL-SCNC: 4.1 MMOL/L
PROT SERPL-MCNC: 6.7 G/DL
RBC # BLD: 3.61 M/UL
RBC # FLD: 13.8 %
SODIUM SERPL-SCNC: 136 MMOL/L
WBC # FLD AUTO: 7.87 K/UL

## 2020-11-30 ENCOUNTER — APPOINTMENT (OUTPATIENT)
Dept: INTERNAL MEDICINE | Facility: CLINIC | Age: 85
End: 2020-11-30
Payer: MEDICARE

## 2020-11-30 VITALS
TEMPERATURE: 97.5 F | HEIGHT: 68 IN | RESPIRATION RATE: 18 BRPM | BODY MASS INDEX: 26.07 KG/M2 | WEIGHT: 172 LBS | HEART RATE: 77 BPM | SYSTOLIC BLOOD PRESSURE: 120 MMHG | DIASTOLIC BLOOD PRESSURE: 70 MMHG

## 2020-11-30 DIAGNOSIS — Z01.818 ENCOUNTER FOR OTHER PREPROCEDURAL EXAMINATION: ICD-10-CM

## 2020-11-30 DIAGNOSIS — R20.2 PARESTHESIA OF SKIN: ICD-10-CM

## 2020-11-30 DIAGNOSIS — R21 RASH AND OTHER NONSPECIFIC SKIN ERUPTION: ICD-10-CM

## 2020-11-30 DIAGNOSIS — G89.11 PAIN IN RIGHT SHOULDER: ICD-10-CM

## 2020-11-30 DIAGNOSIS — D64.9 ANEMIA, UNSPECIFIED: ICD-10-CM

## 2020-11-30 DIAGNOSIS — Z92.29 PERSONAL HISTORY OF OTHER DRUG THERAPY: ICD-10-CM

## 2020-11-30 DIAGNOSIS — Z86.59 PERSONAL HISTORY OF OTHER MENTAL AND BEHAVIORAL DISORDERS: ICD-10-CM

## 2020-11-30 DIAGNOSIS — M25.511 PAIN IN RIGHT SHOULDER: ICD-10-CM

## 2020-11-30 DIAGNOSIS — T14.8XXA OTHER INJURY OF UNSPECIFIED BODY REGION, INITIAL ENCOUNTER: ICD-10-CM

## 2020-11-30 DIAGNOSIS — J06.9 ACUTE UPPER RESPIRATORY INFECTION, UNSPECIFIED: ICD-10-CM

## 2020-11-30 DIAGNOSIS — S59.901A UNSPECIFIED INJURY OF RIGHT ELBOW, INITIAL ENCOUNTER: ICD-10-CM

## 2020-11-30 DIAGNOSIS — M25.531 PAIN IN RIGHT WRIST: ICD-10-CM

## 2020-11-30 DIAGNOSIS — Z87.39 PERSONAL HISTORY OF OTHER DISEASES OF THE MUSCULOSKELETAL SYSTEM AND CONNECTIVE TISSUE: ICD-10-CM

## 2020-11-30 DIAGNOSIS — I35.0 NONRHEUMATIC AORTIC (VALVE) STENOSIS: ICD-10-CM

## 2020-11-30 DIAGNOSIS — Z86.2 PERSONAL HISTORY OF DISEASES OF THE BLOOD AND BLOOD-FORMING ORGANS AND CERTAIN DISORDERS INVOLVING THE IMMUNE MECHANISM: ICD-10-CM
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
## 2020-11-30 DIAGNOSIS — Z11.59 ENCOUNTER FOR SCREENING FOR OTHER VIRAL DISEASES: ICD-10-CM

## 2020-11-30 DIAGNOSIS — M62.838 OTHER MUSCLE SPASM: ICD-10-CM

## 2020-11-30 PROCEDURE — 99214 OFFICE O/P EST MOD 30 MIN: CPT | Mod: 25

## 2020-11-30 PROCEDURE — 36415 COLL VENOUS BLD VENIPUNCTURE: CPT

## 2020-11-30 RX ORDER — RIVAROXABAN 15 MG/1
15 TABLET, FILM COATED ORAL
Qty: 30 | Refills: 0 | Status: DISCONTINUED | COMMUNITY
Start: 2020-11-26

## 2020-11-30 RX ORDER — FUROSEMIDE 40 MG/1
40 TABLET ORAL
Qty: 30 | Refills: 0 | Status: DISCONTINUED | COMMUNITY
Start: 2020-11-20

## 2020-11-30 RX ORDER — MUPIROCIN 20 MG/G
2 OINTMENT TOPICAL
Qty: 22 | Refills: 0 | Status: DISCONTINUED | COMMUNITY
Start: 2020-11-17

## 2020-12-01 ENCOUNTER — TRANSCRIPTION ENCOUNTER (OUTPATIENT)
Age: 85
End: 2020-12-01

## 2020-12-01 LAB
ALBUMIN SERPL ELPH-MCNC: 3.8 G/DL
ALP BLD-CCNC: 71 U/L
ALT SERPL-CCNC: 11 U/L
ANION GAP SERPL CALC-SCNC: 12 MMOL/L
AST SERPL-CCNC: 18 U/L
BASOPHILS # BLD AUTO: 0.03 K/UL
BASOPHILS NFR BLD AUTO: 0.3 %
BILIRUB SERPL-MCNC: 0.4 MG/DL
BUN SERPL-MCNC: 25 MG/DL
CALCIUM SERPL-MCNC: 9.3 MG/DL
CHLORIDE SERPL-SCNC: 96 MMOL/L
CO2 SERPL-SCNC: 29 MMOL/L
CREAT SERPL-MCNC: 1.18 MG/DL
EOSINOPHIL # BLD AUTO: 0.07 K/UL
EOSINOPHIL NFR BLD AUTO: 0.7 %
GLUCOSE SERPL-MCNC: 326 MG/DL
HCT VFR BLD CALC: 37.6 %
HGB BLD-MCNC: 11.3 G/DL
IMM GRANULOCYTES NFR BLD AUTO: 0.4 %
LYMPHOCYTES # BLD AUTO: 1.13 K/UL
LYMPHOCYTES NFR BLD AUTO: 11 %
MAGNESIUM SERPL-MCNC: 1.7 MG/DL
MAN DIFF?: NORMAL
MCHC RBC-ENTMCNC: 29 PG
MCHC RBC-ENTMCNC: 30.1 GM/DL
MCV RBC AUTO: 96.4 FL
MONOCYTES # BLD AUTO: 0.69 K/UL
MONOCYTES NFR BLD AUTO: 6.7 %
NEUTROPHILS # BLD AUTO: 8.3 K/UL
NEUTROPHILS NFR BLD AUTO: 80.9 %
PLATELET # BLD AUTO: 242 K/UL
POTASSIUM SERPL-SCNC: 4.3 MMOL/L
PROT SERPL-MCNC: 6.6 G/DL
RBC # BLD: 3.9 M/UL
RBC # FLD: 13.8 %
SODIUM SERPL-SCNC: 137 MMOL/L
WBC # FLD AUTO: 10.26 K/UL

## 2020-12-01 NOTE — PHYSICAL EXAM
[No Acute Distress] : no acute distress [Normal Sclera/Conjunctiva] : normal sclera/conjunctiva [No JVD] : no jugular venous distention [No Respiratory Distress] : no respiratory distress  [Normal Rate] : normal rate  [No Edema] : there was no peripheral edema [de-identified] : irregular [de-identified] : tender, spasm trap muscles [de-identified] : slow gait [de-identified] : depressed mood
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
## 2020-12-01 NOTE — REVIEW OF SYSTEMS
[Fatigue] : fatigue [Unsteady Walk] : ataxia [Anxiety] : anxiety [Negative] : Heme/Lymph [FreeTextEntry9] : neck pain
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
## 2020-12-01 NOTE — ADDENDUM
[FreeTextEntry1] : Blood work good with hemoglobin improved to 11.3.\par Chemistries normal other than glucose at 320 with patient having had sweets prior to visit.\par \par Dr. Mcgill cardiology called to say that Eliquis s being held therefore patient can have colonoscopy.\par \par I called gastroenterology who is to see the patient in the office tomorrow

## 2020-12-02 ENCOUNTER — APPOINTMENT (OUTPATIENT)
Dept: GASTROENTEROLOGY | Facility: CLINIC | Age: 85
End: 2020-12-02
Payer: MEDICARE

## 2020-12-02 VITALS
BODY MASS INDEX: 26.52 KG/M2 | TEMPERATURE: 97.7 F | DIASTOLIC BLOOD PRESSURE: 72 MMHG | HEART RATE: 70 BPM | HEIGHT: 68 IN | WEIGHT: 175 LBS | SYSTOLIC BLOOD PRESSURE: 120 MMHG

## 2020-12-02 DIAGNOSIS — K63.89 OTHER SPECIFIED DISEASES OF INTESTINE: ICD-10-CM

## 2020-12-02 PROCEDURE — 99214 OFFICE O/P EST MOD 30 MIN: CPT

## 2020-12-02 RX ORDER — APIXABAN 5 MG/1
5 TABLET, FILM COATED ORAL
Qty: 60 | Refills: 0 | Status: COMPLETED | COMMUNITY
Start: 2020-11-13 | End: 2020-12-02

## 2020-12-02 NOTE — PHYSICAL EXAM
[General Appearance - Alert] : alert [General Appearance - In No Acute Distress] : in no acute distress [Sclera] : the sclera and conjunctiva were normal [PERRL With Normal Accommodation] : pupils were equal in size, round, and reactive to light [Extraocular Movements] : extraocular movements were intact [Outer Ear] : the ears and nose were normal in appearance [Oropharynx] : the oropharynx was normal [Neck Appearance] : the appearance of the neck was normal [Neck Cervical Mass (___cm)] : no neck mass was observed [Jugular Venous Distention Increased] : there was no jugular-venous distention [Thyroid Diffuse Enlargement] : the thyroid was not enlarged [Thyroid Nodule] : there were no palpable thyroid nodules [Auscultation Breath Sounds / Voice Sounds] : lungs were clear to auscultation bilaterally [Heart Rate And Rhythm] : heart rate was normal and rhythm regular [Heart Sounds] : normal S1 and S2 [Heart Sounds Gallop] : no gallops [Murmurs] : no murmurs [Heart Sounds Pericardial Friction Rub] : no pericardial rub [Full Pulse] : the pedal pulses are present [Edema] : there was no peripheral edema [Bowel Sounds] : normal bowel sounds [Abdomen Soft] : soft [Abdomen Tenderness] : non-tender [Abdomen Mass (___ Cm)] : no abdominal mass palpated [Cervical Lymph Nodes Enlarged Posterior Bilaterally] : posterior cervical [Cervical Lymph Nodes Enlarged Anterior Bilaterally] : anterior cervical [Abnormal Walk] : normal gait [Nail Clubbing] : no clubbing  or cyanosis of the fingernails [Musculoskeletal - Swelling] : no joint swelling seen [Motor Tone] : muscle strength and tone were normal [Skin Color & Pigmentation] : normal skin color and pigmentation [Skin Turgor] : normal skin turgor [] : no rash [No Focal Deficits] : no focal deficits [Oriented To Time, Place, And Person] : oriented to person, place, and time [Impaired Insight] : insight and judgment were intact [Affect] : the affect was normal

## 2020-12-02 NOTE — ASSESSMENT
[FreeTextEntry1] : At this time, we had an extensive discussion with the patient and his family. The patient is concerned for COVID 19 infection. I am recommending to consider colonoscopy and then we will proceed  accordingly. we will obtain the cardiology  clearance note. I am recommending to consider gentle bowel preparation. \par \par The bowel preparation was discussed at length. Risks (including bleeding, pain, perforation, incomplete examination, splenic laceration, adverse reactions to medications, aspiration and death), benefits and alternatives were discussed. Patient is agreeable for the colonoscopy. The patient is medically optimized for the procedure. We will schedule the patient for the procedure. Bowel preparation was sent to the pharmacy.\par \par \par Ben Becerra MD\par Gastroenterology \par \par
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
## 2020-12-03 ENCOUNTER — TRANSCRIPTION ENCOUNTER (OUTPATIENT)
Age: 85
End: 2020-12-03

## 2020-12-04 ENCOUNTER — APPOINTMENT (OUTPATIENT)
Dept: DISASTER EMERGENCY | Facility: CLINIC | Age: 85
End: 2020-12-04

## 2020-12-05 ENCOUNTER — TRANSCRIPTION ENCOUNTER (OUTPATIENT)
Age: 85
End: 2020-12-05

## 2020-12-05 LAB — SARS-COV-2 N GENE NPH QL NAA+PROBE: NOT DETECTED

## 2020-12-07 ENCOUNTER — RESULT REVIEW (OUTPATIENT)
Age: 85
End: 2020-12-07

## 2020-12-07 ENCOUNTER — OUTPATIENT (OUTPATIENT)
Dept: OUTPATIENT SERVICES | Facility: HOSPITAL | Age: 85
LOS: 1 days | End: 2020-12-07
Payer: MEDICARE

## 2020-12-07 ENCOUNTER — APPOINTMENT (OUTPATIENT)
Dept: GASTROENTEROLOGY | Facility: HOSPITAL | Age: 85
End: 2020-12-07

## 2020-12-07 DIAGNOSIS — Z98.89 OTHER SPECIFIED POSTPROCEDURAL STATES: Chronic | ICD-10-CM

## 2020-12-07 DIAGNOSIS — Z95.810 PRESENCE OF AUTOMATIC (IMPLANTABLE) CARDIAC DEFIBRILLATOR: Chronic | ICD-10-CM

## 2020-12-07 DIAGNOSIS — K63.5 POLYP OF COLON: ICD-10-CM

## 2020-12-07 DIAGNOSIS — Z95.3 PRESENCE OF XENOGENIC HEART VALVE: Chronic | ICD-10-CM

## 2020-12-07 DIAGNOSIS — Z98.890 OTHER SPECIFIED POSTPROCEDURAL STATES: Chronic | ICD-10-CM

## 2020-12-07 DIAGNOSIS — Z90.49 ACQUIRED ABSENCE OF OTHER SPECIFIED PARTS OF DIGESTIVE TRACT: Chronic | ICD-10-CM

## 2020-12-07 DIAGNOSIS — Z96.60 PRESENCE OF UNSPECIFIED ORTHOPEDIC JOINT IMPLANT: Chronic | ICD-10-CM

## 2020-12-07 DIAGNOSIS — Z98.41 CATARACT EXTRACTION STATUS, RIGHT EYE: Chronic | ICD-10-CM

## 2020-12-07 DIAGNOSIS — Z95.1 PRESENCE OF AORTOCORONARY BYPASS GRAFT: Chronic | ICD-10-CM

## 2020-12-07 DIAGNOSIS — Z98.61 CORONARY ANGIOPLASTY STATUS: Chronic | ICD-10-CM

## 2020-12-07 DIAGNOSIS — Z98.42 CATARACT EXTRACTION STATUS, LEFT EYE: Chronic | ICD-10-CM

## 2020-12-07 DIAGNOSIS — K64.8 OTHER HEMORRHOIDS: ICD-10-CM

## 2020-12-07 DIAGNOSIS — K63.89 OTHER SPECIFIED DISEASES OF INTESTINE: ICD-10-CM

## 2020-12-07 DIAGNOSIS — Z95.0 PRESENCE OF CARDIAC PACEMAKER: Chronic | ICD-10-CM

## 2020-12-07 DIAGNOSIS — K57.30 DIVERTICULOSIS OF LARGE INTESTINE W/OUT PERFORATION OR ABSCESS W/OUT BLEEDING: ICD-10-CM

## 2020-12-07 LAB — GLUCOSE BLDC GLUCOMTR-MCNC: 113 MG/DL — HIGH (ref 70–99)

## 2020-12-07 PROCEDURE — 45385 COLONOSCOPY W/LESION REMOVAL: CPT

## 2020-12-07 PROCEDURE — 88305 TISSUE EXAM BY PATHOLOGIST: CPT

## 2020-12-07 PROCEDURE — 45381 COLONOSCOPY SUBMUCOUS NJX: CPT | Mod: 59

## 2020-12-07 PROCEDURE — 45380 COLONOSCOPY AND BIOPSY: CPT | Mod: XS

## 2020-12-07 PROCEDURE — 82962 GLUCOSE BLOOD TEST: CPT

## 2020-12-07 PROCEDURE — 45381 COLONOSCOPY SUBMUCOUS NJX: CPT

## 2020-12-07 PROCEDURE — 88305 TISSUE EXAM BY PATHOLOGIST: CPT | Mod: 26

## 2020-12-07 PROCEDURE — C1889: CPT

## 2020-12-07 PROCEDURE — 45380 COLONOSCOPY AND BIOPSY: CPT | Mod: 59

## 2020-12-07 NOTE — ASSESSMENT
[FreeTextEntry1] : IMPRESSION:\par Colon mass x 2. Cecal and transverse colon polypoid mass. Tattoo placed distal to transverse colon mass\par Colon polyps\par Diverticulosis of colon\par Internal hemorrhoids\par \par RECOMMENDATIONS:\par Colorectal surgery evaluation\par Resume eliquis tomorrow\par CEA level\par

## 2020-12-07 NOTE — PROCEDURE
[With Biopsy] : with biopsy [With Hemostasis] : hemostasis [With Yasmin Ink Tattoo] : Yasmin ink tattoo [With Snare Polypectomy] : snare polypectomy [With Cautery] : cautery [Hemoccult +] : hemoccult positive stool [Abn Imaging Study] : abnormal imaging study [Procedure Explained] : The procedure was explained [Allergies Reviewed] : allergies reviewed. [Risks] : Risks [Benefits] : benefits [Alternatives] : alternatives [Consent Obtained] : written consent was obtained prior to the procedure and is detailed in the patient's record [Patient] : the patient [Bowel Prep Kit] : the patient took the appropriate bowel preparation kit as directed [Approved Diet Followed] : the patient avoided solid foods and adhered to the approved diet list for 24 hours prior to the procedure [Automated Blood Pressure Cuff] : automated blood pressure cuff [Cardiac Monitor] : cardiac monitor [Pulse Oximeter] : pulse oximeter [3] : 3 [Prep Qualtiy: ___] : Prep Quality:  [unfilled] [Withdrawal Time: ___] : Withdrawal Time:  [unfilled] [Performed By: ___] : Performed by:  LO [Left Lateral Decubitus] : The patient was positioned in the left lateral decubitus position [Normal Prostate] : a normal prostate [Cecum (Landmarks)] : and guided to the cecum which was identified by the anatomic landmarks of the appendiceal orifice and ileocecal valve [Terminal Ileum via Ileocecal Valve] : and the terminal ileum was examined by entering the ileocecal valve [No Difficulty] : without difficulty [Multiple Passes Needed] : after multiple passes [Retroflex View] : a retroflex view of the rectum was performed [Biopsy] : biopsy [Normal] : Normal [Diverticulosis] : diverticulosis [Polyps] : polyps [Hot Snare Polypectomy] : hot snare polypectomy [Hemorrhoids] : hemorrhoids [Sent to Pathology] : was sent to pathology for analysis [Tolerated Well] : the patient tolerated the procedure well [Vital Signs Stable] : the vital signs were stable [No Complications] : There were no complications [Abnormal Rectum] : a normal rectum [External Hemorrhoids] : no external hemorrhoids [Insufflated] : not insufflated [Patient Rotated Into Alternating Positions] : the patient was not rotated [FreeTextEntry1] : Endoclip placement x1 [de-identified] : Normal terminal ileum\par Normal ileocecal valve\par 2.5 cm mass in the cecal close to ileocecal valve s/p cold biopsy\par Diminutive cecal polyp not biopsied [de-identified] : 4 cm polypoid mass with 3 cm base s/p cold biopsy.\par 3 quadrant distal tattoo placed\par 4 mm polyp noted distal to the mass s/p cold biopsy forceps polypectomy [de-identified] : 1 cm polyp s/p hot snare polypectomy and endoclip placement at polypectomy site [de-identified] : Cecal mass; Transverse colon mass; Transverse colon polyp; Sigmoid colon polyp

## 2020-12-08 ENCOUNTER — APPOINTMENT (OUTPATIENT)
Dept: COLORECTAL SURGERY | Facility: CLINIC | Age: 85
End: 2020-12-08
Payer: MEDICARE

## 2020-12-08 VITALS
WEIGHT: 175 LBS | HEIGHT: 68 IN | DIASTOLIC BLOOD PRESSURE: 80 MMHG | SYSTOLIC BLOOD PRESSURE: 148 MMHG | BODY MASS INDEX: 26.52 KG/M2 | TEMPERATURE: 97.4 F | HEART RATE: 109 BPM | RESPIRATION RATE: 16 BRPM

## 2020-12-08 DIAGNOSIS — Z78.9 OTHER SPECIFIED HEALTH STATUS: ICD-10-CM

## 2020-12-08 DIAGNOSIS — C18.4 MALIGNANT NEOPLASM OF TRANSVERSE COLON: ICD-10-CM

## 2020-12-08 PROCEDURE — 99204 OFFICE O/P NEW MOD 45 MIN: CPT

## 2020-12-08 NOTE — ASSESSMENT
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 Recommend further staging with a CAT scan of the brain chest abdomen and pelvis rule out metastatic disease. If there is no metastatic disease and proceed with a extended right hemicolectomy with lymph node dissection. I've had a lengthy discussion with his cardiologist it is felt that he may not tolerate laparoscopic procedure because of the pneumoperitoneum but also because of the increase in OR time therefore most likely will need to performed is open.Risk and benefits of the surgery have been discussed which include bleeding, infection, sepsis, multiorgan failure, inadvertent injury including hollow viscus, solid organ, ureter neurovascular and neurological structures, DVT PE, heart attack, stroke, hernias, recurrence of tumor, EKG anastomosis require reoperation possible ostomy and death.

## 2020-12-08 NOTE — DATA REVIEWED
[FreeTextEntry1] : colonoscopy demonstrates mass in the cecum and transverse colon\par pathology is pending

## 2020-12-08 NOTE — REVIEW OF SYSTEMS
[Feeling Poorly] : feeling poorly [Feeling Tired] : feeling tired [Recent Weight Loss (___ Lbs)] : recent [unfilled] ~Ulb weight loss [Abdominal Pain] : abdominal pain [As Noted in HPI] : as noted in HPI [Dizziness] : dizziness [Difficulty Walking] : difficulty walking [Negative] : Heme/Lymph

## 2020-12-08 NOTE — PHYSICAL EXAM
[Abdomen Masses] : No abdominal masses [Abdomen Tenderness] : ~T No ~M abdominal tenderness [Tender] : nontender [JVD] : no jugular venous distention  [Normal Breath Sounds] : Normal breath sounds [Normal Heart Sounds] : normal heart sounds [Normal Rate and Rhythm] : normal rate and rhythm [No Rash or Lesion] : No rash or lesion [Alert] : alert [Oriented to Person] : oriented to person [Oriented to Place] : oriented to place [Oriented to Time] : oriented to time [Calm] : calm [de-identified] : thin [de-identified] : Looks well in no distress, of stated age. [de-identified] : pupils equal reactive to light normocephalic atraumatic. [de-identified] : moves all 4 extremities appropriately with 5 over 5 strength

## 2020-12-08 NOTE — CONSULT LETTER
[Dear  ___] : Dear  [unfilled], [Consult Letter:] : I had the pleasure of evaluating your patient, [unfilled]. [Please see my note below.] : Please see my note below. [Consult Closing:] : Thank you very much for allowing me to participate in the care of this patient.  If you have any questions, please do not hesitate to contact me. [Sincerely,] : Sincerely, [FreeTextEntry3] : Kem Collier M.D. FACS, FASCRS

## 2020-12-08 NOTE — HISTORY OF PRESENT ILLNESS
[FreeTextEntry1] : Consultation requested by Dr. Yaima alexander for synchronous colon cancers. 88-year-old male with extensive cardiac history recently had a cardiac stent which CAT scan demonstrated a mass of the colon was also found to be anemic recent colonoscopy demonstrates a cancer of the cecum and also transverse colon. Patient does admit to feeling very weak and overall deconditioned over the last month.

## 2020-12-09 ENCOUNTER — APPOINTMENT (OUTPATIENT)
Dept: CT IMAGING | Facility: CLINIC | Age: 85
End: 2020-12-09
Payer: MEDICARE

## 2020-12-09 ENCOUNTER — RESULT REVIEW (OUTPATIENT)
Age: 85
End: 2020-12-09

## 2020-12-09 ENCOUNTER — OUTPATIENT (OUTPATIENT)
Dept: OUTPATIENT SERVICES | Facility: HOSPITAL | Age: 85
LOS: 1 days | End: 2020-12-09
Payer: MEDICARE

## 2020-12-09 DIAGNOSIS — Z96.60 PRESENCE OF UNSPECIFIED ORTHOPEDIC JOINT IMPLANT: Chronic | ICD-10-CM

## 2020-12-09 DIAGNOSIS — Z98.890 OTHER SPECIFIED POSTPROCEDURAL STATES: Chronic | ICD-10-CM

## 2020-12-09 DIAGNOSIS — Z95.0 PRESENCE OF CARDIAC PACEMAKER: Chronic | ICD-10-CM

## 2020-12-09 DIAGNOSIS — Z00.8 ENCOUNTER FOR OTHER GENERAL EXAMINATION: ICD-10-CM

## 2020-12-09 DIAGNOSIS — Z98.61 CORONARY ANGIOPLASTY STATUS: Chronic | ICD-10-CM

## 2020-12-09 DIAGNOSIS — Z95.3 PRESENCE OF XENOGENIC HEART VALVE: Chronic | ICD-10-CM

## 2020-12-09 DIAGNOSIS — Z90.49 ACQUIRED ABSENCE OF OTHER SPECIFIED PARTS OF DIGESTIVE TRACT: Chronic | ICD-10-CM

## 2020-12-09 DIAGNOSIS — Z98.89 OTHER SPECIFIED POSTPROCEDURAL STATES: Chronic | ICD-10-CM

## 2020-12-09 DIAGNOSIS — Z98.41 CATARACT EXTRACTION STATUS, RIGHT EYE: Chronic | ICD-10-CM

## 2020-12-09 DIAGNOSIS — Z98.42 CATARACT EXTRACTION STATUS, LEFT EYE: Chronic | ICD-10-CM

## 2020-12-09 DIAGNOSIS — Z95.810 PRESENCE OF AUTOMATIC (IMPLANTABLE) CARDIAC DEFIBRILLATOR: Chronic | ICD-10-CM

## 2020-12-09 DIAGNOSIS — Z95.1 PRESENCE OF AORTOCORONARY BYPASS GRAFT: Chronic | ICD-10-CM

## 2020-12-09 LAB — SURGICAL PATHOLOGY STUDY: SIGNIFICANT CHANGE UP

## 2020-12-09 PROCEDURE — 70470 CT HEAD/BRAIN W/O & W/DYE: CPT | Mod: 26

## 2020-12-09 PROCEDURE — 74177 CT ABD & PELVIS W/CONTRAST: CPT | Mod: 26

## 2020-12-09 PROCEDURE — 71260 CT THORAX DX C+: CPT | Mod: 26

## 2020-12-09 PROCEDURE — 72125 CT NECK SPINE W/O DYE: CPT | Mod: 26

## 2020-12-09 PROCEDURE — 76376 3D RENDER W/INTRP POSTPROCES: CPT | Mod: 26

## 2020-12-09 PROCEDURE — 72125 CT NECK SPINE W/O DYE: CPT

## 2020-12-09 PROCEDURE — 70470 CT HEAD/BRAIN W/O & W/DYE: CPT

## 2020-12-09 PROCEDURE — 71260 CT THORAX DX C+: CPT

## 2020-12-09 PROCEDURE — 74177 CT ABD & PELVIS W/CONTRAST: CPT

## 2020-12-09 PROCEDURE — 76376 3D RENDER W/INTRP POSTPROCES: CPT

## 2020-12-09 PROCEDURE — 82565 ASSAY OF CREATININE: CPT

## 2020-12-11 ENCOUNTER — OUTPATIENT (OUTPATIENT)
Dept: OUTPATIENT SERVICES | Facility: HOSPITAL | Age: 85
LOS: 1 days | End: 2020-12-11
Payer: MEDICARE

## 2020-12-11 VITALS
OXYGEN SATURATION: 99 % | SYSTOLIC BLOOD PRESSURE: 141 MMHG | HEART RATE: 91 BPM | DIASTOLIC BLOOD PRESSURE: 59 MMHG | RESPIRATION RATE: 16 BRPM | TEMPERATURE: 98 F | WEIGHT: 171.96 LBS | HEIGHT: 68 IN

## 2020-12-11 DIAGNOSIS — C18.4 MALIGNANT NEOPLASM OF TRANSVERSE COLON: ICD-10-CM

## 2020-12-11 DIAGNOSIS — Z95.0 PRESENCE OF CARDIAC PACEMAKER: Chronic | ICD-10-CM

## 2020-12-11 DIAGNOSIS — Z98.890 OTHER SPECIFIED POSTPROCEDURAL STATES: Chronic | ICD-10-CM

## 2020-12-11 DIAGNOSIS — Z98.89 OTHER SPECIFIED POSTPROCEDURAL STATES: Chronic | ICD-10-CM

## 2020-12-11 DIAGNOSIS — Z95.810 PRESENCE OF AUTOMATIC (IMPLANTABLE) CARDIAC DEFIBRILLATOR: Chronic | ICD-10-CM

## 2020-12-11 DIAGNOSIS — C18.0 MALIGNANT NEOPLASM OF CECUM: ICD-10-CM

## 2020-12-11 DIAGNOSIS — Z98.61 CORONARY ANGIOPLASTY STATUS: Chronic | ICD-10-CM

## 2020-12-11 DIAGNOSIS — Z29.9 ENCOUNTER FOR PROPHYLACTIC MEASURES, UNSPECIFIED: ICD-10-CM

## 2020-12-11 DIAGNOSIS — Z90.49 ACQUIRED ABSENCE OF OTHER SPECIFIED PARTS OF DIGESTIVE TRACT: Chronic | ICD-10-CM

## 2020-12-11 DIAGNOSIS — Z96.60 PRESENCE OF UNSPECIFIED ORTHOPEDIC JOINT IMPLANT: Chronic | ICD-10-CM

## 2020-12-11 DIAGNOSIS — Z98.42 CATARACT EXTRACTION STATUS, LEFT EYE: Chronic | ICD-10-CM

## 2020-12-11 DIAGNOSIS — Z98.41 CATARACT EXTRACTION STATUS, RIGHT EYE: Chronic | ICD-10-CM

## 2020-12-11 DIAGNOSIS — Z95.3 PRESENCE OF XENOGENIC HEART VALVE: Chronic | ICD-10-CM

## 2020-12-11 DIAGNOSIS — Z95.1 PRESENCE OF AORTOCORONARY BYPASS GRAFT: Chronic | ICD-10-CM

## 2020-12-11 LAB
A1C WITH ESTIMATED AVERAGE GLUCOSE RESULT: 7 % — HIGH (ref 4–5.6)
ALBUMIN SERPL ELPH-MCNC: 3.2 G/DL — LOW (ref 3.3–5)
ALP SERPL-CCNC: 68 U/L — SIGNIFICANT CHANGE UP (ref 40–120)
ALT FLD-CCNC: 13 U/L — SIGNIFICANT CHANGE UP (ref 12–78)
ANION GAP SERPL CALC-SCNC: 2 MMOL/L — LOW (ref 5–17)
APTT BLD: 31.6 SEC — SIGNIFICANT CHANGE UP (ref 27.5–35.5)
AST SERPL-CCNC: 13 U/L — LOW (ref 15–37)
BASOPHILS # BLD AUTO: 0.02 K/UL — SIGNIFICANT CHANGE UP (ref 0–0.2)
BASOPHILS NFR BLD AUTO: 0.3 % — SIGNIFICANT CHANGE UP (ref 0–2)
BILIRUB DIRECT SERPL-MCNC: 0.1 MG/DL — SIGNIFICANT CHANGE UP (ref 0–0.2)
BILIRUB SERPL-MCNC: 0.3 MG/DL — SIGNIFICANT CHANGE UP (ref 0.2–1.2)
BUN SERPL-MCNC: 18 MG/DL — SIGNIFICANT CHANGE UP (ref 7–23)
CALCIUM SERPL-MCNC: 9.3 MG/DL — SIGNIFICANT CHANGE UP (ref 8.5–10.1)
CEA SERPL-MCNC: 2.7 NG/ML — SIGNIFICANT CHANGE UP (ref 0–3.8)
CHLORIDE SERPL-SCNC: 105 MMOL/L — SIGNIFICANT CHANGE UP (ref 96–108)
CO2 SERPL-SCNC: 32 MMOL/L — HIGH (ref 22–31)
CREAT SERPL-MCNC: 1.05 MG/DL — SIGNIFICANT CHANGE UP (ref 0.5–1.3)
EOSINOPHIL # BLD AUTO: 0.15 K/UL — SIGNIFICANT CHANGE UP (ref 0–0.5)
EOSINOPHIL NFR BLD AUTO: 2.1 % — SIGNIFICANT CHANGE UP (ref 0–6)
ESTIMATED AVERAGE GLUCOSE: 154 MG/DL — HIGH (ref 68–114)
GLUCOSE SERPL-MCNC: 93 MG/DL — SIGNIFICANT CHANGE UP (ref 70–99)
HCT VFR BLD CALC: 34 % — LOW (ref 39–50)
HGB BLD-MCNC: 10.4 G/DL — LOW (ref 13–17)
IMM GRANULOCYTES NFR BLD AUTO: 0.4 % — SIGNIFICANT CHANGE UP (ref 0–1.5)
INR BLD: 1.72 RATIO — HIGH (ref 0.88–1.16)
LYMPHOCYTES # BLD AUTO: 1.8 K/UL — SIGNIFICANT CHANGE UP (ref 1–3.3)
LYMPHOCYTES # BLD AUTO: 24.9 % — SIGNIFICANT CHANGE UP (ref 13–44)
MCHC RBC-ENTMCNC: 27.8 PG — SIGNIFICANT CHANGE UP (ref 27–34)
MCHC RBC-ENTMCNC: 30.6 GM/DL — LOW (ref 32–36)
MCV RBC AUTO: 90.9 FL — SIGNIFICANT CHANGE UP (ref 80–100)
MONOCYTES # BLD AUTO: 0.64 K/UL — SIGNIFICANT CHANGE UP (ref 0–0.9)
MONOCYTES NFR BLD AUTO: 8.9 % — SIGNIFICANT CHANGE UP (ref 2–14)
NEUTROPHILS # BLD AUTO: 4.59 K/UL — SIGNIFICANT CHANGE UP (ref 1.8–7.4)
NEUTROPHILS NFR BLD AUTO: 63.4 % — SIGNIFICANT CHANGE UP (ref 43–77)
PLATELET # BLD AUTO: 241 K/UL — SIGNIFICANT CHANGE UP (ref 150–400)
POTASSIUM SERPL-MCNC: 4.2 MMOL/L — SIGNIFICANT CHANGE UP (ref 3.5–5.3)
POTASSIUM SERPL-SCNC: 4.2 MMOL/L — SIGNIFICANT CHANGE UP (ref 3.5–5.3)
PROT SERPL-MCNC: 7.1 GM/DL — SIGNIFICANT CHANGE UP (ref 6–8.3)
PROTHROM AB SERPL-ACNC: 19.4 SEC — HIGH (ref 10.6–13.6)
RBC # BLD: 3.74 M/UL — LOW (ref 4.2–5.8)
RBC # FLD: 13.8 % — SIGNIFICANT CHANGE UP (ref 10.3–14.5)
SODIUM SERPL-SCNC: 139 MMOL/L — SIGNIFICANT CHANGE UP (ref 135–145)
WBC # BLD: 7.23 K/UL — SIGNIFICANT CHANGE UP (ref 3.8–10.5)
WBC # FLD AUTO: 7.23 K/UL — SIGNIFICANT CHANGE UP (ref 3.8–10.5)

## 2020-12-11 PROCEDURE — 82248 BILIRUBIN DIRECT: CPT

## 2020-12-11 PROCEDURE — 36415 COLL VENOUS BLD VENIPUNCTURE: CPT

## 2020-12-11 PROCEDURE — 86900 BLOOD TYPING SEROLOGIC ABO: CPT

## 2020-12-11 PROCEDURE — 85730 THROMBOPLASTIN TIME PARTIAL: CPT

## 2020-12-11 PROCEDURE — 80053 COMPREHEN METABOLIC PANEL: CPT

## 2020-12-11 PROCEDURE — 83036 HEMOGLOBIN GLYCOSYLATED A1C: CPT

## 2020-12-11 PROCEDURE — 86850 RBC ANTIBODY SCREEN: CPT

## 2020-12-11 PROCEDURE — 85610 PROTHROMBIN TIME: CPT

## 2020-12-11 PROCEDURE — 93005 ELECTROCARDIOGRAM TRACING: CPT

## 2020-12-11 PROCEDURE — 93010 ELECTROCARDIOGRAM REPORT: CPT

## 2020-12-11 PROCEDURE — 85025 COMPLETE CBC W/AUTO DIFF WBC: CPT

## 2020-12-11 PROCEDURE — 82378 CARCINOEMBRYONIC ANTIGEN: CPT

## 2020-12-11 PROCEDURE — 86901 BLOOD TYPING SEROLOGIC RH(D): CPT

## 2020-12-11 PROCEDURE — G0463: CPT | Mod: 25

## 2020-12-11 RX ORDER — ACETAMINOPHEN 500 MG
2 TABLET ORAL
Qty: 0 | Refills: 0 | DISCHARGE

## 2020-12-11 RX ORDER — REPAGLINIDE 1 MG/1
1 TABLET ORAL
Qty: 0 | Refills: 0 | DISCHARGE

## 2020-12-11 RX ORDER — BUDESONIDE AND FORMOTEROL FUMARATE DIHYDRATE 160; 4.5 UG/1; UG/1
2 AEROSOL RESPIRATORY (INHALATION)
Qty: 0 | Refills: 0 | DISCHARGE

## 2020-12-11 NOTE — H&P PST ADULT - NSICDXPASTSURGICALHX_GEN_ALL_CORE_FT
PAST SURGICAL HISTORY:  H/O cystoscopy     History of CEA (carotid endarterectomy) Bilateral    History of permanent cardiac pacemaker placement [employer]    S/P appendectomy     S/P CABG x 2 LIMA LAD SVG OM1    S/P cataract surgery, left     S/P cataract surgery, right     S/P cholecystectomy     S/P cholecystectomy     S/P hernia repair     S/P ICD (internal cardiac defibrillator) procedure     S/P joint replacement Bilateral knee replacement    S/P nasal surgery Secondary to nasal fracture    S/P PTCA (percutaneous transluminal coronary angioplasty) x 9    S/P tonsillectomy     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 #29 [employer] core valve

## 2020-12-11 NOTE — H&P PST ADULT - NSICDXPASTMEDICALHX_GEN_ALL_CORE_FT
PAST MEDICAL HISTORY:  Abnormal positron emission tomography (PET) scan 2019 anterior, anteroseptal,anterolateral and iferolateral ischemia    Afib     Age-related incipient cataract of both eyes     Angina pectoris class II    Aortic stenosis TAVR #29 Medtronic core valve 2017    Asthma     BPH (benign prostatic hyperplasia)     CAD (coronary artery disease) S/P Stenting x 12 last stent 3/2019    CAD (coronary artery disease) CABG LIMA LAD SVG OM1, PCI PDA LM/LCX with PTCA    CAD (coronary artery disease) PPM, PCI LM and LCX, CABG LIMA OAD and OM1    Cancer, colon     Carotid artery disease RCEA    Cataract     Colon cancer     COPD, 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    Diabetes mellitus     MENDEZ (dyspnea on exertion)     DVT (deep venous thrombosis) left leg    Fatigue Profound w/exertion    Former smoker     Former smoker     GI bleed     H/O urinary frequency     Hematuria     History of BPH     Hyperlipidemia     Hypertension     MR (mitral regurgitation) moderate      FANNY (obstructive sleep apnea) uses mouth peice    Osteoarthritis     PVD (peripheral vascular disease) intervention RLE    PVD (peripheral vascular disease)     PVD (peripheral vascular disease) R SFA and L SFA w/stents and PTA    Schatzki's ring     SOB (shortness of breath)     Spinal stenosis     Statin intolerance     Thrombus " i have a small clot in the linning of my heart" as per pt     PAST MEDICAL HISTORY:  Abdominal hernia     Abnormal positron emission tomography (PET) scan 2019 anterior, anteroseptal,anterolateral and iferolateral ischemia    Afib     Age-related incipient cataract of both eyes     Angina pectoris class II    Aortic stenosis TAVR #29 Medtronic core valve 2017    Asthma     BPH (benign prostatic hyperplasia)     CAD (coronary artery disease) S/P Stenting x 12 last stent 3/2019    CAD (coronary artery disease) CABG LIMA LAD SVG OM1, PCI PDA LM/LCX with PTCA    CAD (coronary artery disease) PPM, PCI LM and LCX, CABG LIMA OAD and OM1    Cancer, colon     Carotid artery disease RCEA    Cataract     Colon cancer     COPD, 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    Diabetes mellitus     MENDEZ (dyspnea on exertion)     DVT (deep venous thrombosis) left leg    Fatigue Profound w/exertion    Former smoker     Former smoker     GI bleed     H/O urinary frequency     Hematuria     History of BPH     Hyperlipidemia     Hypertension     MR (mitral regurgitation) moderate      FANNY (obstructive sleep apnea) uses mouth peice    Osteoarthritis     PVD (peripheral vascular disease) intervention RLE    PVD (peripheral vascular disease)     PVD (peripheral vascular disease) R SFA and L SFA w/stents and PTA    Schatzki's ring     SOB (shortness of breath)     Spinal stenosis     Statin intolerance     Thrombus " i have a small clot in the linning of my heart" as per pt
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
## 2020-12-11 NOTE — H&P PST ADULT - NS MD HP INPLANTS MED DEV
knee replacement ; cardiac stent and right leg stent/Automatic Implantable Cardioverter Defibrillator/Artificial joint

## 2020-12-11 NOTE — H&P PST ADULT - ASSESSMENT
88 year old male presents to PST for planned right hemicolectomy     Plan:  1. PST instructions given ; NPO status instructions to be given by ASU   2. Pt instructed to take following meds with sip of water : pantoprazole, plavix;  inhaler symbicort   3. Pt instructed to take routine evening medications unless indicated   4. Stop NSAIDS ( Aspirin Alev Motrin Mobic Diclofenac), herbal supplements , MVI , Vitamin fish oil 7 days prior to surgery  unless   directed by surgeon or cardiologist;   5. Medical Optimization  with Dr Burns and cardio Dr Mcgill  6. EZ wash instructions given   7. Labs EKG CXR as per surgeon request   8. Pt instructed to self quarantine   9. Covid Testing scheduled Pt notified and aware  10. Pt denies covid symptoms shortness of breath fever cough         CAPRINI SCORE [CLOT]    AGE RELATED RISK FACTORS                                                       MOBILITY RELATED FACTORS  [ ] Age 41-60 years                                            (1 Point)                  [ ] Bed rest                                                        (1 Point)  [ ] Age: 61-74 years                                           (2 Points)                 [ ] Plaster cast                                                   (2 Points)  [x ] Age= 75 years                                              (3 Points)                 [ ] Bed bound for more than 72 hours                 (2 Points)    DISEASE RELATED RISK FACTORS                                               GENDER SPECIFIC FACTORS  [ ] Edema in the lower extremities                       (1 Point)                  [ ] Pregnancy                                                     (1 Point)  [ ] Varicose veins                                               (1 Point)                  [ ] Post-partum < 6 weeks                                   (1 Point)             [x ] BMI > 25 Kg/m2                                            (1 Point)                  [ ] Hormonal therapy  or oral contraception          (1 Point)                 [ ] Sepsis (in the previous month)                        (1 Point)                  [ ] History of pregnancy complications                 (1 point)  [ ] Pneumonia or serious lung disease                                               [ ] Unexplained or recurrent                     (1 Point)           (in the previous month)                               (1 Point)  [ ] Abnormal pulmonary function test                     (1 Point)                 SURGERY RELATED RISK FACTORS  [ ] Acute myocardial infarction                              (1 Point)                 [ ]  Section                                             (1 Point)  [ ] Congestive heart failure (in the previous month)  (1 Point)               [ ] Minor surgery                                                  (1 Point)   [ ] Inflammatory bowel disease                             (1 Point)                 [ ] Arthroscopic surgery                                        (2 Points)  [ ] Central venous access                                      (2 Points)                [x ] General surgery lasting more than 45 minutes   (2 Points)       [ ] Stroke (in the previous month)                          (5 Points)               [ ] Elective arthroplasty                                         (5 Points)            (x ) presents and past malignancy                           (2 points)                                                                                                         HEMATOLOGY RELATED FACTORS                                                 TRAUMA RELATED RISK FACTORS  [x ] Prior episodes of VTE                                     (3 Points)                 [ ] Fracture of the hip, pelvis, or leg                       (5 Points)  [ ] Positive family history for VTE                         (3 Points)                 [ ] Acute spinal cord injury (in the previous month)  (5 Points)  [ ] Prothrombin 85764 A                                     (3 Points)                 [ ] Paralysis  (less than 1 month)                             (5 Points)  [ ] Factor V Leiden                                             (3 Points)                  [ ] Multiple Trauma within 1 month                        (5 Points)  [ ] Lupus anticoagulants                                     (3 Points)                                                           [ ] Anticardiolipin antibodies                               (3 Points)                                                       [ ] High homocysteine in the blood                      (3 Points)                                             [ ] Other congenital or acquired thrombophilia      (3 Points)                                                [ ] Heparin induced thrombocytopenia                  (3 Points)                                          Total Score [     11     ]

## 2020-12-11 NOTE — H&P PST ADULT - NSICDXPROBLEM_GEN_ALL_CORE_FT
PROBLEM DIAGNOSES  Problem: Need for prophylactic measure  Assessment and Plan: The Caprini score indicates that this patient is at high risk for a VTE event (score 6 or greater). Surgical patients in this group will benefit from both pharmacologic prophylaxis and intermittent compression devices.  The surgical team will determine the balance between VTE risk and bleeding risk, and other clinical considerations

## 2020-12-12 DIAGNOSIS — C18.4 MALIGNANT NEOPLASM OF TRANSVERSE COLON: ICD-10-CM

## 2020-12-12 DIAGNOSIS — C18.0 MALIGNANT NEOPLASM OF CECUM: ICD-10-CM

## 2020-12-14 ENCOUNTER — APPOINTMENT (OUTPATIENT)
Dept: INTERNAL MEDICINE | Facility: CLINIC | Age: 85
End: 2020-12-14
Payer: MEDICARE

## 2020-12-14 ENCOUNTER — OUTPATIENT (OUTPATIENT)
Dept: OUTPATIENT SERVICES | Facility: HOSPITAL | Age: 85
LOS: 1 days | Discharge: HOME | End: 2020-12-14

## 2020-12-14 VITALS
DIASTOLIC BLOOD PRESSURE: 80 MMHG | SYSTOLIC BLOOD PRESSURE: 125 MMHG | HEIGHT: 68 IN | TEMPERATURE: 97.2 F | WEIGHT: 170 LBS | HEART RATE: 88 BPM | OXYGEN SATURATION: 95 % | BODY MASS INDEX: 25.76 KG/M2 | RESPIRATION RATE: 18 BRPM

## 2020-12-14 DIAGNOSIS — Z98.89 OTHER SPECIFIED POSTPROCEDURAL STATES: Chronic | ICD-10-CM

## 2020-12-14 DIAGNOSIS — Z98.42 CATARACT EXTRACTION STATUS, LEFT EYE: Chronic | ICD-10-CM

## 2020-12-14 DIAGNOSIS — Z98.890 OTHER SPECIFIED POSTPROCEDURAL STATES: Chronic | ICD-10-CM

## 2020-12-14 DIAGNOSIS — Z95.3 PRESENCE OF XENOGENIC HEART VALVE: Chronic | ICD-10-CM

## 2020-12-14 DIAGNOSIS — Z90.49 ACQUIRED ABSENCE OF OTHER SPECIFIED PARTS OF DIGESTIVE TRACT: Chronic | ICD-10-CM

## 2020-12-14 DIAGNOSIS — Z96.60 PRESENCE OF UNSPECIFIED ORTHOPEDIC JOINT IMPLANT: Chronic | ICD-10-CM

## 2020-12-14 DIAGNOSIS — Z98.61 CORONARY ANGIOPLASTY STATUS: Chronic | ICD-10-CM

## 2020-12-14 DIAGNOSIS — Z95.810 PRESENCE OF AUTOMATIC (IMPLANTABLE) CARDIAC DEFIBRILLATOR: Chronic | ICD-10-CM

## 2020-12-14 DIAGNOSIS — Z98.41 CATARACT EXTRACTION STATUS, RIGHT EYE: Chronic | ICD-10-CM

## 2020-12-14 DIAGNOSIS — C18.9 MALIGNANT NEOPLASM OF COLON, UNSPECIFIED: ICD-10-CM

## 2020-12-14 DIAGNOSIS — Z95.1 PRESENCE OF AORTOCORONARY BYPASS GRAFT: Chronic | ICD-10-CM

## 2020-12-14 DIAGNOSIS — Z85.038 PERSONAL HISTORY OF OTHER MALIGNANT NEOPLASM OF LARGE INTESTINE: ICD-10-CM

## 2020-12-14 DIAGNOSIS — Z95.0 PRESENCE OF CARDIAC PACEMAKER: Chronic | ICD-10-CM

## 2020-12-14 DIAGNOSIS — K76.9 LIVER DISEASE, UNSPECIFIED: ICD-10-CM

## 2020-12-14 PROBLEM — I82.409 ACUTE EMBOLISM AND THROMBOSIS OF UNSPECIFIED DEEP VEINS OF UNSPECIFIED LOWER EXTREMITY: Chronic | Status: ACTIVE | Noted: 2020-12-11

## 2020-12-14 PROBLEM — K92.2 GASTROINTESTINAL HEMORRHAGE, UNSPECIFIED: Chronic | Status: ACTIVE | Noted: 2020-12-11

## 2020-12-14 PROBLEM — K46.9 UNSPECIFIED ABDOMINAL HERNIA WITHOUT OBSTRUCTION OR GANGRENE: Chronic | Status: ACTIVE | Noted: 2020-12-11

## 2020-12-14 PROBLEM — I82.90 ACUTE EMBOLISM AND THROMBOSIS OF UNSPECIFIED VEIN: Chronic | Status: ACTIVE | Noted: 2020-12-11

## 2020-12-14 PROBLEM — J44.9 CHRONIC OBSTRUCTIVE PULMONARY DISEASE, UNSPECIFIED: Chronic | Status: ACTIVE | Noted: 2019-05-28

## 2020-12-14 PROBLEM — K22.2 ESOPHAGEAL OBSTRUCTION: Chronic | Status: ACTIVE | Noted: 2020-12-11

## 2020-12-14 PROBLEM — M48.00 SPINAL STENOSIS, SITE UNSPECIFIED: Chronic | Status: ACTIVE | Noted: 2020-12-11

## 2020-12-14 PROCEDURE — 36415 COLL VENOUS BLD VENIPUNCTURE: CPT

## 2020-12-14 PROCEDURE — 99213 OFFICE O/P EST LOW 20 MIN: CPT | Mod: 25

## 2020-12-14 NOTE — PHYSICAL EXAM
[No Acute Distress] : no acute distress [Alert and Oriented x3] : oriented to person, place, and time [de-identified] : Cervical collar in place [de-identified] : Slow gait

## 2020-12-14 NOTE — ASSESSMENT
[FreeTextEntry1] : 88-year-old male with new diagnosis of cecal adenocarcinoma originally presented for medical clearance for right hemicolectomy but patient has canceled his surgery and would like to see oncology evaluation prior.\par i do not disagree with this.\par therefore referral given to see oncology.\par Recommended followup with pain management concerning his neck pain.\par check CMP and CBC\par Followup in 2 months

## 2020-12-14 NOTE — HISTORY OF PRESENT ILLNESS
[de-identified] : The patient presents with his wife and daughter originally scheduled for a preoperative medical evaluation prior to right colon hemicolectomy secondary to cecal cancer.\par \par The patient and family informed me that he is canceling his surgery and does not want to look into it and would like an oncology evaluation prior.\par He realizes he had colon cancer but is concerned that his overall cardiovascular health is not good and is concerned with postoperative complications.\par Therefore he would like the opinion of a medical oncologist first.\par \par The patient had no abdominal symptoms but only complaint is continued neck pain for which he was seen pain management and was given injections and has not as yet.

## 2020-12-14 NOTE — PHYSICAL EXAM
[No Acute Distress] : no acute distress [Alert and Oriented x3] : oriented to person, place, and time [de-identified] : Cervical collar in place [de-identified] : Slow gait

## 2020-12-14 NOTE — HISTORY OF PRESENT ILLNESS
[de-identified] : The patient presents with his wife and daughter originally scheduled for a preoperative medical evaluation prior to right colon hemicolectomy secondary to cecal cancer.\par \par The patient and family informed me that he is canceling his surgery and does not want to look into it and would like an oncology evaluation prior.\par He realizes he had colon cancer but is concerned that his overall cardiovascular health is not good and is concerned with postoperative complications.\par Therefore he would like the opinion of a medical oncologist first.\par \par The patient had no abdominal symptoms but only complaint is continued neck pain for which he was seen pain management and was given injections and has not as yet.

## 2020-12-14 NOTE — DATA REVIEWED
[FreeTextEntry1] : Preop blood work shows hemoglobin slightly less at 10.4\par BNP = WNL except anion gap at 2

## 2020-12-15 ENCOUNTER — APPOINTMENT (OUTPATIENT)
Dept: INTERNAL MEDICINE | Facility: CLINIC | Age: 85
End: 2020-12-15

## 2020-12-15 LAB
ANION GAP SERPL CALC-SCNC: 11 MMOL/L
BASOPHILS # BLD AUTO: 0.03 K/UL
BASOPHILS NFR BLD AUTO: 0.4 %
BUN SERPL-MCNC: 24 MG/DL
CALCIUM SERPL-MCNC: 9.5 MG/DL
CHLORIDE SERPL-SCNC: 101 MMOL/L
CO2 SERPL-SCNC: 27 MMOL/L
CREAT SERPL-MCNC: 1.19 MG/DL
EOSINOPHIL # BLD AUTO: 0.09 K/UL
EOSINOPHIL NFR BLD AUTO: 1.2 %
GLUCOSE SERPL-MCNC: 229 MG/DL
HCT VFR BLD CALC: 33.6 %
HGB BLD-MCNC: 10.3 G/DL
IMM GRANULOCYTES NFR BLD AUTO: 0.3 %
LYMPHOCYTES # BLD AUTO: 1.61 K/UL
LYMPHOCYTES NFR BLD AUTO: 20.9 %
MAN DIFF?: NORMAL
MCHC RBC-ENTMCNC: 28.8 PG
MCHC RBC-ENTMCNC: 30.7 GM/DL
MCV RBC AUTO: 93.9 FL
MONOCYTES # BLD AUTO: 0.57 K/UL
MONOCYTES NFR BLD AUTO: 7.4 %
NEUTROPHILS # BLD AUTO: 5.37 K/UL
NEUTROPHILS NFR BLD AUTO: 69.8 %
PLATELET # BLD AUTO: 246 K/UL
POTASSIUM SERPL-SCNC: 4.2 MMOL/L
RBC # BLD: 3.58 M/UL
RBC # FLD: 14.3 %
SODIUM SERPL-SCNC: 139 MMOL/L
WBC # FLD AUTO: 7.69 K/UL

## 2020-12-17 ENCOUNTER — APPOINTMENT (OUTPATIENT)
Dept: HEMATOLOGY ONCOLOGY | Facility: CLINIC | Age: 85
End: 2020-12-17
Payer: MEDICARE

## 2020-12-17 ENCOUNTER — APPOINTMENT (OUTPATIENT)
Dept: NUCLEAR MEDICINE | Facility: CLINIC | Age: 85
End: 2020-12-17

## 2020-12-17 LAB — SURGICAL PATHOLOGY STUDY: SIGNIFICANT CHANGE UP

## 2020-12-17 PROCEDURE — 99215 OFFICE O/P EST HI 40 MIN: CPT | Mod: 95
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
## 2020-12-17 NOTE — CONSULT LETTER
[Dear  ___] : Dear  [unfilled], [Consult Letter:] : I had the pleasure of evaluating your patient, [unfilled]. [Please see my note below.] : Please see my note below. [Consult Closing:] : Thank you very much for allowing me to participate in the care of this patient.  If you have any questions, please do not hesitate to contact me. [Sincerely,] : Sincerely, [DrCheli  ___] : Dr. BLANCHARD [DrCheli ___] : Dr. BLANCHARD [FreeTextEntry3] : Dr. Laura Zamora

## 2020-12-17 NOTE — RESULTS/DATA
[FreeTextEntry1] : 12/9/20 CT C/A/P:\par Focal masslike thickening at the base of the cecum measuring 2.9 x 1.6 cm likely correlating to known malignancy. Focal soft tissue density in the mid transverse colon measuring 3.1 x 2.5 cm likely correlating to the known malignancy.  Indeterminate right hepatic lesion. No other evidence of metastatic disease in the chest, abdomen or pelvis.\par \par 12/7/20 Pathology:\par Cecal mass, biopsy: Fragments of adenocarcinoma.\par Transverse colon mass, biopsy:  Fragments of tubular adenoma with high-grade glandular dysplasia.\par Transverse colon polyp, biopsy:  Tubular adenoma.\par Sigmoid colon polyp, biopsy:  Tubulovillous adenoma.

## 2020-12-18 ENCOUNTER — APPOINTMENT (OUTPATIENT)
Dept: ULTRASOUND IMAGING | Facility: CLINIC | Age: 85
End: 2020-12-18
Payer: MEDICARE

## 2020-12-18 ENCOUNTER — OUTPATIENT (OUTPATIENT)
Dept: OUTPATIENT SERVICES | Facility: HOSPITAL | Age: 85
LOS: 1 days | End: 2020-12-18

## 2020-12-18 DIAGNOSIS — Z98.890 OTHER SPECIFIED POSTPROCEDURAL STATES: Chronic | ICD-10-CM

## 2020-12-18 DIAGNOSIS — Z98.42 CATARACT EXTRACTION STATUS, LEFT EYE: Chronic | ICD-10-CM

## 2020-12-18 DIAGNOSIS — Z90.49 ACQUIRED ABSENCE OF OTHER SPECIFIED PARTS OF DIGESTIVE TRACT: Chronic | ICD-10-CM

## 2020-12-18 DIAGNOSIS — Z96.60 PRESENCE OF UNSPECIFIED ORTHOPEDIC JOINT IMPLANT: Chronic | ICD-10-CM

## 2020-12-18 DIAGNOSIS — Z98.89 OTHER SPECIFIED POSTPROCEDURAL STATES: Chronic | ICD-10-CM

## 2020-12-18 DIAGNOSIS — Z95.0 PRESENCE OF CARDIAC PACEMAKER: Chronic | ICD-10-CM

## 2020-12-18 DIAGNOSIS — Z95.3 PRESENCE OF XENOGENIC HEART VALVE: Chronic | ICD-10-CM

## 2020-12-18 DIAGNOSIS — K76.9 LIVER DISEASE, UNSPECIFIED: ICD-10-CM

## 2020-12-18 DIAGNOSIS — Z95.1 PRESENCE OF AORTOCORONARY BYPASS GRAFT: Chronic | ICD-10-CM

## 2020-12-18 DIAGNOSIS — Z95.810 PRESENCE OF AUTOMATIC (IMPLANTABLE) CARDIAC DEFIBRILLATOR: Chronic | ICD-10-CM

## 2020-12-18 DIAGNOSIS — Z98.61 CORONARY ANGIOPLASTY STATUS: Chronic | ICD-10-CM

## 2020-12-18 DIAGNOSIS — Z98.41 CATARACT EXTRACTION STATUS, RIGHT EYE: Chronic | ICD-10-CM

## 2020-12-18 PROCEDURE — 76705 ECHO EXAM OF ABDOMEN: CPT | Mod: 26

## 2020-12-19 ENCOUNTER — TRANSCRIPTION ENCOUNTER (OUTPATIENT)
Age: 85
End: 2020-12-19

## 2020-12-21 ENCOUNTER — NON-APPOINTMENT (OUTPATIENT)
Age: 85
End: 2020-12-21

## 2020-12-22 ENCOUNTER — RESULT REVIEW (OUTPATIENT)
Age: 85
End: 2020-12-22

## 2020-12-22 ENCOUNTER — APPOINTMENT (OUTPATIENT)
Dept: NUCLEAR MEDICINE | Facility: CLINIC | Age: 85
End: 2020-12-22
Payer: MEDICARE

## 2020-12-22 ENCOUNTER — APPOINTMENT (OUTPATIENT)
Dept: NUCLEAR MEDICINE | Facility: CLINIC | Age: 85
End: 2020-12-22

## 2020-12-22 PROCEDURE — G1004: CPT

## 2020-12-22 PROCEDURE — A9552B: CUSTOM

## 2020-12-22 PROCEDURE — 78815 PET IMAGE W/CT SKULL-THIGH: CPT | Mod: PI,MG

## 2020-12-23 ENCOUNTER — APPOINTMENT (OUTPATIENT)
Dept: HEMATOLOGY ONCOLOGY | Facility: CLINIC | Age: 85
End: 2020-12-23
Payer: MEDICARE

## 2020-12-23 DIAGNOSIS — K59.01 SLOW TRANSIT CONSTIPATION: ICD-10-CM

## 2020-12-23 PROCEDURE — 99215 OFFICE O/P EST HI 40 MIN: CPT | Mod: 95
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
## 2020-12-28 NOTE — ASU PATIENT PROFILE, ADULT - ANESTHESIA, PREVIOUS REACTION, PROFILE
[FreeTextEntry1] : Documented by Lucía Enrique acting as a scribe for Dr. Brownlee on 12/23/2020 .\par \par All medical record entries made by the Scribe were at my, Dr. Brownlee, direction and personally dictated by me on 12/23/2020 . I have reviewed the chart and agree that the record accurately reflects my personal performance of the history, physical exam, assessment and plan. I have also personally directed, reviewed, and agree with the discharge instructions.\par  
none

## 2020-12-29 ENCOUNTER — NON-APPOINTMENT (OUTPATIENT)
Age: 85
End: 2020-12-29

## 2021-01-05 ENCOUNTER — NON-APPOINTMENT (OUTPATIENT)
Age: 86
End: 2021-01-05

## 2021-01-06 ENCOUNTER — NON-APPOINTMENT (OUTPATIENT)
Age: 86
End: 2021-01-06

## 2021-01-07 ENCOUNTER — APPOINTMENT (OUTPATIENT)
Dept: INTERNAL MEDICINE | Facility: CLINIC | Age: 86
End: 2021-01-07
Payer: MEDICARE

## 2021-01-07 VITALS
SYSTOLIC BLOOD PRESSURE: 125 MMHG | HEIGHT: 68 IN | OXYGEN SATURATION: 97 % | WEIGHT: 174 LBS | RESPIRATION RATE: 18 BRPM | DIASTOLIC BLOOD PRESSURE: 80 MMHG | TEMPERATURE: 97.1 F | HEART RATE: 88 BPM | BODY MASS INDEX: 26.37 KG/M2

## 2021-01-07 DIAGNOSIS — Z09 ENCOUNTER FOR FOLLOW-UP EXAMINATION AFTER COMPLETED TREATMENT FOR CONDITIONS OTHER THAN MALIGNANT NEOPLASM: ICD-10-CM

## 2021-01-07 DIAGNOSIS — J44.9 CHRONIC OBSTRUCTIVE PULMONARY DISEASE, UNSPECIFIED: ICD-10-CM

## 2021-01-07 DIAGNOSIS — Z87.898 PERSONAL HISTORY OF OTHER SPECIFIED CONDITIONS: ICD-10-CM

## 2021-01-07 DIAGNOSIS — K63.89 OTHER SPECIFIED DISEASES OF INTESTINE: ICD-10-CM

## 2021-01-07 DIAGNOSIS — Z91.81 HISTORY OF FALLING: ICD-10-CM

## 2021-01-07 DIAGNOSIS — Z86.010 PERSONAL HISTORY OF COLONIC POLYPS: ICD-10-CM

## 2021-01-07 DIAGNOSIS — M48.00 SPINAL STENOSIS, SITE UNSPECIFIED: ICD-10-CM

## 2021-01-07 DIAGNOSIS — I47.2 VENTRICULAR TACHYCARDIA: ICD-10-CM
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
## 2021-01-07 DIAGNOSIS — Z01.818 ENCOUNTER FOR OTHER PREPROCEDURAL EXAMINATION: ICD-10-CM

## 2021-01-07 DIAGNOSIS — M79.601 PAIN IN RIGHT ARM: ICD-10-CM

## 2021-01-07 DIAGNOSIS — Z87.09 PERSONAL HISTORY OF OTHER DISEASES OF THE RESPIRATORY SYSTEM: ICD-10-CM

## 2021-01-07 DIAGNOSIS — M79.641 PAIN IN RIGHT HAND: ICD-10-CM

## 2021-01-07 PROCEDURE — 99214 OFFICE O/P EST MOD 30 MIN: CPT

## 2021-01-08 PROBLEM — Z91.81 STATUS POST FALL: Status: RESOLVED | Noted: 2019-10-09 | Resolved: 2021-01-08

## 2021-01-08 PROBLEM — K63.89 MASS OF COLON: Status: RESOLVED | Noted: 2020-12-02 | Resolved: 2021-01-08
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
## 2021-01-08 PROBLEM — J44.9 COPD WITH CHRONIC BRONCHITIS: Status: RESOLVED | Noted: 2019-07-29 | Resolved: 2021-01-08

## 2021-01-08 PROBLEM — Z87.898 HISTORY OF POSTNASAL DRIP: Status: RESOLVED | Noted: 2017-04-12 | Resolved: 2021-01-08

## 2021-01-08 PROBLEM — I47.2 NSVT (NONSUSTAINED VENTRICULAR TACHYCARDIA): Status: ACTIVE | Noted: 2018-04-10

## 2021-01-08 PROBLEM — Z01.818 PRE-OP EVALUATION: Status: ACTIVE | Noted: 2021-01-08

## 2021-01-08 PROBLEM — M79.641 PAIN OF RIGHT HAND: Status: RESOLVED | Noted: 2019-10-09 | Resolved: 2021-01-08

## 2021-01-08 PROBLEM — J44.9 COPD WITH ASTHMA: Status: ACTIVE | Noted: 2020-02-06

## 2021-01-08 PROBLEM — M79.601 PAIN OF RIGHT UPPER EXTREMITY: Status: RESOLVED | Noted: 2019-09-04 | Resolved: 2021-01-08

## 2021-01-08 PROBLEM — Z09 HOSPITAL DISCHARGE FOLLOW-UP: Status: RESOLVED | Noted: 2020-12-02 | Resolved: 2021-01-08

## 2021-01-12 ENCOUNTER — OUTPATIENT (OUTPATIENT)
Dept: OUTPATIENT SERVICES | Facility: HOSPITAL | Age: 86
LOS: 1 days | End: 2021-01-12
Payer: MEDICARE

## 2021-01-12 VITALS
HEART RATE: 96 BPM | OXYGEN SATURATION: 100 % | SYSTOLIC BLOOD PRESSURE: 155 MMHG | HEIGHT: 67 IN | RESPIRATION RATE: 16 BRPM | TEMPERATURE: 98 F | DIASTOLIC BLOOD PRESSURE: 64 MMHG | WEIGHT: 177.03 LBS

## 2021-01-12 DIAGNOSIS — Z98.890 OTHER SPECIFIED POSTPROCEDURAL STATES: Chronic | ICD-10-CM

## 2021-01-12 DIAGNOSIS — Z95.1 PRESENCE OF AORTOCORONARY BYPASS GRAFT: Chronic | ICD-10-CM

## 2021-01-12 DIAGNOSIS — Z98.61 CORONARY ANGIOPLASTY STATUS: Chronic | ICD-10-CM

## 2021-01-12 DIAGNOSIS — Z98.42 CATARACT EXTRACTION STATUS, LEFT EYE: Chronic | ICD-10-CM

## 2021-01-12 DIAGNOSIS — Z98.89 OTHER SPECIFIED POSTPROCEDURAL STATES: Chronic | ICD-10-CM

## 2021-01-12 DIAGNOSIS — Z95.810 PRESENCE OF AUTOMATIC (IMPLANTABLE) CARDIAC DEFIBRILLATOR: Chronic | ICD-10-CM

## 2021-01-12 DIAGNOSIS — Z90.49 ACQUIRED ABSENCE OF OTHER SPECIFIED PARTS OF DIGESTIVE TRACT: Chronic | ICD-10-CM

## 2021-01-12 DIAGNOSIS — C18.4 MALIGNANT NEOPLASM OF TRANSVERSE COLON: ICD-10-CM

## 2021-01-12 DIAGNOSIS — Z95.0 PRESENCE OF CARDIAC PACEMAKER: Chronic | ICD-10-CM

## 2021-01-12 DIAGNOSIS — Z98.41 CATARACT EXTRACTION STATUS, RIGHT EYE: Chronic | ICD-10-CM

## 2021-01-12 DIAGNOSIS — Z95.3 PRESENCE OF XENOGENIC HEART VALVE: Chronic | ICD-10-CM

## 2021-01-12 DIAGNOSIS — Z96.60 PRESENCE OF UNSPECIFIED ORTHOPEDIC JOINT IMPLANT: Chronic | ICD-10-CM

## 2021-01-12 DIAGNOSIS — Z29.9 ENCOUNTER FOR PROPHYLACTIC MEASURES, UNSPECIFIED: ICD-10-CM

## 2021-01-12 DIAGNOSIS — Z01.818 ENCOUNTER FOR OTHER PREPROCEDURAL EXAMINATION: ICD-10-CM

## 2021-01-12 LAB
A1C WITH ESTIMATED AVERAGE GLUCOSE RESULT: 7.3 % — HIGH (ref 4–5.6)
ALBUMIN SERPL ELPH-MCNC: 3.2 G/DL — LOW (ref 3.3–5)
ALP SERPL-CCNC: 59 U/L — SIGNIFICANT CHANGE UP (ref 40–120)
ALT FLD-CCNC: 16 U/L — SIGNIFICANT CHANGE UP (ref 12–78)
ANION GAP SERPL CALC-SCNC: 3 MMOL/L — LOW (ref 5–17)
APTT BLD: 33.3 SEC — SIGNIFICANT CHANGE UP (ref 27.5–35.5)
AST SERPL-CCNC: 12 U/L — LOW (ref 15–37)
BASOPHILS # BLD AUTO: 0.02 K/UL — SIGNIFICANT CHANGE UP (ref 0–0.2)
BASOPHILS NFR BLD AUTO: 0.3 % — SIGNIFICANT CHANGE UP (ref 0–2)
BILIRUB DIRECT SERPL-MCNC: 0.1 MG/DL — SIGNIFICANT CHANGE UP (ref 0–0.2)
BILIRUB SERPL-MCNC: 0.3 MG/DL — SIGNIFICANT CHANGE UP (ref 0.2–1.2)
BUN SERPL-MCNC: 22 MG/DL — SIGNIFICANT CHANGE UP (ref 7–23)
CALCIUM SERPL-MCNC: 8.8 MG/DL — SIGNIFICANT CHANGE UP (ref 8.5–10.1)
CEA SERPL-MCNC: 3 NG/ML — SIGNIFICANT CHANGE UP (ref 0–3.8)
CHLORIDE SERPL-SCNC: 105 MMOL/L — SIGNIFICANT CHANGE UP (ref 96–108)
CO2 SERPL-SCNC: 31 MMOL/L — SIGNIFICANT CHANGE UP (ref 22–31)
CREAT SERPL-MCNC: 1.15 MG/DL — SIGNIFICANT CHANGE UP (ref 0.5–1.3)
EOSINOPHIL # BLD AUTO: 0.07 K/UL — SIGNIFICANT CHANGE UP (ref 0–0.5)
EOSINOPHIL NFR BLD AUTO: 1 % — SIGNIFICANT CHANGE UP (ref 0–6)
ESTIMATED AVERAGE GLUCOSE: 163 MG/DL — HIGH (ref 68–114)
GLUCOSE SERPL-MCNC: 182 MG/DL — HIGH (ref 70–99)
HCT VFR BLD CALC: 28.8 % — LOW (ref 39–50)
HGB BLD-MCNC: 8.9 G/DL — LOW (ref 13–17)
IMM GRANULOCYTES NFR BLD AUTO: 0.3 % — SIGNIFICANT CHANGE UP (ref 0–1.5)
INR BLD: 1.82 RATIO — HIGH (ref 0.88–1.16)
LYMPHOCYTES # BLD AUTO: 1.27 K/UL — SIGNIFICANT CHANGE UP (ref 1–3.3)
LYMPHOCYTES # BLD AUTO: 17.8 % — SIGNIFICANT CHANGE UP (ref 13–44)
MCHC RBC-ENTMCNC: 26.6 PG — LOW (ref 27–34)
MCHC RBC-ENTMCNC: 30.9 GM/DL — LOW (ref 32–36)
MCV RBC AUTO: 86.2 FL — SIGNIFICANT CHANGE UP (ref 80–100)
MONOCYTES # BLD AUTO: 0.7 K/UL — SIGNIFICANT CHANGE UP (ref 0–0.9)
MONOCYTES NFR BLD AUTO: 9.8 % — SIGNIFICANT CHANGE UP (ref 2–14)
NEUTROPHILS # BLD AUTO: 5.05 K/UL — SIGNIFICANT CHANGE UP (ref 1.8–7.4)
NEUTROPHILS NFR BLD AUTO: 70.8 % — SIGNIFICANT CHANGE UP (ref 43–77)
PLATELET # BLD AUTO: 171 K/UL — SIGNIFICANT CHANGE UP (ref 150–400)
POTASSIUM SERPL-MCNC: 4.3 MMOL/L — SIGNIFICANT CHANGE UP (ref 3.5–5.3)
POTASSIUM SERPL-SCNC: 4.3 MMOL/L — SIGNIFICANT CHANGE UP (ref 3.5–5.3)
PROT SERPL-MCNC: 7.2 GM/DL — SIGNIFICANT CHANGE UP (ref 6–8.3)
PROTHROM AB SERPL-ACNC: 20.5 SEC — HIGH (ref 10.6–13.6)
RBC # BLD: 3.34 M/UL — LOW (ref 4.2–5.8)
RBC # FLD: 15.2 % — HIGH (ref 10.3–14.5)
SODIUM SERPL-SCNC: 139 MMOL/L — SIGNIFICANT CHANGE UP (ref 135–145)
WBC # BLD: 7.13 K/UL — SIGNIFICANT CHANGE UP (ref 3.8–10.5)
WBC # FLD AUTO: 7.13 K/UL — SIGNIFICANT CHANGE UP (ref 3.8–10.5)

## 2021-01-12 PROCEDURE — G0463: CPT | Mod: 25

## 2021-01-12 PROCEDURE — 85730 THROMBOPLASTIN TIME PARTIAL: CPT

## 2021-01-12 PROCEDURE — 86901 BLOOD TYPING SEROLOGIC RH(D): CPT

## 2021-01-12 PROCEDURE — 36415 COLL VENOUS BLD VENIPUNCTURE: CPT

## 2021-01-12 PROCEDURE — 93005 ELECTROCARDIOGRAM TRACING: CPT

## 2021-01-12 PROCEDURE — 86900 BLOOD TYPING SEROLOGIC ABO: CPT

## 2021-01-12 PROCEDURE — 85025 COMPLETE CBC W/AUTO DIFF WBC: CPT

## 2021-01-12 PROCEDURE — 83036 HEMOGLOBIN GLYCOSYLATED A1C: CPT

## 2021-01-12 PROCEDURE — 82378 CARCINOEMBRYONIC ANTIGEN: CPT

## 2021-01-12 PROCEDURE — 85610 PROTHROMBIN TIME: CPT

## 2021-01-12 PROCEDURE — 86850 RBC ANTIBODY SCREEN: CPT

## 2021-01-12 PROCEDURE — 80053 COMPREHEN METABOLIC PANEL: CPT

## 2021-01-12 PROCEDURE — 82248 BILIRUBIN DIRECT: CPT

## 2021-01-12 PROCEDURE — 93010 ELECTROCARDIOGRAM REPORT: CPT

## 2021-01-12 RX ORDER — FUROSEMIDE 40 MG
0 TABLET ORAL
Qty: 0 | Refills: 0 | DISCHARGE

## 2021-01-12 RX ORDER — CLOPIDOGREL BISULFATE 75 MG/1
1 TABLET, FILM COATED ORAL
Qty: 0 | Refills: 0 | DISCHARGE

## 2021-01-12 NOTE — H&P PST ADULT - ASSESSMENT
88 years old female present to PST prior to open extended right leela colectomy with Dr. Collier.    Plan   1. NPO after midnight  2. Take the following medications with sips of water on the day of procedure: Use  Symbicort  3. Use E-Z sponge as directed  4. Drink a quart of extra  fluids the day before your surgery.  5. Medical optimization for surgery with Dr. Burns and cardiac evaluation Dr. Mcgill  6. CBC, BMP, HGB AIC, CEA, LFT, PT/ INR and PTT, Type and Screen sent to lab  8. EKG done in [facility]  9. Chest x- ray on chart from 2020    CAPRINI SCORE [CLOT]    AGE RELATED RISK FACTORS                                                       MOBILITY RELATED FACTORS  [ ] Age 41-60 years                                            (1 Point)                  [ ] Bed rest                                                        (1 Point)  [ ] Age: 61-74 years                                           (2 Points)                 [ ] Plaster cast                                                   (2 Points)  [ x  DISEASE RELATED RISK FACTORS                                               GENDER SPECIFIC FACTORS  [ ] Edema in the lower extremities                       (1 Point)                  [ ] Pregnancy                                                     (1 Point)  [ ] Varicose veins                                               (1 Point)                  [ ] Post-partum < 6 weeks                                   (1 Point)             [x ] BMI > 25 Kg/m2                                            (1 Point)                  [ ] Hormonal therapy  or oral contraception          (1 Point)                 [ ] Sepsis (in the previous month)                        (1 Point)                  [ ] History of pregnancy complications                 (1 point)  [ ] Pneumonia or serious lung disease                                               [ ] Unexplained or recurrent                     (1 Point)           (in the previous month)                               (1 Point)  [ ] Abnormal pulmonary function test                     (1 Point)                 SURGERY RELATED RISK FACTORS  [ ] Acute myocardial infarction                              (1 Point)                 [ ]  Section                                             (1 Point)  [ ] Congestive heart failure (in the previous month)  (1 Point)               [ ] Minor surgery                                                  (1 Point)   [ ] Inflammatory bowel disease                             (1 Point)                 [ ] Arthroscopic surgery                                        (2 Points)  [ ] Central venous access                                      (2 Points)                [ x] General surgery lasting more than 45 minutes   (2 Points)       [ ] Stroke (in the previous month)                          (5 Points)               [ ] Elective arthroplasty                                         (5 Points)            [ ] malignancy present or previous                      (2 points)                                                                                                                                   HEMATOLOGY RELATED FACTORS                                                 TRAUMA RELATED RISK FACTORS  [x ] Prior episodes of VTE                                     (3 Points)                 [ ] Fracture of the hip, pelvis, or leg                       (5 Points)  [ ] Positive family history for VTE                         (3 Points)                 [ ] Acute spinal cord injury (in the previous month)  (5 Points)  [ ] Prothrombin  A                                     (3 Points)                 [ ] Paralysis  (less than 1 month)                             (5 Points)  [ ] Factor V Leiden                                             (3 Points)                  [ ] Multiple Trauma within 1 month                        (5 Points)  [ ] Lupus anticoagulants                                     (3 Points)                                                           [ ] Anticardiolipin antibodies                               (3 Points)                                                       [ ] High homocysteine in the blood                      (3 Points)                                             [ ] Other congenital or acquired thrombophilia      (3 Points)                                                [ ] Heparin induced thrombocytopenia                  (3 Points)                                          Total Score [    9      ]
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
## 2021-01-12 NOTE — H&P PST ADULT - NSICDXPASTSURGICALHX_GEN_ALL_CORE_FT
PAST SURGICAL HISTORY:  H/O cystoscopy     History of CEA (carotid endarterectomy) Bilateral    History of permanent cardiac pacemaker placement [employer]    S/P appendectomy     S/P CABG x 2 LIMA LAD SVG OM1    S/P cataract surgery, left     S/P cataract surgery, right     S/P cholecystectomy     S/P cholecystectomy     S/P hernia repair     S/P ICD (internal cardiac defibrillator) procedure     S/P joint replacement Bilateral knee replacement    S/P nasal surgery Secondary to nasal fracture    S/P PTCA (percutaneous transluminal coronary angioplasty) x 9    S/P tonsillectomy     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 #29 Medtronic core valve

## 2021-01-12 NOTE — H&P PST ADULT - NSICDXPASTMEDICALHX_GEN_ALL_CORE_FT
PAST MEDICAL HISTORY:  Abdominal hernia     Abnormal positron emission tomography (PET) scan 2019 anterior, anteroseptal,anterolateral and iferolateral ischemia    Afib     Age-related incipient cataract of both eyes     Angina pectoris class II    Aortic stenosis TAVR #29 Medtronic core valve 2017    Asthma     BPH (benign prostatic hyperplasia)     CAD (coronary artery disease) S/P Stenting x 12 last stent 3/2019    CAD (coronary artery disease) CABG LIMA LAD SVG OM1, PCI PDA LM/LCX with PTCA    CAD (coronary artery disease) PPM, PCI LM and LCX, CABG LIMA OAD and OM1    Cancer, colon     Carotid artery disease RCEA    Cataract     Colon cancer     COPD, 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    Diabetes mellitus     MENDEZ (dyspnea on exertion)     DVT (deep venous thrombosis) left leg    Fatigue Profound w/exertion    Former smoker     Former smoker     GI bleed     H/O urinary frequency     Hematuria     History of BPH     Hyperlipidemia     Hypertension     MR (mitral regurgitation) moderate      FANNY (obstructive sleep apnea) uses mouth peice    Osteoarthritis     PVD (peripheral vascular disease) intervention RLE    PVD (peripheral vascular disease)     PVD (peripheral vascular disease) R SFA and L SFA w/stents and PTA    Schatzki's ring     SOB (shortness of breath)     Spinal stenosis     Statin intolerance     Thrombus " i have a small clot in the linning of my heart" as per pt

## 2021-01-13 DIAGNOSIS — C18.4 MALIGNANT NEOPLASM OF TRANSVERSE COLON: ICD-10-CM

## 2021-01-13 DIAGNOSIS — Z01.818 ENCOUNTER FOR OTHER PREPROCEDURAL EXAMINATION: ICD-10-CM

## 2021-01-13 NOTE — ASSESSMENT
[Patient Optimized for Surgery] : Patient optimized for surgery [No Further Testing Recommended] : no further testing recommended [Modify anticoagulant treatment prior to procedure] : Modify anticoagulant treatment prior to procedure [As per surgery] : as per surgery [FreeTextEntry4] : 88-year-old male with extensive cardiovascular history with colon cancer requiring right hemicolectomy.\par Patient evaluated by cardiology with no absolute contraindication to planned surgery.\par Clinically the patient is currently stable with no evidence of cardiovascular decompensation. Therefore patient cleared with increased cardiovascular risks.\par \par slight decrease anion gap of the concern and likely secondary to low albumin\par \par  [FreeTextEntry5] : per cardiology stop Eliquis 3 days prior to OR.  Resume ASAP post op

## 2021-01-13 NOTE — PHYSICAL EXAM
[No Acute Distress] : no acute distress [No JVD] : no jugular venous distention [No Respiratory Distress] : no respiratory distress  [No Accessory Muscle Use] : no accessory muscle use [Clear to Auscultation] : lungs were clear to auscultation bilaterally [Normal Rate] : normal rate  [Regular Rhythm] : with a regular rhythm [No Carotid Bruits] : no carotid bruits [Soft] : abdomen soft [Non Tender] : non-tender [Non-distended] : non-distended [No Masses] : no abdominal mass palpated [No HSM] : no HSM [No Focal Deficits] : no focal deficits [Normal Affect] : the affect was normal [de-identified] : trace bilateral edema-stable [de-identified] : Slow gait

## 2021-01-13 NOTE — HISTORY OF PRESENT ILLNESS
[Atrial Fibrillation] : atrial fibrillation [Coronary Artery Disease] : coronary artery disease [No Adverse Anesthesia Reaction] : no adverse anesthesia reaction in self or family member [Chronic Anticoagulation] : chronic anticoagulation [Diabetes] : diabetes [(Patient denies any chest pain, claudication, dyspnea on exertion, orthopnea, palpitations or syncope)] : Patient denies any chest pain, claudication, dyspnea on exertion, orthopnea, palpitations or syncope [Moderate (4-6 METs)] : Moderate (4-6 METs) [Anticoagulants: _____] : Anticoagulants: [unfilled] [Spouse] : spouse [COPD] : COPD [Sleep Apnea] : sleep apnea [Aortic Stenosis] : no aortic stenosis [Recent Myocardial Infarction] : no recent myocardial infarction [Implantable Device/Pacemaker] : no implantable device/pacemaker [Chronic Kidney Disease] : no chronic kidney disease [FreeTextEntry1] : Right hemicolectomy [FreeTextEntry2] : January 20, 2021 [FreeTextEntry3] : Dr Collier [FreeTextEntry4] : 87-year-old male with extensive medical history with recently diagnosed colon cecal cancer presents for medical evaluation prior to right hemicolectomy.\par \par Medical history includes ASHD/ischemic cardiomyopathy with history of CABG in 2005 and multiple stents,, PVD with stenting to the left leg, type 2 diabetes, moderate aortic stenosis, hypertension and hyperlipidemia for which he is intolerant to statin therapy.\par \par The patient has had multiple significant cardiovascular issues including\par -August 2017 the patient had progressive aortic stenosis and had a TAVR with a permanent pacemaker\par -November 2017 had PCI of his right leg secondary to claudication and February 2018 hadn't of the left leg\par -April 2018 he had non-sustained VT with the addition of Toprol\par -June 2018 he had a cardiac catheterization with PCI to his left main and circumflex arteries.\par -May 2019 stenting to the left main and circumflex \par -abnormal cardiac PET scan July 2019 showing patent LIMA and SVG with moderate disease of the left main and circumflex.\par -Nonsustained VT therefore pacemaker upgraded to a pacemaker/ICD 2019\par -Atrial fibrillation diagnosed November 2020 therefore patient on Eliquis\par \par history also includes COPD which has been stable and sleep apnea.\par \par Het had PTCA to the left main and ostial branch February 2020.\par .\par From a cardiac perspective he is doing better without any chest pain and decreased shortness of breath.

## 2021-01-13 NOTE — RESULTS/DATA
[] : results reviewed [de-identified] : Normal other than hemoglobin= 8.9 [de-identified] : INR = 1.82 [de-identified] : WNL other than slightly decreased anion gap

## 2021-01-17 ENCOUNTER — APPOINTMENT (OUTPATIENT)
Dept: DISASTER EMERGENCY | Facility: CLINIC | Age: 86
End: 2021-01-17

## 2021-01-17 DIAGNOSIS — Z01.818 ENCOUNTER FOR OTHER PREPROCEDURAL EXAMINATION: ICD-10-CM

## 2021-01-19 LAB — SARS-COV-2 N GENE NPH QL NAA+PROBE: NOT DETECTED

## 2021-01-20 ENCOUNTER — INPATIENT (INPATIENT)
Facility: HOSPITAL | Age: 86
LOS: 6 days | Discharge: HOME HEALTH | DRG: 329 | End: 2021-01-27
Attending: COLON & RECTAL SURGERY | Admitting: COLON & RECTAL SURGERY
Payer: MEDICARE

## 2021-01-20 ENCOUNTER — APPOINTMENT (OUTPATIENT)
Dept: COLORECTAL SURGERY | Facility: HOSPITAL | Age: 86
End: 2021-01-20
Payer: MEDICARE

## 2021-01-20 ENCOUNTER — RESULT REVIEW (OUTPATIENT)
Age: 86
End: 2021-01-20

## 2021-01-20 VITALS
OXYGEN SATURATION: 99 % | HEART RATE: 100 BPM | HEIGHT: 67 IN | DIASTOLIC BLOOD PRESSURE: 66 MMHG | RESPIRATION RATE: 16 BRPM | SYSTOLIC BLOOD PRESSURE: 146 MMHG | TEMPERATURE: 97 F | WEIGHT: 177.03 LBS

## 2021-01-20 DIAGNOSIS — Z95.3 PRESENCE OF XENOGENIC HEART VALVE: Chronic | ICD-10-CM

## 2021-01-20 DIAGNOSIS — Z95.810 PRESENCE OF AUTOMATIC (IMPLANTABLE) CARDIAC DEFIBRILLATOR: Chronic | ICD-10-CM

## 2021-01-20 DIAGNOSIS — Z90.49 ACQUIRED ABSENCE OF OTHER SPECIFIED PARTS OF DIGESTIVE TRACT: Chronic | ICD-10-CM

## 2021-01-20 DIAGNOSIS — Z98.89 OTHER SPECIFIED POSTPROCEDURAL STATES: Chronic | ICD-10-CM

## 2021-01-20 DIAGNOSIS — Z98.890 OTHER SPECIFIED POSTPROCEDURAL STATES: Chronic | ICD-10-CM

## 2021-01-20 DIAGNOSIS — Z98.42 CATARACT EXTRACTION STATUS, LEFT EYE: Chronic | ICD-10-CM

## 2021-01-20 DIAGNOSIS — C18.4 MALIGNANT NEOPLASM OF TRANSVERSE COLON: ICD-10-CM

## 2021-01-20 DIAGNOSIS — Z96.60 PRESENCE OF UNSPECIFIED ORTHOPEDIC JOINT IMPLANT: Chronic | ICD-10-CM

## 2021-01-20 DIAGNOSIS — Z98.41 CATARACT EXTRACTION STATUS, RIGHT EYE: Chronic | ICD-10-CM

## 2021-01-20 DIAGNOSIS — Z98.61 CORONARY ANGIOPLASTY STATUS: Chronic | ICD-10-CM

## 2021-01-20 DIAGNOSIS — Z95.0 PRESENCE OF CARDIAC PACEMAKER: Chronic | ICD-10-CM

## 2021-01-20 DIAGNOSIS — Z95.1 PRESENCE OF AORTOCORONARY BYPASS GRAFT: Chronic | ICD-10-CM

## 2021-01-20 DIAGNOSIS — C18.0 MALIGNANT NEOPLASM OF CECUM: ICD-10-CM

## 2021-01-20 LAB
APTT BLD: 27.7 SEC — SIGNIFICANT CHANGE UP (ref 27.5–35.5)
BLD GP AB SCN SERPL QL: SIGNIFICANT CHANGE UP
HCT VFR BLD CALC: 29.7 % — LOW (ref 39–50)
HGB BLD-MCNC: 9.1 G/DL — LOW (ref 13–17)
INR BLD: 1.26 RATIO — HIGH (ref 0.88–1.16)
MCHC RBC-ENTMCNC: 25.9 PG — LOW (ref 27–34)
MCHC RBC-ENTMCNC: 30.6 GM/DL — LOW (ref 32–36)
MCV RBC AUTO: 84.4 FL — SIGNIFICANT CHANGE UP (ref 80–100)
PLATELET # BLD AUTO: 187 K/UL — SIGNIFICANT CHANGE UP (ref 150–400)
PROTHROM AB SERPL-ACNC: 14.5 SEC — HIGH (ref 10.6–13.6)
RBC # BLD: 3.52 M/UL — LOW (ref 4.2–5.8)
RBC # FLD: 15.6 % — HIGH (ref 10.3–14.5)
WBC # BLD: 7.69 K/UL — SIGNIFICANT CHANGE UP (ref 3.8–10.5)
WBC # FLD AUTO: 7.69 K/UL — SIGNIFICANT CHANGE UP (ref 3.8–10.5)

## 2021-01-20 PROCEDURE — 80048 BASIC METABOLIC PNL TOTAL CA: CPT

## 2021-01-20 PROCEDURE — 86921 COMPATIBILITY TEST INCUBATE: CPT

## 2021-01-20 PROCEDURE — 85027 COMPLETE CBC AUTOMATED: CPT

## 2021-01-20 PROCEDURE — 44213 LAP MOBIL SPLENIC FL ADD-ON: CPT | Mod: AS

## 2021-01-20 PROCEDURE — 86922 COMPATIBILITY TEST ANTIGLOB: CPT

## 2021-01-20 PROCEDURE — 88309 TISSUE EXAM BY PATHOLOGIST: CPT

## 2021-01-20 PROCEDURE — 82962 GLUCOSE BLOOD TEST: CPT

## 2021-01-20 PROCEDURE — 86900 BLOOD TYPING SEROLOGIC ABO: CPT

## 2021-01-20 PROCEDURE — 38570 LAPAROSCOPY LYMPH NODE BIOP: CPT | Mod: AS

## 2021-01-20 PROCEDURE — 85730 THROMBOPLASTIN TIME PARTIAL: CPT

## 2021-01-20 PROCEDURE — 97116 GAIT TRAINING THERAPY: CPT | Mod: GP

## 2021-01-20 PROCEDURE — 36430 TRANSFUSION BLD/BLD COMPNT: CPT

## 2021-01-20 PROCEDURE — 83735 ASSAY OF MAGNESIUM: CPT

## 2021-01-20 PROCEDURE — 49255 REMOVAL OF OMENTUM: CPT

## 2021-01-20 PROCEDURE — P9016: CPT

## 2021-01-20 PROCEDURE — 74019 RADEX ABDOMEN 2 VIEWS: CPT

## 2021-01-20 PROCEDURE — 93283 PRGRMG EVAL IMPLANTABLE DFB: CPT | Mod: 26

## 2021-01-20 PROCEDURE — 86901 BLOOD TYPING SEROLOGIC RH(D): CPT

## 2021-01-20 PROCEDURE — 84100 ASSAY OF PHOSPHORUS: CPT

## 2021-01-20 PROCEDURE — 44213 LAP MOBIL SPLENIC FL ADD-ON: CPT

## 2021-01-20 PROCEDURE — 86920 COMPATIBILITY TEST SPIN: CPT

## 2021-01-20 PROCEDURE — 85025 COMPLETE CBC W/AUTO DIFF WBC: CPT

## 2021-01-20 PROCEDURE — C1889: CPT

## 2021-01-20 PROCEDURE — 94640 AIRWAY INHALATION TREATMENT: CPT

## 2021-01-20 PROCEDURE — C9290: CPT

## 2021-01-20 PROCEDURE — 85610 PROTHROMBIN TIME: CPT

## 2021-01-20 PROCEDURE — 38570 LAPAROSCOPY LYMPH NODE BIOP: CPT

## 2021-01-20 PROCEDURE — 44204 LAPARO PARTIAL COLECTOMY: CPT

## 2021-01-20 PROCEDURE — 49255 REMOVAL OF OMENTUM: CPT | Mod: AS

## 2021-01-20 PROCEDURE — 44204 LAPARO PARTIAL COLECTOMY: CPT | Mod: AS

## 2021-01-20 PROCEDURE — 97163 PT EVAL HIGH COMPLEX 45 MIN: CPT | Mod: GP

## 2021-01-20 PROCEDURE — 36415 COLL VENOUS BLD VENIPUNCTURE: CPT

## 2021-01-20 PROCEDURE — 86850 RBC ANTIBODY SCREEN: CPT

## 2021-01-20 PROCEDURE — 88309 TISSUE EXAM BY PATHOLOGIST: CPT | Mod: 26

## 2021-01-20 RX ORDER — ACETAMINOPHEN 500 MG
1000 TABLET ORAL ONCE
Refills: 0 | Status: COMPLETED | OUTPATIENT
Start: 2021-01-20 | End: 2021-01-20

## 2021-01-20 RX ORDER — SODIUM CHLORIDE 9 MG/ML
1000 INJECTION, SOLUTION INTRAVENOUS
Refills: 0 | Status: DISCONTINUED | OUTPATIENT
Start: 2021-01-20 | End: 2021-01-20

## 2021-01-20 RX ORDER — ACETAMINOPHEN 500 MG
1000 TABLET ORAL EVERY 6 HOURS
Refills: 0 | Status: COMPLETED | OUTPATIENT
Start: 2021-01-20 | End: 2021-01-21

## 2021-01-20 RX ORDER — SODIUM CHLORIDE 9 MG/ML
3 INJECTION INTRAMUSCULAR; INTRAVENOUS; SUBCUTANEOUS EVERY 8 HOURS
Refills: 0 | Status: DISCONTINUED | OUTPATIENT
Start: 2021-01-20 | End: 2021-01-20

## 2021-01-20 RX ORDER — HYDROMORPHONE HYDROCHLORIDE 2 MG/ML
0.5 INJECTION INTRAMUSCULAR; INTRAVENOUS; SUBCUTANEOUS
Refills: 0 | Status: DISCONTINUED | OUTPATIENT
Start: 2021-01-20 | End: 2021-01-20

## 2021-01-20 RX ORDER — HEPARIN SODIUM 5000 [USP'U]/ML
5000 INJECTION INTRAVENOUS; SUBCUTANEOUS EVERY 12 HOURS
Refills: 0 | Status: DISCONTINUED | OUTPATIENT
Start: 2021-01-21 | End: 2021-01-22

## 2021-01-20 RX ORDER — DEXTROSE 50 % IN WATER 50 %
25 SYRINGE (ML) INTRAVENOUS ONCE
Refills: 0 | Status: DISCONTINUED | OUTPATIENT
Start: 2021-01-20 | End: 2021-01-27

## 2021-01-20 RX ORDER — OXYCODONE HYDROCHLORIDE 5 MG/1
5 TABLET ORAL EVERY 4 HOURS
Refills: 0 | Status: DISCONTINUED | OUTPATIENT
Start: 2021-01-20 | End: 2021-01-27

## 2021-01-20 RX ORDER — GLUCAGON INJECTION, SOLUTION 0.5 MG/.1ML
1 INJECTION, SOLUTION SUBCUTANEOUS ONCE
Refills: 0 | Status: DISCONTINUED | OUTPATIENT
Start: 2021-01-20 | End: 2021-01-27

## 2021-01-20 RX ORDER — OXYCODONE HYDROCHLORIDE 5 MG/1
10 TABLET ORAL EVERY 4 HOURS
Refills: 0 | Status: DISCONTINUED | OUTPATIENT
Start: 2021-01-20 | End: 2021-01-23

## 2021-01-20 RX ORDER — SODIUM CHLORIDE 9 MG/ML
1000 INJECTION, SOLUTION INTRAVENOUS
Refills: 0 | Status: DISCONTINUED | OUTPATIENT
Start: 2021-01-20 | End: 2021-01-27

## 2021-01-20 RX ORDER — DEXTROSE 50 % IN WATER 50 %
15 SYRINGE (ML) INTRAVENOUS ONCE
Refills: 0 | Status: DISCONTINUED | OUTPATIENT
Start: 2021-01-20 | End: 2021-01-27

## 2021-01-20 RX ORDER — HEPARIN SODIUM 5000 [USP'U]/ML
5000 INJECTION INTRAVENOUS; SUBCUTANEOUS ONCE
Refills: 0 | Status: COMPLETED | OUTPATIENT
Start: 2021-01-20 | End: 2021-01-20

## 2021-01-20 RX ORDER — ALVIMOPAN 12 MG/1
12 CAPSULE ORAL ONCE
Refills: 0 | Status: COMPLETED | OUTPATIENT
Start: 2021-01-20 | End: 2021-01-20

## 2021-01-20 RX ORDER — BUDESONIDE AND FORMOTEROL FUMARATE DIHYDRATE 160; 4.5 UG/1; UG/1
2 AEROSOL RESPIRATORY (INHALATION)
Refills: 0 | Status: DISCONTINUED | OUTPATIENT
Start: 2021-01-20 | End: 2021-01-27

## 2021-01-20 RX ORDER — PROCHLORPERAZINE MALEATE 5 MG
10 TABLET ORAL ONCE
Refills: 0 | Status: COMPLETED | OUTPATIENT
Start: 2021-01-20 | End: 2021-01-21

## 2021-01-20 RX ORDER — DEXTROSE 50 % IN WATER 50 %
12.5 SYRINGE (ML) INTRAVENOUS ONCE
Refills: 0 | Status: DISCONTINUED | OUTPATIENT
Start: 2021-01-20 | End: 2021-01-27

## 2021-01-20 RX ORDER — FENTANYL CITRATE 50 UG/ML
50 INJECTION INTRAVENOUS
Refills: 0 | Status: DISCONTINUED | OUTPATIENT
Start: 2021-01-20 | End: 2021-01-20

## 2021-01-20 RX ORDER — ALFUZOSIN HYDROCHLORIDE 10 MG/1
10 TABLET, EXTENDED RELEASE ORAL DAILY
Refills: 0 | Status: DISCONTINUED | OUTPATIENT
Start: 2021-01-20 | End: 2021-01-27

## 2021-01-20 RX ORDER — ALVIMOPAN 12 MG/1
12 CAPSULE ORAL
Refills: 0 | Status: COMPLETED | OUTPATIENT
Start: 2021-01-20 | End: 2021-01-23

## 2021-01-20 RX ORDER — ONDANSETRON 8 MG/1
4 TABLET, FILM COATED ORAL EVERY 6 HOURS
Refills: 0 | Status: DISCONTINUED | OUTPATIENT
Start: 2021-01-20 | End: 2021-01-27

## 2021-01-20 RX ORDER — PANTOPRAZOLE SODIUM 20 MG/1
40 TABLET, DELAYED RELEASE ORAL
Refills: 0 | Status: DISCONTINUED | OUTPATIENT
Start: 2021-01-20 | End: 2021-01-27

## 2021-01-20 RX ORDER — OXYCODONE HYDROCHLORIDE 5 MG/1
5 TABLET ORAL ONCE
Refills: 0 | Status: DISCONTINUED | OUTPATIENT
Start: 2021-01-20 | End: 2021-01-20

## 2021-01-20 RX ORDER — ONDANSETRON 8 MG/1
4 TABLET, FILM COATED ORAL ONCE
Refills: 0 | Status: DISCONTINUED | OUTPATIENT
Start: 2021-01-20 | End: 2021-01-20

## 2021-01-20 RX ORDER — INSULIN LISPRO 100/ML
VIAL (ML) SUBCUTANEOUS
Refills: 0 | Status: DISCONTINUED | OUTPATIENT
Start: 2021-01-20 | End: 2021-01-27

## 2021-01-20 RX ORDER — ACETAMINOPHEN 500 MG
650 TABLET ORAL EVERY 6 HOURS
Refills: 0 | Status: DISCONTINUED | OUTPATIENT
Start: 2021-01-20 | End: 2021-01-27

## 2021-01-20 RX ORDER — LATANOPROST 0.05 MG/ML
1 SOLUTION/ DROPS OPHTHALMIC; TOPICAL AT BEDTIME
Refills: 0 | Status: DISCONTINUED | OUTPATIENT
Start: 2021-01-20 | End: 2021-01-27

## 2021-01-20 RX ORDER — CEFOTETAN DISODIUM 1 G
2 VIAL (EA) INJECTION EVERY 12 HOURS
Refills: 0 | Status: COMPLETED | OUTPATIENT
Start: 2021-01-21 | End: 2021-01-21

## 2021-01-20 RX ORDER — SODIUM CHLORIDE 9 MG/ML
1000 INJECTION, SOLUTION INTRAVENOUS
Refills: 0 | Status: DISCONTINUED | OUTPATIENT
Start: 2021-01-20 | End: 2021-01-23

## 2021-01-20 RX ADMIN — HEPARIN SODIUM 5000 UNIT(S): 5000 INJECTION INTRAVENOUS; SUBCUTANEOUS at 09:21

## 2021-01-20 RX ADMIN — Medication 0.5 MILLIGRAM(S): at 19:24

## 2021-01-20 RX ADMIN — Medication 2: at 15:55

## 2021-01-20 RX ADMIN — FENTANYL CITRATE 50 MICROGRAM(S): 50 INJECTION INTRAVENOUS at 14:35

## 2021-01-20 RX ADMIN — ALVIMOPAN 12 MILLIGRAM(S): 12 CAPSULE ORAL at 09:20

## 2021-01-20 RX ADMIN — Medication 400 MILLIGRAM(S): at 22:12

## 2021-01-20 RX ADMIN — FENTANYL CITRATE 50 MICROGRAM(S): 50 INJECTION INTRAVENOUS at 14:41

## 2021-01-20 RX ADMIN — Medication 400 MILLIGRAM(S): at 14:35

## 2021-01-20 NOTE — BRIEF OPERATIVE NOTE - NSICDXBRIEFPROCEDURE_GEN_ALL_CORE_FT
PROCEDURES:  Mobilization of hepatic flexure 20-Jan-2021 12:56:40  Carlos Tovar  Laparoscopic right hemicolectomy 20-Jan-2021 12:56:27  Carlos Tovar

## 2021-01-20 NOTE — BRIEF OPERATIVE NOTE - NSICDXBRIEFPREOP_GEN_ALL_CORE_FT
PRE-OP DIAGNOSIS:  Cancer of right colon 20-Jan-2021 12:56:52  Carlos Tovar  Colon polyp 20-Jan-2021 12:57:02  Carlos Tovar

## 2021-01-20 NOTE — PROCEDURE NOTE - INTERROGATION NOTE: COMMENTS
single episode of 17beat NSVT on 1/18/21 0853am, terminated spontaneously no ICD therapies delivered

## 2021-01-20 NOTE — PATIENT PROFILE ADULT - NSPROIMPLANTSMEDDEV_GEN_A_NUR
heart stents bilateral cataracts bilateral total knee replacement/Automatic Implantable Cardioverter Defibrillator

## 2021-01-21 DIAGNOSIS — C18.9 MALIGNANT NEOPLASM OF COLON, UNSPECIFIED: ICD-10-CM

## 2021-01-21 DIAGNOSIS — I48.91 UNSPECIFIED ATRIAL FIBRILLATION: ICD-10-CM

## 2021-01-21 DIAGNOSIS — I25.10 ATHEROSCLEROTIC HEART DISEASE OF NATIVE CORONARY ARTERY WITHOUT ANGINA PECTORIS: ICD-10-CM

## 2021-01-21 LAB
ANION GAP SERPL CALC-SCNC: 8 MMOL/L — SIGNIFICANT CHANGE UP (ref 5–17)
BASOPHILS # BLD AUTO: 0.02 K/UL — SIGNIFICANT CHANGE UP (ref 0–0.2)
BASOPHILS NFR BLD AUTO: 0.1 % — SIGNIFICANT CHANGE UP (ref 0–2)
BUN SERPL-MCNC: 23 MG/DL — SIGNIFICANT CHANGE UP (ref 7–23)
CALCIUM SERPL-MCNC: 8.7 MG/DL — SIGNIFICANT CHANGE UP (ref 8.5–10.1)
CHLORIDE SERPL-SCNC: 107 MMOL/L — SIGNIFICANT CHANGE UP (ref 96–108)
CO2 SERPL-SCNC: 23 MMOL/L — SIGNIFICANT CHANGE UP (ref 22–31)
CREAT SERPL-MCNC: 1.59 MG/DL — HIGH (ref 0.5–1.3)
EOSINOPHIL # BLD AUTO: 0 K/UL — SIGNIFICANT CHANGE UP (ref 0–0.5)
EOSINOPHIL NFR BLD AUTO: 0 % — SIGNIFICANT CHANGE UP (ref 0–6)
GLUCOSE SERPL-MCNC: 175 MG/DL — HIGH (ref 70–99)
HCT VFR BLD CALC: 31.8 % — LOW (ref 39–50)
HGB BLD-MCNC: 9.7 G/DL — LOW (ref 13–17)
IMM GRANULOCYTES NFR BLD AUTO: 0.5 % — SIGNIFICANT CHANGE UP (ref 0–1.5)
LYMPHOCYTES # BLD AUTO: 0.98 K/UL — LOW (ref 1–3.3)
LYMPHOCYTES # BLD AUTO: 6.1 % — LOW (ref 13–44)
MAGNESIUM SERPL-MCNC: 2 MG/DL — SIGNIFICANT CHANGE UP (ref 1.6–2.6)
MCHC RBC-ENTMCNC: 25.9 PG — LOW (ref 27–34)
MCHC RBC-ENTMCNC: 30.5 GM/DL — LOW (ref 32–36)
MCV RBC AUTO: 85 FL — SIGNIFICANT CHANGE UP (ref 80–100)
MONOCYTES # BLD AUTO: 1.48 K/UL — HIGH (ref 0–0.9)
MONOCYTES NFR BLD AUTO: 9.2 % — SIGNIFICANT CHANGE UP (ref 2–14)
NEUTROPHILS # BLD AUTO: 13.51 K/UL — HIGH (ref 1.8–7.4)
NEUTROPHILS NFR BLD AUTO: 84.1 % — HIGH (ref 43–77)
PHOSPHATE SERPL-MCNC: 3.8 MG/DL — SIGNIFICANT CHANGE UP (ref 2.5–4.5)
PLATELET # BLD AUTO: 186 K/UL — SIGNIFICANT CHANGE UP (ref 150–400)
POTASSIUM SERPL-MCNC: 4.4 MMOL/L — SIGNIFICANT CHANGE UP (ref 3.5–5.3)
POTASSIUM SERPL-SCNC: 4.4 MMOL/L — SIGNIFICANT CHANGE UP (ref 3.5–5.3)
RBC # BLD: 3.74 M/UL — LOW (ref 4.2–5.8)
RBC # FLD: 15.5 % — HIGH (ref 10.3–14.5)
SODIUM SERPL-SCNC: 138 MMOL/L — SIGNIFICANT CHANGE UP (ref 135–145)
WBC # BLD: 16.07 K/UL — HIGH (ref 3.8–10.5)
WBC # FLD AUTO: 16.07 K/UL — HIGH (ref 3.8–10.5)

## 2021-01-21 PROCEDURE — 99232 SBSQ HOSP IP/OBS MODERATE 35: CPT

## 2021-01-21 PROCEDURE — 99223 1ST HOSP IP/OBS HIGH 75: CPT

## 2021-01-21 RX ORDER — SODIUM CHLORIDE 9 MG/ML
1000 INJECTION INTRAMUSCULAR; INTRAVENOUS; SUBCUTANEOUS ONCE
Refills: 0 | Status: COMPLETED | OUTPATIENT
Start: 2021-01-21 | End: 2021-01-21

## 2021-01-21 RX ADMIN — Medication 400 MILLIGRAM(S): at 17:45

## 2021-01-21 RX ADMIN — Medication 1: at 17:42

## 2021-01-21 RX ADMIN — SODIUM CHLORIDE 1000 MILLILITER(S): 9 INJECTION INTRAMUSCULAR; INTRAVENOUS; SUBCUTANEOUS at 08:06

## 2021-01-21 RX ADMIN — HEPARIN SODIUM 5000 UNIT(S): 5000 INJECTION INTRAVENOUS; SUBCUTANEOUS at 22:52

## 2021-01-21 RX ADMIN — SODIUM CHLORIDE 100 MILLILITER(S): 9 INJECTION, SOLUTION INTRAVENOUS at 23:41

## 2021-01-21 RX ADMIN — HEPARIN SODIUM 5000 UNIT(S): 5000 INJECTION INTRAVENOUS; SUBCUTANEOUS at 08:50

## 2021-01-21 RX ADMIN — ALVIMOPAN 12 MILLIGRAM(S): 12 CAPSULE ORAL at 22:52

## 2021-01-21 RX ADMIN — Medication 1 TABLET(S): at 08:50

## 2021-01-21 RX ADMIN — Medication 1: at 12:14

## 2021-01-21 RX ADMIN — Medication 100 GRAM(S): at 15:25

## 2021-01-21 RX ADMIN — OXYCODONE HYDROCHLORIDE 10 MILLIGRAM(S): 5 TABLET ORAL at 23:47

## 2021-01-21 RX ADMIN — LATANOPROST 1 DROP(S): 0.05 SOLUTION/ DROPS OPHTHALMIC; TOPICAL at 22:52

## 2021-01-21 RX ADMIN — PANTOPRAZOLE SODIUM 40 MILLIGRAM(S): 20 TABLET, DELAYED RELEASE ORAL at 06:17

## 2021-01-21 RX ADMIN — Medication 1: at 08:48

## 2021-01-21 RX ADMIN — SODIUM CHLORIDE 100 MILLILITER(S): 9 INJECTION, SOLUTION INTRAVENOUS at 01:01

## 2021-01-21 RX ADMIN — Medication 400 MILLIGRAM(S): at 12:05

## 2021-01-21 RX ADMIN — ALFUZOSIN HYDROCHLORIDE 10 MILLIGRAM(S): 10 TABLET, EXTENDED RELEASE ORAL at 22:52

## 2021-01-21 RX ADMIN — Medication 400 MILLIGRAM(S): at 06:17

## 2021-01-21 RX ADMIN — Medication 10 MILLIGRAM(S): at 12:05

## 2021-01-21 RX ADMIN — Medication 100 GRAM(S): at 01:00

## 2021-01-21 RX ADMIN — OXYCODONE HYDROCHLORIDE 5 MILLIGRAM(S): 5 TABLET ORAL at 12:05

## 2021-01-21 RX ADMIN — ALVIMOPAN 12 MILLIGRAM(S): 12 CAPSULE ORAL at 08:49

## 2021-01-21 NOTE — CONSULT NOTE ADULT - SUBJECTIVE AND OBJECTIVE BOX
CHIEF COMPLAINT: Post-op incisional pain (controlled)    HPI: Eliezer Pedraza is an 88 year old man with multiple medical/cardiac problems who was admitted electively on 1/20/2021 for surgical management of colon cancer and underwent Laparoscopic right hemicolectomy.  He has a cardiac history significant for severe CAD, remote CABG, numerous coronary stents, aortic stenosis s/p TAVR, ICD, statin-intolerance, atrial fibrillation, and PAD treated with peripheral interventions.  He describes chronic, stable heart disease and presently feels well; no chest discomfort or dyspnea.  He sees his outpatient cardiologist, Dr Mcgill, regularly and was cleared for surgery earlier this month.    PAST MEDICAL & SURGICAL HISTORY:  Abdominal hernia  Cancer, colon  DVT (deep venous thrombosis) left leg  GI bleed  Spinal stenosis  Schatzki ring  Colon cancer  Afib  Statin intolerance  PVD (peripheral vascular disease) R SFA and L SFA w/stents and PTA  Former smoker  H/O urinary frequency  Hematuria  History of BPH  Cataract  COPD, mild  MR (mitral regurgitation)  CAD (coronary artery disease)  PPM, PCI LM and LCX, CABG LIMA OAD and OM1  Osteoarthritis  Age-related incipient cataract of both eyes  PVD (peripheral vascular disease) intervention RLE  CAD (coronary artery disease)  CABG LIMA LAD SVG OM1, PCI PDA LM/LCX with PTCA  Carotid artery disease RCEA  Former smoker  BPH (benign prostatic hyperplasia)  Angina pectoris  PVD (peripheral vascular disease)  FANNY (obstructive sleep apnea)  CAD (coronary artery disease) S/P Stenting x 12 last stent 3/2019  Asthma  Aortic stenosis TAVR #29 Medtronic core valve 2017  Diabetes mellitus  Hyperlipidemia  Hypertension  H/O cystoscopy  S/P cholecystectomy  S/P appendectomy  S/P ICD (internal cardiac defibrillator) procedure  S/P hernia repair  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 #29 Medtronic core valve  S/P CABG x 2 LIMA LAD SVG OM1  S/P PTCA (percutaneous transluminal coronary angioplasty)  S/P nasal surgery  Secondary to nasal fracture  S/P joint replacement Bilateral knee replacement  S/P cataract surgery, right  S/P cataract surgery, left  History of CEA (carotid endarterectomy) Bilateral  S/P cholecystectomy  S/P tonsillectomy    SOCIAL HISTORY:   Alcohol: No  Smoking: Former smoker  Marital Status:     FAMILY HISTORY:   Family history of stroke (Sibling)  Family history of acute myocardial infarction (Sibling)  Family history of depression (Sibling)    MEDICATIONS  (STANDING):  acetaminophen  IVPB .. 1000 milliGRAM(s) IV Intermittent every 6 hours  alfuzosin 10 milliGRAM(s) Oral daily  alvimopan 12 milliGRAM(s) Oral two times a day  budesonide 160 MICROgram(s)/formoterol 4.5 MICROgram(s) Inhaler 2 Puff(s) Inhalation two times a day  cefoTEtan  IVPB 2 Gram(s) IV Intermittent every 12 hours  glucagon  Injectable 1 milliGRAM(s) IntraMuscular once  heparin   Injectable 5000 Unit(s) SubCutaneous every 12 hours  insulin lispro (ADMELOG) corrective regimen sliding scale   SubCutaneous three times a day before meals  lactated ringers. 1000 milliLiter(s) (100 mL/Hr) IV Continuous <Continuous>  latanoprost 0.005% Ophthalmic Solution 1 Drop(s) Both EYES at bedtime  multivitamin 1 Tablet(s) Oral daily  pantoprazole    Tablet 40 milliGRAM(s) Oral before breakfast    MEDICATIONS  (PRN):  acetaminophen   Tablet .. 650 milliGRAM(s) Oral every 6 hours PRN Temp greater or equal to 38C (100.4F), Mild Pain (1 - 3)  ondansetron Injectable 4 milliGRAM(s) IV Push every 6 hours PRN Nausea and/or Vomiting  oxyCODONE    IR 5 milliGRAM(s) Oral every 4 hours PRN Moderate Pain (4 - 6)  oxyCODONE    IR 10 milliGRAM(s) Oral every 4 hours PRN Severe Pain (7 - 10)  prochlorperazine   Injectable 10 milliGRAM(s) IV Push once PRN nausea    Allergies:  adhesives (Rash)  statins (Muscle Pain)    REVIEW OF SYSTEMS:  CONSTITUTIONAL: No fevers or chills  Eyes: No visual changes  NECK: No pain or stiffness  RESPIRATORY: No shortness of breath  CARDIOVASCULAR: No chest pain or palpitations  GASTROINTESTINAL: Controlled post-op abdominal discomfort  GENITOURINARY: No dysuria  NEUROLOGICAL: No numbness.  SKIN: No itching or rash  All other review of systems is negative unless indicated above    VITAL SIGNS:   Vital Signs Last 24 Hrs  T(C): 36.6 (21 Jan 2021 05:00), Max: 37.4 (20 Jan 2021 13:40)  T(F): 97.9 (21 Jan 2021 05:00), Max: 99.4 (20 Jan 2021 13:40)  HR: 97 (21 Jan 2021 08:00) (92 - 114)  BP: 140/69 (21 Jan 2021 08:00) (132/54 - 160/71)  BP(mean): 87 (21 Jan 2021 08:00) (73 - 87)  RR: 15 (21 Jan 2021 08:00) (14 - 26)  SpO2: 100% (21 Jan 2021 08:00) (95% - 100%)    PHYSICAL EXAM:  Constitutional: NAD, awake and alert, seated in bedside chair  HEENT:   No oral cyanosis.  Pulmonary: Non-labored, breath sounds are clear bilaterally, No wheezing, rales or rhonchi  Cardiovascular: Irregular, normal S2, systolic murmur  Gastrointestinal: Bowel Sounds present, soft, nontender.   Lymph: No peripheral edema. No cervical lymphadenopathy.  Neurological: Alert, no focal deficits  Skin: No rashes.  Psych:  Mood & affect appropriate    LABS:                      9.7    16.07 )-----------( 186      ( 21 Jan 2021 06:13 )             31.8               138    |  107    |  23     ----------------------------<  175    4.4     |  23     |  1.59     Ca    8.7        21 Jan 2021 06:13  Phos  3.8       21 Jan 2021 06:13  Mg     2.0       21 Jan 2021 06:13    PT/INR - ( 20 Jan 2021 09:01 )   PT: 14.5 sec;   INR: 1.26 ratio    PTT - ( 20 Jan 2021 09:01 )  PTT:27.7 sec    Tele:  AF, vent pacing              
88 y.o. male with PMHx of Afib, LLE DVT, PVD, BPH, GIB, Spinal stenosis, CAD s/p CABG, PPM/AICD, AUGUSTINE s/p right CEA, Asthma, HTN, HLD, DMII, AS s/p TAVR s/p elective right hemicolectomy on 1/20/21 for cecal CA.  Pt c/o abdominal distention, "gaseous", mild nausea, ambulated with walker and PT  Other ROS reviewed and neg     PAST MEDICAL & SURGICAL HISTORY:  Abdominal hernia  Cancer, colon  DVT (deep venous thrombosis) left leg  GI bleed  Spinal stenosis  Schatzki ring  Colon cancer  Afib  Statin intolerance  PVD (peripheral vascular disease) R SFA and L SFA w/stents and PTA  Former smoker  H/O urinary frequency  Hematuria  History of BPH  Cataract  COPD, mild  MR (mitral regurgitation)  CAD (coronary artery disease)  PPM, PCI LM and LCX, CABG LIMA OAD and OM1  Osteoarthritis  Age-related incipient cataract of both eyes  PVD (peripheral vascular disease) intervention RLE  CAD (coronary artery disease)  CABG LIMA LAD SVG OM1, PCI PDA LM/LCX with PTCA  Carotid artery disease RCEA  Former smoker  BPH (benign prostatic hyperplasia)  Angina pectoris  PVD (peripheral vascular disease)  FANNY (obstructive sleep apnea)  CAD (coronary artery disease) S/P Stenting x 12 last stent 3/2019  Asthma  Aortic stenosis TAVR #29 Medtronic core valve 2017  Diabetes mellitus  Hyperlipidemia  Hypertension  H/O cystoscopy  S/P cholecystectomy  S/P appendectomy  S/P ICD (internal cardiac defibrillator) procedure  S/P hernia repair  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 #29 Medtronic core valve  S/P CABG x 2 LIMA LAD SVG OM1  S/P PTCA (percutaneous transluminal coronary angioplasty)  S/P nasal surgery  Secondary to nasal fracture  S/P joint replacement Bilateral knee replacement  S/P cataract surgery, right  S/P cataract surgery, left  History of CEA (carotid endarterectomy) Bilateral  S/P cholecystectomy  S/P tonsillectomy    SOCIAL HISTORY:   Alcohol: No  Smoking: Former smoker  Marital Status:     FAMILY HISTORY:   Family history of stroke (Sibling)  Family history of acute myocardial infarction (Sibling)  Family history of depression (Sibling)    MEDS: see MR  ALL: adhesives, statin (muscle pain)    Vital Signs Last 24 Hrs  T(C): 36.5 (21 Jan 2021 10:38), Max: 37.4 (20 Jan 2021 13:40)  T(F): 97.7 (21 Jan 2021 10:38), Max: 99.4 (20 Jan 2021 13:40)  HR: 103 (21 Jan 2021 12:00) (92 - 114)  BP: 160/75 (21 Jan 2021 12:00) (132/54 - 160/75)  BP(mean): 98 (21 Jan 2021 12:00) (73 - 98)  RR: 15 (21 Jan 2021 12:00) (14 - 26)  SpO2: 97% (21 Jan 2021 12:00) (95% - 100%)  I&O's Detail    20 Jan 2021 07:01  -  21 Jan 2021 07:00  --------------------------------------------------------  IN:    IV PiggyBack: 300 mL    Lactated Ringers: 900 mL    Lactated Ringers: 300 mL    Oral Fluid: 125 mL    Other (mL): 1300 mL    PRBCs (Packed Red Blood Cells): 308 mL  Total IN: 3233 mL    OUT:    Indwelling Catheter - Urethral (mL): 635 mL    Other (mL): 50 mL  Total OUT: 685 mL    Total NET: 2548 mL                                9.7    16.07 )-----------( 186      ( 21 Jan 2021 06:13 )             31.8     21 Jan 2021 06:13    138    |  107    |  23     ----------------------------<  175    4.4     |  23     |  1.59     Ca    8.7        21 Jan 2021 06:13  Phos  3.8       21 Jan 2021 06:13  Mg     2.0       21 Jan 2021 06:13      PT/INR - ( 20 Jan 2021 09:01 )   PT: 14.5 sec;   INR: 1.26 ratio         PTT - ( 20 Jan 2021 09:01 )  PTT:27.7 sec  CAPILLARY BLOOD GLUCOSE      POCT Blood Glucose.: 174 mg/dL (21 Jan 2021 12:12)  POCT Blood Glucose.: 152 mg/dL (21 Jan 2021 08:46)  POCT Blood Glucose.: 213 mg/dL (20 Jan 2021 21:24)  POCT Blood Glucose.: 209 mg/dL (20 Jan 2021 15:53)  POCT Blood Glucose.: 202 mg/dL (20 Jan 2021 13:43)    MEDICATIONS  (STANDING):  acetaminophen  IVPB .. 1000 milliGRAM(s) IV Intermittent every 6 hours  alfuzosin 10 milliGRAM(s) Oral daily  alvimopan 12 milliGRAM(s) Oral two times a day  budesonide 160 MICROgram(s)/formoterol 4.5 MICROgram(s) Inhaler 2 Puff(s) Inhalation two times a day  cefoTEtan  IVPB 2 Gram(s) IV Intermittent every 12 hours  dextrose 40% Gel 15 Gram(s) Oral once  dextrose 5%. 1000 milliLiter(s) (50 mL/Hr) IV Continuous <Continuous>  dextrose 5%. 1000 milliLiter(s) (100 mL/Hr) IV Continuous <Continuous>  dextrose 50% Injectable 25 Gram(s) IV Push once  dextrose 50% Injectable 12.5 Gram(s) IV Push once  dextrose 50% Injectable 25 Gram(s) IV Push once  glucagon  Injectable 1 milliGRAM(s) IntraMuscular once  heparin   Injectable 5000 Unit(s) SubCutaneous every 12 hours  insulin lispro (ADMELOG) corrective regimen sliding scale   SubCutaneous three times a day before meals  lactated ringers. 1000 milliLiter(s) (100 mL/Hr) IV Continuous <Continuous>  latanoprost 0.005% Ophthalmic Solution 1 Drop(s) Both EYES at bedtime  multivitamin 1 Tablet(s) Oral daily  pantoprazole    Tablet 40 milliGRAM(s) Oral before breakfast    MEDICATIONS  (PRN):  acetaminophen   Tablet .. 650 milliGRAM(s) Oral every 6 hours PRN Temp greater or equal to 38C (100.4F), Mild Pain (1 - 3)  ondansetron Injectable 4 milliGRAM(s) IV Push every 6 hours PRN Nausea and/or Vomiting  oxyCODONE    IR 5 milliGRAM(s) Oral every 4 hours PRN Moderate Pain (4 - 6)  oxyCODONE    IR 10 milliGRAM(s) Oral every 4 hours PRN Severe Pain (7 - 10)    PHYSICAL EXAM:    General: male in no acute distress  Eyes: PERRLA, EOMI; conjunctiva and sclera clear  Head: Normocephalic; atraumatic  ENMT: No nasal discharge; airway clear  Neck: Supple; non tender; no masses  Respiratory: No wheezes, rales or rhonchi  Cardiovascular: S1, S2 irreg with II/VI BEBETO with AICD in left upper chest  Gastrointestinal: Soft distended abdomen mildly tender on exam with + bowel sounds  Genitourinary: No costovertebral angle tenderness, FQ to gravity  Extremities: No clubbing, cyanosis or edema  Vascular: Peripheral pulses palpable 2+ bilaterally  Neurological: Alert and oriented x4  Skin: Warm and dry.   Musculoskeletal: Normal tone, without deformities  Psychiatric: Cooperative and appropriate

## 2021-01-21 NOTE — CONSULT NOTE ADULT - ASSESSMENT
88 y.o. male with PMHx of Afib, LLE DVT, PVD, BPH, GIB, Spinal stenosis, CAD s/p CABG, PPM/AICD, AUGUSTINE s/p right CEA, Asthma, HTN, HLD, DMII, AS s/p TAVR s/p elective right hemicolectomy on 1/20/21 for cecal CA.    1. Cecal CA s/p right laparoscopic hemicolectomy POD#1   - pain control, advance diet as per Sx, ambulate   - voiding trial   - GI proph with PPI   - VTE proph with UFH    2. [place] due to prerenal etiology - IVF hydration    3. DMII - HISS    4. CAD s/p CABG - resume home meds    5. Afib permanent - Vpaced, resume eliquis when cleared by surgery    6. BPH - resume alfusozin

## 2021-01-21 NOTE — CONSULT NOTE ADULT - PROBLEM SELECTOR RECOMMENDATION 3
Rate is satisfactorily controlled (given post-op state); resume Eliquis when feasible from a surgical standpoint.

## 2021-01-21 NOTE — CONSULT NOTE ADULT - PROBLEM SELECTOR RECOMMENDATION 2
No angina; history of severe CAD s/p revascularizations; no antiplt use prior to admission, likely due to anticoagulation (Eliquis)

## 2021-01-21 NOTE — PHYSICAL THERAPY INITIAL EVALUATION ADULT - MODALITIES TREATMENT COMMENTS
pt left seated in recliner post Eval; chair alarm donned; all above lines / monitors in place; callbell in reach; pt instructed not to get up alone; call nursing for assist; carlos well; denied pain

## 2021-01-22 LAB
ANION GAP SERPL CALC-SCNC: 7 MMOL/L — SIGNIFICANT CHANGE UP (ref 5–17)
BASOPHILS # BLD AUTO: 0.02 K/UL — SIGNIFICANT CHANGE UP (ref 0–0.2)
BASOPHILS NFR BLD AUTO: 0.1 % — SIGNIFICANT CHANGE UP (ref 0–2)
BUN SERPL-MCNC: 19 MG/DL — SIGNIFICANT CHANGE UP (ref 7–23)
CALCIUM SERPL-MCNC: 8.8 MG/DL — SIGNIFICANT CHANGE UP (ref 8.5–10.1)
CHLORIDE SERPL-SCNC: 106 MMOL/L — SIGNIFICANT CHANGE UP (ref 96–108)
CO2 SERPL-SCNC: 25 MMOL/L — SIGNIFICANT CHANGE UP (ref 22–31)
CREAT SERPL-MCNC: 1.11 MG/DL — SIGNIFICANT CHANGE UP (ref 0.5–1.3)
EOSINOPHIL # BLD AUTO: 0 K/UL — SIGNIFICANT CHANGE UP (ref 0–0.5)
EOSINOPHIL NFR BLD AUTO: 0 % — SIGNIFICANT CHANGE UP (ref 0–6)
GLUCOSE SERPL-MCNC: 215 MG/DL — HIGH (ref 70–99)
HCT VFR BLD CALC: 34.2 % — LOW (ref 39–50)
HGB BLD-MCNC: 10.3 G/DL — LOW (ref 13–17)
IMM GRANULOCYTES NFR BLD AUTO: 0.4 % — SIGNIFICANT CHANGE UP (ref 0–1.5)
LYMPHOCYTES # BLD AUTO: 0.78 K/UL — LOW (ref 1–3.3)
LYMPHOCYTES # BLD AUTO: 5.8 % — LOW (ref 13–44)
MAGNESIUM SERPL-MCNC: 1.9 MG/DL — SIGNIFICANT CHANGE UP (ref 1.6–2.6)
MCHC RBC-ENTMCNC: 25.4 PG — LOW (ref 27–34)
MCHC RBC-ENTMCNC: 30.1 GM/DL — LOW (ref 32–36)
MCV RBC AUTO: 84.4 FL — SIGNIFICANT CHANGE UP (ref 80–100)
MONOCYTES # BLD AUTO: 1.06 K/UL — HIGH (ref 0–0.9)
MONOCYTES NFR BLD AUTO: 7.9 % — SIGNIFICANT CHANGE UP (ref 2–14)
NEUTROPHILS # BLD AUTO: 11.57 K/UL — HIGH (ref 1.8–7.4)
NEUTROPHILS NFR BLD AUTO: 85.8 % — HIGH (ref 43–77)
PHOSPHATE SERPL-MCNC: 2.5 MG/DL — SIGNIFICANT CHANGE UP (ref 2.5–4.5)
PLATELET # BLD AUTO: 206 K/UL — SIGNIFICANT CHANGE UP (ref 150–400)
POTASSIUM SERPL-MCNC: 4 MMOL/L — SIGNIFICANT CHANGE UP (ref 3.5–5.3)
POTASSIUM SERPL-SCNC: 4 MMOL/L — SIGNIFICANT CHANGE UP (ref 3.5–5.3)
RBC # BLD: 4.05 M/UL — LOW (ref 4.2–5.8)
RBC # FLD: 15.7 % — HIGH (ref 10.3–14.5)
SODIUM SERPL-SCNC: 138 MMOL/L — SIGNIFICANT CHANGE UP (ref 135–145)
WBC # BLD: 13.49 K/UL — HIGH (ref 3.8–10.5)
WBC # FLD AUTO: 13.49 K/UL — HIGH (ref 3.8–10.5)

## 2021-01-22 PROCEDURE — 99232 SBSQ HOSP IP/OBS MODERATE 35: CPT

## 2021-01-22 PROCEDURE — 99233 SBSQ HOSP IP/OBS HIGH 50: CPT

## 2021-01-22 RX ORDER — APIXABAN 2.5 MG/1
5 TABLET, FILM COATED ORAL
Refills: 0 | Status: DISCONTINUED | OUTPATIENT
Start: 2021-01-22 | End: 2021-01-27

## 2021-01-22 RX ORDER — SIMETHICONE 80 MG/1
80 TABLET, CHEWABLE ORAL EVERY 8 HOURS
Refills: 0 | Status: DISCONTINUED | OUTPATIENT
Start: 2021-01-22 | End: 2021-01-27

## 2021-01-22 RX ORDER — METOPROLOL TARTRATE 50 MG
25 TABLET ORAL
Refills: 0 | Status: DISCONTINUED | OUTPATIENT
Start: 2021-01-22 | End: 2021-01-27

## 2021-01-22 RX ADMIN — APIXABAN 5 MILLIGRAM(S): 2.5 TABLET, FILM COATED ORAL at 21:47

## 2021-01-22 RX ADMIN — LATANOPROST 1 DROP(S): 0.05 SOLUTION/ DROPS OPHTHALMIC; TOPICAL at 21:48

## 2021-01-22 RX ADMIN — ALVIMOPAN 12 MILLIGRAM(S): 12 CAPSULE ORAL at 21:47

## 2021-01-22 RX ADMIN — Medication 2: at 17:02

## 2021-01-22 RX ADMIN — APIXABAN 5 MILLIGRAM(S): 2.5 TABLET, FILM COATED ORAL at 12:03

## 2021-01-22 RX ADMIN — OXYCODONE HYDROCHLORIDE 10 MILLIGRAM(S): 5 TABLET ORAL at 22:12

## 2021-01-22 RX ADMIN — Medication 25 MILLIGRAM(S): at 22:13

## 2021-01-22 RX ADMIN — ALVIMOPAN 12 MILLIGRAM(S): 12 CAPSULE ORAL at 09:29

## 2021-01-22 RX ADMIN — PANTOPRAZOLE SODIUM 40 MILLIGRAM(S): 20 TABLET, DELAYED RELEASE ORAL at 05:12

## 2021-01-22 RX ADMIN — Medication 1 TABLET(S): at 09:29

## 2021-01-22 RX ADMIN — Medication 2: at 12:34

## 2021-01-22 RX ADMIN — ONDANSETRON 4 MILLIGRAM(S): 8 TABLET, FILM COATED ORAL at 09:04

## 2021-01-22 RX ADMIN — SIMETHICONE 80 MILLIGRAM(S): 80 TABLET, CHEWABLE ORAL at 12:03

## 2021-01-22 RX ADMIN — Medication 2: at 06:58

## 2021-01-22 RX ADMIN — ALFUZOSIN HYDROCHLORIDE 10 MILLIGRAM(S): 10 TABLET, EXTENDED RELEASE ORAL at 21:47

## 2021-01-22 NOTE — PROGRESS NOTE ADULT - PROBLEM SELECTOR PLAN 1
POD # 2 - stable cardiac status but more abdominal discomfort this AM -- management as per primary service.

## 2021-01-22 NOTE — PROGRESS NOTE ADULT - PROBLEM SELECTOR PLAN 3
Mildly elevated HR -- no AVN agents prior to admission; tachycardia may be related to post-op state / discomfort; if persistent tachycardia will add low-dose metoprolol.

## 2021-01-23 LAB
ANION GAP SERPL CALC-SCNC: 4 MMOL/L — LOW (ref 5–17)
BASOPHILS # BLD AUTO: 0.02 K/UL — SIGNIFICANT CHANGE UP (ref 0–0.2)
BASOPHILS NFR BLD AUTO: 0.2 % — SIGNIFICANT CHANGE UP (ref 0–2)
BUN SERPL-MCNC: 16 MG/DL — SIGNIFICANT CHANGE UP (ref 7–23)
CALCIUM SERPL-MCNC: 8.4 MG/DL — LOW (ref 8.5–10.1)
CHLORIDE SERPL-SCNC: 104 MMOL/L — SIGNIFICANT CHANGE UP (ref 96–108)
CO2 SERPL-SCNC: 29 MMOL/L — SIGNIFICANT CHANGE UP (ref 22–31)
CREAT SERPL-MCNC: 0.95 MG/DL — SIGNIFICANT CHANGE UP (ref 0.5–1.3)
EOSINOPHIL # BLD AUTO: 0.03 K/UL — SIGNIFICANT CHANGE UP (ref 0–0.5)
EOSINOPHIL NFR BLD AUTO: 0.3 % — SIGNIFICANT CHANGE UP (ref 0–6)
GLUCOSE SERPL-MCNC: 198 MG/DL — HIGH (ref 70–99)
HCT VFR BLD CALC: 31.1 % — LOW (ref 39–50)
HGB BLD-MCNC: 9.6 G/DL — LOW (ref 13–17)
IMM GRANULOCYTES NFR BLD AUTO: 0.5 % — SIGNIFICANT CHANGE UP (ref 0–1.5)
LYMPHOCYTES # BLD AUTO: 0.84 K/UL — LOW (ref 1–3.3)
LYMPHOCYTES # BLD AUTO: 7.9 % — LOW (ref 13–44)
MAGNESIUM SERPL-MCNC: 1.9 MG/DL — SIGNIFICANT CHANGE UP (ref 1.6–2.6)
MCHC RBC-ENTMCNC: 26 PG — LOW (ref 27–34)
MCHC RBC-ENTMCNC: 30.9 GM/DL — LOW (ref 32–36)
MCV RBC AUTO: 84.3 FL — SIGNIFICANT CHANGE UP (ref 80–100)
MONOCYTES # BLD AUTO: 1.1 K/UL — HIGH (ref 0–0.9)
MONOCYTES NFR BLD AUTO: 10.3 % — SIGNIFICANT CHANGE UP (ref 2–14)
NEUTROPHILS # BLD AUTO: 8.66 K/UL — HIGH (ref 1.8–7.4)
NEUTROPHILS NFR BLD AUTO: 80.8 % — HIGH (ref 43–77)
PHOSPHATE SERPL-MCNC: 2.3 MG/DL — LOW (ref 2.5–4.5)
PLATELET # BLD AUTO: 183 K/UL — SIGNIFICANT CHANGE UP (ref 150–400)
POTASSIUM SERPL-MCNC: 4 MMOL/L — SIGNIFICANT CHANGE UP (ref 3.5–5.3)
POTASSIUM SERPL-SCNC: 4 MMOL/L — SIGNIFICANT CHANGE UP (ref 3.5–5.3)
RBC # BLD: 3.69 M/UL — LOW (ref 4.2–5.8)
RBC # FLD: 15.8 % — HIGH (ref 10.3–14.5)
SODIUM SERPL-SCNC: 137 MMOL/L — SIGNIFICANT CHANGE UP (ref 135–145)
WBC # BLD: 10.7 K/UL — HIGH (ref 3.8–10.5)
WBC # FLD AUTO: 10.7 K/UL — HIGH (ref 3.8–10.5)

## 2021-01-23 PROCEDURE — 99233 SBSQ HOSP IP/OBS HIGH 50: CPT

## 2021-01-23 RX ORDER — INSULIN GLARGINE 100 [IU]/ML
8 INJECTION, SOLUTION SUBCUTANEOUS AT BEDTIME
Refills: 0 | Status: DISCONTINUED | OUTPATIENT
Start: 2021-01-23 | End: 2021-01-27

## 2021-01-23 RX ORDER — ACETAMINOPHEN 500 MG
1000 TABLET ORAL ONCE
Refills: 0 | Status: COMPLETED | OUTPATIENT
Start: 2021-01-23 | End: 2021-01-23

## 2021-01-23 RX ADMIN — OXYCODONE HYDROCHLORIDE 10 MILLIGRAM(S): 5 TABLET ORAL at 03:13

## 2021-01-23 RX ADMIN — APIXABAN 5 MILLIGRAM(S): 2.5 TABLET, FILM COATED ORAL at 10:25

## 2021-01-23 RX ADMIN — Medication 2: at 08:17

## 2021-01-23 RX ADMIN — Medication 2: at 18:26

## 2021-01-23 RX ADMIN — ONDANSETRON 4 MILLIGRAM(S): 8 TABLET, FILM COATED ORAL at 03:13

## 2021-01-23 RX ADMIN — ONDANSETRON 4 MILLIGRAM(S): 8 TABLET, FILM COATED ORAL at 09:08

## 2021-01-23 RX ADMIN — INSULIN GLARGINE 8 UNIT(S): 100 INJECTION, SOLUTION SUBCUTANEOUS at 22:59

## 2021-01-23 RX ADMIN — BUDESONIDE AND FORMOTEROL FUMARATE DIHYDRATE 2 PUFF(S): 160; 4.5 AEROSOL RESPIRATORY (INHALATION) at 09:36

## 2021-01-23 RX ADMIN — PANTOPRAZOLE SODIUM 40 MILLIGRAM(S): 20 TABLET, DELAYED RELEASE ORAL at 06:40

## 2021-01-23 RX ADMIN — ALFUZOSIN HYDROCHLORIDE 10 MILLIGRAM(S): 10 TABLET, EXTENDED RELEASE ORAL at 22:34

## 2021-01-23 RX ADMIN — Medication 25 MILLIGRAM(S): at 10:24

## 2021-01-23 RX ADMIN — Medication 25 MILLIGRAM(S): at 22:34

## 2021-01-23 RX ADMIN — Medication 1 TABLET(S): at 10:24

## 2021-01-23 RX ADMIN — Medication 400 MILLIGRAM(S): at 22:58

## 2021-01-23 RX ADMIN — SIMETHICONE 80 MILLIGRAM(S): 80 TABLET, CHEWABLE ORAL at 06:40

## 2021-01-23 RX ADMIN — APIXABAN 5 MILLIGRAM(S): 2.5 TABLET, FILM COATED ORAL at 22:34

## 2021-01-23 RX ADMIN — Medication 3: at 14:09

## 2021-01-23 RX ADMIN — LATANOPROST 1 DROP(S): 0.05 SOLUTION/ DROPS OPHTHALMIC; TOPICAL at 22:34

## 2021-01-23 RX ADMIN — BUDESONIDE AND FORMOTEROL FUMARATE DIHYDRATE 2 PUFF(S): 160; 4.5 AEROSOL RESPIRATORY (INHALATION) at 22:26

## 2021-01-23 RX ADMIN — ALVIMOPAN 12 MILLIGRAM(S): 12 CAPSULE ORAL at 10:26

## 2021-01-23 RX ADMIN — Medication 62.5 MILLIMOLE(S): at 10:50

## 2021-01-24 LAB
ANION GAP SERPL CALC-SCNC: 3 MMOL/L — LOW (ref 5–17)
BUN SERPL-MCNC: 17 MG/DL — SIGNIFICANT CHANGE UP (ref 7–23)
CALCIUM SERPL-MCNC: 8.9 MG/DL — SIGNIFICANT CHANGE UP (ref 8.5–10.1)
CHLORIDE SERPL-SCNC: 102 MMOL/L — SIGNIFICANT CHANGE UP (ref 96–108)
CO2 SERPL-SCNC: 34 MMOL/L — HIGH (ref 22–31)
CREAT SERPL-MCNC: 1.08 MG/DL — SIGNIFICANT CHANGE UP (ref 0.5–1.3)
GLUCOSE SERPL-MCNC: 153 MG/DL — HIGH (ref 70–99)
HCT VFR BLD CALC: 31.1 % — LOW (ref 39–50)
HGB BLD-MCNC: 9.5 G/DL — LOW (ref 13–17)
MAGNESIUM SERPL-MCNC: 1.9 MG/DL — SIGNIFICANT CHANGE UP (ref 1.6–2.6)
MCHC RBC-ENTMCNC: 25.9 PG — LOW (ref 27–34)
MCHC RBC-ENTMCNC: 30.5 GM/DL — LOW (ref 32–36)
MCV RBC AUTO: 84.7 FL — SIGNIFICANT CHANGE UP (ref 80–100)
PHOSPHATE SERPL-MCNC: 3 MG/DL — SIGNIFICANT CHANGE UP (ref 2.5–4.5)
PLATELET # BLD AUTO: 189 K/UL — SIGNIFICANT CHANGE UP (ref 150–400)
POTASSIUM SERPL-MCNC: 3.7 MMOL/L — SIGNIFICANT CHANGE UP (ref 3.5–5.3)
POTASSIUM SERPL-SCNC: 3.7 MMOL/L — SIGNIFICANT CHANGE UP (ref 3.5–5.3)
RBC # BLD: 3.67 M/UL — LOW (ref 4.2–5.8)
RBC # FLD: 15.9 % — HIGH (ref 10.3–14.5)
SODIUM SERPL-SCNC: 139 MMOL/L — SIGNIFICANT CHANGE UP (ref 135–145)
WBC # BLD: 8.38 K/UL — SIGNIFICANT CHANGE UP (ref 3.8–10.5)
WBC # FLD AUTO: 8.38 K/UL — SIGNIFICANT CHANGE UP (ref 3.8–10.5)

## 2021-01-24 PROCEDURE — 99233 SBSQ HOSP IP/OBS HIGH 50: CPT

## 2021-01-24 RX ORDER — ACETAMINOPHEN 500 MG
1000 TABLET ORAL ONCE
Refills: 0 | Status: COMPLETED | OUTPATIENT
Start: 2021-01-24 | End: 2021-01-24

## 2021-01-24 RX ORDER — MULTIVIT WITH MIN/MFOLATE/K2 340-15/3 G
1 POWDER (GRAM) ORAL ONCE
Refills: 0 | Status: COMPLETED | OUTPATIENT
Start: 2021-01-24 | End: 2021-01-24

## 2021-01-24 RX ORDER — LANOLIN ALCOHOL/MO/W.PET/CERES
5 CREAM (GRAM) TOPICAL AT BEDTIME
Refills: 0 | Status: DISCONTINUED | OUTPATIENT
Start: 2021-01-24 | End: 2021-01-27

## 2021-01-24 RX ADMIN — LATANOPROST 1 DROP(S): 0.05 SOLUTION/ DROPS OPHTHALMIC; TOPICAL at 22:30

## 2021-01-24 RX ADMIN — ONDANSETRON 4 MILLIGRAM(S): 8 TABLET, FILM COATED ORAL at 12:33

## 2021-01-24 RX ADMIN — Medication 25 MILLIGRAM(S): at 09:58

## 2021-01-24 RX ADMIN — Medication 1 TABLET(S): at 09:58

## 2021-01-24 RX ADMIN — APIXABAN 5 MILLIGRAM(S): 2.5 TABLET, FILM COATED ORAL at 22:30

## 2021-01-24 RX ADMIN — SIMETHICONE 80 MILLIGRAM(S): 80 TABLET, CHEWABLE ORAL at 22:41

## 2021-01-24 RX ADMIN — APIXABAN 5 MILLIGRAM(S): 2.5 TABLET, FILM COATED ORAL at 09:59

## 2021-01-24 RX ADMIN — ALFUZOSIN HYDROCHLORIDE 10 MILLIGRAM(S): 10 TABLET, EXTENDED RELEASE ORAL at 22:30

## 2021-01-24 RX ADMIN — Medication 2: at 09:58

## 2021-01-24 RX ADMIN — INSULIN GLARGINE 8 UNIT(S): 100 INJECTION, SOLUTION SUBCUTANEOUS at 22:50

## 2021-01-24 RX ADMIN — Medication 400 MILLIGRAM(S): at 22:31

## 2021-01-24 RX ADMIN — PANTOPRAZOLE SODIUM 40 MILLIGRAM(S): 20 TABLET, DELAYED RELEASE ORAL at 05:52

## 2021-01-24 RX ADMIN — Medication 2: at 17:41

## 2021-01-24 RX ADMIN — Medication 25 MILLIGRAM(S): at 22:30

## 2021-01-24 RX ADMIN — Medication 1 BOTTLE: at 10:17

## 2021-01-25 LAB
ANION GAP SERPL CALC-SCNC: 5 MMOL/L — SIGNIFICANT CHANGE UP (ref 5–17)
BUN SERPL-MCNC: 21 MG/DL — SIGNIFICANT CHANGE UP (ref 7–23)
CALCIUM SERPL-MCNC: 8.9 MG/DL — SIGNIFICANT CHANGE UP (ref 8.5–10.1)
CHLORIDE SERPL-SCNC: 99 MMOL/L — SIGNIFICANT CHANGE UP (ref 96–108)
CO2 SERPL-SCNC: 34 MMOL/L — HIGH (ref 22–31)
CREAT SERPL-MCNC: 0.9 MG/DL — SIGNIFICANT CHANGE UP (ref 0.5–1.3)
GLUCOSE SERPL-MCNC: 166 MG/DL — HIGH (ref 70–99)
HCT VFR BLD CALC: 32.7 % — LOW (ref 39–50)
HGB BLD-MCNC: 10.3 G/DL — LOW (ref 13–17)
MAGNESIUM SERPL-MCNC: 2.6 MG/DL — SIGNIFICANT CHANGE UP (ref 1.6–2.6)
MCHC RBC-ENTMCNC: 26.3 PG — LOW (ref 27–34)
MCHC RBC-ENTMCNC: 31.5 GM/DL — LOW (ref 32–36)
MCV RBC AUTO: 83.4 FL — SIGNIFICANT CHANGE UP (ref 80–100)
PHOSPHATE SERPL-MCNC: 3.5 MG/DL — SIGNIFICANT CHANGE UP (ref 2.5–4.5)
PLATELET # BLD AUTO: 224 K/UL — SIGNIFICANT CHANGE UP (ref 150–400)
POTASSIUM SERPL-MCNC: 3.6 MMOL/L — SIGNIFICANT CHANGE UP (ref 3.5–5.3)
POTASSIUM SERPL-SCNC: 3.6 MMOL/L — SIGNIFICANT CHANGE UP (ref 3.5–5.3)
RBC # BLD: 3.92 M/UL — LOW (ref 4.2–5.8)
RBC # FLD: 16.1 % — HIGH (ref 10.3–14.5)
SODIUM SERPL-SCNC: 138 MMOL/L — SIGNIFICANT CHANGE UP (ref 135–145)
WBC # BLD: 10.33 K/UL — SIGNIFICANT CHANGE UP (ref 3.8–10.5)
WBC # FLD AUTO: 10.33 K/UL — SIGNIFICANT CHANGE UP (ref 3.8–10.5)

## 2021-01-25 PROCEDURE — 74019 RADEX ABDOMEN 2 VIEWS: CPT | Mod: 26

## 2021-01-25 PROCEDURE — 99233 SBSQ HOSP IP/OBS HIGH 50: CPT

## 2021-01-25 RX ORDER — ACETAMINOPHEN 500 MG
1000 TABLET ORAL ONCE
Refills: 0 | Status: COMPLETED | OUTPATIENT
Start: 2021-01-25 | End: 2021-01-25

## 2021-01-25 RX ADMIN — Medication 1: at 12:27

## 2021-01-25 RX ADMIN — PANTOPRAZOLE SODIUM 40 MILLIGRAM(S): 20 TABLET, DELAYED RELEASE ORAL at 06:22

## 2021-01-25 RX ADMIN — Medication 1: at 08:53

## 2021-01-25 RX ADMIN — SIMETHICONE 80 MILLIGRAM(S): 80 TABLET, CHEWABLE ORAL at 21:18

## 2021-01-25 RX ADMIN — LATANOPROST 1 DROP(S): 0.05 SOLUTION/ DROPS OPHTHALMIC; TOPICAL at 21:18

## 2021-01-25 RX ADMIN — BUDESONIDE AND FORMOTEROL FUMARATE DIHYDRATE 2 PUFF(S): 160; 4.5 AEROSOL RESPIRATORY (INHALATION) at 23:50

## 2021-01-25 RX ADMIN — BUDESONIDE AND FORMOTEROL FUMARATE DIHYDRATE 2 PUFF(S): 160; 4.5 AEROSOL RESPIRATORY (INHALATION) at 02:15

## 2021-01-25 RX ADMIN — Medication 25 MILLIGRAM(S): at 11:28

## 2021-01-25 RX ADMIN — APIXABAN 5 MILLIGRAM(S): 2.5 TABLET, FILM COATED ORAL at 21:17

## 2021-01-25 RX ADMIN — BUDESONIDE AND FORMOTEROL FUMARATE DIHYDRATE 2 PUFF(S): 160; 4.5 AEROSOL RESPIRATORY (INHALATION) at 11:24

## 2021-01-25 RX ADMIN — ONDANSETRON 4 MILLIGRAM(S): 8 TABLET, FILM COATED ORAL at 03:18

## 2021-01-25 RX ADMIN — Medication 25 MILLIGRAM(S): at 21:17

## 2021-01-25 RX ADMIN — Medication 400 MILLIGRAM(S): at 21:16

## 2021-01-25 RX ADMIN — ALFUZOSIN HYDROCHLORIDE 10 MILLIGRAM(S): 10 TABLET, EXTENDED RELEASE ORAL at 21:17

## 2021-01-25 RX ADMIN — Medication 1 TABLET(S): at 11:28

## 2021-01-25 RX ADMIN — INSULIN GLARGINE 8 UNIT(S): 100 INJECTION, SOLUTION SUBCUTANEOUS at 21:16

## 2021-01-25 RX ADMIN — ONDANSETRON 4 MILLIGRAM(S): 8 TABLET, FILM COATED ORAL at 09:09

## 2021-01-25 RX ADMIN — ONDANSETRON 4 MILLIGRAM(S): 8 TABLET, FILM COATED ORAL at 17:00

## 2021-01-25 RX ADMIN — APIXABAN 5 MILLIGRAM(S): 2.5 TABLET, FILM COATED ORAL at 11:28

## 2021-01-25 RX ADMIN — Medication 1: at 17:44

## 2021-01-25 RX ADMIN — Medication 5 MILLIGRAM(S): at 21:17

## 2021-01-26 ENCOUNTER — TRANSCRIPTION ENCOUNTER (OUTPATIENT)
Age: 86
End: 2021-01-26

## 2021-01-26 LAB
ANION GAP SERPL CALC-SCNC: 5 MMOL/L — SIGNIFICANT CHANGE UP (ref 5–17)
BUN SERPL-MCNC: 23 MG/DL — SIGNIFICANT CHANGE UP (ref 7–23)
CALCIUM SERPL-MCNC: 8.6 MG/DL — SIGNIFICANT CHANGE UP (ref 8.5–10.1)
CHLORIDE SERPL-SCNC: 104 MMOL/L — SIGNIFICANT CHANGE UP (ref 96–108)
CO2 SERPL-SCNC: 29 MMOL/L — SIGNIFICANT CHANGE UP (ref 22–31)
CREAT SERPL-MCNC: 1.02 MG/DL — SIGNIFICANT CHANGE UP (ref 0.5–1.3)
GLUCOSE SERPL-MCNC: 147 MG/DL — HIGH (ref 70–99)
HCT VFR BLD CALC: 30.1 % — LOW (ref 39–50)
HGB BLD-MCNC: 9.2 G/DL — LOW (ref 13–17)
MCHC RBC-ENTMCNC: 25.8 PG — LOW (ref 27–34)
MCHC RBC-ENTMCNC: 30.6 GM/DL — LOW (ref 32–36)
MCV RBC AUTO: 84.3 FL — SIGNIFICANT CHANGE UP (ref 80–100)
PLATELET # BLD AUTO: 193 K/UL — SIGNIFICANT CHANGE UP (ref 150–400)
POTASSIUM SERPL-MCNC: 3.7 MMOL/L — SIGNIFICANT CHANGE UP (ref 3.5–5.3)
POTASSIUM SERPL-SCNC: 3.7 MMOL/L — SIGNIFICANT CHANGE UP (ref 3.5–5.3)
RBC # BLD: 3.57 M/UL — LOW (ref 4.2–5.8)
RBC # FLD: 16.3 % — HIGH (ref 10.3–14.5)
SODIUM SERPL-SCNC: 138 MMOL/L — SIGNIFICANT CHANGE UP (ref 135–145)
WBC # BLD: 9.46 K/UL — SIGNIFICANT CHANGE UP (ref 3.8–10.5)
WBC # FLD AUTO: 9.46 K/UL — SIGNIFICANT CHANGE UP (ref 3.8–10.5)

## 2021-01-26 RX ORDER — ACETAMINOPHEN 500 MG
1000 TABLET ORAL ONCE
Refills: 0 | Status: COMPLETED | OUTPATIENT
Start: 2021-01-26 | End: 2021-01-26

## 2021-01-26 RX ORDER — ACETAMINOPHEN 500 MG
2 TABLET ORAL
Qty: 56 | Refills: 0
Start: 2021-01-26 | End: 2021-02-01

## 2021-01-26 RX ADMIN — APIXABAN 5 MILLIGRAM(S): 2.5 TABLET, FILM COATED ORAL at 10:37

## 2021-01-26 RX ADMIN — ALFUZOSIN HYDROCHLORIDE 10 MILLIGRAM(S): 10 TABLET, EXTENDED RELEASE ORAL at 21:12

## 2021-01-26 RX ADMIN — LATANOPROST 1 DROP(S): 0.05 SOLUTION/ DROPS OPHTHALMIC; TOPICAL at 21:13

## 2021-01-26 RX ADMIN — Medication 5 MILLIGRAM(S): at 21:16

## 2021-01-26 RX ADMIN — Medication 400 MILLIGRAM(S): at 21:14

## 2021-01-26 RX ADMIN — BUDESONIDE AND FORMOTEROL FUMARATE DIHYDRATE 2 PUFF(S): 160; 4.5 AEROSOL RESPIRATORY (INHALATION) at 21:02

## 2021-01-26 RX ADMIN — Medication 1: at 17:27

## 2021-01-26 RX ADMIN — Medication 2: at 12:41

## 2021-01-26 RX ADMIN — Medication 1 TABLET(S): at 10:37

## 2021-01-26 RX ADMIN — Medication 25 MILLIGRAM(S): at 10:37

## 2021-01-26 RX ADMIN — BUDESONIDE AND FORMOTEROL FUMARATE DIHYDRATE 2 PUFF(S): 160; 4.5 AEROSOL RESPIRATORY (INHALATION) at 08:36

## 2021-01-26 RX ADMIN — PANTOPRAZOLE SODIUM 40 MILLIGRAM(S): 20 TABLET, DELAYED RELEASE ORAL at 05:50

## 2021-01-26 RX ADMIN — APIXABAN 5 MILLIGRAM(S): 2.5 TABLET, FILM COATED ORAL at 21:12

## 2021-01-26 RX ADMIN — Medication 25 MILLIGRAM(S): at 21:12

## 2021-01-26 RX ADMIN — INSULIN GLARGINE 8 UNIT(S): 100 INJECTION, SOLUTION SUBCUTANEOUS at 21:12

## 2021-01-26 NOTE — DIETITIAN NUTRITION RISK NOTIFICATION - ADDITIONAL COMMENTS/DIETITIAN RECOMMENDATIONS
RECOMMENDATIONS:  1) encourage protein intake; encourage glucerna BID between meals to optimize PO intake   2) consider checking vitamin D and supplement prn   3) weekly wt checks to track/trend changes

## 2021-01-26 NOTE — DIETITIAN INITIAL EVALUATION ADULT. - PERTINENT LABORATORY DATA
01-26    138  |  104  |  23  ----------------------------<  147<H>  3.7   |  29  |  1.02    Ca    8.6      26 Jan 2021 06:02  Phos  3.5     01-25  Mg     2.6     01-25    BMI: BMI (kg/m2): 27.7 (01-20-21 @ 08:59)  HbA1c: A1C with Estimated Average Glucose Result: 7.3 % (01-12-21 @ 12:15)    Glucose: POCT Blood Glucose.: 247 mg/dL (01-26-21 @ 12:35)

## 2021-01-26 NOTE — DISCHARGE NOTE PROVIDER - NSDCMRMEDTOKEN_GEN_ALL_CORE_FT
acetaminophen 500 mg oral tablet: 2 tab(s) orally every 6 hours   alfuzosin 10 mg oral tablet, extended release: 1 tab(s) orally once a day (at bedtime)  apixaban 5 mg oral tablet: 1 tab(s) orally every 12 hours-stop 3 days prior to surgery as per cardio  furosemide: 1  orally once a day, As Needed for peripheral edema  latanoprost 0.005% ophthalmic solution: 1 drop(s) to each affected eye once a day (at bedtime)  metFORMIN 500 mg oral tablet, extended release: 1 tab(s) orally once a day-stop 24 hours prior to surgery  multivitamin: 1 tab(s) orally once a day  pantoprazole 40 mg oral delayed release tablet: 1 tab(s) orally once a day  Probiotic Formula oral capsule: 1 cap(s) orally once a day  repaglinide 1 mg oral tablet: 1 tab(s) orally 3 times a day (before meals)  Symbicort 160 mcg-4.5 mcg/inh inhalation aerosol: 2 puff(s) inhaled 2 times a day  Zetia 10 mg oral tablet: 1 tab(s) orally once a day   acetaminophen 500 mg oral tablet: 2 tab(s) orally every 6 hours   alfuzosin 10 mg oral tablet, extended release: 1 tab(s) orally once a day (at bedtime)  apixaban 5 mg oral tablet: 1 tab(s) orally every 12 hours-stop 3 days prior to surgery as per cardio  furosemide: 1  orally once a day, As Needed for peripheral edema  latanoprost 0.005% ophthalmic solution: 1 drop(s) to each affected eye once a day (at bedtime)  metFORMIN 500 mg oral tablet, extended release: 1 tab(s) orally once a day-stop 24 hours prior to surgery  multivitamin: 1 tab(s) orally once a day  oxycodone-acetaminophen 5 mg-325 mg oral tablet: 1 tab(s) orally every 6 hours, As Needed -for moderate pain [id number]  pantoprazole 40 mg oral delayed release tablet: 1 tab(s) orally once a day  Probiotic Formula oral capsule: 1 cap(s) orally once a day  repaglinide 1 mg oral tablet: 1 tab(s) orally 3 times a day (before meals)  Symbicort 160 mcg-4.5 mcg/inh inhalation aerosol: 2 puff(s) inhaled 2 times a day  Zetia 10 mg oral tablet: 1 tab(s) orally once a day

## 2021-01-26 NOTE — DISCHARGE NOTE PROVIDER - CARE PROVIDER_API CALL
Kem Collier)  ColonRectal Surgery; Surgery  [address] [place] [address]  Phone: [phone]  Fax: [phone]  Follow Up Time:

## 2021-01-26 NOTE — PROVIDER CONTACT NOTE (OTHER) - ACTION/TREATMENT ORDERED:
Left voicemail on the Hospitalist admit phone.
Gala Deleon surgical resident made aware, no new orders at this time.

## 2021-01-26 NOTE — DIETITIAN INITIAL EVALUATION ADULT. - MALNUTRITION
severe malnutrition in acute illness severe malnutrition in acute illness r/t altered GI function AEB meeting <50% of estimated nutr needs x 6 days; moderate muscle/fat wasting

## 2021-01-26 NOTE — DIETITIAN INITIAL EVALUATION ADULT. - ENERGY INTAKE
Poor (<50%) pt with minimal PO intake x 6 days; meeting <50% of estimated nutr needs.  Pt diet advanced to solid food today.

## 2021-01-26 NOTE — DIETITIAN INITIAL EVALUATION ADULT. - PERTINENT MEDS FT
MEDICATIONS  (STANDING):  alfuzosin 10 milliGRAM(s) Oral daily  apixaban 5 milliGRAM(s) Oral two times a day  budesonide 160 MICROgram(s)/formoterol 4.5 MICROgram(s) Inhaler 2 Puff(s) Inhalation two times a day  dextrose 40% Gel 15 Gram(s) Oral once  dextrose 5%. 1000 milliLiter(s) (50 mL/Hr) IV Continuous <Continuous>  dextrose 5%. 1000 milliLiter(s) (100 mL/Hr) IV Continuous <Continuous>  dextrose 50% Injectable 25 Gram(s) IV Push once  dextrose 50% Injectable 25 Gram(s) IV Push once  dextrose 50% Injectable 12.5 Gram(s) IV Push once  glucagon  Injectable 1 milliGRAM(s) IntraMuscular once  insulin glargine Injectable (LANTUS) 8 Unit(s) SubCutaneous at bedtime  insulin lispro (ADMELOG) corrective regimen sliding scale   SubCutaneous three times a day before meals  latanoprost 0.005% Ophthalmic Solution 1 Drop(s) Both EYES at bedtime  metoprolol tartrate 25 milliGRAM(s) Oral two times a day  multivitamin 1 Tablet(s) Oral daily  pantoprazole    Tablet 40 milliGRAM(s) Oral before breakfast    MEDICATIONS  (PRN):  acetaminophen   Tablet .. 650 milliGRAM(s) Oral every 6 hours PRN Temp greater or equal to 38C (100.4F), Mild Pain (1 - 3)  melatonin 5 milliGRAM(s) Oral at bedtime PRN Sleep  ondansetron Injectable 4 milliGRAM(s) IV Push every 6 hours PRN Nausea and/or Vomiting  oxyCODONE    IR 5 milliGRAM(s) Oral every 4 hours PRN Moderate Pain (4 - 6)  oxyCODONE    IR 10 milliGRAM(s) Oral every 4 hours PRN Severe Pain (7 - 10)  simethicone 80 milliGRAM(s) Chew every 8 hours PRN Gas

## 2021-01-26 NOTE — PROVIDER CONTACT NOTE (OTHER) - SITUATION
Dr may.  Please fax discharge papers to [phone].
Pt rang call bell to go to bed. Pt was already in bed. Noted to be having difficulty finding words. Pt became angry with staff.
Pt doesn't want to take PO oxycodone or tylenol po, requesting IV tylenol. Pt states he has had great results with IV in past.
Spoke to Anitra Saleh's service postop colon resection for cancer, polyp  extensive cardiac HX, CAD, CABG
Left voicemail on the Hospitalist admit phone regarding stop colon resection,   postop medical management, Hx of CAD, DM, CABG

## 2021-01-26 NOTE — DISCHARGE NOTE PROVIDER - NSDCFUSCHEDAPPT_GEN_ALL_CORE_FT
TIFFANIE ZELAYA ; 02/04/2021 ; [facility]  TIFFANIE ZELAYA ; [facility]/2021 [facility] [address] [facility]

## 2021-01-26 NOTE — PROVIDER CONTACT NOTE (OTHER) - ASSESSMENT
Moving all extremities equally, pupils equal and reactive. Alert and oriented x3. VSS. 24 hour EEG in progress.

## 2021-01-26 NOTE — DIETITIAN INITIAL EVALUATION ADULT. - OTHER INFO
89yo male with PMH significant for Afib, PVD, DVT, GIB, spinal stenosis, CAD s/p CABG, AUGUSTINE s/p JAMES, asthma, HTN, HLD, DM2; admitted with cecal CA s/p elective Rt hemicolectomy with post-op ileus.

## 2021-01-26 NOTE — DIETITIAN INITIAL EVALUATION ADULT. - ADD RECOMMEND
1) encourage protein intake; encourage glucerna BID between meals to optimize PO intake 2) consider checking vitamin D and supplement prn 3) weekly wt checks to track/trend changes

## 2021-01-26 NOTE — DISCHARGE NOTE PROVIDER - HOSPITAL COURSE
89 yo M with cecal cancer and transverse polyp, and is  s/p laparoscopic extended right hemicolectomy. Patient course complicated by post operative ileus. Patient once tolerated diet and having BMs stable for DC home.

## 2021-01-27 ENCOUNTER — TRANSCRIPTION ENCOUNTER (OUTPATIENT)
Age: 86
End: 2021-01-27

## 2021-01-27 VITALS — HEART RATE: 81 BPM | OXYGEN SATURATION: 92 % | RESPIRATION RATE: 23 BRPM

## 2021-01-27 LAB
ANION GAP SERPL CALC-SCNC: 8 MMOL/L — SIGNIFICANT CHANGE UP (ref 5–17)
BUN SERPL-MCNC: 24 MG/DL — HIGH (ref 7–23)
CALCIUM SERPL-MCNC: 9 MG/DL — SIGNIFICANT CHANGE UP (ref 8.5–10.1)
CHLORIDE SERPL-SCNC: 104 MMOL/L — SIGNIFICANT CHANGE UP (ref 96–108)
CO2 SERPL-SCNC: 26 MMOL/L — SIGNIFICANT CHANGE UP (ref 22–31)
CREAT SERPL-MCNC: 1.14 MG/DL — SIGNIFICANT CHANGE UP (ref 0.5–1.3)
GLUCOSE SERPL-MCNC: 168 MG/DL — HIGH (ref 70–99)
HCT VFR BLD CALC: 33 % — LOW (ref 39–50)
HGB BLD-MCNC: 10 G/DL — LOW (ref 13–17)
MAGNESIUM SERPL-MCNC: 2.4 MG/DL — SIGNIFICANT CHANGE UP (ref 1.6–2.6)
MCHC RBC-ENTMCNC: 25.7 PG — LOW (ref 27–34)
MCHC RBC-ENTMCNC: 30.3 GM/DL — LOW (ref 32–36)
MCV RBC AUTO: 84.8 FL — SIGNIFICANT CHANGE UP (ref 80–100)
PHOSPHATE SERPL-MCNC: 3.4 MG/DL — SIGNIFICANT CHANGE UP (ref 2.5–4.5)
PLATELET # BLD AUTO: 224 K/UL — SIGNIFICANT CHANGE UP (ref 150–400)
POTASSIUM SERPL-MCNC: 3.9 MMOL/L — SIGNIFICANT CHANGE UP (ref 3.5–5.3)
POTASSIUM SERPL-SCNC: 3.9 MMOL/L — SIGNIFICANT CHANGE UP (ref 3.5–5.3)
RBC # BLD: 3.89 M/UL — LOW (ref 4.2–5.8)
RBC # FLD: 16.8 % — HIGH (ref 10.3–14.5)
SODIUM SERPL-SCNC: 138 MMOL/L — SIGNIFICANT CHANGE UP (ref 135–145)
WBC # BLD: 10.23 K/UL — SIGNIFICANT CHANGE UP (ref 3.8–10.5)
WBC # FLD AUTO: 10.23 K/UL — SIGNIFICANT CHANGE UP (ref 3.8–10.5)

## 2021-01-27 RX ORDER — OXYCODONE AND ACETAMINOPHEN 5; 325 MG/1; MG/1
1 TABLET ORAL
Qty: 20 | Refills: 0
Start: 2021-01-27

## 2021-01-27 RX ADMIN — Medication 25 MILLIGRAM(S): at 11:06

## 2021-01-27 RX ADMIN — Medication 1: at 08:29

## 2021-01-27 RX ADMIN — PANTOPRAZOLE SODIUM 40 MILLIGRAM(S): 20 TABLET, DELAYED RELEASE ORAL at 05:29

## 2021-01-27 RX ADMIN — OXYCODONE HYDROCHLORIDE 5 MILLIGRAM(S): 5 TABLET ORAL at 13:16

## 2021-01-27 RX ADMIN — BUDESONIDE AND FORMOTEROL FUMARATE DIHYDRATE 2 PUFF(S): 160; 4.5 AEROSOL RESPIRATORY (INHALATION) at 07:58

## 2021-01-27 RX ADMIN — APIXABAN 5 MILLIGRAM(S): 2.5 TABLET, FILM COATED ORAL at 11:06

## 2021-01-27 RX ADMIN — ONDANSETRON 4 MILLIGRAM(S): 8 TABLET, FILM COATED ORAL at 02:45

## 2021-01-27 RX ADMIN — Medication 1 TABLET(S): at 11:06

## 2021-01-27 RX ADMIN — SIMETHICONE 80 MILLIGRAM(S): 80 TABLET, CHEWABLE ORAL at 14:13

## 2021-01-27 NOTE — PROGRESS NOTE ADULT - ASSESSMENT
87 yo M with cecal cancer and transverse polyp, is POD3 s/p laparoscopic extended right hemicolectomy.    Plan:  -pain and nausea control prn  -continue with Eliquis  -diet: FLD - keep on FLD today  -monitor bowel function  -serial abdominal exams  -cardiology consult, recs appreciated  -hospitalist consult, recs appreciated  -encourage IS use  -encourage OOB/ambulation  -continue with home meds  -GI/DVT ppx    Plan discussed with Dr. Collier
87 yo M with cecal cancer and transverse polyp, is POD5 s/p laparoscopic extended right hemicolectomy.  KUB - postoperative ileus    Plan:  -pain and nausea control prn  -continue with Eliquis  -diet: FLD ADAT  -monitor bowel function  -serial abdominal exams  -cardiology consult, recs appreciated  -hospitalist consult, recs appreciated  -encourage IS use  -encourage OOB/ambulation  -continue with home meds  -GI/DVT ppx  -DISPO planning    Plan discussed with Dr. Collier
88 y.o. male with PMHx of Afib, LLE DVT, PVD, BPH, GIB, Spinal stenosis, CAD s/p CABG, PPM/AICD, AUGUSTINE s/p right CEA, Asthma, HTN, HLD, DMII, AS s/p TAVR s/p elective right hemicolectomy on 1/20/21 for cecal CA.    1. Cecal CA s/p right laparoscopic hemicolectomy POD#3   - almivopan started   - gum   - pain control, advance diet as per Sx, ambulate   - GI proph with PPI    2. AFRICA due to prerenal etiology - IVF hydration   - resolved, stop IVF     3. DMII - HISS, add lantus as glucose >200's    4. CAD s/p CABG - resume home meds    5. Afib permanent - Vpaced, resumed eliquis    - pt had suspicion for RV lead thrombus and recent DCCV in November - back on eliquis    6. HTN - added low dose BBL    6. BPH - resumed alfusozin
89 yo M with cecal cancer and transverse polyp, is POD1 s/p laparoscopic extended right hemicolectomy.    Plan:  -pain and nausea control prn  -diet: CLD, ADAT  -monitor bowel function  -serial abdominal exams  -cardiology consult, recs appreciated  -hospitalist consult, recs appreciated  -d/c Caba today; void check   -encourage IS use  -encourage OOB/ambulation  -continue with home meds  -GI/DVT ppx    Plan discussed with Dr. Collier
87 yo M with cecal cancer and transverse polyp, is POD2 s/p laparoscopic extended right hemicolectomy.    Plan:  -pain and nausea control prn  -eliquis started yesterday  -diet: FLD - keep on FLD today  -monitor bowel function  -serial abdominal exams  -cardiology consult, recs appreciated  -hospitalist consult, recs appreciated  -encourage IS use  -encourage OOB/ambulation  -continue with home meds  -GI/DVT ppx    Plan discussed with Dr. Collier
89 yo M with cecal cancer and transverse polyp, is POD1 s/p laparoscopic extended right hemicolectomy.    Plan:  -pain and nausea control prn  -diet: CLD, ADAT  -monitor bowel function  -serial abdominal exams  -cardiology consult, recs appreciated  -hospitalist consult, recs appreciated  -encourage IS use  -encourage OOB/ambulation  -continue with home meds  -GI/DVT ppx    Plan discussed with Dr. Collier
89 yo M with cecal cancer and transverse polyp, is POD4 s/p laparoscopic extended right hemicolectomy.    Plan:  -pain and nausea control prn  -continue with Eliquis  -diet: FLD ADAT  -monitor bowel function  -serial abdominal exams  -cardiology consult, recs appreciated  -hospitalist consult, recs appreciated  -encourage IS use  -encourage OOB/ambulation  -continue with home meds  -GI/DVT ppx    Plan discussed with Dr. Collier
89 yo M with cecal cancer and transverse polyp, is POD6 s/p laparoscopic extended right hemicolectomy.  KUB - postoperative ileus    Plan:  -pain and nausea control prn  -continue with Eliquis  -diet: LRD  -monitor bowel function  -serial abdominal exams  -cardiology consult, recs appreciated  -hospitalist consult, recs appreciated  -encourage IS use  -encourage OOB/ambulation  -continue with home meds  -GI/DVT ppx  -DISPO planning: possible DC home later today     Plan discussed with Dr. Collier
88 y.o. male with PMHx of Afib, LLE DVT, PVD, BPH, GIB, Spinal stenosis, CAD s/p CABG, PPM/AICD, AUGUSTINE s/p right CEA, Asthma, HTN, HLD, DMII, AS s/p TAVR s/p elective right hemicolectomy on 1/20/21 for cecal CA.    1. Cecal CA s/p right laparoscopic hemicolectomy POD#2   - almivopan started   - gum   - pain control, advance diet as per Sx, ambulate   - GI proph with PPI    2. AFR[place] due to prerenal etiology - IVF hydration   - resolved, stop IVF when tolerates po    3. DMII - HISS    4. CAD s/p CABG - resume home meds    5. Afib permanent - Vpaced, resumed eliquis    - pt had suspicion for RV lead thrombus and recent DCCV in November - back on eliquis    6. HTN - add low dose BBL    6. BPH - resumed alfusozin
88 y.o. male with PMHx of Afib, LLE DVT, PVD, BPH, GIB, Spinal stenosis, CAD s/p CABG, PPM/AICD, AUGUSTINE s/p right CEA, Asthma, HTN, HLD, DMII, AS s/p TAVR s/p elective right hemicolectomy on 1/20/21 for cecal CA.    1. Cecal CA s/p right laparoscopic hemicolectomy POD#4   - almivopan started   - gum   - pain control, advance diet as per Sx, ambulate   - GI proph with PPI   - poor progression with persistent abdominal distention, nausea and no flatus    2. AFR[place] due to prerenal etiology - IVF hydration   - resolved    3. DMII - HISS, added lantus as glucose >200's with improved fasting glucose    4. CAD s/p CABG - resume home meds    5. Afib permanent - Vpaced, resumed eliquis    - pt had suspicion for RV lead thrombus and recent DCCV in November - back on eliquis    6. HTN - added low dose BBL with improved HR and BP    6. BPH - resumed alfusozin

## 2021-01-27 NOTE — PROGRESS NOTE ADULT - PROVIDER SPECIALTY LIST ADULT
Colorectal Surgery
Cardiology
Colorectal Surgery
Hospitalist

## 2021-01-27 NOTE — PROGRESS NOTE ADULT - ATTENDING COMMENTS
PT seen and examined.     multiple BM's.  denies nausea.    Vital Signs Last 24 Hrs  T(C): 36.4 (27 Jan 2021 08:15), Max: 36.6 (27 Jan 2021 05:00)  T(F): 97.6 (27 Jan 2021 08:15), Max: 97.9 (27 Jan 2021 05:00)  HR: 92 (27 Jan 2021 10:57) (65 - 92)  BP: 112/43 (27 Jan 2021 10:57) (112/43 - 127/46)  BP(mean): 60 (27 Jan 2021 10:00) (60 - 67)  RR: 20 (27 Jan 2021 10:57) (12 - 26)  SpO2: 92% (27 Jan 2021 10:57) (89% - 100%)    Abd mild distension/soft/NT                          10.0   10.23 )-----------( 224      ( 27 Jan 2021 06:43 )             33.0   01-27    138  |  104  |  24<H>  ----------------------------<  168<H>  3.9   |  26  |  1.14    Ca    9.0      27 Jan 2021 06:43  Phos  3.4     01-27  Mg     2.4     01-27    imp: s/p extended R hemicolectomy  --doing well  --home today
Patient seen and examined this morning.  Doing well.  Complains of gas pains.  Abdomen is soft, distended and appropriately tender.  Incision with Prevena dressing.  Caba removed.  Continue fluids for now and wean off by tomorrow.  Encourage ambulation and IS.  Clear liquid diet.  Will likely resume anticoagulation tomorrow if hemoglobin remains stable.
Patient seen and examined. Doing well but still distended. Pain well controlled. Having bowel function- more liquid stool than gas. Advance to solid diet and plan for discharge later today versus tomorrow if tolerating.
Patient seen and examined. Having gas pains. Having bowel function but still distended, likely post op ileus. On liquid diet but once improved from distension standpoint can move to solid diet.
Pt seen and examined.    still distended.  nausea has improved.  reports some flatus.  has been ambulating    AVSS  Uo 150    Abd moderate distension/soft/NT   proveena intact    WBC 10.3  Hgb 10.3  K 3.6  Cre 0.9    imp: s/p extended R hemicolectomy POD4  --axr shows ileus.  labs are ok.  encourage ambulation.  pt with some flatus.  On fulls as tolerated.
Patient seen and examined. Stable. Still has gas pains. No nausea or emesis. Tolerating full liquids. Will give bowel regimen as a stimulant for bowel function. Will advance to solid diet when feeling better from a distension standpoint. Ambulate and IS

## 2021-01-27 NOTE — PROGRESS NOTE ADULT - SUBJECTIVE AND OBJECTIVE BOX
88 y.o. male with PMHx of Afib, LLE DVT, PVD, BPH, GIB, Spinal stenosis, CAD s/p CABG, PPM/AICD, AUGUSTINE s/p right CEA, Asthma, HTN, HLD, DMII, AS s/p TAVR s/p elective right hemicolectomy on 1/20/21 for cecal CA.    INTERVAL HPI: c/o abdominal distention with "gas" and unable to pass gas or belch associated with nausea, ambulated with walker and PT  Other ROS reviewed and neg     Vital Signs Last 24 Hrs  T(C): 37 (22 Jan 2021 14:10), Max: 37 (22 Jan 2021 14:10)  T(F): 98.6 (22 Jan 2021 14:10), Max: 98.6 (22 Jan 2021 14:10)  HR: 98 (22 Jan 2021 14:00) (95 - 111)  BP: 161/65 (22 Jan 2021 14:00) (129/51 - 185/75)  BP(mean): 73 (22 Jan 2021 14:00) (73 - 108)  RR: 12 (22 Jan 2021 14:00) (12 - 27)  SpO2: 100% (22 Jan 2021 14:00) (85% - 100%)  I&O's Detail    21 Jan 2021 07:01  -  22 Jan 2021 07:00  --------------------------------------------------------  IN:    Lactated Ringers: 2100 mL    Sodium Chloride 0.9% Bolus: 1000 mL  Total IN: 3100 mL    OUT:    Indwelling Catheter - Urethral (mL): 200 mL    Voided (mL): 300 mL  Total OUT: 500 mL    Total NET: 2600 mL                        10.3   13.49 )-----------( 206      ( 22 Jan 2021 05:32 )             34.2     22 Jan 2021 05:32    138    |  106    |  19     ----------------------------<  215    4.0     |  25     |  1.11     Ca    8.8        22 Jan 2021 05:32  Phos  2.5       22 Jan 2021 05:32  Mg     1.9       22 Jan 2021 05:32    CAPILLARY BLOOD GLUCOSE    POCT Blood Glucose.: 244 mg/dL (22 Jan 2021 12:05)  POCT Blood Glucose.: 214 mg/dL (22 Jan 2021 08:43)  POCT Blood Glucose.: 211 mg/dL (22 Jan 2021 06:50)  POCT Blood Glucose.: 250 mg/dL (21 Jan 2021 23:08)  POCT Blood Glucose.: 186 mg/dL (21 Jan 2021 17:28)    MEDICATIONS  (STANDING):  alfuzosin 10 milliGRAM(s) Oral daily  alvimopan 12 milliGRAM(s) Oral two times a day  apixaban 5 milliGRAM(s) Oral two times a day  budesonide 160 MICROgram(s)/formoterol 4.5 MICROgram(s) Inhaler 2 Puff(s) Inhalation two times a day  dextrose 40% Gel 15 Gram(s) Oral once  dextrose 5%. 1000 milliLiter(s) (50 mL/Hr) IV Continuous <Continuous>  dextrose 5%. 1000 milliLiter(s) (100 mL/Hr) IV Continuous <Continuous>  dextrose 50% Injectable 25 Gram(s) IV Push once  dextrose 50% Injectable 12.5 Gram(s) IV Push once  dextrose 50% Injectable 25 Gram(s) IV Push once  glucagon  Injectable 1 milliGRAM(s) IntraMuscular once  insulin lispro (ADMELOG) corrective regimen sliding scale   SubCutaneous three times a day before meals  lactated ringers. 1000 milliLiter(s) (100 mL/Hr) IV Continuous <Continuous>  latanoprost 0.005% Ophthalmic Solution 1 Drop(s) Both EYES at bedtime  multivitamin 1 Tablet(s) Oral daily  pantoprazole    Tablet 40 milliGRAM(s) Oral before breakfast    MEDICATIONS  (PRN):  acetaminophen   Tablet .. 650 milliGRAM(s) Oral every 6 hours PRN Temp greater or equal to 38C (100.4F), Mild Pain (1 - 3)  ondansetron Injectable 4 milliGRAM(s) IV Push every 6 hours PRN Nausea and/or Vomiting  oxyCODONE    IR 5 milliGRAM(s) Oral every 4 hours PRN Moderate Pain (4 - 6)  oxyCODONE    IR 10 milliGRAM(s) Oral every 4 hours PRN Severe Pain (7 - 10)  simethicone 80 milliGRAM(s) Chew every 8 hours PRN Gas    PHYSICAL EXAM:    General: male in mild distress due to abdominal distention  Eyes: PERRLA, EOMI; conjunctiva and sclera clear  Head: Normocephalic; atraumatic  ENMT: No nasal discharge; airway clear  Neck: Supple; non tender; no masses  Respiratory: No wheezes, rales or rhonchi  Cardiovascular: S1, S2 irreg with II/VI BEBETO with AICD in left upper chest  Gastrointestinal: Soft distended abdomen mildly tender on exam with + bowel sounds  Genitourinary: No costovertebral angle tenderness  Extremities: No clubbing, cyanosis or edema  Vascular: Peripheral pulses palpable 2+ bilaterally  Neurological: Alert and oriented x4  Skin: Warm and dry.   Musculoskeletal: Normal tone, without deformities  Psychiatric: Cooperative and appropriate    
Patient was seen and evaluated at bedside this morning with the surgery team. No acute events overnight. Admits to passing flatus and having BM. Tolerated small amount of liquids. Patient is out of bed to chair. Denies fevers, chills, chest pain, nausea or vomiting. Vital signs reviewed.     Vital Signs Last 24 Hrs  T(C): 36.6 (22 Jan 2021 05:00), Max: 36.8 (21 Jan 2021 18:00)  T(F): 97.9 (22 Jan 2021 05:00), Max: 98.2 (21 Jan 2021 18:00)  HR: 108 (22 Jan 2021 06:00) (95 - 111)  BP: 151/77 (22 Jan 2021 06:00) (133/67 - 185/75)  BP(mean): 95 (22 Jan 2021 06:00) (74 - 108)  RR: 15 (22 Jan 2021 06:00) (15 - 24)  SpO2: 99% (22 Jan 2021 06:00) (85% - 100%)    Labs:                        9.7    16.07 )-----------( 186      ( 21 Jan 2021 06:13 )             31.8   01-21    138  |  107  |  23  ----------------------------<  175<H>  4.4   |  23  |  1.59<H>    Ca    8.7      21 Jan 2021 06:13  Phos  3.8     01-21  Mg     2.0     01-21        Physical Exam:  General: AAOx3, in NAD  HEENT: NC/AT, EOMI  Cardio: S1S2, regular rate  Pulm: equal air entry b/l, on labored on NC  Abdomen: soft, appropriate tenderness around incision sites, prevena and opsites in place, non distended  
SURGERY DAILY PROGRESS NOTE:     Subjective:  Patient seen and examined this AM at bedside. No acute events overnight and patient resting comfortably. Passed small liquid stool. Complains of distension and mild tenderness. denies n/v. Denies fever/chills, shortness of breath, chest pain. VS reviewed    Objective:    MEDICATIONS  (STANDING):  alfuzosin 10 milliGRAM(s) Oral daily  alvimopan 12 milliGRAM(s) Oral two times a day  apixaban 5 milliGRAM(s) Oral two times a day  budesonide 160 MICROgram(s)/formoterol 4.5 MICROgram(s) Inhaler 2 Puff(s) Inhalation two times a day  dextrose 40% Gel 15 Gram(s) Oral once  dextrose 5%. 1000 milliLiter(s) (50 mL/Hr) IV Continuous <Continuous>  dextrose 5%. 1000 milliLiter(s) (100 mL/Hr) IV Continuous <Continuous>  dextrose 50% Injectable 25 Gram(s) IV Push once  dextrose 50% Injectable 12.5 Gram(s) IV Push once  dextrose 50% Injectable 25 Gram(s) IV Push once  glucagon  Injectable 1 milliGRAM(s) IntraMuscular once  insulin lispro (ADMELOG) corrective regimen sliding scale   SubCutaneous three times a day before meals  lactated ringers. 1000 milliLiter(s) (100 mL/Hr) IV Continuous <Continuous>  latanoprost 0.005% Ophthalmic Solution 1 Drop(s) Both EYES at bedtime  metoprolol tartrate 25 milliGRAM(s) Oral two times a day  multivitamin 1 Tablet(s) Oral daily  pantoprazole    Tablet 40 milliGRAM(s) Oral before breakfast    MEDICATIONS  (PRN):  acetaminophen   Tablet .. 650 milliGRAM(s) Oral every 6 hours PRN Temp greater or equal to 38C (100.4F), Mild Pain (1 - 3)  ondansetron Injectable 4 milliGRAM(s) IV Push every 6 hours PRN Nausea and/or Vomiting  oxyCODONE    IR 5 milliGRAM(s) Oral every 4 hours PRN Moderate Pain (4 - 6)  oxyCODONE    IR 10 milliGRAM(s) Oral every 4 hours PRN Severe Pain (7 - 10)  simethicone 80 milliGRAM(s) Chew every 8 hours PRN Gas      Vital Signs Last 24 Hrs  T(C): 36.4 (2021 08:24), Max: 37 (2021 14:10)  T(F): 97.5 (2021 08:24), Max: 98.6 (2021 14:10)  HR: 84 (2021 09:00) (84 - 109)  BP: 106/43 (2021 09:00) (106/43 - 161/65)  BP(mean): 58 (2021 09:00) (58 - 92)  RR: 16 (2021 09:00) (12 - 27)  SpO2: 96% (2021 09:00) (89% - 100%)      PHYSICAL EXAM   GENERAL: NAD, AOx3, well developed, well nourished  HEAD: Atraumatic, normocephalic  EYES: EOMI, PERRLA, conjunctiva and sclera clear  ENT: moist mucous membrane  NECK: supple, No JVD, midline trachea  CHEST/LUNG: clear to auscultation b/l, no rales, rhonchi, wheezing or rubs. unlabored respirations  Heart: S1, S2 normal, RRR w/o murmur  ABDOMEN: soft, distended, appropriate tenderness around incision sites, prevena and opsites in place,   EXTREMITIES: +2 peripheral pulses, brisk cap refill. no clubbing, cyanosis or edema  NERVOUS SYSTEM: AOx3, speech clear, no neuro-deficits  MSK: full ROM, no deformities  SKIN: warm to touch, no rash or lesions      I&O's Detail    2021 07:01  -  2021 07:00  --------------------------------------------------------  IN:    Lactated Ringers: 1150 mL  Total IN: 1150 mL    OUT:  Total OUT: 0 mL    Total NET: 1150 mL      2021 07:01  -  2021 09:56  --------------------------------------------------------  IN:    Lactated Ringers: 272 mL  Total IN: 272 mL    OUT:  Total OUT: 0 mL    Total NET: 272 mL          Daily     Daily Weight in k (2021 06:00)    LABS:                        9.6    10.70 )-----------( 183      ( 2021 07:29 )             31.1     0123    137  |  104  |  16  ----------------------------<  198<H>  4.0   |  29  |  0.95    Ca    8.4<L>      2021 07:29  Phos  2.3       Mg     1.9                 RADIOLOGY & ADDITIONAL STUDIES:        
SURGERY DAILY PROGRESS NOTE:     Subjective:  Patient seen and examined this AM at bedside. No acute events overnight and patient resting comfortably. Patient passing flatus and passed BM. One episode of nausea  Denies fever/chills, shortness of breath, chest pain. VS reviewed    Objective:    MEDICATIONS  (STANDING):  alfuzosin 10 milliGRAM(s) Oral daily  apixaban 5 milliGRAM(s) Oral two times a day  budesonide 160 MICROgram(s)/formoterol 4.5 MICROgram(s) Inhaler 2 Puff(s) Inhalation two times a day  dextrose 40% Gel 15 Gram(s) Oral once  dextrose 5%. 1000 milliLiter(s) (50 mL/Hr) IV Continuous <Continuous>  dextrose 5%. 1000 milliLiter(s) (100 mL/Hr) IV Continuous <Continuous>  dextrose 50% Injectable 25 Gram(s) IV Push once  dextrose 50% Injectable 12.5 Gram(s) IV Push once  dextrose 50% Injectable 25 Gram(s) IV Push once  glucagon  Injectable 1 milliGRAM(s) IntraMuscular once  insulin glargine Injectable (LANTUS) 8 Unit(s) SubCutaneous at bedtime  insulin lispro (ADMELOG) corrective regimen sliding scale   SubCutaneous three times a day before meals  latanoprost 0.005% Ophthalmic Solution 1 Drop(s) Both EYES at bedtime  metoprolol tartrate 25 milliGRAM(s) Oral two times a day  multivitamin 1 Tablet(s) Oral daily  pantoprazole    Tablet 40 milliGRAM(s) Oral before breakfast    MEDICATIONS  (PRN):  acetaminophen   Tablet .. 650 milliGRAM(s) Oral every 6 hours PRN Temp greater or equal to 38C (100.4F), Mild Pain (1 - 3)  melatonin 5 milliGRAM(s) Oral at bedtime PRN Sleep  ondansetron Injectable 4 milliGRAM(s) IV Push every 6 hours PRN Nausea and/or Vomiting  oxyCODONE    IR 5 milliGRAM(s) Oral every 4 hours PRN Moderate Pain (4 - 6)  oxyCODONE    IR 10 milliGRAM(s) Oral every 4 hours PRN Severe Pain (7 - 10)  simethicone 80 milliGRAM(s) Chew every 8 hours PRN Gas      Vital Signs Last 24 Hrs  T(C): 36.6 (27 Jan 2021 05:00), Max: 36.7 (26 Jan 2021 09:29)  T(F): 97.9 (27 Jan 2021 05:00), Max: 98 (26 Jan 2021 09:29)  HR: 71 (27 Jan 2021 08:00) (65 - 90)  BP: 112/45 (27 Jan 2021 06:00) (95/36 - 118/48)  BP(mean): 65 (27 Jan 2021 03:00) (61 - 79)  RR: 13 (27 Jan 2021 06:00) (12 - 26)  SpO2: 99% (27 Jan 2021 08:00) (89% - 100%)      PHYSICAL EXAM   GENERAL: NAD, AOx3, well developed, well nourished  HEAD: Atraumatic, normocephalic  EYES: EOMI, PERRLA, conjunctiva and sclera clear  ENT: moist mucous membrane  NECK: supple, No JVD, midline trachea  CHEST/LUNG: clear to auscultation b/l, no rales, rhonchi, wheezing or rubs. unlabored respirations  Heart: S1, S2 normal, RRR w/o murmur  ABDOMEN: soft, distended, appropriate tenderness around incision sites, prevena and opsites in place  EXTREMITIES: +2 peripheral pulses, brisk cap refill. no clubbing, cyanosis or edema  NERVOUS SYSTEM: AOx3, speech clear, no neuro-deficits  MSK: full ROM, no deformities  SKIN: warm to touch, no rash or lesions      I&O's Detail      CBC Full  -  ( 27 Jan 2021 06:43 )  WBC Count : 10.23 K/uL  RBC Count : 3.89 M/uL  Hemoglobin : 10.0 g/dL  Hematocrit : 33.0 %  Platelet Count - Automated : 224 K/uL  Mean Cell Volume : 84.8 fl  Mean Cell Hemoglobin : 25.7 pg  Mean Cell Hemoglobin Concentration : 30.3 gm/dL  Auto Neutrophil # : x  Auto Lymphocyte # : x  Auto Monocyte # : x  Auto Eosinophil # : x  Auto Basophil # : x  Auto Neutrophil % : x  Auto Lymphocyte % : x  Auto Monocyte % : x  Auto Eosinophil % : x  Auto Basophil % : x  01-27    138  |  104  |  24<H>  ----------------------------<  168<H>  3.9   |  26  |  1.14    Ca    9.0      27 Jan 2021 06:43  Phos  3.4     01-27  Mg     2.4     01-27                RADIOLOGY & ADDITIONAL STUDIES:        
SURGERY DAILY PROGRESS NOTE:     Subjective:  Patient seen and examined this AM at bedside. No acute events overnight and patient resting comfortably. Continues to complain of gas pain. Denies nausea or vomiting. Denies bowel function. Has been ambulating and been out of bed to chair. Denies fever/chills, shortness of breath, chest pain. VS reviewed    Objective:    MEDICATIONS  (STANDING):  alfuzosin 10 milliGRAM(s) Oral daily  alvimopan 12 milliGRAM(s) Oral two times a day  apixaban 5 milliGRAM(s) Oral two times a day  budesonide 160 MICROgram(s)/formoterol 4.5 MICROgram(s) Inhaler 2 Puff(s) Inhalation two times a day  dextrose 40% Gel 15 Gram(s) Oral once  dextrose 5%. 1000 milliLiter(s) (50 mL/Hr) IV Continuous <Continuous>  dextrose 5%. 1000 milliLiter(s) (100 mL/Hr) IV Continuous <Continuous>  dextrose 50% Injectable 25 Gram(s) IV Push once  dextrose 50% Injectable 12.5 Gram(s) IV Push once  dextrose 50% Injectable 25 Gram(s) IV Push once  glucagon  Injectable 1 milliGRAM(s) IntraMuscular once  insulin lispro (ADMELOG) corrective regimen sliding scale   SubCutaneous three times a day before meals  lactated ringers. 1000 milliLiter(s) (100 mL/Hr) IV Continuous <Continuous>  latanoprost 0.005% Ophthalmic Solution 1 Drop(s) Both EYES at bedtime  metoprolol tartrate 25 milliGRAM(s) Oral two times a day  multivitamin 1 Tablet(s) Oral daily  pantoprazole    Tablet 40 milliGRAM(s) Oral before breakfast    MEDICATIONS  (PRN):  acetaminophen   Tablet .. 650 milliGRAM(s) Oral every 6 hours PRN Temp greater or equal to 38C (100.4F), Mild Pain (1 - 3)  ondansetron Injectable 4 milliGRAM(s) IV Push every 6 hours PRN Nausea and/or Vomiting  oxyCODONE    IR 5 milliGRAM(s) Oral every 4 hours PRN Moderate Pain (4 - 6)  oxyCODONE    IR 10 milliGRAM(s) Oral every 4 hours PRN Severe Pain (7 - 10)  simethicone 80 milliGRAM(s) Chew every 8 hours PRN Gas    Vital Signs Last 24 Hrs  T(C): 36.9 (2021 20:29), Max: 36.9 (2021 20:29)  T(F): 98.5 (2021 20:29), Max: 98.5 (2021 20:29)  HR: 73 (2021 06:00) (69 - 96)  BP: 136/57 (2021 06:00) (106/43 - 153/62)  BP(mean): 78 (2021 06:00) (58 - 86)  RR: 22 (2021 06:00) (12 - 24)  SpO2: 100% (2021 06:00) (83% - 100%)    PHYSICAL EXAM   GENERAL: NAD, AOx3, well developed, well nourished  HEAD: Atraumatic, normocephalic  EYES: EOMI, PERRLA, conjunctiva and sclera clear  ENT: moist mucous membrane  NECK: supple, No JVD, midline trachea  CHEST/LUNG: clear to auscultation b/l, no rales, rhonchi, wheezing or rubs. unlabored respirations  Heart: S1, S2 normal, RRR w/o murmur  ABDOMEN: soft, distended, appropriate tenderness around incision sites, prevena and opsites in place,   EXTREMITIES: +2 peripheral pulses, brisk cap refill. no clubbing, cyanosis or edema  NERVOUS SYSTEM: AOx3, speech clear, no neuro-deficits  MSK: full ROM, no deformities  SKIN: warm to touch, no rash or lesions      Daily     Daily Weight in k (2021 06:00)    LABS:                        9.6    10.70 )-----------( 183      ( 2021 07:29 )             31.1       137  |  104  |  16  ----------------------------<  198<H>  4.0   |  29  |  0.95    Ca    8.4<L>      2021 07:29  Phos  2.3       Mg     1.9                 RADIOLOGY & ADDITIONAL STUDIES:        
Patient was seen and evaluated at bedside this morning with the surgery team. No acute events overnight. Patient admits to incisional pain. Denies passing flatus or having a BM. Tolerated small amount of liquids. Patient is out of bed to chair. Denies fevers, chills, chest pain, nausea or vomiting. Vital signs reviewed.     Vital Signs Last 24 Hrs  T(C): 36.6 (21 Jan 2021 05:00), Max: 37.4 (20 Jan 2021 13:40)  T(F): 97.9 (21 Jan 2021 05:00), Max: 99.4 (20 Jan 2021 13:40)  HR: 97 (21 Jan 2021 08:00) (92 - 114)  BP: 140/69 (21 Jan 2021 08:00) (132/54 - 160/71)  BP(mean): 87 (21 Jan 2021 08:00) (73 - 87)  RR: 15 (21 Jan 2021 08:00) (14 - 26)  SpO2: 100% (21 Jan 2021 08:00) (95% - 100%)    Labs:                        9.7    16.07 )-----------( 186      ( 21 Jan 2021 06:13 )             31.8   01-21    138  |  107  |  23  ----------------------------<  175<H>  4.4   |  23  |  1.59<H>    Ca    8.7      21 Jan 2021 06:13  Phos  3.8     01-21  Mg     2.0     01-21    Physical Exam:  General: AAOx3, in NAD, in chair  HEENT: NC/AT, EOMI  Cardio: S1S2, regular rate  Pulm: equal air entry b/l, on labored on NC  Abdomen: soft, appropriate tenderness around incision sites, prevena and opsites in place, non distended  : Caba in place  
SURGERY DAILY PROGRESS NOTE:     Subjective:  Patient seen and examined this AM at bedside. No acute events overnight and patient resting comfortably. Patient passing flatus and passed BM. Denies fever/chills, shortness of breath, chest pain. VS reviewed    Objective:    MEDICATIONS  (STANDING):  alfuzosin 10 milliGRAM(s) Oral daily  apixaban 5 milliGRAM(s) Oral two times a day  budesonide 160 MICROgram(s)/formoterol 4.5 MICROgram(s) Inhaler 2 Puff(s) Inhalation two times a day  dextrose 40% Gel 15 Gram(s) Oral once  dextrose 5%. 1000 milliLiter(s) (50 mL/Hr) IV Continuous <Continuous>  dextrose 5%. 1000 milliLiter(s) (100 mL/Hr) IV Continuous <Continuous>  dextrose 50% Injectable 25 Gram(s) IV Push once  dextrose 50% Injectable 12.5 Gram(s) IV Push once  dextrose 50% Injectable 25 Gram(s) IV Push once  glucagon  Injectable 1 milliGRAM(s) IntraMuscular once  insulin glargine Injectable (LANTUS) 8 Unit(s) SubCutaneous at bedtime  insulin lispro (ADMELOG) corrective regimen sliding scale   SubCutaneous three times a day before meals  latanoprost 0.005% Ophthalmic Solution 1 Drop(s) Both EYES at bedtime  metoprolol tartrate 25 milliGRAM(s) Oral two times a day  multivitamin 1 Tablet(s) Oral daily  pantoprazole    Tablet 40 milliGRAM(s) Oral before breakfast    MEDICATIONS  (PRN):  acetaminophen   Tablet .. 650 milliGRAM(s) Oral every 6 hours PRN Temp greater or equal to 38C (100.4F), Mild Pain (1 - 3)  melatonin 5 milliGRAM(s) Oral at bedtime PRN Sleep  ondansetron Injectable 4 milliGRAM(s) IV Push every 6 hours PRN Nausea and/or Vomiting  oxyCODONE    IR 5 milliGRAM(s) Oral every 4 hours PRN Moderate Pain (4 - 6)  oxyCODONE    IR 10 milliGRAM(s) Oral every 4 hours PRN Severe Pain (7 - 10)  simethicone 80 milliGRAM(s) Chew every 8 hours PRN Gas      Vital Signs Last 24 Hrs  T(C): 36.9 (2021 05:00), Max: 36.9 (2021 16:33)  T(F): 98.4 (2021 05:00), Max: 98.5 (2021 21:07)  HR: 73 (2021 07:00) (61 - 82)  BP: 118/45 (2021 07:00) (111/43 - 150/55)  BP(mean): 63 (2021 07:00) (59 - 79)  RR: 17 (2021 07:00) (10 - 24)  SpO2: 86% (2021 06:00) (86% - 100%)      PHYSICAL EXAM   GENERAL: NAD, AOx3, well developed, well nourished  HEAD: Atraumatic, normocephalic  EYES: EOMI, PERRLA, conjunctiva and sclera clear  ENT: moist mucous membrane  NECK: supple, No JVD, midline trachea  CHEST/LUNG: clear to auscultation b/l, no rales, rhonchi, wheezing or rubs. unlabored respirations  Heart: S1, S2 normal, RRR w/o murmur  ABDOMEN: soft, distended, appropriate tenderness around incision sites, prevena and opsites in place  EXTREMITIES: +2 peripheral pulses, brisk cap refill. no clubbing, cyanosis or edema  NERVOUS SYSTEM: AOx3, speech clear, no neuro-deficits  MSK: full ROM, no deformities  SKIN: warm to touch, no rash or lesions      I&O's Detail      Daily     Daily Weight in k.9 (2021 05:00)    LABS:                        9.2    9.46  )-----------( 193      ( 2021 06:02 )             30.1         138  |  104  |  23  ----------------------------<  147<H>  3.7   |  29  |  1.02    Ca    8.6      2021 06:02  Phos  3.5       Mg     2.6                 RADIOLOGY & ADDITIONAL STUDIES:        
SURGERY DAILY PROGRESS NOTE:     Subjective:  Patient seen and examined this AM at bedside. No acute events overnight and patient resting comfortably. reports 1 BM. Denies nausea or vomiting. Denies bowel function. Has been ambulating and been out of bed to chair. Denies fever/chills, shortness of breath, chest pain. VS reviewed    Objective:    MEDICATIONS  (STANDING):  alfuzosin 10 milliGRAM(s) Oral daily  alvimopan 12 milliGRAM(s) Oral two times a day  apixaban 5 milliGRAM(s) Oral two times a day  budesonide 160 MICROgram(s)/formoterol 4.5 MICROgram(s) Inhaler 2 Puff(s) Inhalation two times a day  dextrose 40% Gel 15 Gram(s) Oral once  dextrose 5%. 1000 milliLiter(s) (50 mL/Hr) IV Continuous <Continuous>  dextrose 5%. 1000 milliLiter(s) (100 mL/Hr) IV Continuous <Continuous>  dextrose 50% Injectable 25 Gram(s) IV Push once  dextrose 50% Injectable 12.5 Gram(s) IV Push once  dextrose 50% Injectable 25 Gram(s) IV Push once  glucagon  Injectable 1 milliGRAM(s) IntraMuscular once  insulin lispro (ADMELOG) corrective regimen sliding scale   SubCutaneous three times a day before meals  lactated ringers. 1000 milliLiter(s) (100 mL/Hr) IV Continuous <Continuous>  latanoprost 0.005% Ophthalmic Solution 1 Drop(s) Both EYES at bedtime  metoprolol tartrate 25 milliGRAM(s) Oral two times a day  multivitamin 1 Tablet(s) Oral daily  pantoprazole    Tablet 40 milliGRAM(s) Oral before breakfast    MEDICATIONS  (PRN):  acetaminophen   Tablet .. 650 milliGRAM(s) Oral every 6 hours PRN Temp greater or equal to 38C (100.4F), Mild Pain (1 - 3)  ondansetron Injectable 4 milliGRAM(s) IV Push every 6 hours PRN Nausea and/or Vomiting  oxyCODONE    IR 5 milliGRAM(s) Oral every 4 hours PRN Moderate Pain (4 - 6)  oxyCODONE    IR 10 milliGRAM(s) Oral every 4 hours PRN Severe Pain (7 - 10)  simethicone 80 milliGRAM(s) Chew every 8 hours PRN Gas    Vital Signs Last 24 Hrs  T(C): 36.3 (2021 20:32), Max: 36.6 (2021 17:45)  T(F): 97.4 (2021 20:32), Max: 97.8 (2021 17:45)  HR: 78 (2021 04:00) (73 - 85)  BP: 142/59 (2021 04:00) (129/52 - 153/60)  BP(mean): 81 (2021 04:00) (71 - 86)  RR: 14 (2021 04:00) (12 - 20)  SpO2: 100% (2021 04:00) (86% - 100%)    PHYSICAL EXAM   GENERAL: NAD, AOx3, well developed, well nourished  HEAD: Atraumatic, normocephalic  EYES: EOMI, PERRLA, conjunctiva and sclera clear  ENT: moist mucous membrane  NECK: supple, No JVD, midline trachea  CHEST/LUNG: clear to auscultation b/l, no rales, rhonchi, wheezing or rubs. unlabored respirations  Heart: S1, S2 normal, RRR w/o murmur  ABDOMEN: soft, distended, appropriate tenderness around incision sites, prevena and opsites in place,   EXTREMITIES: +2 peripheral pulses, brisk cap refill. no clubbing, cyanosis or edema  NERVOUS SYSTEM: AOx3, speech clear, no neuro-deficits  MSK: full ROM, no deformities  SKIN: warm to touch, no rash or lesions      Daily     Daily Weight in k (2021 06:00)    LABS:                        9.5    8.38  )-----------( 189      ( 2021 06:44 )             31.1       139  |  102  |  17  ----------------------------<  153<H>  3.7   |  34<H>  |  1.08    Ca    8.9      2021 06:44  Phos  3.0       Mg     1.9      07:01  -  2021 07:00  --------------------------------------------------------  IN:    IV PiggyBack: 350 mL    Lactated Ringers: 272 mL    Oral Fluid: 360 mL  Total IN: 982 mL    OUT:    Voided (mL): 800 mL  Total OUT: 800 mL    Total NET: 182 mL      2021 07:01  -  2021 06:51  --------------------------------------------------------  IN:  Total IN: 0 mL    OUT:    Estimated Blood Loss (mL): 2 mL    Voided (mL): 150 mL  Total OUT: 152 mL    Total NET: -152 mL                RADIOLOGY & ADDITIONAL STUDIES:        
88 y.o. male with PMHx of Afib, LLE DVT, PVD, BPH, GIB, Spinal stenosis, CAD s/p CABG, PPM/AICD, AUGUSTINE s/p right CEA, Asthma, HTN, HLD, DMII, AS s/p TAVR s/p elective right hemicolectomy on 1/20/21 for cecal CA.    INTERVAL HPI: c/o abdominal distention with "gas" and unable to pass gas or belch associated with nausea, ambulated with walker and PT, small bowel movements but not passing gas  Other ROS reviewed and neg     Vital Signs Last 24 Hrs  T(C): 36.4 (23 Jan 2021 12:34), Max: 37 (22 Jan 2021 14:10)  T(F): 97.6 (23 Jan 2021 12:34), Max: 98.6 (22 Jan 2021 14:10)  HR: 83 (23 Jan 2021 13:00) (82 - 109)  BP: 126/53 (23 Jan 2021 13:00) (106/43 - 161/65)  BP(mean): 71 (23 Jan 2021 13:00) (58 - 92)  RR: 13 (23 Jan 2021 13:00) (12 - 27)  SpO2: 100% (23 Jan 2021 13:00) (83% - 100%)  I&O's Detail    22 Jan 2021 07:01  -  23 Jan 2021 07:00  --------------------------------------------------------  IN:    Lactated Ringers: 1150 mL  Total IN: 1150 mL    OUT:  Total OUT: 0 mL    Total NET: 1150 mL      23 Jan 2021 07:01  -  23 Jan 2021 13:27  --------------------------------------------------------  IN:    Lactated Ringers: 272 mL  Total IN: 272 mL    OUT:  Total OUT: 0 mL    Total NET: 272 mL                        9.6    10.70 )-----------( 183      ( 23 Jan 2021 07:29 )             31.1     23 Jan 2021 07:29    137    |  104    |  16     ----------------------------<  198    4.0     |  29     |  0.95     Ca    8.4        23 Jan 2021 07:29  Phos  2.3       23 Jan 2021 07:29  Mg     1.9       23 Jan 2021 07:29    CAPILLARY BLOOD GLUCOSE    POCT Blood Glucose.: 214 mg/dL (23 Jan 2021 08:08)  POCT Blood Glucose.: 203 mg/dL (22 Jan 2021 20:58)  POCT Blood Glucose.: 243 mg/dL (22 Jan 2021 16:53)    MEDICATIONS  (STANDING):  alfuzosin 10 milliGRAM(s) Oral daily  apixaban 5 milliGRAM(s) Oral two times a day  budesonide 160 MICROgram(s)/formoterol 4.5 MICROgram(s) Inhaler 2 Puff(s) Inhalation two times a day  dextrose 40% Gel 15 Gram(s) Oral once  dextrose 5%. 1000 milliLiter(s) (50 mL/Hr) IV Continuous <Continuous>  dextrose 5%. 1000 milliLiter(s) (100 mL/Hr) IV Continuous <Continuous>  dextrose 50% Injectable 25 Gram(s) IV Push once  dextrose 50% Injectable 12.5 Gram(s) IV Push once  dextrose 50% Injectable 25 Gram(s) IV Push once  glucagon  Injectable 1 milliGRAM(s) IntraMuscular once  insulin lispro (ADMELOG) corrective regimen sliding scale   SubCutaneous three times a day before meals  latanoprost 0.005% Ophthalmic Solution 1 Drop(s) Both EYES at bedtime  metoprolol tartrate 25 milliGRAM(s) Oral two times a day  multivitamin 1 Tablet(s) Oral daily  pantoprazole    Tablet 40 milliGRAM(s) Oral before breakfast    MEDICATIONS  (PRN):  acetaminophen   Tablet .. 650 milliGRAM(s) Oral every 6 hours PRN Temp greater or equal to 38C (100.4F), Mild Pain (1 - 3)  ondansetron Injectable 4 milliGRAM(s) IV Push every 6 hours PRN Nausea and/or Vomiting  oxyCODONE    IR 5 milliGRAM(s) Oral every 4 hours PRN Moderate Pain (4 - 6)  oxyCODONE    IR 10 milliGRAM(s) Oral every 4 hours PRN Severe Pain (7 - 10)  simethicone 80 milliGRAM(s) Chew every 8 hours PRN Gas    PHYSICAL EXAM:    General: male in mild distress due to abdominal distention  Eyes: PERRLA, EOMI; conjunctiva and sclera clear  Head: Normocephalic; atraumatic  ENMT: No nasal discharge; airway clear  Neck: Supple; non tender; no masses  Respiratory: No wheezes, rales or rhonchi  Cardiovascular: S1, S2 irreg with II/VI BEBETO with AICD in left upper chest  Gastrointestinal: Soft distended abdomen mildly tender on exam with + bowel sounds  Genitourinary: No costovertebral angle tenderness  Extremities: No clubbing, cyanosis or edema  Vascular: Peripheral pulses palpable 2+ bilaterally  Neurological: Alert and oriented x4  Skin: Warm and dry.   Musculoskeletal: Normal tone, without deformities  Psychiatric: Cooperative and appropriate    
88 y.o. male with PMHx of Afib, LLE DVT, PVD, BPH, GIB, Spinal stenosis, CAD s/p CABG, PPM/AICD, AUGUSTINE s/p right CEA, Asthma, HTN, HLD, DMII, AS s/p TAVR s/p elective right hemicolectomy on 1/20/21 for cecal CA.    INTERVAL HPI: c/o abdominal distention with "gas" and unable to pass gas or belch associated with nausea, ambulated with walker and PT, small bowel movements but not passing gas - still the same  Other ROS reviewed and neg     Vital Signs Last 24 Hrs  T(C): 36.3 (24 Jan 2021 11:00), Max: 36.9 (23 Jan 2021 20:29)  T(F): 97.3 (24 Jan 2021 11:00), Max: 98.5 (23 Jan 2021 20:29)  HR: 73 (24 Jan 2021 14:00) (69 - 87)  BP: 144/60 (24 Jan 2021 14:00) (114/51 - 153/62)  BP(mean): 83 (24 Jan 2021 14:00) (64 - 86)  RR: 17 (24 Jan 2021 13:00) (12 - 22)  SpO2: 100% (24 Jan 2021 14:00) (86% - 100%)  I&O's Detail    23 Jan 2021 07:01  -  24 Jan 2021 07:00  --------------------------------------------------------  IN:    IV PiggyBack: 350 mL    Lactated Ringers: 272 mL    Oral Fluid: 360 mL  Total IN: 982 mL    OUT:    Voided (mL): 800 mL  Total OUT: 800 mL    Total NET: 182 mL                       9.5    8.38  )-----------( 189      ( 24 Jan 2021 06:44 )             31.1     24 Jan 2021 06:44    139    |  102    |  17     ----------------------------<  153    3.7     |  34     |  1.08     Ca    8.9        24 Jan 2021 06:44  Phos  3.0       24 Jan 2021 06:44  Mg     1.9       24 Jan 2021 06:44    CAPILLARY BLOOD GLUCOSE    POCT Blood Glucose.: 202 mg/dL (24 Jan 2021 09:56)  POCT Blood Glucose.: 168 mg/dL (23 Jan 2021 22:47)  POCT Blood Glucose.: 241 mg/dL (23 Jan 2021 18:16)    MEDICATIONS  (STANDING):  alfuzosin 10 milliGRAM(s) Oral daily  apixaban 5 milliGRAM(s) Oral two times a day  budesonide 160 MICROgram(s)/formoterol 4.5 MICROgram(s) Inhaler 2 Puff(s) Inhalation two times a day  dextrose 40% Gel 15 Gram(s) Oral once  dextrose 5%. 1000 milliLiter(s) (50 mL/Hr) IV Continuous <Continuous>  dextrose 5%. 1000 milliLiter(s) (100 mL/Hr) IV Continuous <Continuous>  dextrose 50% Injectable 25 Gram(s) IV Push once  dextrose 50% Injectable 12.5 Gram(s) IV Push once  dextrose 50% Injectable 25 Gram(s) IV Push once  glucagon  Injectable 1 milliGRAM(s) IntraMuscular once  insulin glargine Injectable (LANTUS) 8 Unit(s) SubCutaneous at bedtime  insulin lispro (ADMELOG) corrective regimen sliding scale   SubCutaneous three times a day before meals  latanoprost 0.005% Ophthalmic Solution 1 Drop(s) Both EYES at bedtime  metoprolol tartrate 25 milliGRAM(s) Oral two times a day  multivitamin 1 Tablet(s) Oral daily  pantoprazole    Tablet 40 milliGRAM(s) Oral before breakfast    MEDICATIONS  (PRN):  acetaminophen   Tablet .. 650 milliGRAM(s) Oral every 6 hours PRN Temp greater or equal to 38C (100.4F), Mild Pain (1 - 3)  ondansetron Injectable 4 milliGRAM(s) IV Push every 6 hours PRN Nausea and/or Vomiting  oxyCODONE    IR 5 milliGRAM(s) Oral every 4 hours PRN Moderate Pain (4 - 6)  oxyCODONE    IR 10 milliGRAM(s) Oral every 4 hours PRN Severe Pain (7 - 10)  simethicone 80 milliGRAM(s) Chew every 8 hours PRN Gas    PHYSICAL EXAM:    General: male in mild distress due to abdominal distention  Eyes: PERRLA, EOMI; conjunctiva and sclera clear  Head: Normocephalic; atraumatic  ENMT: No nasal discharge; airway clear  Neck: Supple; non tender; no masses  Respiratory: No wheezes, rales or rhonchi  Cardiovascular: S1, S2 irreg with II/VI BEBETO with AICD in left upper chest  Gastrointestinal: Soft distended abdomen mildly tender on exam with + bowel sounds  Genitourinary: No costovertebral angle tenderness  Extremities: No clubbing, cyanosis or edema  Vascular: Peripheral pulses palpable 2+ bilaterally  Neurological: Alert and oriented x4  Skin: Warm and dry.   Musculoskeletal: Normal tone, without deformities  Psychiatric: Cooperative and appropriate    
  REASON FOR VISIT: AF, CAD    HPI: Eliezer Pedraza is an 88 year old man with multiple medical/cardiac problems (including colon cancer) who was admitted electively on 1/20/2021 for laparoscopic right hemicolectomy.  He has a cardiac history significant for severe CAD, remote CABG, numerous coronary stents, aortic stenosis s/p TAVR, ICD, statin-intolerance, atrial fibrillation, and PAD treated with peripheral interventions.      1/22/21:  More abdominal discomfort today; no dyspnea, angina.    MEDICATIONS  (STANDING):  alfuzosin 10 milliGRAM(s) Oral daily  alvimopan 12 milliGRAM(s) Oral two times a day  apixaban 5 milliGRAM(s) Oral two times a day  budesonide 160 MICROgram(s)/formoterol 4.5 MICROgram(s) Inhaler 2 Puff(s) Inhalation two times a day  insulin lispro (ADMELOG) corrective regimen sliding scale   SubCutaneous three times a day before meals  lactated ringers. 1000 milliLiter(s) (100 mL/Hr) IV Continuous <Continuous>  latanoprost 0.005% Ophthalmic Solution 1 Drop(s) Both EYES at bedtime  multivitamin 1 Tablet(s) Oral daily  pantoprazole    Tablet 40 milliGRAM(s) Oral before breakfast    Vital Signs Last 24 Hrs  T(C): 36.6 (22 Jan 2021 05:00), Max: 36.8 (21 Jan 2021 18:00)  T(F): 97.9 (22 Jan 2021 05:00), Max: 98.2 (21 Jan 2021 18:00)  HR: 106 (22 Jan 2021 08:00) (95 - 111)  BP: 161/75 (22 Jan 2021 08:00) (133/67 - 185/75)  BP(mean): 93 (22 Jan 2021 08:00) (74 - 108)  RR: 14 (22 Jan 2021 08:00) (14 - 24)  SpO2: 99% (22 Jan 2021 08:00) (85% - 99%)    PHYSICAL EXAM:  Constitutional: Seated in bedside chair, NAD, awake and alert  HEENT:   No oral cyanosis.  Pulmonary: Non-labored, breath sounds are clear bilaterally, No wheezing, rales or rhonchi  Cardiovascular: Irregular, mild tachycardia, normal S2, systolic murmur  Gastrointestinal: Abdomen is distended, tender, soft  Lymph: No peripheral edema.   Psych:  Mood & affect appropriate    LABS:                            10.3   13.49 )-----------( 206      ( 22 Jan 2021 05:32 )             34.2     138  |  106  |  19  ----------------------------<  215<H>  4.0   |  25  |  1.11    Ca    8.8      22 Jan 2021 05:32  Phos  2.5     01-22  Mg     1.9     01-22    Tele:  AF, vent pacing

## 2021-01-27 NOTE — DISCHARGE NOTE NURSING/CASE MANAGEMENT/SOCIAL WORK - PATIENT PORTAL LINK FT
You can access the FollowMyHealth Patient Portal offered by [facility] by registering at the following website: [email or web address]. By joining [employer]’s FollowMyHealth portal, you will also be able to view your health information using other applications (apps) compatible with our system.

## 2021-01-28 ENCOUNTER — NON-APPOINTMENT (OUTPATIENT)
Age: 86
End: 2021-01-28

## 2021-01-31 ENCOUNTER — NON-APPOINTMENT (OUTPATIENT)
Age: 86
End: 2021-01-31

## 2021-01-31 DIAGNOSIS — R11.0 NAUSEA: ICD-10-CM

## 2021-02-01 ENCOUNTER — OUTPATIENT (OUTPATIENT)
Dept: OUTPATIENT SERVICES | Facility: HOSPITAL | Age: 86
LOS: 1 days | Discharge: HOME | End: 2021-02-01

## 2021-02-01 DIAGNOSIS — Z90.49 ACQUIRED ABSENCE OF OTHER SPECIFIED PARTS OF DIGESTIVE TRACT: Chronic | ICD-10-CM

## 2021-02-01 DIAGNOSIS — Z98.890 OTHER SPECIFIED POSTPROCEDURAL STATES: Chronic | ICD-10-CM

## 2021-02-01 DIAGNOSIS — Z98.42 CATARACT EXTRACTION STATUS, LEFT EYE: Chronic | ICD-10-CM

## 2021-02-01 DIAGNOSIS — Z98.89 OTHER SPECIFIED POSTPROCEDURAL STATES: Chronic | ICD-10-CM

## 2021-02-01 DIAGNOSIS — Z96.60 PRESENCE OF UNSPECIFIED ORTHOPEDIC JOINT IMPLANT: Chronic | ICD-10-CM

## 2021-02-01 DIAGNOSIS — Z95.1 PRESENCE OF AORTOCORONARY BYPASS GRAFT: Chronic | ICD-10-CM

## 2021-02-01 DIAGNOSIS — Z95.3 PRESENCE OF XENOGENIC HEART VALVE: Chronic | ICD-10-CM

## 2021-02-01 DIAGNOSIS — Z98.41 CATARACT EXTRACTION STATUS, RIGHT EYE: Chronic | ICD-10-CM

## 2021-02-01 DIAGNOSIS — Z98.61 CORONARY ANGIOPLASTY STATUS: Chronic | ICD-10-CM

## 2021-02-01 DIAGNOSIS — C18.9 MALIGNANT NEOPLASM OF COLON, UNSPECIFIED: ICD-10-CM

## 2021-02-01 DIAGNOSIS — Z95.810 PRESENCE OF AUTOMATIC (IMPLANTABLE) CARDIAC DEFIBRILLATOR: Chronic | ICD-10-CM

## 2021-02-01 DIAGNOSIS — Z95.0 PRESENCE OF CARDIAC PACEMAKER: Chronic | ICD-10-CM

## 2021-02-03 DIAGNOSIS — K66.0 PERITONEAL ADHESIONS (POSTPROCEDURAL) (POSTINFECTION): ICD-10-CM

## 2021-02-03 DIAGNOSIS — N40.0 BENIGN PROSTATIC HYPERPLASIA WITHOUT LOWER URINARY TRACT SYMPTOMS: ICD-10-CM

## 2021-02-03 DIAGNOSIS — K63.5 POLYP OF COLON: ICD-10-CM

## 2021-02-03 DIAGNOSIS — K22.2 ESOPHAGEAL OBSTRUCTION: ICD-10-CM

## 2021-02-03 DIAGNOSIS — G47.33 OBSTRUCTIVE SLEEP APNEA (ADULT) (PEDIATRIC): ICD-10-CM

## 2021-02-03 DIAGNOSIS — Z95.1 PRESENCE OF AORTOCORONARY BYPASS GRAFT: ICD-10-CM

## 2021-02-03 DIAGNOSIS — Z95.5 PRESENCE OF CORONARY ANGIOPLASTY IMPLANT AND GRAFT: ICD-10-CM

## 2021-02-03 DIAGNOSIS — C18.2 MALIGNANT NEOPLASM OF ASCENDING COLON: ICD-10-CM

## 2021-02-03 DIAGNOSIS — C18.0 MALIGNANT NEOPLASM OF CECUM: ICD-10-CM

## 2021-02-03 DIAGNOSIS — E78.5 HYPERLIPIDEMIA, UNSPECIFIED: ICD-10-CM

## 2021-02-03 DIAGNOSIS — I25.10 ATHEROSCLEROTIC HEART DISEASE OF NATIVE CORONARY ARTERY WITHOUT ANGINA PECTORIS: ICD-10-CM

## 2021-02-03 DIAGNOSIS — Z79.84 LONG TERM (CURRENT) USE OF ORAL HYPOGLYCEMIC DRUGS: ICD-10-CM

## 2021-02-03 DIAGNOSIS — Z95.2 PRESENCE OF PROSTHETIC HEART VALVE: ICD-10-CM

## 2021-02-03 DIAGNOSIS — I48.21 PERMANENT ATRIAL FIBRILLATION: ICD-10-CM

## 2021-02-03 DIAGNOSIS — K56.7 ILEUS, UNSPECIFIED: ICD-10-CM

## 2021-02-03 DIAGNOSIS — Z95.810 PRESENCE OF AUTOMATIC (IMPLANTABLE) CARDIAC DEFIBRILLATOR: ICD-10-CM

## 2021-02-03 DIAGNOSIS — N17.9 ACUTE KIDNEY FAILURE, UNSPECIFIED: ICD-10-CM

## 2021-02-03 DIAGNOSIS — D64.9 ANEMIA, UNSPECIFIED: ICD-10-CM

## 2021-02-03 DIAGNOSIS — M19.90 UNSPECIFIED OSTEOARTHRITIS, UNSPECIFIED SITE: ICD-10-CM

## 2021-02-03 DIAGNOSIS — Z86.718 PERSONAL HISTORY OF OTHER VENOUS THROMBOSIS AND EMBOLISM: ICD-10-CM

## 2021-02-03 DIAGNOSIS — J44.9 CHRONIC OBSTRUCTIVE PULMONARY DISEASE, UNSPECIFIED: ICD-10-CM

## 2021-02-03 DIAGNOSIS — Z87.891 PERSONAL HISTORY OF NICOTINE DEPENDENCE: ICD-10-CM

## 2021-02-03 DIAGNOSIS — E43 UNSPECIFIED SEVERE PROTEIN-CALORIE MALNUTRITION: ICD-10-CM

## 2021-02-03 DIAGNOSIS — Z79.01 LONG TERM (CURRENT) USE OF ANTICOAGULANTS: ICD-10-CM

## 2021-02-03 DIAGNOSIS — Z79.51 LONG TERM (CURRENT) USE OF INHALED STEROIDS: ICD-10-CM

## 2021-02-03 DIAGNOSIS — I10 ESSENTIAL (PRIMARY) HYPERTENSION: ICD-10-CM

## 2021-02-03 DIAGNOSIS — K21.9 GASTRO-ESOPHAGEAL REFLUX DISEASE WITHOUT ESOPHAGITIS: ICD-10-CM

## 2021-02-03 DIAGNOSIS — E11.51 TYPE 2 DIABETES MELLITUS WITH DIABETIC PERIPHERAL ANGIOPATHY WITHOUT GANGRENE: ICD-10-CM

## 2021-02-04 ENCOUNTER — RESULT REVIEW (OUTPATIENT)
Age: 86
End: 2021-02-04

## 2021-02-04 ENCOUNTER — APPOINTMENT (OUTPATIENT)
Dept: HEMATOLOGY ONCOLOGY | Facility: CLINIC | Age: 86
End: 2021-02-04
Payer: MEDICARE

## 2021-02-04 DIAGNOSIS — C18.8 MALIGNANT NEOPLASM OF OVERLAPPING SITES OF COLON: ICD-10-CM

## 2021-02-04 LAB
BASOPHILS # BLD AUTO: 0.1 K/UL — SIGNIFICANT CHANGE UP (ref 0–0.2)
BASOPHILS NFR BLD AUTO: 0.9 % — SIGNIFICANT CHANGE UP (ref 0–2)
EOSINOPHIL # BLD AUTO: 0.1 K/UL — SIGNIFICANT CHANGE UP (ref 0–0.5)
EOSINOPHIL NFR BLD AUTO: 1 % — SIGNIFICANT CHANGE UP (ref 0–6)
HCT VFR BLD CALC: 33.1 % — LOW (ref 39–50)
HGB BLD-MCNC: 9.5 G/DL — LOW (ref 13–17)
LYMPHOCYTES # BLD AUTO: 1.4 K/UL — SIGNIFICANT CHANGE UP (ref 1–3.3)
LYMPHOCYTES # BLD AUTO: 13 % — SIGNIFICANT CHANGE UP (ref 13–44)
MCHC RBC-ENTMCNC: 24.8 PG — LOW (ref 27–34)
MCHC RBC-ENTMCNC: 28.8 G/DL — LOW (ref 32–36)
MCV RBC AUTO: 86.3 FL — SIGNIFICANT CHANGE UP (ref 80–100)
MONOCYTES # BLD AUTO: 1.2 K/UL — HIGH (ref 0–0.9)
MONOCYTES NFR BLD AUTO: 11.5 % — SIGNIFICANT CHANGE UP (ref 2–14)
NEUTROPHILS # BLD AUTO: 7.9 K/UL — HIGH (ref 1.8–7.4)
NEUTROPHILS NFR BLD AUTO: 73.7 % — SIGNIFICANT CHANGE UP (ref 43–77)
PLATELET # BLD AUTO: 259 K/UL — SIGNIFICANT CHANGE UP (ref 150–400)
RBC # BLD: 3.83 M/UL — LOW (ref 4.2–5.8)
RBC # FLD: 18.4 % — HIGH (ref 10.3–14.5)
WBC # BLD: 10.7 K/UL — HIGH (ref 3.8–10.5)
WBC # FLD AUTO: 10.7 K/UL — HIGH (ref 3.8–10.5)

## 2021-02-04 PROCEDURE — 99215 OFFICE O/P EST HI 40 MIN: CPT

## 2021-02-04 NOTE — RESULTS/DATA
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,\par measuring 3.7 cm\par - Carcinoma invades the muscularis propria\par - Separate tubulovillous adenoma, measuring 4.3 cm\par - The surgical margins are negative\par - 21 negative lymph nodes\par - Negative omentum\par - Fibrotic appendix\par \par Procedure: Right colon, transverse, terminal ileum, omentum and\par lymph nodes, excision\par Macroscopic Evaluation of Mesorectum: Not applicable\par TUMOR\par Tumor Site: Ascending colon\par Histologic Type: Mucinous adenocarcinoma\par Histologic Grade: G2: Moderately differentiated\par Tumor Size: Greatest dimension (Centimeters) - 3.7 cm\par Tumor Extension: Tumor invades muscularis propria\par Macroscopic Tumor Perforation: Not identified\par Lymphovascular Invasion: Not identified\par Perineural Invasion: Not identified\par Treatment Effect: No known presurgical therapy\par MARGINS\par Margins: All margins are uninvolved by invasive carcinoma\par Margins Examined: Proximal, Distal, Radial (circumferential)\par Distance of Tumor from Radial (circumferential) Margin: 2.0 cm\par LYMPH NODES\par Number of Lymph Nodes Involved: 0\par Number of Lymph Nodes Examined: 21\par Tumor Deposits: Not identified\par Primary Tumor (pT): pT2\par Regional Lymph Nodes (pN): pN0\par Distant Metastasis (pM): Not applicable - pM cannot be determined\par \par 12/22/20 PET:  . No focal FDG avidity corresponding to HEPATIC lesion seen on CT scan, which remains incompletely characterized.  1.  FDG avid lesion in the cecum, consistent with known malignancy.  Additional subtle area of soft tissue thickening mid transverse colon, possibly synchronous colon cancer. Suggest correlation with colonoscopy findings.  No focal FDG avidity corresponding to lesion seen on CT scan which is incompletely characterized.  Small foci of air nondependent portion of the urinary bladder, new since CT December 9, 2020. Suggest correlation with recent instrumentation.\par \par 12/9/20 CT head:  NO abnormal enhancement to suggest intracranial metastatic disease.\par \par 12/9/20 CT C/A/P:\par Focal masslike thickening at the base of the cecum measuring 2.9 x 1.6 cm likely correlating to known malignancy. Focal soft tissue density in the mid transverse colon measuring 3.1 x 2.5 cm likely correlating to the known malignancy.  Indeterminate right hepatic lesion. No other evidence of metastatic disease in the chest, abdomen or pelvis.\par \par 12/7/20 Pathology:\par Cecal mass, biopsy: Fragments of adenocarcinoma.\par Transverse colon mass, biopsy:  Fragments of tubular adenoma with high-grade glandular dysplasia.\par Transverse colon polyp, biopsy:  Tubular adenoma.\par Sigmoid colon polyp, biopsy:  Tubulovillous adenoma.
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
## 2021-02-04 NOTE — ASSESSMENT
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

## 2021-02-05 ENCOUNTER — APPOINTMENT (OUTPATIENT)
Dept: INTERNAL MEDICINE | Facility: CLINIC | Age: 86
End: 2021-02-05
Payer: MEDICARE

## 2021-02-05 VITALS
BODY MASS INDEX: 24.1 KG/M2 | SYSTOLIC BLOOD PRESSURE: 115 MMHG | HEIGHT: 68 IN | WEIGHT: 159 LBS | HEART RATE: 80 BPM | RESPIRATION RATE: 18 BRPM | TEMPERATURE: 97.2 F | DIASTOLIC BLOOD PRESSURE: 60 MMHG | OXYGEN SATURATION: 98 %

## 2021-02-05 DIAGNOSIS — D64.9 ANEMIA, UNSPECIFIED: ICD-10-CM

## 2021-02-05 DIAGNOSIS — C18.2 MALIGNANT NEOPLASM OF ASCENDING COLON: ICD-10-CM

## 2021-02-05 DIAGNOSIS — Z87.898 PERSONAL HISTORY OF OTHER SPECIFIED CONDITIONS: ICD-10-CM

## 2021-02-05 PROCEDURE — 99495 TRANSJ CARE MGMT MOD F2F 14D: CPT

## 2021-02-05 NOTE — PHYSICAL EXAM
[No Acute Distress] : no acute distress [Normal Sclera/Conjunctiva] : normal sclera/conjunctiva [No JVD] : no jugular venous distention [No Respiratory Distress] : no respiratory distress  [No Accessory Muscle Use] : no accessory muscle use [Clear to Auscultation] : lungs were clear to auscultation bilaterally [Normal Rate] : normal rate  [Regular Rhythm] : with a regular rhythm [No Edema] : there was no peripheral edema [Soft] : abdomen soft [Non Tender] : non-tender [Non-distended] : non-distended [No Focal Deficits] : no focal deficits [de-identified] : sl. pale [de-identified] : Wounds stapled without signs of infection [de-identified] : Mildly depressed and anxious affect

## 2021-02-05 NOTE — ASSESSMENT
[FreeTextEntry1] : Patient relatively stable postoperatively although the patient does not feel this way with fatigue, decreased appetite and poor feeling.\par Blood work done yesterday showed hemoglobin slightly lower decreasing from 10 to 9.5. This is likely postoperative period\par \par Definitely feel psychological component to the ay the patient is feeling including some definite anxiety with some depression which should improve over time.\par Would like to avoid a daily medication such as Lexapro but will give Xanax 0.25 mg b.i.d. p.r.n. for anxiety.\par \par Patient told how well he is doing and hopefully he feels that way also.\par \par Patient has followup with surgery next week.\par \par start iron daily to help anemia.\par Followup CBC in 2 weeks.

## 2021-02-05 NOTE — HISTORY OF PRESENT ILLNESS
[Admitted on: ___] : The patient was admitted on [unfilled] [Discharged on ___] : discharged on [unfilled] [Discharge Summary] : discharge summary [Pertinent Labs] : pertinent labs [Radiology Findings] : radiology findings [Discharge Med List] : discharge medication list [Patient Contacted By: ____] : and contacted by [unfilled] [FreeTextEntry2] : The patient presents with his wife for post hospital visit where he was admitted to [facility] electively on January 20, 2021 secondary to colon cancer in his cecum and transverse polyp.\par Patient had a laparoscopic extended right hemicolectomy.\par Patient had no intraoperative complications.\par Postop complicated by ileus which gradually resolved and patient's diet was advanced.\par Patient stable for discharge on January 27, 2021.\par Medications are same as prior to admission. \par \par The patient states that he was doing great in the hospital ambulating without any issues and starting to eat better.\par He now states that she's been home he's doing "terrible" with fatigue, no energy, eating poorly and down on himself.\par The patient's family attempts to motivate him but he seems disinterested.\par No significant abdominal pain\par Moving his bowels loose but not watery. No nausea or vomiting\par He still complains of some vertigo which he had prior to his surgery.\par \par The patient's family as well as home care personnel feels he is very anxious very\par \par He saw oncology yesterday with blood work done.

## 2021-02-05 NOTE — REVIEW OF SYSTEMS
[Fatigue] : fatigue [Recent Change In Weight] : ~T recent weight change [Dyspnea on Exertion] : dyspnea on exertion [Diarrhea] : diarrhea [Frequency] : frequency [Joint Pain] : joint pain [Back Pain] : back pain [Dizziness] : dizziness [Unsteady Walk] : ataxia [Anxiety] : anxiety [Negative] : Heme/Lymph [Fever] : no fever [Chills] : no chills [Hot Flashes] : no hot flashes [Night Sweats] : no night sweats [Abdominal Pain] : no abdominal pain [Nausea] : no nausea [Constipation] : no constipation [Vomiting] : no vomiting [Heartburn] : no heartburn [Melena] : no melena

## 2021-02-10 ENCOUNTER — APPOINTMENT (OUTPATIENT)
Dept: INTERNAL MEDICINE | Facility: CLINIC | Age: 86
End: 2021-02-10
Payer: MEDICARE

## 2021-02-12 ENCOUNTER — APPOINTMENT (OUTPATIENT)
Dept: COLORECTAL SURGERY | Facility: CLINIC | Age: 86
End: 2021-02-12
Payer: MEDICARE

## 2021-02-12 VITALS
WEIGHT: 157 LBS | DIASTOLIC BLOOD PRESSURE: 100 MMHG | OXYGEN SATURATION: 98 % | TEMPERATURE: 98 F | RESPIRATION RATE: 17 BRPM | BODY MASS INDEX: 23.79 KG/M2 | SYSTOLIC BLOOD PRESSURE: 160 MMHG | HEIGHT: 68 IN | HEART RATE: 80 BPM

## 2021-02-12 DIAGNOSIS — Z09 ENCOUNTER FOR FOLLOW-UP EXAMINATION AFTER COMPLETED TREATMENT FOR CONDITIONS OTHER THAN MALIGNANT NEOPLASM: ICD-10-CM

## 2021-02-12 PROCEDURE — 99024 POSTOP FOLLOW-UP VISIT: CPT

## 2021-02-12 NOTE — HISTORY OF PRESENT ILLNESS
[FreeTextEntry1] : Mr. Pedraza presents to the office for a postoperative visit after undergoing a laparoscopic right colectomy on January 20, 2021 for a T2 N0 M0 colon cancer.  He recovered well from surgery and is here now for follow-up.  His main complaint is in regards to ongoing postoperative fatigue and weakness.  He denies any issues with p.o. intake though is uncertain of what he is allowed to eat in the postoperative timeframe.  Bowel movements have been passed 2-3 times and are noted to be of solid consistency.  He denies any abdominal pain.

## 2021-02-12 NOTE — ASSESSMENT
[FreeTextEntry1] : Doing well overall in the post operative timeframe.\par Liberalize diet though adhere to diabetic restrictions.  Aim for high protein, low fat and low sugar options.\par Reassured with regards to fatigue and weakness, and that his energy levels will slowly return.\par Follow-up Dr. Zamora in 6 months time.

## 2021-02-12 NOTE — PHYSICAL EXAM
[No Rash or Lesion] : No rash or lesion [Alert] : alert [Oriented to Person] : oriented to person [Oriented to Place] : oriented to place [Oriented to Time] : oriented to time [Calm] : calm [de-identified] : Incision clean/dry and intact; soft, nontender and nondistended [de-identified] : Normocephalic atraumatic [de-identified] : No apparent distress [de-identified] : Moving all extremities x4

## 2021-02-18 LAB
ALBUMIN SERPL ELPH-MCNC: 3.6 G/DL
ALP BLD-CCNC: 59 U/L
ALT SERPL-CCNC: 29 U/L
ANION GAP SERPL CALC-SCNC: 12 MMOL/L
AST SERPL-CCNC: 41 U/L
BILIRUB SERPL-MCNC: 0.5 MG/DL
BUN SERPL-MCNC: 19 MG/DL
CALCIUM SERPL-MCNC: 8.6 MG/DL
CEA SERPL-MCNC: 5 NG/ML
CHLORIDE SERPL-SCNC: 100 MMOL/L
CO2 SERPL-SCNC: 24 MMOL/L
CREAT SERPL-MCNC: 1.21 MG/DL
GLUCOSE SERPL-MCNC: 211 MG/DL
MAGNESIUM SERPL-MCNC: 1.4 MG/DL
POTASSIUM SERPL-SCNC: 3.7 MMOL/L
PROT SERPL-MCNC: 6.3 G/DL
SODIUM SERPL-SCNC: 136 MMOL/L

## 2021-02-24 ENCOUNTER — APPOINTMENT (OUTPATIENT)
Dept: NEUROLOGY | Facility: CLINIC | Age: 86
End: 2021-02-24
Payer: MEDICARE

## 2021-02-24 VITALS — TEMPERATURE: 98.6 F | BODY MASS INDEX: 23.79 KG/M2 | WEIGHT: 157 LBS | HEIGHT: 68 IN

## 2021-02-24 PROCEDURE — 99205 OFFICE O/P NEW HI 60 MIN: CPT

## 2021-02-25 ENCOUNTER — LABORATORY RESULT (OUTPATIENT)
Age: 86
End: 2021-02-25

## 2021-02-26 LAB
BASOPHILS # BLD AUTO: 0.02 K/UL
BASOPHILS NFR BLD AUTO: 0.3 %
EOSINOPHIL # BLD AUTO: 0.2 K/UL
EOSINOPHIL NFR BLD AUTO: 2.7 %
HCT VFR BLD CALC: 33.2 %
HGB BLD-MCNC: 9.8 G/DL
IMM GRANULOCYTES NFR BLD AUTO: 0.3 %
LYMPHOCYTES # BLD AUTO: 1.63 K/UL
LYMPHOCYTES NFR BLD AUTO: 21.9 %
MAN DIFF?: NORMAL
MCHC RBC-ENTMCNC: 25.5 PG
MCHC RBC-ENTMCNC: 29.5 GM/DL
MCV RBC AUTO: 86.2 FL
MONOCYTES # BLD AUTO: 0.71 K/UL
MONOCYTES NFR BLD AUTO: 9.5 %
NEUTROPHILS # BLD AUTO: 4.86 K/UL
NEUTROPHILS NFR BLD AUTO: 65.3 %
PLATELET # BLD AUTO: 185 K/UL
RBC # BLD: 3.85 M/UL
RBC # FLD: 20.2 %
WBC # FLD AUTO: 7.44 K/UL

## 2021-02-28 ENCOUNTER — NON-APPOINTMENT (OUTPATIENT)
Age: 86
End: 2021-02-28

## 2021-03-02 ENCOUNTER — APPOINTMENT (OUTPATIENT)
Dept: ENDOCRINOLOGY | Facility: CLINIC | Age: 86
End: 2021-03-02

## 2021-03-16 ENCOUNTER — NON-APPOINTMENT (OUTPATIENT)
Age: 86
End: 2021-03-16

## 2021-03-19 ENCOUNTER — NON-APPOINTMENT (OUTPATIENT)
Age: 86
End: 2021-03-19

## 2021-04-05 ENCOUNTER — NON-APPOINTMENT (OUTPATIENT)
Age: 86
End: 2021-04-05

## 2021-04-09 ENCOUNTER — APPOINTMENT (OUTPATIENT)
Dept: NEUROLOGY | Facility: CLINIC | Age: 86
End: 2021-04-09

## 2021-04-15 NOTE — H&P PST ADULT - AIRWAY
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

## 2021-04-20 ENCOUNTER — RX RENEWAL (OUTPATIENT)
Age: 86
End: 2021-04-20

## 2021-05-05 LAB
ALBUMIN SERPL ELPH-MCNC: 4 G/DL
ALP BLD-CCNC: 62 U/L
ALT SERPL-CCNC: 13 U/L
ANION GAP SERPL CALC-SCNC: 12 MMOL/L
AST SERPL-CCNC: 18 U/L
BASOPHILS # BLD AUTO: 0.02 K/UL
BASOPHILS NFR BLD AUTO: 0.3 %
BILIRUB SERPL-MCNC: 0.3 MG/DL
BUN SERPL-MCNC: 28 MG/DL
CALCIUM SERPL-MCNC: 9.3 MG/DL
CHLORIDE SERPL-SCNC: 101 MMOL/L
CO2 SERPL-SCNC: 25 MMOL/L
CREAT SERPL-MCNC: 1.07 MG/DL
EOSINOPHIL # BLD AUTO: 0.17 K/UL
EOSINOPHIL NFR BLD AUTO: 2.3 %
GLUCOSE SERPL-MCNC: 279 MG/DL
HCT VFR BLD CALC: 38 %
HGB BLD-MCNC: 12.3 G/DL
IMM GRANULOCYTES NFR BLD AUTO: 0.5 %
LYMPHOCYTES # BLD AUTO: 1.34 K/UL
LYMPHOCYTES NFR BLD AUTO: 17.8 %
MAGNESIUM SERPL-MCNC: 2 MG/DL
MAN DIFF?: NORMAL
MCHC RBC-ENTMCNC: 29.9 PG
MCHC RBC-ENTMCNC: 32.4 GM/DL
MCV RBC AUTO: 92.5 FL
MONOCYTES # BLD AUTO: 0.76 K/UL
MONOCYTES NFR BLD AUTO: 10.1 %
NEUTROPHILS # BLD AUTO: 5.2 K/UL
NEUTROPHILS NFR BLD AUTO: 69 %
PLATELET # BLD AUTO: 180 K/UL
POTASSIUM SERPL-SCNC: 4.3 MMOL/L
PROT SERPL-MCNC: 6.5 G/DL
RBC # BLD: 4.11 M/UL
RBC # FLD: 17.8 %
SODIUM SERPL-SCNC: 137 MMOL/L
WBC # FLD AUTO: 7.53 K/UL

## 2021-05-07 ENCOUNTER — NON-APPOINTMENT (OUTPATIENT)
Age: 86
End: 2021-05-07

## 2021-05-11 ENCOUNTER — RESULT CHARGE (OUTPATIENT)
Age: 86
End: 2021-05-11

## 2021-05-12 ENCOUNTER — APPOINTMENT (OUTPATIENT)
Dept: INTERNAL MEDICINE | Facility: CLINIC | Age: 86
End: 2021-05-12
Payer: MEDICARE

## 2021-05-12 VITALS
DIASTOLIC BLOOD PRESSURE: 80 MMHG | BODY MASS INDEX: 25.24 KG/M2 | HEART RATE: 72 BPM | RESPIRATION RATE: 14 BRPM | TEMPERATURE: 97.6 F | SYSTOLIC BLOOD PRESSURE: 130 MMHG | WEIGHT: 166 LBS

## 2021-05-12 PROCEDURE — 69209 REMOVE IMPACTED EAR WAX UNI: CPT

## 2021-05-12 PROCEDURE — 99213 OFFICE O/P EST LOW 20 MIN: CPT | Mod: 25

## 2021-05-12 NOTE — HISTORY OF PRESENT ILLNESS
[FreeTextEntry8] : Patient presents stating he needs his ears cleaned, he has been using debrox\par -h/o wax impaction

## 2021-05-12 NOTE — ASSESSMENT
[FreeTextEntry1] : Status post irrigation with excellent results. Patient will return to the office as scheduled for regular followup\par \par \par Dr. Burns was present in office building while I examined patient\par

## 2021-05-12 NOTE — PHYSICAL EXAM
[de-identified] : +Cerumen impaction bilateral status post irrigation with excellent results, patient tolerated well

## 2021-06-03 ENCOUNTER — NON-APPOINTMENT (OUTPATIENT)
Age: 86
End: 2021-06-03

## 2021-06-17 NOTE — PATIENT PROFILE ADULT - NSASFALLATTEMPTOOB_GEN_A_NUR
Preoperative Exam    Scheduled Procedure:Left Knee ACL, MCL, and Meniscus repair   Surgery Date:  5/7/2021  Surgery Location: [facility] [place] Fax #[phone]   Surgeon:  Dr. BECERRA     Assessment/Plan:     1. Pre-op exam  - Pregnancy, Urine    2. Injury of left knee, sequela     Surgical Procedure Risk: Low (reported cardiac risk generally < 1%)  Have you had prior anesthesia?: Yes  Have you or any family members had a previous anesthesia reaction: No  Do you or any family members have a history of a clotting or bleeding disorder?:  No    APPROVAL GIVEN to proceed with proposed procedure, without further diagnostic evaluation    Functional Status: Age Appropriate [place]  Patient plans to recover at home with family.  Do you have any concerns regarding care after surgery?: No     Subjective:      Nelida Kline is a 18 y.o. female who presents for a preoperative consultation.  She is having left knee repair including ACL, MCL, and meniscus. She is otherwise well. She has had right knee surgery previously without difficulty of anesthesia.     All other systems reviewed and are negative, other than those listed in the HPI.    Pertinent History  Any croup, wheezing or respiratory illness in the past 3 weeks?:  No  History of obstructive sleep apnea: No  Steroid use in the last 6 months: No  Any ibuprofen, NSAID or aspirin use in the last 2 weeks?: No  Prior Blood Transfusion: No  Prior Blood Transfusion Reaction: No  If for some reason prior to, during or after the procedure, if it is medically indicated, would you be willing to have a blood transfusion?:  There is no transfusion refusal.  Any exposure in the past 3 weeks to chicken pox, Fifth disease, whooping cough, measles, tuberculosis?: No    No current outpatient medications on file.     No current facility-administered medications for this visit.         No Known Allergies    Patient Active Problem List   Diagnosis      "Patellofemoral Syndrome Of The Left Knee     Allergy to cats     Allergy to dog dander       Past Medical History:   Diagnosis Date     Toe fracture        Past Surgical History:   Procedure Laterality Date     negative         Social History     Socioeconomic History     Marital status: Single     Spouse name: Not on file     Number of children: Not on file     Years of education: Not on file     Highest education level: Not on file   Occupational History     Not on file   Social Needs     Financial resource strain: Not on file     Food insecurity     Worry: Not on file     Inability: Not on file     Transportation needs     Medical: Not on file     Non-medical: Not on file   Tobacco Use     Smoking status: Never Smoker     Smokeless tobacco: Never Used   Substance and Sexual Activity     Alcohol use: Never     Frequency: Never     Binge frequency: Never     Drug use: Never     Sexual activity: Never   Lifestyle     Physical activity     Days per week: Not on file     Minutes per session: Not on file     Stress: Not on file   Relationships     Social connections     Talks on phone: Not on file     Gets together: Not on file     Attends Oriental orthodox service: Not on file     Active member of club or organization: Not on file     Attends meetings of clubs or organizations: Not on file     Relationship status: Not on file     Intimate partner violence     Fear of current or ex partner: Not on file     Emotionally abused: Not on file     Physically abused: Not on file     Forced sexual activity: Not on file   Other Topics Concern     Not on file   Social History Narrative    [facility]       Patient Care Team:  Provider, No Primary Care as PCP - General  Mariola Cardenas MD as Assigned PCP      Objective:     Vitals:    04/29/21 1512   BP: 105/61   Pulse: 83   Resp: 15   Temp: 98.2  F (36.8  C)   TempSrc: Oral   SpO2: 99%   Weight: 122 lb 12.8 oz (55.7 kg)   Height: 5' 4.5\" (1.638 m)   LMP: 04/08/2021       Physical " Exam:  Constitutional: Appears well-developed and well-nourished.   HEENT: Head: Normocephalic.    Right Ear: Tympanic membrane, external ear and canal normal.    Left Ear: Tympanic membrane, external ear and canal normal.    Nose: Nose normal.    Mouth/Throat: Mucous membranes are moist. Oropharynx is clear.    Eyes: Conjunctivae and lids are normal. Pupils are equal, round, and reactive to light.   Neck: Neck supple. No tenderness is present.   Cardiovascular: Normal rate and regular rhythm. No murmur heard.  Pulmonary/Chest: Effort normal and breath sounds normal. There is normal air entry. Breast development is normal.   Abdominal: Soft. There is no hepatosplenomegaly. Soft, non-tender, non-distended.   Musculoskeletal: Normal range of motion. Normal strength and tone. No gross abnormalities.   Neurological: Alert. Gait normal other than mild guarding of left knee.   Psychiatric: Normal mood and affect, speech and behavior normal.  Skin: Clear. No rashes.       Patient Instructions   Do not take any ibuprofen in the 10 days before surgery. Tylenol is ok.    Take pre-op form with you on the day of your procedure.     Call your surgeon if you have any questions before then.     If you are sick, you may need to be re-evaluated here prior to your procedure.         Labs:  Recent Results (from the past 24 hour(s))   Pregnancy, Urine    Collection Time: 04/29/21  3:32 PM   Result Value Ref Range    Pregnancy Test, Urine Negative Negative       Immunization History   Administered Date(s) Administered     Dtap 2003, 2003, 2003, 06/24/2004, 08/05/2008     HPV Quadrivalent 04/18/2014, 04/03/2015, 08/25/2015     Hep A, historic 05/16/2007, 08/05/2008     Hep B, historic 2003, 2003, 06/24/2004     Hib (PRP-OMP) 2003, 2003, 06/24/2004     IPV 2003, 2003, 2003, 05/16/2007     Influenza R6i9-05, 11/30/2009, 03/17/2010     Influenza, inj, historic,unspecified  10/30/2008, 12/12/2008     MMR 03/26/2004, 08/05/2008     Meningococcal MCV4O 04/18/2014     Meningococcal MCV4P 08/27/2020     Pneumo Conj 7-V(before 2010) 2003, 2003, 2003, 03/18/2005     Tdap 04/02/2013     Varicella 03/26/2004, 08/05/2008         Electronically signed by Sola Saenz MD 04/29/21 3:08 PM     no

## 2021-07-23 NOTE — PATIENT PROFILE ADULT. - NS PRO ABUSE SCREEN SUSPICION NEGLECT YN
verified. Pt notified of mammogram results and understanding vocalized.
"AFTER YOUR CYSTOSCOPY  ?  ?  You have just completed a cystoscopy, or \"cysto\", which allowed your physician to learn more about your bladder (or to remove a stent placed after surgery). We suggest that you continue to avoid caffeine, fruit juice, and alcohol for the next 24 hours, however, you are encouraged to return to your normal activities.  ?  ?  A few things that are considered normal after your cystoscopy:  ?  * small amount of bleeding (or spotting) that clears within the next 24 hours  ?  * slight burning sensation with urination  ?  * sensation of needing to void (urinate) more frequently  ?  * the feeling of \"air\" in your urine  ?  * mild discomfort that is relieved with Tylenol    * bladder spasms  ?  ?  ?  Please contact our office promptly if you:  ?  * develop a fever above 101 degrees  ?  * are unable to urinate  ?  * develop bright red blood that does not stop  ?  * experience severe pain or swelling  ?  ?  ?  And of course, please contact our office with any concerns or questions [phone]  ?      "
Patient returning nurse phone call
no

## 2021-07-30 ENCOUNTER — RESULT REVIEW (OUTPATIENT)
Age: 86
End: 2021-07-30

## 2021-07-30 ENCOUNTER — OUTPATIENT (OUTPATIENT)
Dept: OUTPATIENT SERVICES | Facility: HOSPITAL | Age: 86
LOS: 1 days | End: 2021-07-30
Payer: MEDICARE

## 2021-07-30 ENCOUNTER — NON-APPOINTMENT (OUTPATIENT)
Age: 86
End: 2021-07-30

## 2021-07-30 ENCOUNTER — APPOINTMENT (OUTPATIENT)
Dept: HEMATOLOGY ONCOLOGY | Facility: CLINIC | Age: 86
End: 2021-07-30
Payer: MEDICARE

## 2021-07-30 VITALS
TEMPERATURE: 97.3 F | BODY MASS INDEX: 25.61 KG/M2 | OXYGEN SATURATION: 96 % | DIASTOLIC BLOOD PRESSURE: 62 MMHG | HEART RATE: 78 BPM | HEIGHT: 68 IN | SYSTOLIC BLOOD PRESSURE: 127 MMHG | WEIGHT: 169 LBS

## 2021-07-30 DIAGNOSIS — Z98.41 CATARACT EXTRACTION STATUS, RIGHT EYE: Chronic | ICD-10-CM

## 2021-07-30 DIAGNOSIS — Z98.890 OTHER SPECIFIED POSTPROCEDURAL STATES: Chronic | ICD-10-CM

## 2021-07-30 DIAGNOSIS — Z95.3 PRESENCE OF XENOGENIC HEART VALVE: Chronic | ICD-10-CM

## 2021-07-30 DIAGNOSIS — Z95.1 PRESENCE OF AORTOCORONARY BYPASS GRAFT: Chronic | ICD-10-CM

## 2021-07-30 DIAGNOSIS — Z90.49 ACQUIRED ABSENCE OF OTHER SPECIFIED PARTS OF DIGESTIVE TRACT: Chronic | ICD-10-CM

## 2021-07-30 DIAGNOSIS — Z98.89 OTHER SPECIFIED POSTPROCEDURAL STATES: Chronic | ICD-10-CM

## 2021-07-30 DIAGNOSIS — Z98.61 CORONARY ANGIOPLASTY STATUS: Chronic | ICD-10-CM

## 2021-07-30 DIAGNOSIS — C18.9 MALIGNANT NEOPLASM OF COLON, UNSPECIFIED: ICD-10-CM

## 2021-07-30 DIAGNOSIS — Z98.42 CATARACT EXTRACTION STATUS, LEFT EYE: Chronic | ICD-10-CM

## 2021-07-30 DIAGNOSIS — Z95.810 PRESENCE OF AUTOMATIC (IMPLANTABLE) CARDIAC DEFIBRILLATOR: Chronic | ICD-10-CM

## 2021-07-30 DIAGNOSIS — Z95.0 PRESENCE OF CARDIAC PACEMAKER: Chronic | ICD-10-CM

## 2021-07-30 DIAGNOSIS — Z96.60 PRESENCE OF UNSPECIFIED ORTHOPEDIC JOINT IMPLANT: Chronic | ICD-10-CM

## 2021-07-30 LAB
BASOPHILS # BLD AUTO: 0.02 K/UL — SIGNIFICANT CHANGE UP (ref 0–0.2)
BASOPHILS NFR BLD AUTO: 0.3 % — SIGNIFICANT CHANGE UP (ref 0–2)
EOSINOPHIL # BLD AUTO: 0.17 K/UL — SIGNIFICANT CHANGE UP (ref 0–0.5)
EOSINOPHIL NFR BLD AUTO: 2.4 % — SIGNIFICANT CHANGE UP (ref 0–6)
HCT VFR BLD CALC: 40.8 % — SIGNIFICANT CHANGE UP (ref 39–50)
HGB BLD-MCNC: 13.4 G/DL — SIGNIFICANT CHANGE UP (ref 13–17)
IMM GRANULOCYTES NFR BLD AUTO: 0.4 % — SIGNIFICANT CHANGE UP (ref 0–1.5)
LYMPHOCYTES # BLD AUTO: 1.39 K/UL — SIGNIFICANT CHANGE UP (ref 1–3.3)
LYMPHOCYTES # BLD AUTO: 19.5 % — SIGNIFICANT CHANGE UP (ref 13–44)
MCHC RBC-ENTMCNC: 32.7 PG — SIGNIFICANT CHANGE UP (ref 27–34)
MCHC RBC-ENTMCNC: 32.8 GM/DL — SIGNIFICANT CHANGE UP (ref 32–36)
MCV RBC AUTO: 99.5 FL — SIGNIFICANT CHANGE UP (ref 80–100)
MONOCYTES # BLD AUTO: 0.7 K/UL — SIGNIFICANT CHANGE UP (ref 0–0.9)
MONOCYTES NFR BLD AUTO: 9.8 % — SIGNIFICANT CHANGE UP (ref 2–14)
NEUTROPHILS # BLD AUTO: 4.83 K/UL — SIGNIFICANT CHANGE UP (ref 1.8–7.4)
NEUTROPHILS NFR BLD AUTO: 67.6 % — SIGNIFICANT CHANGE UP (ref 43–77)
NRBC # BLD: 0 /100 WBCS — SIGNIFICANT CHANGE UP (ref 0–0)
PLATELET # BLD AUTO: 151 K/UL — SIGNIFICANT CHANGE UP (ref 150–400)
RBC # BLD: 4.1 M/UL — LOW (ref 4.2–5.8)
RBC # FLD: 13.5 % — SIGNIFICANT CHANGE UP (ref 10.3–14.5)
WBC # BLD: 7.14 K/UL — SIGNIFICANT CHANGE UP (ref 3.8–10.5)
WBC # FLD AUTO: 7.14 K/UL — SIGNIFICANT CHANGE UP (ref 3.8–10.5)

## 2021-07-30 PROCEDURE — 84466 ASSAY OF TRANSFERRIN: CPT

## 2021-07-30 PROCEDURE — 83550 IRON BINDING TEST: CPT

## 2021-07-30 PROCEDURE — 83540 ASSAY OF IRON: CPT

## 2021-07-30 PROCEDURE — 82378 CARCINOEMBRYONIC ANTIGEN: CPT

## 2021-07-30 PROCEDURE — 85027 COMPLETE CBC AUTOMATED: CPT

## 2021-07-30 PROCEDURE — 99215 OFFICE O/P EST HI 40 MIN: CPT

## 2021-07-30 PROCEDURE — 82728 ASSAY OF FERRITIN: CPT

## 2021-07-30 PROCEDURE — 80053 COMPREHEN METABOLIC PANEL: CPT

## 2021-07-30 RX ORDER — TRAMADOL HYDROCHLORIDE 50 MG/1
50 TABLET, COATED ORAL
Qty: 30 | Refills: 0 | Status: DISCONTINUED | COMMUNITY
Start: 2020-11-25 | End: 2021-07-30

## 2021-07-30 RX ORDER — ALPRAZOLAM 0.25 MG/1
0.25 TABLET ORAL
Qty: 40 | Refills: 0 | Status: DISCONTINUED | COMMUNITY
Start: 2021-02-05 | End: 2021-07-30

## 2021-07-30 RX ORDER — CLOTRIMAZOLE AND BETAMETHASONE DIPROPIONATE 10; .5 MG/G; MG/G
1-0.05 CREAM TOPICAL TWICE DAILY
Qty: 1 | Refills: 0 | Status: DISCONTINUED | COMMUNITY
Start: 2020-11-17 | End: 2021-07-30

## 2021-07-30 RX ORDER — ONDANSETRON 4 MG/1
4 TABLET, ORALLY DISINTEGRATING ORAL EVERY 6 HOURS
Qty: 12 | Refills: 0 | Status: DISCONTINUED | COMMUNITY
Start: 2021-01-31 | End: 2021-07-30

## 2021-07-30 RX ORDER — NEOMYCIN SULFATE 500 MG/1
500 TABLET ORAL
Qty: 6 | Refills: 0 | Status: DISCONTINUED | COMMUNITY
Start: 2020-12-09 | End: 2021-07-30

## 2021-07-30 RX ORDER — METRONIDAZOLE 500 MG/1
500 TABLET ORAL
Qty: 3 | Refills: 0 | Status: DISCONTINUED | COMMUNITY
Start: 2020-12-09 | End: 2021-07-30

## 2021-07-30 NOTE — PHYSICAL EXAM
[Restricted in physically strenuous activity but ambulatory and able to carry out work of a light or sedentary nature] : Status 1- Restricted in physically strenuous activity but ambulatory and able to carry out work of a light or sedentary nature, e.g., light house work, office work [Normal] : affect appropriate [de-identified] : anicteric

## 2021-07-30 NOTE — CONSULT LETTER
[Dear  ___] : Dear  [unfilled], [Consult Letter:] : I had the pleasure of evaluating your patient, [unfilled]. [Please see my note below.] : Please see my note below. [Consult Closing:] : Thank you very much for allowing me to participate in the care of this patient.  If you have any questions, please do not hesitate to contact me. [Sincerely,] : Sincerely, [FreeTextEntry2] : Dr. Jaspal Burns [FreeTextEntry3] : Yusuf Giles MD\par  [DrCheli  ___] : Dr. BLANCHARD [DrCheli ___] : Dr. BLANCHARD

## 2021-07-30 NOTE — REVIEW OF SYSTEMS
[Fatigue] : no fatigue [Recent Change In Weight] : ~T no recent weight change [Dysphagia] : no dysphagia [Odynophagia] : no odynophagia [Chest Pain] : no chest pain [Lower Ext Edema] : no lower extremity edema [Abdominal Pain] : no abdominal pain [Diarrhea] : no diarrhea [Skin Rash] : no skin rash [Anxiety] : no anxiety [Depression] : no depression [Easy Bleeding] : no tendency for easy bleeding [Easy Bruising] : no tendency for easy bruising [Swollen Glands] : no swollen glands
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
## 2021-07-30 NOTE — RESULTS/DATA
[FreeTextEntry1] : Mr. Pedraza is a pleasant 88 year-old man with T2N0M0, stage I colon cancer s/p resection in January, 2021, here to establish care.

## 2021-07-31 LAB
ALBUMIN SERPL ELPH-MCNC: 4.4 G/DL — SIGNIFICANT CHANGE UP (ref 3.3–5)
ALP SERPL-CCNC: 59 U/L — SIGNIFICANT CHANGE UP (ref 40–120)
ALT FLD-CCNC: 16 U/L — SIGNIFICANT CHANGE UP (ref 10–45)
ANION GAP SERPL CALC-SCNC: 11 MMOL/L — SIGNIFICANT CHANGE UP (ref 5–17)
AST SERPL-CCNC: 21 U/L — SIGNIFICANT CHANGE UP (ref 10–40)
BILIRUB SERPL-MCNC: 0.6 MG/DL — SIGNIFICANT CHANGE UP (ref 0.2–1.2)
BUN SERPL-MCNC: 25 MG/DL — HIGH (ref 7–23)
CALCIUM SERPL-MCNC: 9.6 MG/DL — SIGNIFICANT CHANGE UP (ref 8.4–10.5)
CEA SERPL-MCNC: 5.3 NG/ML — HIGH (ref 0–3.8)
CHLORIDE SERPL-SCNC: 102 MMOL/L — SIGNIFICANT CHANGE UP (ref 96–108)
CO2 SERPL-SCNC: 28 MMOL/L — SIGNIFICANT CHANGE UP (ref 22–31)
CREAT SERPL-MCNC: 1.13 MG/DL — SIGNIFICANT CHANGE UP (ref 0.5–1.3)
FERRITIN SERPL-MCNC: 74 NG/ML — SIGNIFICANT CHANGE UP (ref 30–400)
GLUCOSE SERPL-MCNC: 150 MG/DL — HIGH (ref 70–99)
IRON SATN MFR SERPL: 149 UG/DL — SIGNIFICANT CHANGE UP (ref 45–165)
IRON SATN MFR SERPL: 43 % — SIGNIFICANT CHANGE UP (ref 16–55)
POTASSIUM SERPL-MCNC: 4.3 MMOL/L — SIGNIFICANT CHANGE UP (ref 3.5–5.3)
POTASSIUM SERPL-SCNC: 4.3 MMOL/L — SIGNIFICANT CHANGE UP (ref 3.5–5.3)
PROT SERPL-MCNC: 7.1 G/DL — SIGNIFICANT CHANGE UP (ref 6–8.3)
SODIUM SERPL-SCNC: 141 MMOL/L — SIGNIFICANT CHANGE UP (ref 135–145)
TIBC SERPL-MCNC: 343 UG/DL — SIGNIFICANT CHANGE UP (ref 220–430)
TRANSFERRIN SERPL-MCNC: 274 MG/DL — SIGNIFICANT CHANGE UP (ref 200–360)
UIBC SERPL-MCNC: 195 UG/DL — SIGNIFICANT CHANGE UP (ref 110–370)

## 2021-08-02 ENCOUNTER — NON-APPOINTMENT (OUTPATIENT)
Age: 86
End: 2021-08-02

## 2021-08-04 ENCOUNTER — APPOINTMENT (OUTPATIENT)
Dept: HEMATOLOGY ONCOLOGY | Facility: CLINIC | Age: 86
End: 2021-08-04

## 2021-08-09 ENCOUNTER — NON-APPOINTMENT (OUTPATIENT)
Age: 86
End: 2021-08-09

## 2021-08-10 ENCOUNTER — APPOINTMENT (OUTPATIENT)
Dept: ULTRASOUND IMAGING | Facility: CLINIC | Age: 86
End: 2021-08-10
Payer: MEDICARE

## 2021-08-10 ENCOUNTER — RESULT REVIEW (OUTPATIENT)
Age: 86
End: 2021-08-10

## 2021-08-10 ENCOUNTER — NON-APPOINTMENT (OUTPATIENT)
Age: 86
End: 2021-08-10

## 2021-08-10 PROCEDURE — 76770 US EXAM ABDO BACK WALL COMP: CPT

## 2021-08-11 ENCOUNTER — NON-APPOINTMENT (OUTPATIENT)
Age: 86
End: 2021-08-11

## 2021-08-17 NOTE — ASU PATIENT PROFILE, ADULT - PRO ARRIVE FROM
Dk received a referral to this patient for home 02 @ 2 lpm w exertion via nc. Delivered a pull behind POC and a portable tank for pt to DC with. Thank you for the referral.  Electronically signed by Joe Gardner on 8/17/2021 at 3:55 PM  Cell ph# [phone]    NOTE: After 5:00 pm, Weekends, Holidays: Call Marisa[facility] at [phone] to coordinate delivery of home medical equipment. home

## 2021-08-18 ENCOUNTER — APPOINTMENT (OUTPATIENT)
Dept: CT IMAGING | Facility: CLINIC | Age: 86
End: 2021-08-18
Payer: MEDICARE

## 2021-08-18 PROCEDURE — 71260 CT THORAX DX C+: CPT | Mod: MH

## 2021-08-18 PROCEDURE — 74177 CT ABD & PELVIS W/CONTRAST: CPT | Mod: MG

## 2021-08-18 PROCEDURE — G1004: CPT

## 2021-08-20 ENCOUNTER — NON-APPOINTMENT (OUTPATIENT)
Age: 86
End: 2021-08-20

## 2021-09-02 ENCOUNTER — NON-APPOINTMENT (OUTPATIENT)
Age: 86
End: 2021-09-02

## 2021-09-07 NOTE — ED PROVIDER NOTE - EKG ADDITIONAL QUESTION - PERFORMED INDEPENDENT VISUALIZATION
SPOUSE CALLED BACK AGAIN TODAY TO SEE IF RECORDS HAD BEEN FAXED AS PATIENT IS PENDING AN APPT. ASSURED SPOUSE THAT HIS REQUEST HAD BEEN FORWARDED TO MEDICAL RECORDS AND TO ALLOW 24 TO 48 HOURS FOR RECORDS TO BE FAXED.     Yes

## 2021-09-16 ENCOUNTER — APPOINTMENT (OUTPATIENT)
Dept: INTERNAL MEDICINE | Facility: CLINIC | Age: 86
End: 2021-09-16
Payer: MEDICARE

## 2021-09-16 VITALS
WEIGHT: 173 LBS | TEMPERATURE: 97.7 F | BODY MASS INDEX: 26.22 KG/M2 | HEART RATE: 80 BPM | DIASTOLIC BLOOD PRESSURE: 70 MMHG | RESPIRATION RATE: 13 BRPM | SYSTOLIC BLOOD PRESSURE: 123 MMHG | HEIGHT: 68 IN

## 2021-09-16 VITALS
BODY MASS INDEX: 26.22 KG/M2 | TEMPERATURE: 97.7 F | SYSTOLIC BLOOD PRESSURE: 123 MMHG | DIASTOLIC BLOOD PRESSURE: 70 MMHG | HEART RATE: 80 BPM | RESPIRATION RATE: 16 BRPM | WEIGHT: 173 LBS | HEIGHT: 68 IN

## 2021-09-16 DIAGNOSIS — D50.9 IRON DEFICIENCY ANEMIA, UNSPECIFIED: ICD-10-CM

## 2021-09-16 DIAGNOSIS — Z95.2 PRESENCE OF PROSTHETIC HEART VALVE: ICD-10-CM

## 2021-09-16 DIAGNOSIS — J30.9 ALLERGIC RHINITIS, UNSPECIFIED: ICD-10-CM

## 2021-09-16 DIAGNOSIS — M47.812 SPONDYLOSIS W/OUT MYELOPATHY OR RADICULOPATHY, CERVICAL REGION: ICD-10-CM

## 2021-09-16 DIAGNOSIS — I77.9 DISORDER OF ARTERIES AND ARTERIOLES, UNSPECIFIED: ICD-10-CM

## 2021-09-16 DIAGNOSIS — K21.9 GASTRO-ESOPHAGEAL REFLUX DISEASE W/OUT ESOPHAGITIS: ICD-10-CM

## 2021-09-16 PROCEDURE — G0439: CPT

## 2021-09-16 PROCEDURE — 36415 COLL VENOUS BLD VENIPUNCTURE: CPT

## 2021-09-16 PROCEDURE — 90662 IIV NO PRSV INCREASED AG IM: CPT

## 2021-09-16 PROCEDURE — G0008: CPT

## 2021-09-16 NOTE — COUNSELING
[None] : None [Good understanding] : Patient has a good understanding of lifestyle changes and the steps needed to achieve self management goals [de-identified] : continue diet and exercise as able

## 2021-09-16 NOTE — HISTORY OF PRESENT ILLNESS
[FreeTextEntry1] : 88-year-old male with extensive medical history including ASHD/ischemic cardiomyopathy with history of CABG in 2005 and multiple stents,, PVD with stenting to the left leg, type 2 diabetes, moderate aortic stenosis, hypertension and hyperlipidemia for which he is intolerant to statin therapy.\par \par The patient has had multiple significant cardiovascular issues including\par -August 2017 the patient had progressive aortic stenosis and had a TAVR with a permanent pacemaker\par -November 2017 had PCI of his right leg secondary to claudication and February 2018 hadn't of the left leg\par -April 2018 he had non-sustained VT with the addition of Toprol\par -June 2018 he had a cardiac catheterization with PCI to his left main and circumflex arteries.\par -May 2019 stenting to the left main and circumflex \par -abnormal cardiac PET scan July 2019 showing patent LIMA and SVG with moderate disease of the left main and circumflex.\par -Nonsustained VT therefore pacemaker upgraded to a pacemaker/ICD 2019\par Most recently the patient had PTCA to the left main and ostial branch February 2020.\par Cardiac PET scan February 2021 with mild ischemia felt to be low risk\par He followed up with cardiology recently with addition of isosorbide.\par \par The patient was found to have anemia December 2020 with workup showing him to have 2 colon lesions one in the terminal ileum which was positive for moderately differentiated adenocarcinoma and 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.\par The patient had a right hemicolectomy January 2021 and has oncology.\par anemia has been treated with iron and most recent hemoglobin normal at 13.\par \par From a cardiac perspective he is doing better without any chest pain and decreased shortness of breath.\par \par The patient occasionally has right leg pain mainly in his thigh not activity related likely secondary to lumbar spinal stenosis with radiculopathy.\par \par More significantly he is having neck pain and upper back pain secondary to cervical degenerative disc disease for which she has received injections without benefit. \par He continues with issues and now has caused him significant difficulty with range of motion and difficulty raising his head into a normal position. This causes him and ability to drive.\par \par Other significant issue is chronic vertigo with patient has seen specialists and "nobody can do anything for me". He was referred for vestibular therapy but they will unable to do it secondary to cervical spine issues.\par \par The patient also complains of decreased hearing bilaterally.\par \par He has recovered fairly well from his colon surgery but patient is "miserable" secondary to his chronic neck problems and chronic vertigo.

## 2021-09-16 NOTE — ASSESSMENT
[FreeTextEntry1] : 88-year-old male with extensive medical history including ASHD/ischemic cardiopathy, type 2 diabetes with fair control, hypertension which is controlled and hyperlipidemia which is not optimal but patient intolerant to statin therapy.\par \par Patient with significant CAD with multiple stenting most recently February 2020.\par Cardiac PET scan February 2021 with mild ischemia therefore Isosorbide added\par \par The patient is status post TAVR August 2017 with a permanent pacemaker.\par Patient with recent evidence of NSVT therefore now with permanent pacemaker/defibrillator\par \par Colon cancer diagnosed December 2020 with right hemicolectomy January 2021 with success. Postop anemia resolved therefore discontinue daily iron\par \par PVD with status post bilateral leg PCI November 2017 in February 2018.\par \par Chronic musculoskeletal issues including chronic low back pain secondary to lumbar spinal stenosis and more significantly presently neck pain secondary to cervical DDD. Patient given referral for orthopedic evaluation\par \par Patient with chronic vertigo with inability to do vestibular therapy secondary to neck issues.\par \par Postnasal drip with cough therefore start Flonase and Claritin\par \par Plan is to continue present medications with patient to followup with cardiology.\par Followup with specialist as scheduled\par \par Ophthalmology UTD\par Podiatry every 3 months\par Carotid per cardiology\par \par Patient up to date with vaccines including COVID\par High-dose influenza vaccine given left deltoid \par Recieved Shingrix x 2\par \par Venipuncture done today in the office\par Followup in 3to4 months

## 2021-09-16 NOTE — HEALTH RISK ASSESSMENT
[Fair] :  ~his/her~ mood as fair [No] : In the past 12 months have you used drugs other than those required for medical reasons? No [No falls in past year] : Patient reported no falls in the past year [0] : 2) Feeling down, depressed, or hopeless: Not at all (0) [None] : None [With Significant Other] : lives with significant other [# of Members in Household ___] :  household currently consist of [unfilled] member(s) [Retired] : retired [High School] : high school [] :  [# Of Children ___] : has [unfilled] children [Sexually Active] : sexually active [Feels Safe at Home] : Feels safe at home [Fully functional (bathing, dressing, toileting, transferring, walking, feeding)] : Fully functional (bathing, dressing, toileting, transferring, walking, feeding) [Fully functional (using the telephone, shopping, preparing meals, housekeeping, doing laundry, using] : Fully functional and needs no help or supervision to perform IADLs (using the telephone, shopping, preparing meals, housekeeping, doing laundry, using transportation, managing medications and managing finances) [Reports changes in vision] : Reports changes in vision [Smoke Detector] : smoke detector [Carbon Monoxide Detector] : carbon monoxide detector [Seat Belt] :  uses seat belt [Discussed at today's visit] : Advance Directives Discussed at today's visit [Name: ___] : Health Care Proxy's Name: [unfilled]  [Reviewed no changes] : Reviewed, no changes [] : No [Audit-CScore] : 0 [de-identified] : good [de-identified] : golf [[id number]] : 0 [Change in mental status noted] : No change in mental status noted [Reports changes in hearing] : Reports no changes in hearing [Reports changes in dental health] : Reports no changes in dental health [Guns at Home] : no guns at home [Sunscreen] : does not use sunscreen [AdvancecareDate] : 09/21

## 2021-09-16 NOTE — PHYSICAL EXAM
[General Appearance - Alert] : alert [General Appearance - In No Acute Distress] : in no acute distress [Sclera] : the sclera and conjunctiva were normal [Extraocular Movements] : extraocular movements were intact [PERRL With Normal Accommodation] : pupils were equal in size, round, and reactive to light [Outer Ear] : the ears and nose were normal in appearance [Oropharynx] : the oropharynx was normal [Neck Cervical Mass (___cm)] : no neck mass was observed [Jugular Venous Distention Increased] : there was no jugular-venous distention [Thyroid Diffuse Enlargement] : the thyroid was not enlarged [Thyroid Nodule] : there were no palpable thyroid nodules [Auscultation Breath Sounds / Voice Sounds] : lungs were clear to auscultation bilaterally [Heart Rate And Rhythm] : heart rate was normal and rhythm regular [Heart Sounds] : normal S1 and S2 [Heart Sounds Gallop] : no gallops [Heart Sounds Pericardial Friction Rub] : no pericardial rub [Arterial Pulses Carotid] : carotid pulses were normal with no bruits [Abdominal Aorta] : the abdominal aorta was normal [Edema] : there was no peripheral edema [Arterial Pulses Femoral] : femoral pulses were normal without bruits [Veins - Varicosity Changes] : there were no varicosital changes [Bowel Sounds] : normal bowel sounds [Abdomen Soft] : soft [Abdomen Tenderness] : non-tender [Cervical Lymph Nodes Enlarged Posterior Bilaterally] : posterior cervical [Abdomen Mass (___ Cm)] : no abdominal mass palpated [Cervical Lymph Nodes Enlarged Anterior Bilaterally] : anterior cervical [Supraclavicular Lymph Nodes Enlarged Bilaterally] : supraclavicular [Axillary Lymph Nodes Enlarged Bilaterally] : axillary [Femoral Lymph Nodes Enlarged Bilaterally] : femoral [Inguinal Lymph Nodes Enlarged Bilaterally] : inguinal [No Spinal Tenderness] : no spinal tenderness [Nail Clubbing] : no clubbing  or cyanosis of the fingernails [Abnormal Walk] : normal gait [Musculoskeletal - Swelling] : no joint swelling seen [Motor Tone] : muscle strength and tone were normal [Skin Color & Pigmentation] : normal skin color and pigmentation [Skin Turgor] : normal skin turgor [] : no rash [Right Foot Was Examined] : right foot was examined [Left Foot Was Examined] : left foot was examined [Normal Appearance] : normal in appearance [Normal] : normal [Normal in Appearance] : normal in appearance [1+] : 1+ in the dorsalis pedis [Cranial Nerves] : cranial nerves 2-12 were intact [No Focal Deficits] : no focal deficits [Oriented To Time, Place, And Person] : oriented to person, place, and time [Impaired Insight] : insight and judgment were intact [Affect] : the affect was normal [FreeTextEntry1] : Sees podiatry

## 2021-09-16 NOTE — REVIEW OF SYSTEMS
[As noted in HPI] : as noted in HPI [As Noted in HPI] : as noted in HPI [Limb Pain] : limb pain [Negative] : Heme/Lymph [Dizziness] : dizziness [FreeTextEntry4] : postnasal drip [FreeTextEntry7] : Excess gas [FreeTextEntry9] : Chronic low back pain and neck pain [de-identified] : Vertigo

## 2021-09-20 LAB
ALBUMIN SERPL ELPH-MCNC: 4.5 G/DL
ALP BLD-CCNC: 64 U/L
ALT SERPL-CCNC: 16 U/L
ANION GAP SERPL CALC-SCNC: 16 MMOL/L
APPEARANCE: CLEAR
AST SERPL-CCNC: 21 U/L
BACTERIA: NEGATIVE
BASOPHILS # BLD AUTO: 0.02 K/UL
BASOPHILS NFR BLD AUTO: 0.3 %
BILIRUB SERPL-MCNC: 0.6 MG/DL
BILIRUBIN URINE: NEGATIVE
BLOOD URINE: NEGATIVE
BUN SERPL-MCNC: 26 MG/DL
CALCIUM SERPL-MCNC: 9.3 MG/DL
CHLORIDE SERPL-SCNC: 101 MMOL/L
CHOLEST SERPL-MCNC: 165 MG/DL
CO2 SERPL-SCNC: 24 MMOL/L
COLOR: NORMAL
CREAT SERPL-MCNC: 1.07 MG/DL
CREAT SPEC-SCNC: 79 MG/DL
EOSINOPHIL # BLD AUTO: 0.19 K/UL
EOSINOPHIL NFR BLD AUTO: 2.6 %
ESTIMATED AVERAGE GLUCOSE: 166 MG/DL
GLUCOSE QUALITATIVE U: NEGATIVE
GLUCOSE SERPL-MCNC: 171 MG/DL
HBA1C MFR BLD HPLC: 7.4 %
HCT VFR BLD CALC: 43.5 %
HDLC SERPL-MCNC: 61 MG/DL
HGB BLD-MCNC: 14.1 G/DL
HYALINE CASTS: 2 /LPF
IMM GRANULOCYTES NFR BLD AUTO: 0.4 %
KETONES URINE: NEGATIVE
LDLC SERPL CALC-MCNC: 84 MG/DL
LEUKOCYTE ESTERASE URINE: NEGATIVE
LYMPHOCYTES # BLD AUTO: 1.5 K/UL
LYMPHOCYTES NFR BLD AUTO: 20.6 %
MAGNESIUM SERPL-MCNC: 1.9 MG/DL
MAN DIFF?: NORMAL
MCHC RBC-ENTMCNC: 32.4 GM/DL
MCHC RBC-ENTMCNC: 33.5 PG
MCV RBC AUTO: 103.3 FL
MICROALBUMIN 24H UR DL<=1MG/L-MCNC: 4.5 MG/DL
MICROALBUMIN/CREAT 24H UR-RTO: 57 MG/G
MICROSCOPIC-UA: NORMAL
MONOCYTES # BLD AUTO: 0.74 K/UL
MONOCYTES NFR BLD AUTO: 10.2 %
NEUTROPHILS # BLD AUTO: 4.81 K/UL
NEUTROPHILS NFR BLD AUTO: 65.9 %
NITRITE URINE: NEGATIVE
NONHDLC SERPL-MCNC: 104 MG/DL
PH URINE: 5
PLATELET # BLD AUTO: 160 K/UL
POTASSIUM SERPL-SCNC: 4.6 MMOL/L
PROT SERPL-MCNC: 6.9 G/DL
PROTEIN URINE: NORMAL
RBC # BLD: 4.21 M/UL
RBC # FLD: 13.8 %
RED BLOOD CELLS URINE: 1 /HPF
SODIUM SERPL-SCNC: 141 MMOL/L
SPECIFIC GRAVITY URINE: 1.02
SQUAMOUS EPITHELIAL CELLS: 0 /HPF
TRIGL SERPL-MCNC: 100 MG/DL
UROBILINOGEN URINE: NORMAL
VIT B12 SERPL-MCNC: 686 PG/ML
WBC # FLD AUTO: 7.29 K/UL
WHITE BLOOD CELLS URINE: 0 /HPF

## 2021-09-21 ENCOUNTER — NON-APPOINTMENT (OUTPATIENT)
Age: 86
End: 2021-09-21

## 2021-10-08 ENCOUNTER — NON-APPOINTMENT (OUTPATIENT)
Age: 86
End: 2021-10-08

## 2021-10-08 LAB
CREAT 24H UR-MCNC: 1.1 G/24 H
CREAT ?TM UR-MCNC: 66 MG/DL
PROT 24H UR-MRATE: 18 MG/DL
PROT ?TM UR-MCNC: 24 HR
PROT UR-MCNC: 297 MG/24 H
SPECIMEN VOL 24H UR: 1650 ML

## 2021-10-18 ENCOUNTER — OUTPATIENT (OUTPATIENT)
Dept: OUTPATIENT SERVICES | Facility: HOSPITAL | Age: 86
LOS: 1 days | End: 2021-10-18
Payer: MEDICARE

## 2021-10-18 DIAGNOSIS — Z98.89 OTHER SPECIFIED POSTPROCEDURAL STATES: Chronic | ICD-10-CM

## 2021-10-18 DIAGNOSIS — C18.9 MALIGNANT NEOPLASM OF COLON, UNSPECIFIED: ICD-10-CM

## 2021-10-18 DIAGNOSIS — Z90.49 ACQUIRED ABSENCE OF OTHER SPECIFIED PARTS OF DIGESTIVE TRACT: Chronic | ICD-10-CM

## 2021-10-18 DIAGNOSIS — Z98.61 CORONARY ANGIOPLASTY STATUS: Chronic | ICD-10-CM

## 2021-10-18 DIAGNOSIS — Z95.0 PRESENCE OF CARDIAC PACEMAKER: Chronic | ICD-10-CM

## 2021-10-18 DIAGNOSIS — Z95.3 PRESENCE OF XENOGENIC HEART VALVE: Chronic | ICD-10-CM

## 2021-10-18 DIAGNOSIS — Z96.60 PRESENCE OF UNSPECIFIED ORTHOPEDIC JOINT IMPLANT: Chronic | ICD-10-CM

## 2021-10-18 DIAGNOSIS — Z98.42 CATARACT EXTRACTION STATUS, LEFT EYE: Chronic | ICD-10-CM

## 2021-10-18 DIAGNOSIS — Z98.890 OTHER SPECIFIED POSTPROCEDURAL STATES: Chronic | ICD-10-CM

## 2021-10-18 DIAGNOSIS — Z98.41 CATARACT EXTRACTION STATUS, RIGHT EYE: Chronic | ICD-10-CM

## 2021-10-18 DIAGNOSIS — Z95.810 PRESENCE OF AUTOMATIC (IMPLANTABLE) CARDIAC DEFIBRILLATOR: Chronic | ICD-10-CM

## 2021-10-18 DIAGNOSIS — Z95.1 PRESENCE OF AORTOCORONARY BYPASS GRAFT: Chronic | ICD-10-CM

## 2021-10-19 ENCOUNTER — RESULT REVIEW (OUTPATIENT)
Age: 86
End: 2021-10-19

## 2021-10-19 ENCOUNTER — APPOINTMENT (OUTPATIENT)
Dept: HEMATOLOGY ONCOLOGY | Facility: CLINIC | Age: 86
End: 2021-10-19
Payer: MEDICARE

## 2021-10-19 VITALS
SYSTOLIC BLOOD PRESSURE: 117 MMHG | BODY MASS INDEX: 26.23 KG/M2 | TEMPERATURE: 97.8 F | WEIGHT: 173.04 LBS | DIASTOLIC BLOOD PRESSURE: 61 MMHG | HEART RATE: 83 BPM | HEIGHT: 68 IN | OXYGEN SATURATION: 94 %

## 2021-10-19 LAB
BASOPHILS # BLD AUTO: 0.02 K/UL — SIGNIFICANT CHANGE UP (ref 0–0.2)
BASOPHILS NFR BLD AUTO: 0.2 % — SIGNIFICANT CHANGE UP (ref 0–2)
EOSINOPHIL # BLD AUTO: 0.38 K/UL — SIGNIFICANT CHANGE UP (ref 0–0.5)
EOSINOPHIL NFR BLD AUTO: 4.7 % — SIGNIFICANT CHANGE UP (ref 0–6)
HCT VFR BLD CALC: 37.9 % — LOW (ref 39–50)
HGB BLD-MCNC: 12.7 G/DL — LOW (ref 13–17)
IMM GRANULOCYTES NFR BLD AUTO: 0.2 % — SIGNIFICANT CHANGE UP (ref 0–1.5)
LYMPHOCYTES # BLD AUTO: 1.51 K/UL — SIGNIFICANT CHANGE UP (ref 1–3.3)
LYMPHOCYTES # BLD AUTO: 18.6 % — SIGNIFICANT CHANGE UP (ref 13–44)
MCHC RBC-ENTMCNC: 33.2 PG — SIGNIFICANT CHANGE UP (ref 27–34)
MCHC RBC-ENTMCNC: 33.5 GM/DL — SIGNIFICANT CHANGE UP (ref 32–36)
MCV RBC AUTO: 99.2 FL — SIGNIFICANT CHANGE UP (ref 80–100)
MONOCYTES # BLD AUTO: 0.79 K/UL — SIGNIFICANT CHANGE UP (ref 0–0.9)
MONOCYTES NFR BLD AUTO: 9.7 % — SIGNIFICANT CHANGE UP (ref 2–14)
NEUTROPHILS # BLD AUTO: 5.41 K/UL — SIGNIFICANT CHANGE UP (ref 1.8–7.4)
NEUTROPHILS NFR BLD AUTO: 66.6 % — SIGNIFICANT CHANGE UP (ref 43–77)
NRBC # BLD: 0 /100 WBCS — SIGNIFICANT CHANGE UP (ref 0–0)
PLATELET # BLD AUTO: 178 K/UL — SIGNIFICANT CHANGE UP (ref 150–400)
RBC # BLD: 3.82 M/UL — LOW (ref 4.2–5.8)
RBC # FLD: 12.8 % — SIGNIFICANT CHANGE UP (ref 10.3–14.5)
WBC # BLD: 8.13 K/UL — SIGNIFICANT CHANGE UP (ref 3.8–10.5)
WBC # FLD AUTO: 8.13 K/UL — SIGNIFICANT CHANGE UP (ref 3.8–10.5)

## 2021-10-19 PROCEDURE — 84466 ASSAY OF TRANSFERRIN: CPT

## 2021-10-19 PROCEDURE — 83550 IRON BINDING TEST: CPT

## 2021-10-19 PROCEDURE — 99213 OFFICE O/P EST LOW 20 MIN: CPT

## 2021-10-19 PROCEDURE — 85027 COMPLETE CBC AUTOMATED: CPT

## 2021-10-19 PROCEDURE — 80053 COMPREHEN METABOLIC PANEL: CPT

## 2021-10-19 PROCEDURE — 83540 ASSAY OF IRON: CPT

## 2021-10-19 PROCEDURE — 82728 ASSAY OF FERRITIN: CPT

## 2021-10-19 NOTE — RESULTS/DATA
[FreeTextEntry1] : Mr. Pedraza is a pleasant 88 year-old man with T2N0M0, stage I colon cancer s/p resection in January, 2021, here in follow-up.

## 2021-10-19 NOTE — REVIEW OF SYSTEMS
[Fatigue] : no fatigue [Recent Change In Weight] : ~T no recent weight change [Dysphagia] : no dysphagia [Odynophagia] : no odynophagia [Chest Pain] : no chest pain [Lower Ext Edema] : no lower extremity edema [Abdominal Pain] : no abdominal pain [Constipation] : constipation [Diarrhea] : diarrhea [Skin Rash] : no skin rash [Anxiety] : no anxiety [Depression] : no depression [Easy Bleeding] : no tendency for easy bleeding [Easy Bruising] : no tendency for easy bruising [Swollen Glands] : no swollen glands

## 2021-10-19 NOTE — PHYSICAL EXAM
[Restricted in physically strenuous activity but ambulatory and able to carry out work of a light or sedentary nature] : Status 1- Restricted in physically strenuous activity but ambulatory and able to carry out work of a light or sedentary nature, e.g., light house work, office work [Normal] : grossly intact [de-identified] : anicteric
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
## 2021-10-20 LAB
ALBUMIN SERPL ELPH-MCNC: 4 G/DL — SIGNIFICANT CHANGE UP (ref 3.3–5)
ALP SERPL-CCNC: 68 U/L — SIGNIFICANT CHANGE UP (ref 40–120)
ALT FLD-CCNC: 15 U/L — SIGNIFICANT CHANGE UP (ref 10–45)
ANION GAP SERPL CALC-SCNC: 12 MMOL/L — SIGNIFICANT CHANGE UP (ref 5–17)
AST SERPL-CCNC: 15 U/L — SIGNIFICANT CHANGE UP (ref 10–40)
BILIRUB SERPL-MCNC: 0.4 MG/DL — SIGNIFICANT CHANGE UP (ref 0.2–1.2)
BUN SERPL-MCNC: 28 MG/DL — HIGH (ref 7–23)
CALCIUM SERPL-MCNC: 9.1 MG/DL — SIGNIFICANT CHANGE UP (ref 8.4–10.5)
CHLORIDE SERPL-SCNC: 101 MMOL/L — SIGNIFICANT CHANGE UP (ref 96–108)
CO2 SERPL-SCNC: 26 MMOL/L — SIGNIFICANT CHANGE UP (ref 22–31)
CREAT SERPL-MCNC: 1.16 MG/DL — SIGNIFICANT CHANGE UP (ref 0.5–1.3)
FERRITIN SERPL-MCNC: 93 NG/ML — SIGNIFICANT CHANGE UP (ref 30–400)
GLUCOSE SERPL-MCNC: 248 MG/DL — HIGH (ref 70–99)
IRON SATN MFR SERPL: 20 % — SIGNIFICANT CHANGE UP (ref 16–55)
IRON SATN MFR SERPL: 68 UG/DL — SIGNIFICANT CHANGE UP (ref 45–165)
POTASSIUM SERPL-MCNC: 4.8 MMOL/L — SIGNIFICANT CHANGE UP (ref 3.5–5.3)
POTASSIUM SERPL-SCNC: 4.8 MMOL/L — SIGNIFICANT CHANGE UP (ref 3.5–5.3)
PROT SERPL-MCNC: 6.6 G/DL — SIGNIFICANT CHANGE UP (ref 6–8.3)
SODIUM SERPL-SCNC: 139 MMOL/L — SIGNIFICANT CHANGE UP (ref 135–145)
TIBC SERPL-MCNC: 331 UG/DL — SIGNIFICANT CHANGE UP (ref 220–430)
TRANSFERRIN SERPL-MCNC: 258 MG/DL — SIGNIFICANT CHANGE UP (ref 200–360)
UIBC SERPL-MCNC: 263 UG/DL — SIGNIFICANT CHANGE UP (ref 110–370)

## 2021-11-04 NOTE — PATIENT PROFILE ADULT. - CENTRAL VENOUS CATHETER
Both ultrasound in 2 weeks. Tdap was discussed and information given she refuses flu shot today. Pediatrician was discussed. no

## 2021-12-01 ENCOUNTER — NON-APPOINTMENT (OUTPATIENT)
Age: 86
End: 2021-12-01

## 2021-12-18 ENCOUNTER — NON-APPOINTMENT (OUTPATIENT)
Age: 86
End: 2021-12-18

## 2021-12-23 ENCOUNTER — APPOINTMENT (OUTPATIENT)
Dept: INTERNAL MEDICINE | Facility: CLINIC | Age: 86
End: 2021-12-23
Payer: MEDICARE

## 2021-12-23 DIAGNOSIS — M79.622 PAIN IN LEFT UPPER ARM: ICD-10-CM

## 2021-12-23 PROCEDURE — 99441: CPT | Mod: 95

## 2022-01-02 ENCOUNTER — TRANSCRIPTION ENCOUNTER (OUTPATIENT)
Age: 87
End: 2022-01-02

## 2022-01-04 ENCOUNTER — APPOINTMENT (OUTPATIENT)
Dept: INTERNAL MEDICINE | Facility: CLINIC | Age: 87
End: 2022-01-04
Payer: MEDICARE

## 2022-01-04 ENCOUNTER — APPOINTMENT (OUTPATIENT)
Dept: RADIOLOGY | Facility: CLINIC | Age: 87
End: 2022-01-04
Payer: MEDICARE

## 2022-01-04 VITALS
OXYGEN SATURATION: 95 % | SYSTOLIC BLOOD PRESSURE: 115 MMHG | HEIGHT: 68 IN | RESPIRATION RATE: 14 BRPM | DIASTOLIC BLOOD PRESSURE: 70 MMHG | BODY MASS INDEX: 25.76 KG/M2 | TEMPERATURE: 97.4 F | HEART RATE: 67 BPM | WEIGHT: 170 LBS

## 2022-01-04 DIAGNOSIS — J02.9 ACUTE PHARYNGITIS, UNSPECIFIED: ICD-10-CM

## 2022-01-04 DIAGNOSIS — Z11.59 ENCOUNTER FOR SCREENING FOR OTHER VIRAL DISEASES: ICD-10-CM

## 2022-01-04 DIAGNOSIS — R09.89 OTHER SPECIFIED SYMPTOMS AND SIGNS INVOLVING THE CIRCULATORY AND RESPIRATORY SYSTEMS: ICD-10-CM

## 2022-01-04 DIAGNOSIS — R05.9 COUGH, UNSPECIFIED: ICD-10-CM

## 2022-01-04 DIAGNOSIS — B34.9 VIRAL INFECTION, UNSPECIFIED: ICD-10-CM

## 2022-01-04 PROCEDURE — 99213 OFFICE O/P EST LOW 20 MIN: CPT

## 2022-01-04 PROCEDURE — 71046 X-RAY EXAM CHEST 2 VIEWS: CPT

## 2022-01-05 PROBLEM — R09.89 CHEST CONGESTION: Status: ACTIVE | Noted: 2022-01-04

## 2022-01-05 LAB
INFLUENZA A RESULT: NOT DETECTED
INFLUENZA B RESULT: NOT DETECTED
RESP SYN VIRUS RESULT: NOT DETECTED
SARS-COV-2 RESULT: NOT DETECTED

## 2022-01-06 DIAGNOSIS — D75.89 OTHER SPECIFIED DISEASES OF BLOOD AND BLOOD-FORMING ORGANS: ICD-10-CM

## 2022-01-09 LAB
ALBUMIN SERPL ELPH-MCNC: 4.1 G/DL
ALP BLD-CCNC: 72 U/L
ALT SERPL-CCNC: 41 U/L
ANION GAP SERPL CALC-SCNC: 15 MMOL/L
AST SERPL-CCNC: 29 U/L
BASOPHILS # BLD AUTO: 0.01 K/UL
BASOPHILS NFR BLD AUTO: 0.1 %
BILIRUB SERPL-MCNC: 0.6 MG/DL
BUN SERPL-MCNC: 29 MG/DL
CALCIUM SERPL-MCNC: 9.1 MG/DL
CHLORIDE SERPL-SCNC: 99 MMOL/L
CHOLEST SERPL-MCNC: 121 MG/DL
CO2 SERPL-SCNC: 28 MMOL/L
CREAT SERPL-MCNC: 1.45 MG/DL
EOSINOPHIL # BLD AUTO: 0.21 K/UL
EOSINOPHIL NFR BLD AUTO: 2.4 %
ESTIMATED AVERAGE GLUCOSE: 160 MG/DL
FOLATE SERPL-MCNC: 18.7 NG/ML
GLUCOSE SERPL-MCNC: 171 MG/DL
HBA1C MFR BLD HPLC: 7.2 %
HCT VFR BLD CALC: 42.8 %
HDLC SERPL-MCNC: 54 MG/DL
HGB BLD-MCNC: 13.1 G/DL
IMM GRANULOCYTES NFR BLD AUTO: 0.2 %
LDLC SERPL CALC-MCNC: 48 MG/DL
LYMPHOCYTES # BLD AUTO: 1.57 K/UL
LYMPHOCYTES NFR BLD AUTO: 17.7 %
MAGNESIUM SERPL-MCNC: 2.1 MG/DL
MAN DIFF?: NORMAL
MCHC RBC-ENTMCNC: 30.6 GM/DL
MCHC RBC-ENTMCNC: 32.2 PG
MCV RBC AUTO: 105.2 FL
MONOCYTES # BLD AUTO: 0.95 K/UL
MONOCYTES NFR BLD AUTO: 10.7 %
NEUTROPHILS # BLD AUTO: 6.13 K/UL
NEUTROPHILS NFR BLD AUTO: 68.9 %
NONHDLC SERPL-MCNC: 66 MG/DL
PLATELET # BLD AUTO: 165 K/UL
POTASSIUM SERPL-SCNC: 3.9 MMOL/L
PROT SERPL-MCNC: 6.8 G/DL
RBC # BLD: 4.07 M/UL
RBC # FLD: 14.5 %
SODIUM SERPL-SCNC: 142 MMOL/L
TRIGL SERPL-MCNC: 93 MG/DL
VIT B12 SERPL-MCNC: 900 PG/ML
WBC # FLD AUTO: 8.89 K/UL

## 2022-01-10 ENCOUNTER — NON-APPOINTMENT (OUTPATIENT)
Age: 87
End: 2022-01-10

## 2022-01-13 NOTE — HISTORY OF PRESENT ILLNESS
[FreeTextEntry8] : The patient presents for an acute visit not feeling well for about 4-5 days mainly with sore throat, cough with occasional mucus without fever or chills. Also associated malaise and fatigue.\par No chest pain, palpitations, abdominal symptoms with some mild shortness of breath.\par Patient concerned about COVID.\par He has been vaccinated.

## 2022-01-13 NOTE — ADDENDUM
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 Therefore we'll not give this.\par Treat with Hycodan cough syrup.

## 2022-01-13 NOTE — ASSESSMENT
[FreeTextEntry1] : Patient signs and symptoms most compatible with viral illness therefore flu panel PCR to be done.\par Bilateral basal crackles therefore patient will be sent for chest x-ray.\par Symptomatic treatment recommended.\par Call if symptoms persist or worsen.

## 2022-01-13 NOTE — PHYSICAL EXAM
[No Acute Distress] : no acute distress [Normal Sclera/Conjunctiva] : normal sclera/conjunctiva [Normal Outer Ear/Nose] : the outer ears and nose were normal in appearance [Normal Oropharynx] : the oropharynx was normal [Normal TMs] : both tympanic membranes were normal [No Lymphadenopathy] : no lymphadenopathy [No Respiratory Distress] : no respiratory distress  [No Accessory Muscle Use] : no accessory muscle use [Normal Rate] : normal rate  [Regular Rhythm] : with a regular rhythm [No Focal Deficits] : no focal deficits [Normal Affect] : the affect was normal [de-identified] : Non-ill-appearing [de-identified] : Viral basal crackles

## 2022-01-14 ENCOUNTER — NON-APPOINTMENT (OUTPATIENT)
Age: 87
End: 2022-01-14

## 2022-01-14 RX ORDER — HYDROCODONE BITARTRATE AND HOMATROPINE METHYLBROMIDE 5; 1.5 MG/5ML; MG/5ML
5-1.5 SYRUP ORAL EVERY 6 HOURS
Qty: 120 | Refills: 0 | Status: DISCONTINUED | COMMUNITY
Start: 2022-01-13 | End: 2022-01-14

## 2022-01-26 ENCOUNTER — APPOINTMENT (OUTPATIENT)
Dept: INTERNAL MEDICINE | Facility: CLINIC | Age: 87
End: 2022-01-26
Payer: MEDICARE

## 2022-01-26 VITALS
SYSTOLIC BLOOD PRESSURE: 120 MMHG | RESPIRATION RATE: 13 BRPM | BODY MASS INDEX: 25.76 KG/M2 | HEIGHT: 68 IN | WEIGHT: 170 LBS | HEART RATE: 82 BPM | DIASTOLIC BLOOD PRESSURE: 75 MMHG | TEMPERATURE: 97 F | OXYGEN SATURATION: 95 %

## 2022-01-26 DIAGNOSIS — R79.89 OTHER SPECIFIED ABNORMAL FINDINGS OF BLOOD CHEMISTRY: ICD-10-CM

## 2022-01-26 PROCEDURE — 36415 COLL VENOUS BLD VENIPUNCTURE: CPT

## 2022-01-26 PROCEDURE — 99213 OFFICE O/P EST LOW 20 MIN: CPT | Mod: 25

## 2022-01-26 RX ORDER — BENZONATATE 200 MG/1
200 CAPSULE ORAL EVERY 8 HOURS
Qty: 20 | Refills: 0 | Status: DISCONTINUED | COMMUNITY
Start: 2022-01-02 | End: 2022-01-26

## 2022-01-26 RX ORDER — PROMETHAZINE HYDROCHLORIDE AND CODEINE PHOSPHATE 6.25; 1 MG/5ML; MG/5ML
6.25-1 SOLUTION ORAL EVERY 6 HOURS
Qty: 100 | Refills: 0 | Status: DISCONTINUED | COMMUNITY
Start: 2022-01-14 | End: 2022-01-26

## 2022-01-26 NOTE — ASSESSMENT
[FreeTextEntry1] : Venipuncture done in our office, Labs sent out. Patient advised to continue present medications with diet/exercise and specialists followup. Patient will return to the office as sched for reg check\par \par Last colonoscopy was 12/2020\par Last 24-hour urine was 10/2021\par MA 9/2021\par specialists include\par 1. endocrinology-Dr. Acosta\par 2. ophthalmology-Dr. Canseco-2/2021\par 3. podiatry-Dr. Stone\par 4. cardiology-Dr. king\par 5.  pulmonary-Dr. gamino\par 6. gastroenterology-Dr. Burnette\par 7. vascular-Dr. Haskins-changed to Dr. Mayur Walsh\par 8.neurology-Dr. Rosen\par 9. Cardiothoracic surgeon-Dr. Mckeon-NO NEED FOR FOLLOW UP\par 10. - @[facility]\par 11.Derm-Cira\par 12.Hand MD-LORNA, Delicia\par 13.PMR-Dr. Tyson\par 14.Colorectal-Dr. Carvalho/Dr. Hancock\par 15.ONC=Dr. Amaro\par vaccines are UTD< +got covid vaccines X3 (?date of #3)\par echo via cardio

## 2022-01-26 NOTE — HISTORY OF PRESENT ILLNESS
[FreeTextEntry1] : Patient presents for followup on hypertension/repeat labs.Patient is currently on toprol for hypertension and was found to have BUN/Creat elevated at 29/1.45 with last labs\par -abnormal results d/w pt and questions answered\par -had CV for A.fib\par -taking xanax at times for anxiety w/+benefit

## 2022-01-27 ENCOUNTER — NON-APPOINTMENT (OUTPATIENT)
Age: 87
End: 2022-01-27

## 2022-01-27 LAB
ANION GAP SERPL CALC-SCNC: 12 MMOL/L
BUN SERPL-MCNC: 29 MG/DL
CALCIUM SERPL-MCNC: 9.8 MG/DL
CHLORIDE SERPL-SCNC: 104 MMOL/L
CO2 SERPL-SCNC: 26 MMOL/L
CREAT SERPL-MCNC: 1.29 MG/DL
GLUCOSE SERPL-MCNC: 112 MG/DL
POTASSIUM SERPL-SCNC: 4.6 MMOL/L
SODIUM SERPL-SCNC: 142 MMOL/L

## 2022-02-03 ENCOUNTER — OUTPATIENT (OUTPATIENT)
Dept: OUTPATIENT SERVICES | Facility: HOSPITAL | Age: 87
LOS: 1 days | End: 2022-02-03

## 2022-02-03 DIAGNOSIS — Z98.61 CORONARY ANGIOPLASTY STATUS: Chronic | ICD-10-CM

## 2022-02-03 DIAGNOSIS — Z90.49 ACQUIRED ABSENCE OF OTHER SPECIFIED PARTS OF DIGESTIVE TRACT: Chronic | ICD-10-CM

## 2022-02-03 DIAGNOSIS — Z95.0 PRESENCE OF CARDIAC PACEMAKER: Chronic | ICD-10-CM

## 2022-02-03 DIAGNOSIS — Z98.89 OTHER SPECIFIED POSTPROCEDURAL STATES: Chronic | ICD-10-CM

## 2022-02-03 DIAGNOSIS — Z98.890 OTHER SPECIFIED POSTPROCEDURAL STATES: Chronic | ICD-10-CM

## 2022-02-03 DIAGNOSIS — Z98.42 CATARACT EXTRACTION STATUS, LEFT EYE: Chronic | ICD-10-CM

## 2022-02-03 DIAGNOSIS — Z98.41 CATARACT EXTRACTION STATUS, RIGHT EYE: Chronic | ICD-10-CM

## 2022-02-03 DIAGNOSIS — Z95.810 PRESENCE OF AUTOMATIC (IMPLANTABLE) CARDIAC DEFIBRILLATOR: Chronic | ICD-10-CM

## 2022-02-03 DIAGNOSIS — Z95.1 PRESENCE OF AORTOCORONARY BYPASS GRAFT: Chronic | ICD-10-CM

## 2022-02-03 DIAGNOSIS — C18.9 MALIGNANT NEOPLASM OF COLON, UNSPECIFIED: ICD-10-CM

## 2022-02-03 DIAGNOSIS — Z95.3 PRESENCE OF XENOGENIC HEART VALVE: Chronic | ICD-10-CM

## 2022-02-03 DIAGNOSIS — Z96.60 PRESENCE OF UNSPECIFIED ORTHOPEDIC JOINT IMPLANT: Chronic | ICD-10-CM

## 2022-02-08 ENCOUNTER — APPOINTMENT (OUTPATIENT)
Dept: HEMATOLOGY ONCOLOGY | Facility: CLINIC | Age: 87
End: 2022-02-08
Payer: MEDICARE

## 2022-02-08 DIAGNOSIS — R19.7 DIARRHEA, UNSPECIFIED: ICD-10-CM

## 2022-02-08 DIAGNOSIS — K59.00 CONSTIPATION, UNSPECIFIED: ICD-10-CM

## 2022-02-08 PROCEDURE — 99213 OFFICE O/P EST LOW 20 MIN: CPT | Mod: 95

## 2022-02-08 NOTE — RESULTS/DATA
[FreeTextEntry1] : Mr. Pedrzaa is a pleasant 89 year-old man with T2N0M0, stage I colon cancer s/p resection in January, 2021, here in follow-up.
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
## 2022-02-08 NOTE — REVIEW OF SYSTEMS
[Diarrhea] : diarrhea [Fatigue] : no fatigue [Recent Change In Weight] : ~T no recent weight change [Dysphagia] : no dysphagia [Odynophagia] : no odynophagia [Chest Pain] : no chest pain [Lower Ext Edema] : no lower extremity edema [Abdominal Pain] : no abdominal pain [Skin Rash] : no skin rash [Anxiety] : no anxiety [Depression] : no depression [Easy Bleeding] : no tendency for easy bleeding [Easy Bruising] : no tendency for easy bruising [Swollen Glands] : no swollen glands

## 2022-02-08 NOTE — REASON FOR VISIT
[Follow-Up Visit] : a follow-up [Home] : at home, [unfilled] , at the time of the visit. [Medical Office: ([facility])___] : at the medical office located in  [Spouse] : spouse [Verbal consent obtained from patient] : the patient, [unfilled] [FreeTextEntry2] : Colon Cancer

## 2022-02-11 ENCOUNTER — NON-APPOINTMENT (OUTPATIENT)
Age: 87
End: 2022-02-11

## 2022-02-16 ENCOUNTER — APPOINTMENT (OUTPATIENT)
Dept: CT IMAGING | Facility: CLINIC | Age: 87
End: 2022-02-16
Payer: MEDICARE

## 2022-02-16 PROCEDURE — 82565 ASSAY OF CREATININE: CPT | Mod: QW

## 2022-02-16 PROCEDURE — 71260 CT THORAX DX C+: CPT | Mod: MF

## 2022-02-16 PROCEDURE — 74177 CT ABD & PELVIS W/CONTRAST: CPT | Mod: MF

## 2022-02-16 PROCEDURE — G1004: CPT

## 2022-03-08 ENCOUNTER — APPOINTMENT (OUTPATIENT)
Dept: INTERNAL MEDICINE | Facility: CLINIC | Age: 87
End: 2022-03-08
Payer: MEDICARE

## 2022-03-08 VITALS
HEIGHT: 68 IN | RESPIRATION RATE: 14 BRPM | OXYGEN SATURATION: 97 % | SYSTOLIC BLOOD PRESSURE: 135 MMHG | DIASTOLIC BLOOD PRESSURE: 75 MMHG | BODY MASS INDEX: 26.83 KG/M2 | HEART RATE: 74 BPM | TEMPERATURE: 97.2 F | WEIGHT: 177 LBS

## 2022-03-08 DIAGNOSIS — R60.0 LOCALIZED EDEMA: ICD-10-CM

## 2022-03-08 PROCEDURE — 36415 COLL VENOUS BLD VENIPUNCTURE: CPT

## 2022-03-08 PROCEDURE — 99214 OFFICE O/P EST MOD 30 MIN: CPT | Mod: 25

## 2022-03-09 ENCOUNTER — NON-APPOINTMENT (OUTPATIENT)
Age: 87
End: 2022-03-09

## 2022-03-09 LAB
ALBUMIN SERPL ELPH-MCNC: 4 G/DL
ALP BLD-CCNC: 81 U/L
ALT SERPL-CCNC: 37 U/L
ANION GAP SERPL CALC-SCNC: 14 MMOL/L
AST SERPL-CCNC: 31 U/L
BASOPHILS # BLD AUTO: 0.02 K/UL
BASOPHILS NFR BLD AUTO: 0.3 %
BILIRUB SERPL-MCNC: 0.4 MG/DL
BUN SERPL-MCNC: 30 MG/DL
CALCIUM SERPL-MCNC: 8.7 MG/DL
CHLORIDE SERPL-SCNC: 105 MMOL/L
CO2 SERPL-SCNC: 23 MMOL/L
CREAT SERPL-MCNC: 1.29 MG/DL
DEPRECATED D DIMER PPP IA-ACNC: 154 NG/ML DDU
EGFR: 53 ML/MIN/1.73M2
EOSINOPHIL # BLD AUTO: 0.07 K/UL
EOSINOPHIL NFR BLD AUTO: 0.9 %
GLUCOSE SERPL-MCNC: 238 MG/DL
HCT VFR BLD CALC: 39.5 %
HGB BLD-MCNC: 12.8 G/DL
IMM GRANULOCYTES NFR BLD AUTO: 0.4 %
LYMPHOCYTES # BLD AUTO: 1.06 K/UL
LYMPHOCYTES NFR BLD AUTO: 13.5 %
MAN DIFF?: NORMAL
MCHC RBC-ENTMCNC: 32.4 GM/DL
MCHC RBC-ENTMCNC: 33.5 PG
MCV RBC AUTO: 103.4 FL
MONOCYTES # BLD AUTO: 0.74 K/UL
MONOCYTES NFR BLD AUTO: 9.4 %
NEUTROPHILS # BLD AUTO: 5.95 K/UL
NEUTROPHILS NFR BLD AUTO: 75.5 %
NT-PROBNP SERPL-MCNC: 2510 PG/ML
PLATELET # BLD AUTO: 174 K/UL
POTASSIUM SERPL-SCNC: 4.2 MMOL/L
PROT SERPL-MCNC: 6.7 G/DL
RBC # BLD: 3.82 M/UL
RBC # FLD: 14.6 %
SODIUM SERPL-SCNC: 141 MMOL/L
WBC # FLD AUTO: 7.87 K/UL

## 2022-03-09 NOTE — ASSESSMENT
[FreeTextEntry1] : Venipuncture done in our office, Labs sent out.Lasix QD, cardio eval pending labs. Lotrisone cream given. Further recs to followup. RTO as sched for reg check\par \par Dr. Burns was present in office building while I examined patient\par

## 2022-03-09 NOTE — PHYSICAL EXAM
[No Acute Distress] : no acute distress [No Respiratory Distress] : no respiratory distress  [Clear to Auscultation] : lungs were clear to auscultation bilaterally [Normal Rate] : normal rate  [Regular Rhythm] : with a regular rhythm [Normal Affect] : the affect was normal [Normal Mood] : the mood was normal [de-identified] : +1 pitting edema B/L LE, neg homans [de-identified] : +flat erythema with surface scale B/L groin c/w fungus,no vesicles

## 2022-03-09 NOTE — ADDENDUM
[FreeTextEntry1] : Labs show\par -BNP elevated at 2510=to see cardio today, started laxix\par -H/H stable at 12.8/39.5, check next

## 2022-03-09 NOTE — HISTORY OF PRESENT ILLNESS
[Spouse] : spouse [FreeTextEntry8] : Pt presents c/o\par 1.Rash to groin X 1 week, no pain, [facility] and dx with Herpes Zoster, given Valtrex/Doxy/Bactroban, here for recs "little better"\par \par 2.Also c/o edema for 1 week, no worsened Breathing. No CP/calf pain, takes lasix prn, took 1 dose w/o resolution

## 2022-03-10 ENCOUNTER — NON-APPOINTMENT (OUTPATIENT)
Age: 87
End: 2022-03-10

## 2022-03-23 ENCOUNTER — NON-APPOINTMENT (OUTPATIENT)
Age: 87
End: 2022-03-23

## 2022-03-25 ENCOUNTER — NON-APPOINTMENT (OUTPATIENT)
Age: 87
End: 2022-03-25

## 2022-03-29 ENCOUNTER — APPOINTMENT (OUTPATIENT)
Dept: PULMONOLOGY | Facility: CLINIC | Age: 87
End: 2022-03-29
Payer: MEDICARE

## 2022-03-29 ENCOUNTER — NON-APPOINTMENT (OUTPATIENT)
Age: 87
End: 2022-03-29

## 2022-03-29 VITALS
BODY MASS INDEX: 26.37 KG/M2 | WEIGHT: 174 LBS | HEIGHT: 68 IN | RESPIRATION RATE: 16 BRPM | DIASTOLIC BLOOD PRESSURE: 64 MMHG | HEART RATE: 87 BPM | SYSTOLIC BLOOD PRESSURE: 110 MMHG | OXYGEN SATURATION: 95 %

## 2022-03-29 DIAGNOSIS — Z13.9 ENCOUNTER FOR SCREENING, UNSPECIFIED: ICD-10-CM

## 2022-03-29 PROCEDURE — 99214 OFFICE O/P EST MOD 30 MIN: CPT

## 2022-03-29 RX ORDER — ISOSORBIDE MONONITRATE 30 MG/1
30 TABLET, EXTENDED RELEASE ORAL DAILY
Qty: 90 | Refills: 1 | Status: DISCONTINUED | COMMUNITY
Start: 2021-09-16 | End: 2022-03-29

## 2022-03-29 RX ORDER — METOPROLOL TARTRATE 75 MG/1
TABLET, FILM COATED ORAL
Refills: 0 | Status: DISCONTINUED | COMMUNITY
End: 2022-03-29

## 2022-03-29 RX ORDER — MECLIZINE HYDROCHLORIDE 12.5 MG/1
12.5 TABLET ORAL TWICE DAILY
Qty: 20 | Refills: 0 | Status: DISCONTINUED | COMMUNITY
Start: 2021-01-07 | End: 2022-03-29

## 2022-03-29 RX ORDER — FUROSEMIDE 20 MG/1
20 TABLET ORAL
Qty: 90 | Refills: 1 | Status: DISCONTINUED | COMMUNITY
Start: 2020-11-23 | End: 2022-03-29

## 2022-03-29 NOTE — PROCEDURE
[FreeTextEntry1] : ROJAS\par EXAM: [id number] - CT ABDOMEN AND PELVIS IC - ORDERED BY: SHELBY GAYTAN\par \par EXAM: [id number] - CT CHEST OC IC - ORDERED BY: SHELBY GAYTAN\par \par \par PROCEDURE DATE: 02/16/2022\par \par \par \par INTERPRETATION: CLINICAL INFORMATION: Colon cancer, follow-up\par \par COMPARISON: August 2021\par \par CONTRAST/COMPLICATIONS:\par IV Contrast: IV contrast documented in associated exam (accession [id number]), Omnipaque 350 (accession [id number]) 90 cc administered 10 cc discarded\par Oral Contrast: Omnipaque 300 (accession [id number]), NONE (accession [id number])\par Complications: none\par \par PROCEDURE:\par CT of the Chest, Abdomen and Pelvis was performed.\par Sagittal and coronal reformats were performed.\par \par FINDINGS:\par CHEST:\par LUNGS AND LARGE AIRWAYS: Patent central airways. Stable 2 mm MANUEL density (5/42). Previously reported RIGHT base/RLL nodular opacity is resolved. Minimal LEFT hemidiaphragm elevation and adjacent LLL fibrosis, unchanged. Minimal subpleural reticular changes.\par PLEURA: No pleural effusion.\par VESSELS: No aortic dilatation. Aortic /coronary artery calcification. CABG\par HEART: Heart size is normal. No pericardial effusion. TAVR. Cardiac electrodes.\par MEDIASTINUM AND SORAIDA: No lymphadenopathy.\par CHEST WALL AND LOWER NECK: Median sternotomy. LEFT chest wall pacemaker. Asymmetric LEFT gynecomastia, unchanged.\par \par ABDOMEN AND PELVIS:\par LIVER: Within normal limits.\par BILE DUCTS: Post cholecystectomy biliary duct prominence.\par GALLBLADDER: Cholecystectomy.\par SPLEEN: Within normal limits.\par PANCREAS: Diffuse fatty replacement\par ADRENALS: Fail centimeter sized LEFT adrenal nodularity\par KIDNEYS/URETERS: Renal cysts measure up to 22 mm. 4 mm LEFT lower pole intrarenal calculus. No obstructive uropathy.\par \par BLADDER: Trabeculated urinary bladder wall.\par REPRODUCTIVE ORGANS: Enlarged prostate/seminal vesicles, unchanged\par \par BOWEL: Enhancing 10 mm 4th portion duodenal mucosal nodule (coronal 7-53), in retrospect unchanged from 1/14/2012 and therefore benign. No bowel obstruction. RIGHT hemicolectomy and ileocolic anastomosis in the transverse colon. Sigmoid diverticula without diverticulitis..\par PERITONEUM: No ascites.\par VESSELS: Atherosclerotic changes.\par RETROPERITONEUM/LYMPH NODES: No lymphadenopathy.\par ABDOMINAL WALL: RIGHT inguinal scarring.\par BONES: No suspicious osseous abnormality. Mild thoracolumbar spondylosis\par \par IMPRESSION:\par 1. No metastasis.\par 2. Previous 6 mm RLL airspace opacity, resolved.\par 3. Millimeter-sized MANUEL density, unchanged\par \par \par \par --- End of Report ---\par \par \par \par \par \par \par MELISSA ELDRIDGE MD; Attending Radiologist\par This document has been electronically signed. Feb 17 2022 7:50AM\par \par ~~~~~~~~~~~~~~~~~~~~~~~~~~~~~~~~~~~~~~~~~~~~~~~~~~~~~~~~~~~~~~~~~~~~~~~~\par \par [facility]\par EXAM: CT ABDOMEN AND PELVIS OC IC\par \par EXAM: CT CHEST IC\par \par \par PROCEDURE DATE: 08/18/2021\par \par \par \par INTERPRETATION: CLINICAL INFORMATION: Restaging of patient's colon cancer. Status post resection of tumor 12/2020.\par \par COMPARISON: CT chest abdomen pelvis 12/9/2020\par \par CONTRAST/COMPLICATIONS:\par IV Contrast: IV contrast documented in associated exam (accession [id number]), Omnipaque 350 (accession [id number]) 90 cc administered 10 cc discarded\par Oral Contrast: NONE (accession [id number]), Omnipaque 300 (accession [id number])\par Complications: None reported at time of study completion\par \par PROCEDURE:\par CT of the Chest, Abdomen and Pelvis was performed.\par Sagittal and coronal reformats were performed.\par \par FINDINGS:\par CHEST:\par LUNGS AND LARGE AIRWAYS: Patent central airways. Stable distal mucoid impacted airways in the right upper lobe. A new 6 mm nodular opacity at the right lung base. Stable 2 mm left upper lobe nodule. Stable calcified granuloma in the right upper lobe. Stable left basilar scarring..\par PLEURA: No pleural effusion.\par VESSELS: Atherosclerotic changes of the aorta. Status post CABG.\par HEART: Heart size is normal. Status post TAVR. No pericardial effusion.\par MEDIASTINUM AND SORAIDA: No lymphadenopathy.\par CHEST WALL AND LOWER NECK: Left anterior chest wall pacemaker with lead tips in the right atrium and right ventricle.\par \par ABDOMEN AND PELVIS:\par LIVER: Focal hypoattenuation seen in segment 5 on prior study is not as conspicuous on today's exam. Focal fat adjacent to the falciform ligament again noted.\par BILE DUCTS: Normal caliber.\par GALLBLADDER: Cholecystectomy.\par SPLEEN: Within normal limits.\par PANCREAS: Fatty atrophy.\par ADRENALS: Within normal limits.\par KIDNEYS/URETERS: Bilateral renal cysts. Symmetric renal enhancement without hydronephrosis.\par \par BLADDER: Within normal limits.\par REPRODUCTIVE ORGANS: Prostate is enlarged.\par \par BOWEL: Status post right hemicolectomy with an ileocolic anastomosis in the mid transverse colon. Sigmoid diverticulosis. Metallic clip in the rectum, possibly from prior biopsy.\par PERITONEUM: No ascites.\par VESSELS: Atherosclerotic changes.\par RETROPERITONEUM/LYMPH NODES: No lymphadenopathy.\par ABDOMINAL WALL: Within normal limits.\par BONES: Degenerative changes.\par \par IMPRESSION:\par A new 6 mm right lower lobe nodular opacity is indeterminate. Recommend attention on follow-up.\par \par No evidence for metastatic disease within the abdomen or pelvis. A focus of low attenuation in the liver seen on prior study is not as conspicuous on today's exam.\par \par --- End of Report ---\par \par \par \par \par \par \par RUBEN VEGAS MD; Attending Radiologist\par This document has been electronically signed. Aug 19 2021 4:03PM\par \par ~~~~~~~~~~~~~~~~~~~~~~~~~~~~~~~~~~~~~~~~~~~~~~~~~~~~~~~~~~~~~~~~~~~~~~~~\par

## 2022-03-29 NOTE — HISTORY OF PRESENT ILLNESS
[Former] : former [TextBox_4] : 89M PMH moderate COPD, colon CA (adenoCA s/p hemicolectomy 01/2021), AFib s/p DCCV on Eliquis and Amiodarone, cardiomyopathy s/p AICD, CAD s/p PCI, anemia, HTN, HLD, GERD, DM2 who presents for initial pulmonary evaluation of COPD. His cardiologist is Dr. Kem Mcgill. No SOB, no chest pain. No increased sputum production. No fevers, no chills. No N/V/D. [TextBox_11] : 2 [YearQuit] : 1982 [TextBox_13] : 40

## 2022-04-11 PROBLEM — Z11.59 SCREENING FOR VIRAL DISEASE: Status: ACTIVE | Noted: 2022-01-04

## 2022-04-11 PROBLEM — Z11.59 SCREENING FOR VIRAL DISEASE: Status: RESOLVED | Noted: 2020-09-14 | Resolved: 2020-11-30

## 2022-04-26 ENCOUNTER — NON-APPOINTMENT (OUTPATIENT)
Age: 87
End: 2022-04-26

## 2022-05-01 ENCOUNTER — OUTPATIENT (OUTPATIENT)
Dept: OUTPATIENT SERVICES | Facility: HOSPITAL | Age: 87
LOS: 1 days | End: 2022-05-01
Payer: MEDICARE

## 2022-05-01 DIAGNOSIS — Z98.41 CATARACT EXTRACTION STATUS, RIGHT EYE: Chronic | ICD-10-CM

## 2022-05-01 DIAGNOSIS — Z95.810 PRESENCE OF AUTOMATIC (IMPLANTABLE) CARDIAC DEFIBRILLATOR: Chronic | ICD-10-CM

## 2022-05-01 DIAGNOSIS — Z96.60 PRESENCE OF UNSPECIFIED ORTHOPEDIC JOINT IMPLANT: Chronic | ICD-10-CM

## 2022-05-01 DIAGNOSIS — Z95.1 PRESENCE OF AORTOCORONARY BYPASS GRAFT: Chronic | ICD-10-CM

## 2022-05-01 DIAGNOSIS — Z90.49 ACQUIRED ABSENCE OF OTHER SPECIFIED PARTS OF DIGESTIVE TRACT: Chronic | ICD-10-CM

## 2022-05-01 DIAGNOSIS — Z95.3 PRESENCE OF XENOGENIC HEART VALVE: Chronic | ICD-10-CM

## 2022-05-01 DIAGNOSIS — C18.9 MALIGNANT NEOPLASM OF COLON, UNSPECIFIED: ICD-10-CM

## 2022-05-01 DIAGNOSIS — Z98.42 CATARACT EXTRACTION STATUS, LEFT EYE: Chronic | ICD-10-CM

## 2022-05-01 DIAGNOSIS — Z95.0 PRESENCE OF CARDIAC PACEMAKER: Chronic | ICD-10-CM

## 2022-05-01 DIAGNOSIS — Z98.89 OTHER SPECIFIED POSTPROCEDURAL STATES: Chronic | ICD-10-CM

## 2022-05-01 DIAGNOSIS — Z98.890 OTHER SPECIFIED POSTPROCEDURAL STATES: Chronic | ICD-10-CM

## 2022-05-01 DIAGNOSIS — Z98.61 CORONARY ANGIOPLASTY STATUS: Chronic | ICD-10-CM

## 2022-05-02 ENCOUNTER — APPOINTMENT (OUTPATIENT)
Dept: HEMATOLOGY ONCOLOGY | Facility: CLINIC | Age: 87
End: 2022-05-02
Payer: MEDICARE

## 2022-05-05 ENCOUNTER — APPOINTMENT (OUTPATIENT)
Dept: INTERNAL MEDICINE | Facility: CLINIC | Age: 87
End: 2022-05-05
Payer: MEDICARE

## 2022-05-05 ENCOUNTER — APPOINTMENT (OUTPATIENT)
Dept: INTERNAL MEDICINE | Facility: CLINIC | Age: 87
End: 2022-05-05

## 2022-05-09 ENCOUNTER — TRANSCRIPTION ENCOUNTER (OUTPATIENT)
Age: 87
End: 2022-05-09

## 2022-05-09 ENCOUNTER — APPOINTMENT (OUTPATIENT)
Dept: INTERNAL MEDICINE | Facility: CLINIC | Age: 87
End: 2022-05-09
Payer: MEDICARE

## 2022-05-09 DIAGNOSIS — Z71.89 OTHER SPECIFIED COUNSELING: ICD-10-CM

## 2022-05-09 DIAGNOSIS — U07.1 COVID-19: ICD-10-CM

## 2022-05-09 PROCEDURE — 99441: CPT | Mod: CS,95

## 2022-05-09 NOTE — HISTORY OF PRESENT ILLNESS
[Home] : at home, [unfilled] , at the time of the visit. [Medical Office: ([facility])___] : at the medical office located in  [Verbal consent obtained from patient] : the patient, [unfilled] [FreeTextEntry1] : Pt was DX with covid via home test today\par pt's symptoms started on 5/8/22\par symptoms include cough/aches/fatigue, no fever/dyspnea, wife was +\par vaccine status +X3\par \par COVID d/w pt and questions answered\par \par Plan\par -Rest/fluids/quarantine/supportive therapy\par - Report any negative symptoms\par - Discussed infusion tx which pt/wife and son agree with=paperwork completed and pt is sched for tomorrow\par -will not give paxlovid, on amiodarone\par -hycodan given per pt req\par \par DX code=U07.1/Z71.89\par time on phone=6minutes

## 2022-05-10 ENCOUNTER — APPOINTMENT (OUTPATIENT)
Dept: DISASTER EMERGENCY | Facility: HOSPITAL | Age: 87
End: 2022-05-10

## 2022-05-10 ENCOUNTER — OUTPATIENT (OUTPATIENT)
Dept: OUTPATIENT SERVICES | Facility: HOSPITAL | Age: 87
LOS: 1 days | End: 2022-05-10
Payer: MEDICARE

## 2022-05-10 VITALS
WEIGHT: 166.01 LBS | HEIGHT: 68 IN | RESPIRATION RATE: 18 BRPM | TEMPERATURE: 97 F | DIASTOLIC BLOOD PRESSURE: 67 MMHG | SYSTOLIC BLOOD PRESSURE: 152 MMHG | HEART RATE: 90 BPM | OXYGEN SATURATION: 93 %

## 2022-05-10 VITALS
OXYGEN SATURATION: 93 % | HEART RATE: 96 BPM | RESPIRATION RATE: 18 BRPM | DIASTOLIC BLOOD PRESSURE: 68 MMHG | SYSTOLIC BLOOD PRESSURE: 126 MMHG | TEMPERATURE: 98 F

## 2022-05-10 DIAGNOSIS — Z90.49 ACQUIRED ABSENCE OF OTHER SPECIFIED PARTS OF DIGESTIVE TRACT: Chronic | ICD-10-CM

## 2022-05-10 DIAGNOSIS — Z98.890 OTHER SPECIFIED POSTPROCEDURAL STATES: Chronic | ICD-10-CM

## 2022-05-10 DIAGNOSIS — Z95.810 PRESENCE OF AUTOMATIC (IMPLANTABLE) CARDIAC DEFIBRILLATOR: Chronic | ICD-10-CM

## 2022-05-10 DIAGNOSIS — Z98.41 CATARACT EXTRACTION STATUS, RIGHT EYE: Chronic | ICD-10-CM

## 2022-05-10 DIAGNOSIS — Z98.61 CORONARY ANGIOPLASTY STATUS: Chronic | ICD-10-CM

## 2022-05-10 DIAGNOSIS — Z98.89 OTHER SPECIFIED POSTPROCEDURAL STATES: Chronic | ICD-10-CM

## 2022-05-10 DIAGNOSIS — Z95.3 PRESENCE OF XENOGENIC HEART VALVE: Chronic | ICD-10-CM

## 2022-05-10 DIAGNOSIS — Z98.42 CATARACT EXTRACTION STATUS, LEFT EYE: Chronic | ICD-10-CM

## 2022-05-10 DIAGNOSIS — Z95.0 PRESENCE OF CARDIAC PACEMAKER: Chronic | ICD-10-CM

## 2022-05-10 DIAGNOSIS — U07.1 COVID-19: ICD-10-CM

## 2022-05-10 DIAGNOSIS — Z95.1 PRESENCE OF AORTOCORONARY BYPASS GRAFT: Chronic | ICD-10-CM

## 2022-05-10 DIAGNOSIS — Z96.60 PRESENCE OF UNSPECIFIED ORTHOPEDIC JOINT IMPLANT: Chronic | ICD-10-CM

## 2022-05-10 PROCEDURE — M0222: CPT

## 2022-05-10 RX ORDER — BEBTELOVIMAB 87.5 MG/ML
175 INJECTION, SOLUTION INTRAVENOUS ONCE
Refills: 0 | Status: COMPLETED | OUTPATIENT
Start: 2022-05-10 | End: 2022-05-10

## 2022-05-10 RX ADMIN — BEBTELOVIMAB 175 MILLIGRAM(S): 87.5 INJECTION, SOLUTION INTRAVENOUS at 16:09

## 2022-05-10 NOTE — CHART NOTE - NSCHARTNOTEFT_GEN_A_CORE
CC: Monoclonal Antibody Administration/COVID 19 Positive      History: Patient presents for administration of monoclonal antibody  Patient has been screened and was deemed to be a candidate.    Exposure: wife COVID +    Symptoms/ Criteria: headache sore throat body aches    Inclusion Criteria: age > 88 years AF on AC CAD Colon Ca    Date of Positive Test: verified by clinical call center     Date of symptom onset: 5/7    Vaccine status: Pfizer x 3 > 10/21    PMHx:  Infection due to severe acute respiratory syndrome coronavirus 2 (SARS-CoV-2)    H/o or current diagnosis of HF- ACEI/ARB contraindication unknown    H/o or current diagnosis of HF- Contraindication to ACEI/ARBs    H/o or current diagnosis of HF- ACEI/ARB contraindication unknown    H/o or current diagnosis of HF- Contraindication to ACEI/ARBs    H/o or current diagnosis of HF- ACEI/ARB contraindication unknown    H/o or current diagnosis of HF- Contraindication to ACEI/ARBs    H/o or current diagnosis of HF- ACEI/ARB contraindication unknown    Family history of depression (Sibling)    Family history of acute myocardial infarction (Sibling)    Family history of stroke (Sibling)    MEWS Score    Hypertension    Hyperlipidemia    Diabetes mellitus    Aortic stenosis    Asthma    CAD (coronary artery disease)    FANNY (obstructive sleep apnea)    SOB (shortness of breath)    PVD (peripheral vascular disease)    Angina pectoris    BPH (benign prostatic hyperplasia)    SOB (shortness of breath)    MENDEZ (dyspnea on exertion)    Fatigue    Former smoker    Carotid artery disease    CAD (coronary artery disease)    PVD (peripheral vascular disease)    Age-related incipient cataract of both eyes    Osteoarthritis    Abnormal positron emission tomography (PET) scan    CAD (coronary artery disease)    MR (mitral regurgitation)    COPD, mild    Cataract    History of BPH    Hematuria    Angina, class II    FANNY (obstructive sleep apnea)    H/O urinary frequency    Former smoker    PVD (peripheral vascular disease)    Statin intolerance    Afib    Colon cancer    Deidre&#x27;s ring    Spinal stenosis    GI bleed    DVT (deep venous thrombosis)    Thrombus    Cancer, colon    Abdominal hernia    S/P tonsillectomy    S/P cholecystectomy    History of CEA (carotid endarterectomy)    S/P cataract surgery, left    S/P cataract surgery, right    S/P joint replacement    S/P nasal surgery    S/P PTCA (percutaneous transluminal coronary angioplasty)    S/P CABG x 2    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   Lymphatic: No lymphadenopathy  Cardiovascular: Normal S1 S2, No JVD, No murmurs, No edema  Respiratory: Lungs clear to auscultation	  Gastrointestinal:  Soft, Non-tender. No guarding   Skin: warm and dry  Neurologic: Non-focal  Extremities: Normal range of motion.     ASSESSMENT:  Pt is a 89y year old Male with PMH  HTN Afib on AC  Colon Ca Covid +  referred to the infusion center for Monoclonal antibody administration  (Bebtelovimab).        PLAN:  - Administration procedure explained to patient   - Consent for monoclonal antibody administration obtained   - Risk & benefits discussed/all questions answered  - Administer Bebtelovimab per protocol  - will observe patient for one hour post administration  and then if stable discharge home with outpt follow up as planned by PMD.        - Patient tolerated treatment well denies complaints of chest pain/SOB/dizziness/ palpitations  - VSS for discharge home  - D/C instructions given/ fact sheet included.  - Patient to follow-up with PCP as needed.

## 2022-05-11 ENCOUNTER — NON-APPOINTMENT (OUTPATIENT)
Age: 87
End: 2022-05-11

## 2022-05-12 ENCOUNTER — APPOINTMENT (OUTPATIENT)
Dept: DISASTER EMERGENCY | Facility: HOSPITAL | Age: 87
End: 2022-05-12

## 2022-05-18 ENCOUNTER — APPOINTMENT (OUTPATIENT)
Dept: INTERNAL MEDICINE | Facility: CLINIC | Age: 87
End: 2022-05-18
Payer: MEDICARE

## 2022-05-18 VITALS
OXYGEN SATURATION: 96 % | TEMPERATURE: 97 F | HEIGHT: 68 IN | RESPIRATION RATE: 14 BRPM | BODY MASS INDEX: 25.61 KG/M2 | DIASTOLIC BLOOD PRESSURE: 75 MMHG | SYSTOLIC BLOOD PRESSURE: 120 MMHG | HEART RATE: 95 BPM | WEIGHT: 169 LBS

## 2022-05-18 DIAGNOSIS — R23.9 UNSPECIFIED SKIN CHANGES: ICD-10-CM

## 2022-05-18 DIAGNOSIS — R21 RASH AND OTHER NONSPECIFIC SKIN ERUPTION: ICD-10-CM

## 2022-05-18 DIAGNOSIS — K43.2 INCISIONAL HERNIA W/OUT OBSTRUCTION OR GANGRENE: ICD-10-CM

## 2022-05-18 DIAGNOSIS — R05.9 COUGH, UNSPECIFIED: ICD-10-CM

## 2022-05-18 PROCEDURE — 99214 OFFICE O/P EST MOD 30 MIN: CPT

## 2022-05-18 RX ORDER — REPAGLINIDE 1 MG/1
TABLET ORAL
Refills: 0 | Status: DISCONTINUED | COMMUNITY
End: 2022-05-18

## 2022-05-19 RX ORDER — CLOBETASOL PROPIONATE 0.5 MG/G
0.05 CREAM TOPICAL TWICE DAILY
Qty: 1 | Refills: 0 | Status: DISCONTINUED | COMMUNITY
Start: 2022-05-18 | End: 2022-05-19

## 2022-05-19 NOTE — ASSESSMENT
[FreeTextEntry1] : Gen. surgery referral given. Tessalon given for cough. Temovate cream given for left leg. Dermatology referral also given and patient will call if skin does not improve. Patient will return as scheduled for regular followup\par \par Dr. Burns was present in office building while I examined patient\par

## 2022-05-19 NOTE — PHYSICAL EXAM
[No Acute Distress] : no acute distress [No Respiratory Distress] : no respiratory distress  [Clear to Auscultation] : lungs were clear to auscultation bilaterally [Normal Rate] : normal rate  [Regular Rhythm] : with a regular rhythm [Normal Affect] : the affect was normal [Normal Mood] : the mood was normal [Normal Outer Ear/Nose] : the outer ears and nose were normal in appearance [Normal Oropharynx] : the oropharynx was normal [Normal TMs] : both tympanic membranes were normal [Normal Nasal Mucosa] : the nasal mucosa was normal [Supple] : supple [de-identified] : +small quarter sized defect/hernia right lower abdomen at old incision site [de-identified] : +dermatitis appearing skin change left lateral knee  area and left upper thigh

## 2022-05-19 NOTE — HISTORY OF PRESENT ILLNESS
[Spouse] : spouse [FreeTextEntry8] : patient presents with multiple complaints including\par 1. Rash to left leg that has been there 1-2 months. Patient is asymptomatic\par \par 2. Complaining of bulge right  abdomen area, "notice more if cough", no pain\par \par 3.Status post covid 5/9/22. Received infusion and using Hycodan cough syrup QHS. Patient overall significantly better but continues with mild residual cough without dyspnea

## 2022-05-23 ENCOUNTER — RESULT REVIEW (OUTPATIENT)
Age: 87
End: 2022-05-23

## 2022-05-23 ENCOUNTER — APPOINTMENT (OUTPATIENT)
Dept: HEMATOLOGY ONCOLOGY | Facility: CLINIC | Age: 87
End: 2022-05-23
Payer: MEDICARE

## 2022-05-23 VITALS
OXYGEN SATURATION: 98 % | TEMPERATURE: 96.5 F | SYSTOLIC BLOOD PRESSURE: 153 MMHG | DIASTOLIC BLOOD PRESSURE: 72 MMHG | HEART RATE: 77 BPM | BODY MASS INDEX: 26 KG/M2 | WEIGHT: 171 LBS

## 2022-05-23 LAB
BASOPHILS # BLD AUTO: 0.02 K/UL — SIGNIFICANT CHANGE UP (ref 0–0.2)
BASOPHILS NFR BLD AUTO: 0.2 % — SIGNIFICANT CHANGE UP (ref 0–2)
EOSINOPHIL # BLD AUTO: 0.08 K/UL — SIGNIFICANT CHANGE UP (ref 0–0.5)
EOSINOPHIL NFR BLD AUTO: 1 % — SIGNIFICANT CHANGE UP (ref 0–6)
HCT VFR BLD CALC: 39 % — SIGNIFICANT CHANGE UP (ref 39–50)
HGB BLD-MCNC: 12.7 G/DL — LOW (ref 13–17)
IMM GRANULOCYTES NFR BLD AUTO: 0.6 % — SIGNIFICANT CHANGE UP (ref 0–1.5)
LYMPHOCYTES # BLD AUTO: 1.08 K/UL — SIGNIFICANT CHANGE UP (ref 1–3.3)
LYMPHOCYTES # BLD AUTO: 12.9 % — LOW (ref 13–44)
MCHC RBC-ENTMCNC: 32.6 GM/DL — SIGNIFICANT CHANGE UP (ref 32–36)
MCHC RBC-ENTMCNC: 32.6 PG — SIGNIFICANT CHANGE UP (ref 27–34)
MCV RBC AUTO: 100.3 FL — HIGH (ref 80–100)
MONOCYTES # BLD AUTO: 0.82 K/UL — SIGNIFICANT CHANGE UP (ref 0–0.9)
MONOCYTES NFR BLD AUTO: 9.8 % — SIGNIFICANT CHANGE UP (ref 2–14)
NEUTROPHILS # BLD AUTO: 6.29 K/UL — SIGNIFICANT CHANGE UP (ref 1.8–7.4)
NEUTROPHILS NFR BLD AUTO: 75.5 % — SIGNIFICANT CHANGE UP (ref 43–77)
NRBC # BLD: 0 /100 WBCS — SIGNIFICANT CHANGE UP (ref 0–0)
PLATELET # BLD AUTO: 174 K/UL — SIGNIFICANT CHANGE UP (ref 150–400)
RBC # BLD: 3.89 M/UL — LOW (ref 4.2–5.8)
RBC # FLD: 13.7 % — SIGNIFICANT CHANGE UP (ref 10.3–14.5)
WBC # BLD: 8.34 K/UL — SIGNIFICANT CHANGE UP (ref 3.8–10.5)
WBC # FLD AUTO: 8.34 K/UL — SIGNIFICANT CHANGE UP (ref 3.8–10.5)

## 2022-05-23 PROCEDURE — 99213 OFFICE O/P EST LOW 20 MIN: CPT

## 2022-05-23 PROCEDURE — 85027 COMPLETE CBC AUTOMATED: CPT
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
## 2022-05-23 NOTE — PHYSICAL EXAM
[Ambulatory and capable of all self care but unable to carry out any work activities] : Status 2- Ambulatory and capable of all self care but unable to carry out any work activities. Up and about more than 50% of waking hours [Normal] : affect appropriate [de-identified] : anicteric [de-identified] : small ventral hernia at the superior aspect of prior abdominal wound, reducible, non-tender

## 2022-05-23 NOTE — RESULTS/DATA
[FreeTextEntry1] : Mr. Pedraza is a pleasant 89 year-old man with [id number], stage I colon cancer s/p resection in January, 2021, here in follow-up.

## 2022-05-23 NOTE — REVIEW OF SYSTEMS
[Fatigue] : no fatigue [Recent Change In Weight] : ~T no recent weight change [Dysphagia] : no dysphagia [Odynophagia] : no odynophagia [Chest Pain] : no chest pain [Lower Ext Edema] : no lower extremity edema [Abdominal Pain] : no abdominal pain [Skin Rash] : no skin rash [Anxiety] : no anxiety [Depression] : no depression [Easy Bleeding] : no tendency for easy bleeding [Easy Bruising] : no tendency for easy bruising [Swollen Glands] : no swollen glands

## 2022-05-24 LAB
ALBUMIN SERPL ELPH-MCNC: 4.1 G/DL
ALP BLD-CCNC: 56 U/L
ALT SERPL-CCNC: 17 U/L
ANION GAP SERPL CALC-SCNC: 10 MMOL/L
AST SERPL-CCNC: 19 U/L
BILIRUB SERPL-MCNC: 0.6 MG/DL
BUN SERPL-MCNC: 26 MG/DL
CALCIUM SERPL-MCNC: 9 MG/DL
CHLORIDE SERPL-SCNC: 103 MMOL/L
CO2 SERPL-SCNC: 27 MMOL/L
CREAT SERPL-MCNC: 1.3 MG/DL
EGFR: 53 ML/MIN/1.73M2
FERRITIN SERPL-MCNC: 94 NG/ML
GLUCOSE SERPL-MCNC: 203 MG/DL
POTASSIUM SERPL-SCNC: 4.3 MMOL/L
PROT SERPL-MCNC: 6.6 G/DL
SODIUM SERPL-SCNC: 140 MMOL/L

## 2022-05-24 NOTE — H&P PST ADULT - NSICDXPASTSURGICALHX_GEN_ALL_CORE_FT
Number Of Freeze-Thaw Cycles: 2 freeze-thaw cycles Duration Of Freeze Thaw-Cycle (Seconds): 5 Detail Level: Zone Consent: The patient's verbal consent was obtained including but not limited to risks of crusting, scabbing, blistering, scarring, darker or lighter pigmentary change, recurrence, incomplete removal and infection. Render Post Care In The Note?: yes Price (Use Numbers Only, No Special Characters Or $): 160 Post-Care Instructions: I reviewed with the patient in detail post-care instructions. Patient is to wear sunprotection, and avoid picking at any of the treated lesions. Pt may apply Vaseline to crusted or scabbing areas. 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

## 2022-06-10 ENCOUNTER — NON-APPOINTMENT (OUTPATIENT)
Age: 87
End: 2022-06-10

## 2022-07-02 NOTE — H&P PST ADULT - ENMT
Problem: Patient Education: Go to Patient Education Activity  Goal: Patient/Family Education  Outcome: Progressing Towards Goal No oral lesions; no gross abnormalities negative

## 2022-07-14 ENCOUNTER — APPOINTMENT (OUTPATIENT)
Dept: INTERNAL MEDICINE | Facility: CLINIC | Age: 87
End: 2022-07-14

## 2022-07-14 VITALS
DIASTOLIC BLOOD PRESSURE: 80 MMHG | RESPIRATION RATE: 14 BRPM | BODY MASS INDEX: 26.37 KG/M2 | HEART RATE: 88 BPM | WEIGHT: 174 LBS | HEIGHT: 68 IN | OXYGEN SATURATION: 94 % | SYSTOLIC BLOOD PRESSURE: 142 MMHG

## 2022-07-14 DIAGNOSIS — R42 DIZZINESS AND GIDDINESS: ICD-10-CM

## 2022-07-14 PROCEDURE — 99213 OFFICE O/P EST LOW 20 MIN: CPT

## 2022-07-14 RX ORDER — BRIMONIDINE TARTRATE 2 MG/MG
0.2 SOLUTION/ DROPS OPHTHALMIC
Qty: 5 | Refills: 0 | Status: DISCONTINUED | COMMUNITY
Start: 2021-12-28

## 2022-07-14 RX ORDER — SODIUM SULFATE, MAGNESIUM SULFATE, AND POTASSIUM CHLORIDE 17.75; 2.7; 2.25 G/1; G/1; G/1
1479-225-188 TABLET ORAL
Qty: 24 | Refills: 0 | Status: DISCONTINUED | COMMUNITY
Start: 2022-07-05

## 2022-07-14 RX ORDER — VALACYCLOVIR 1 G/1
1 TABLET, FILM COATED ORAL
Qty: 21 | Refills: 0 | Status: DISCONTINUED | COMMUNITY
Start: 2022-03-05

## 2022-07-14 RX ORDER — DOXYCYCLINE 100 MG/1
100 CAPSULE ORAL
Qty: 14 | Refills: 0 | Status: DISCONTINUED | COMMUNITY
Start: 2022-03-05

## 2022-07-14 NOTE — ASSESSMENT
[FreeTextEntry1] : Patient with persistent low-grade vertigo but occasional severe attacks which occurred about 4 days ago.\par Patient unable to do vestibular therapy secondary to neck issues.\par Referral given to follow up with neurology.\par Patient told to avoid any quick head movements.

## 2022-07-14 NOTE — ASU PATIENT PROFILE, ADULT - AS SC BRADEN MOBILITY
EFW BPP
IUGR-  EFW 5-7 AC 25% overall 28% BPP 8/8 GEE  20   Cerclage inplace, removal scheduled for 7/25.  C/o occ cramps, no bleeding  GBS next, labor/bleeding precautions
(4) no limitation

## 2022-07-14 NOTE — PHYSICAL EXAM
[No Respiratory Distress] : no respiratory distress  [No Acute Distress] : no acute distress [Clear to Auscultation] : lungs were clear to auscultation bilaterally [No Accessory Muscle Use] : no accessory muscle use [Normal Rate] : normal rate  [Regular Rhythm] : with a regular rhythm [No Edema] : there was no peripheral edema [Coordination Grossly Intact] : coordination grossly intact [No Focal Deficits] : no focal deficits [Normal Gait] : normal gait [Normal Affect] : the affect was normal

## 2022-07-14 NOTE — HISTORY OF PRESENT ILLNESS
[FreeTextEntry8] : The patient presents for an acute visit secondary to having had another episode of severe vertigo.\par The patient has had a history of vertigo which is relatively mild on a day-to-day basis but continues. 4 days ago he had a severe episode where even sitting in a chair he felt like the room and world was spinning. It lasted 10-15 minutes and gradually resolved. Since then it is not as bad but persists but better today.\par He sat a few episodes of the severe attacks and wants to know what to do to potentially prevent the next one.\par \par He has seen neurology and not in a while.\par He has attempted vestibular therapy but unable to complete it secondary to neck issues.\par ENT evaluation without benefit.

## 2022-07-29 ENCOUNTER — OUTPATIENT (OUTPATIENT)
Dept: OUTPATIENT SERVICES | Facility: HOSPITAL | Age: 87
LOS: 1 days | End: 2022-07-29

## 2022-07-29 DIAGNOSIS — Z90.49 ACQUIRED ABSENCE OF OTHER SPECIFIED PARTS OF DIGESTIVE TRACT: Chronic | ICD-10-CM

## 2022-07-29 DIAGNOSIS — Z95.810 PRESENCE OF AUTOMATIC (IMPLANTABLE) CARDIAC DEFIBRILLATOR: Chronic | ICD-10-CM

## 2022-07-29 DIAGNOSIS — Z98.89 OTHER SPECIFIED POSTPROCEDURAL STATES: Chronic | ICD-10-CM

## 2022-07-29 DIAGNOSIS — Z98.890 OTHER SPECIFIED POSTPROCEDURAL STATES: Chronic | ICD-10-CM

## 2022-07-29 DIAGNOSIS — Z95.0 PRESENCE OF CARDIAC PACEMAKER: Chronic | ICD-10-CM

## 2022-07-29 DIAGNOSIS — Z98.61 CORONARY ANGIOPLASTY STATUS: Chronic | ICD-10-CM

## 2022-07-29 DIAGNOSIS — Z95.3 PRESENCE OF XENOGENIC HEART VALVE: Chronic | ICD-10-CM

## 2022-07-29 DIAGNOSIS — Z95.1 PRESENCE OF AORTOCORONARY BYPASS GRAFT: Chronic | ICD-10-CM

## 2022-07-29 DIAGNOSIS — Z98.41 CATARACT EXTRACTION STATUS, RIGHT EYE: Chronic | ICD-10-CM

## 2022-07-29 DIAGNOSIS — Z98.42 CATARACT EXTRACTION STATUS, LEFT EYE: Chronic | ICD-10-CM

## 2022-07-29 DIAGNOSIS — Z96.60 PRESENCE OF UNSPECIFIED ORTHOPEDIC JOINT IMPLANT: Chronic | ICD-10-CM

## 2022-08-01 ENCOUNTER — APPOINTMENT (OUTPATIENT)
Dept: PULMONOLOGY | Facility: CLINIC | Age: 87
End: 2022-08-01

## 2022-08-05 DIAGNOSIS — I25.10 ATHEROSCLEROTIC HEART DISEASE OF NATIVE CORONARY ARTERY WITHOUT ANGINA PECTORIS: ICD-10-CM

## 2022-08-05 DIAGNOSIS — Z12.11 ENCOUNTER FOR SCREENING FOR MALIGNANT NEOPLASM OF COLON: ICD-10-CM

## 2022-08-05 DIAGNOSIS — I48.91 UNSPECIFIED ATRIAL FIBRILLATION: ICD-10-CM

## 2022-08-05 DIAGNOSIS — G47.30 SLEEP APNEA, UNSPECIFIED: ICD-10-CM

## 2022-08-05 DIAGNOSIS — J44.9 CHRONIC OBSTRUCTIVE PULMONARY DISEASE, UNSPECIFIED: ICD-10-CM

## 2022-08-05 DIAGNOSIS — D64.9 ANEMIA, UNSPECIFIED: ICD-10-CM

## 2022-08-05 DIAGNOSIS — K64.9 UNSPECIFIED HEMORRHOIDS: ICD-10-CM

## 2022-08-05 DIAGNOSIS — Z85.038 PERSONAL HISTORY OF OTHER MALIGNANT NEOPLASM OF LARGE INTESTINE: ICD-10-CM

## 2022-08-05 DIAGNOSIS — K63.5 POLYP OF COLON: ICD-10-CM

## 2022-08-12 ENCOUNTER — RX RENEWAL (OUTPATIENT)
Age: 87
End: 2022-08-12

## 2022-08-16 ENCOUNTER — OUTPATIENT (OUTPATIENT)
Dept: OUTPATIENT SERVICES | Facility: HOSPITAL | Age: 87
LOS: 1 days | End: 2022-08-16

## 2022-08-16 DIAGNOSIS — C18.9 MALIGNANT NEOPLASM OF COLON, UNSPECIFIED: ICD-10-CM

## 2022-08-16 DIAGNOSIS — Z90.49 ACQUIRED ABSENCE OF OTHER SPECIFIED PARTS OF DIGESTIVE TRACT: Chronic | ICD-10-CM

## 2022-08-16 DIAGNOSIS — Z98.41 CATARACT EXTRACTION STATUS, RIGHT EYE: Chronic | ICD-10-CM

## 2022-08-16 DIAGNOSIS — Z98.89 OTHER SPECIFIED POSTPROCEDURAL STATES: Chronic | ICD-10-CM

## 2022-08-16 DIAGNOSIS — Z98.61 CORONARY ANGIOPLASTY STATUS: Chronic | ICD-10-CM

## 2022-08-16 DIAGNOSIS — Z98.42 CATARACT EXTRACTION STATUS, LEFT EYE: Chronic | ICD-10-CM

## 2022-08-16 DIAGNOSIS — Z95.810 PRESENCE OF AUTOMATIC (IMPLANTABLE) CARDIAC DEFIBRILLATOR: Chronic | ICD-10-CM

## 2022-08-16 DIAGNOSIS — Z98.890 OTHER SPECIFIED POSTPROCEDURAL STATES: Chronic | ICD-10-CM

## 2022-08-16 DIAGNOSIS — Z95.0 PRESENCE OF CARDIAC PACEMAKER: Chronic | ICD-10-CM

## 2022-08-16 DIAGNOSIS — Z95.3 PRESENCE OF XENOGENIC HEART VALVE: Chronic | ICD-10-CM

## 2022-08-16 DIAGNOSIS — Z95.1 PRESENCE OF AORTOCORONARY BYPASS GRAFT: Chronic | ICD-10-CM

## 2022-08-16 DIAGNOSIS — Z96.60 PRESENCE OF UNSPECIFIED ORTHOPEDIC JOINT IMPLANT: Chronic | ICD-10-CM

## 2022-08-17 ENCOUNTER — APPOINTMENT (OUTPATIENT)
Dept: HEMATOLOGY ONCOLOGY | Facility: CLINIC | Age: 87
End: 2022-08-17

## 2022-08-17 DIAGNOSIS — D62 ACUTE POSTHEMORRHAGIC ANEMIA: ICD-10-CM

## 2022-08-17 DIAGNOSIS — K43.9 VENTRAL HERNIA W/OUT OBSTRUCTION OR GANGRENE: ICD-10-CM

## 2022-08-17 NOTE — REVIEW OF SYSTEMS
[Fatigue] : no fatigue [Recent Change In Weight] : ~T no recent weight change [Dysphagia] : no dysphagia [Odynophagia] : no odynophagia [Chest Pain] : no chest pain [Lower Ext Edema] : no lower extremity edema [Abdominal Pain] : no abdominal pain [Diarrhea] : diarrhea [Skin Rash] : no skin rash [Anxiety] : no anxiety [Depression] : no depression [Easy Bleeding] : no tendency for easy bleeding [Easy Bruising] : no tendency for easy bruising [Swollen Glands] : no swollen glands
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
## 2022-08-17 NOTE — PHYSICAL EXAM
[Ambulatory and capable of all self care but unable to carry out any work activities] : Status 2- Ambulatory and capable of all self care but unable to carry out any work activities. Up and about more than 50% of waking hours [Normal] : affect appropriate [de-identified] : anicteric [de-identified] : small ventral hernia at the superior aspect of prior abdominal wound, reducible, non-tender

## 2022-08-24 ENCOUNTER — APPOINTMENT (OUTPATIENT)
Age: 87
End: 2022-08-24

## 2022-09-12 ENCOUNTER — APPOINTMENT (OUTPATIENT)
Dept: PULMONOLOGY | Facility: CLINIC | Age: 87
End: 2022-09-12

## 2022-09-14 ENCOUNTER — NON-APPOINTMENT (OUTPATIENT)
Age: 87
End: 2022-09-14

## 2022-09-21 ENCOUNTER — APPOINTMENT (OUTPATIENT)
Dept: INTERNAL MEDICINE | Facility: CLINIC | Age: 87
End: 2022-09-21

## 2022-09-21 VITALS
DIASTOLIC BLOOD PRESSURE: 70 MMHG | HEIGHT: 68 IN | SYSTOLIC BLOOD PRESSURE: 120 MMHG | OXYGEN SATURATION: 94 % | HEART RATE: 91 BPM | BODY MASS INDEX: 26.07 KG/M2 | WEIGHT: 172 LBS | RESPIRATION RATE: 16 BRPM

## 2022-09-21 DIAGNOSIS — R80.9 PROTEINURIA, UNSPECIFIED: ICD-10-CM

## 2022-09-21 PROCEDURE — G0008: CPT

## 2022-09-21 PROCEDURE — 90662 IIV NO PRSV INCREASED AG IM: CPT

## 2022-09-21 PROCEDURE — 36415 COLL VENOUS BLD VENIPUNCTURE: CPT

## 2022-09-21 PROCEDURE — G0439: CPT

## 2022-09-21 RX ORDER — BENZONATATE 200 MG/1
200 CAPSULE ORAL 3 TIMES DAILY
Qty: 30 | Refills: 0 | Status: DISCONTINUED | COMMUNITY
Start: 2022-05-18 | End: 2022-09-21

## 2022-09-21 RX ORDER — METFORMIN HYDROCHLORIDE 500 MG/1
500 TABLET, COATED ORAL
Qty: 180 | Refills: 1 | Status: DISCONTINUED | COMMUNITY
Start: 2019-11-15 | End: 2022-09-21

## 2022-09-21 NOTE — HEALTH RISK ASSESSMENT
[Fair] :  ~his/her~ mood as fair [Former] : Former [No] : In the past 12 months have you used drugs other than those required for medical reasons? No [No falls in past year] : Patient reported no falls in the past year [0] : 2) Feeling down, depressed, or hopeless: Not at all (0) [None] : None [With Significant Other] : lives with significant other [# of Members in Household ___] :  household currently consist of [unfilled] member(s) [Retired] : retired [High School] : high school [] :  [# Of Children ___] : has [unfilled] children [Sexually Active] : sexually active [Feels Safe at Home] : Feels safe at home [Fully functional (bathing, dressing, toileting, transferring, walking, feeding)] : Fully functional (bathing, dressing, toileting, transferring, walking, feeding) [Fully functional (using the telephone, shopping, preparing meals, housekeeping, doing laundry, using] : Fully functional and needs no help or supervision to perform IADLs (using the telephone, shopping, preparing meals, housekeeping, doing laundry, using transportation, managing medications and managing finances) [Reports changes in vision] : Reports changes in vision [Smoke Detector] : smoke detector [Carbon Monoxide Detector] : carbon monoxide detector [Seat Belt] :  uses seat belt [Reviewed no changes] : Reviewed, no changes [Discussed at today's visit] : Advance Directives Discussed at today's visit [Name: ___] : Health Care Proxy's Name: [unfilled]  [Audit-CScore] : 0 [de-identified] : sedentary [de-identified] : good [[id number]] : 0 [Change in mental status noted] : No change in mental status noted [Reports changes in hearing] : Reports no changes in hearing [Reports changes in dental health] : Reports no changes in dental health [Guns at Home] : no guns at home [Sunscreen] : does not use sunscreen [AdvancecareDate] : 09/22

## 2022-09-21 NOTE — ASSESSMENT
[FreeTextEntry1] : 89-year-old male with extensive medical history including ASHD/ischemic cardiopathy, type 2 diabetes with fair control, hypertension which is controlled and hyperlipidemia which is not optimal but patient intolerant to statin therapy.\par \par Patient with significant CAD with multiple stenting most recently February 2020.\par Cardiac PET scan February 2021 with mild ischemia therefore Isosorbide added\par \par The patient is status post TAVR August 2017 with a permanent pacemaker.\par Patient with recent evidence of NSVT therefore now with permanent pacemaker/defibrillator\par \par Colon cancer diagnosed December 2020 with right hemicolectomy January 2021 with success. Postop anemia resolved therefore discontinue daily iron\par Colonoscopy August 2022 without recurrence\par \par PVD with status post bilateral leg PCI November 2017 in February 2018.\par \par Chronic musculoskeletal issues including chronic low back pain secondary to lumbar spinal stenosis and more significantly presently neck pain secondary to cervical DDD. Patient given referral for orthopedic evaluation\par \par Patient with chronic vertigo with inability to do vestibular therapy secondary to neck issues.\par \par Postnasal drip with cough therefore start Flonase and Claritin\par \par Plan is to continue present medications with patient to followup with cardiology.\par Followup with specialist as scheduled\par \par Ophthalmology UTD\par Podiatry every 3 months\par Carotid per cardiology\par \par Patient up to date with vaccines including COVID\par High-dose influenza vaccine given left deltoid \par Recieved Shingrix x 2\par \par Venipuncture done today in the office\par Followup in 3to4 months

## 2022-09-21 NOTE — HISTORY OF PRESENT ILLNESS
[FreeTextEntry1] : 89-year-old male presents for his annual wellness visit with extensive medical history including ASHD/ischemic cardiomyopathy with history of CABG in 2005 and multiple stents,, PVD with stenting to the left leg, type 2 diabetes, moderate aortic stenosis, hypertension and hyperlipidemia for which he is intolerant to statin therapy.\par \par The patient has had multiple significant cardiovascular issues including\par -August 2017 the patient had progressive aortic stenosis and had a TAVR with a permanent pacemaker\par -November 2017 had PCI of his right leg secondary to claudication and February 2018 hadn't of the left leg\par -April 2018 he had non-sustained VT with the addition of Toprol\par -June 2018 he had a cardiac catheterization with PCI to his left main and circumflex arteries.\par -May 2019 stenting to the left main and circumflex \par -abnormal cardiac PET scan July 2019 showing patent LIMA and SVG with moderate disease of the left main and circumflex.\par -Nonsustained VT therefore pacemaker upgraded to a pacemaker/ICD 2019\par Most recently the patient had PTCA to the left main and ostial branch February 2020.\par Cardiac PET scan February 2022 with mild ischemia felt to be low risk\par He followed up with cardiology recently with addition of isosorbide.\par \par The patient was found to have anemia December 2020 with workup showing him to have 2 colon lesions one in the terminal ileum which was positive for moderately differentiated adenocarcinoma and 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.\par The patient had a right hemicolectomy January 2021 and has oncology.\par anemia has been treated with iron and most recent hemoglobin normal at 12.7.\par Colonoscopy August 2022 without recurrent\par \par From a cardiac perspective he is doing better without any chest pain and decreased shortness of breath.\par \par The patient occasionally has right leg pain mainly in his thigh not activity related likely secondary to lumbar spinal stenosis with radiculopathy.\par \par More significantly he is having neck pain and upper back pain secondary to cervical degenerative disc disease for which she has received injections without benefit. \par He continues with issues and now has caused him significant difficulty with range of motion and difficulty raising his head into a normal position. This causes him and ability to drive.\par \par Other significant issue is chronic vertigo with patient has seen specialists and "nobody can do anything for me". He was referred for vestibular therapy but they will unable to do it secondary to cervical spine issues.\par \par The patient also complains of decreased hearing bilaterally.\par \par He has recovered fairly well from his colon surgery but patient is "miserable" secondary to his chronic neck problems and chronic vertigo.

## 2022-09-21 NOTE — REVIEW OF SYSTEMS
[As noted in HPI] : as noted in HPI [As Noted in HPI] : as noted in HPI [Limb Pain] : limb pain [Dizziness] : dizziness [Negative] : Heme/Lymph [FreeTextEntry4] : postnasal drip [FreeTextEntry7] : Excess gas [FreeTextEntry9] : Chronic low back pain and neck pain [de-identified] : Vertigo

## 2022-09-21 NOTE — COUNSELING
[None] : None [Good understanding] : Patient has a good understanding of lifestyle changes and the steps needed to achieve self management goals [de-identified] : continue diet and exercise as able

## 2022-09-21 NOTE — PHYSICAL EXAM
[General Appearance - Alert] : alert [General Appearance - In No Acute Distress] : in no acute distress [Sclera] : the sclera and conjunctiva were normal [PERRL With Normal Accommodation] : pupils were equal in size, round, and reactive to light [Extraocular Movements] : extraocular movements were intact [Neck Cervical Mass (___cm)] : no neck mass was observed [Jugular Venous Distention Increased] : there was no jugular-venous distention [Thyroid Diffuse Enlargement] : the thyroid was not enlarged [Thyroid Nodule] : there were no palpable thyroid nodules [Auscultation Breath Sounds / Voice Sounds] : lungs were clear to auscultation bilaterally [Heart Rate And Rhythm] : heart rate was normal and rhythm regular [Heart Sounds] : normal S1 and S2 [Heart Sounds Gallop] : no gallops [Heart Sounds Pericardial Friction Rub] : no pericardial rub [Arterial Pulses Carotid] : carotid pulses were normal with no bruits [Abdominal Aorta] : the abdominal aorta was normal [Arterial Pulses Femoral] : femoral pulses were normal without bruits [Edema] : there was no peripheral edema [Veins - Varicosity Changes] : there were no varicosital changes [Bowel Sounds] : normal bowel sounds [Abdomen Soft] : soft [Abdomen Tenderness] : non-tender [Abdomen Mass (___ Cm)] : no abdominal mass palpated [Cervical Lymph Nodes Enlarged Posterior Bilaterally] : posterior cervical [Cervical Lymph Nodes Enlarged Anterior Bilaterally] : anterior cervical [Supraclavicular Lymph Nodes Enlarged Bilaterally] : supraclavicular [Axillary Lymph Nodes Enlarged Bilaterally] : axillary [Femoral Lymph Nodes Enlarged Bilaterally] : femoral [Inguinal Lymph Nodes Enlarged Bilaterally] : inguinal [No Spinal Tenderness] : no spinal tenderness [Abnormal Walk] : normal gait [Nail Clubbing] : no clubbing  or cyanosis of the fingernails [Musculoskeletal - Swelling] : no joint swelling seen [Motor Tone] : muscle strength and tone were normal [Skin Color & Pigmentation] : normal skin color and pigmentation [Skin Turgor] : normal skin turgor [] : no rash [Right Foot Was Examined] : right foot was examined [Left Foot Was Examined] : left foot was examined [Normal Appearance] : normal in appearance [Normal in Appearance] : normal in appearance [Normal] : normal [1+] : 1+ in the dorsalis pedis [Cranial Nerves] : cranial nerves 2-12 were intact [No Focal Deficits] : no focal deficits [Oriented To Time, Place, And Person] : oriented to person, place, and time [Impaired Insight] : insight and judgment were intact [Affect] : the affect was normal [FreeTextEntry1] : Sees podiatry

## 2022-09-22 LAB
ALBUMIN SERPL ELPH-MCNC: 3.9 G/DL
ALP BLD-CCNC: 79 U/L
ALT SERPL-CCNC: 24 U/L
ANION GAP SERPL CALC-SCNC: 12 MMOL/L
APPEARANCE: CLEAR
AST SERPL-CCNC: 26 U/L
BACTERIA: NEGATIVE
BASOPHILS # BLD AUTO: 0.02 K/UL
BASOPHILS NFR BLD AUTO: 0.2 %
BILIRUB SERPL-MCNC: 0.4 MG/DL
BILIRUBIN URINE: NEGATIVE
BLOOD URINE: NEGATIVE
BUN SERPL-MCNC: 31 MG/DL
CALCIUM SERPL-MCNC: 9.2 MG/DL
CEA SERPL-MCNC: 5.6 NG/ML
CHLORIDE SERPL-SCNC: 102 MMOL/L
CHOLEST SERPL-MCNC: 152 MG/DL
CO2 SERPL-SCNC: 26 MMOL/L
COLOR: YELLOW
CREAT SERPL-MCNC: 1.23 MG/DL
CREAT SPEC-SCNC: 84 MG/DL
EGFR: 56 ML/MIN/1.73M2
EOSINOPHIL # BLD AUTO: 0.33 K/UL
EOSINOPHIL NFR BLD AUTO: 3.8 %
ESTIMATED AVERAGE GLUCOSE: 177 MG/DL
GLUCOSE QUALITATIVE U: NEGATIVE
GLUCOSE SERPL-MCNC: 137 MG/DL
HBA1C MFR BLD HPLC: 7.8 %
HCT VFR BLD CALC: 40.8 %
HDLC SERPL-MCNC: 64 MG/DL
HGB BLD-MCNC: 13.3 G/DL
HYALINE CASTS: 0 /LPF
IMM GRANULOCYTES NFR BLD AUTO: 0.3 %
KETONES URINE: NEGATIVE
LDLC SERPL CALC-MCNC: 65 MG/DL
LEUKOCYTE ESTERASE URINE: NEGATIVE
LYMPHOCYTES # BLD AUTO: 1.53 K/UL
LYMPHOCYTES NFR BLD AUTO: 17.7 %
MAGNESIUM SERPL-MCNC: 2.2 MG/DL
MAN DIFF?: NORMAL
MCHC RBC-ENTMCNC: 32.6 GM/DL
MCHC RBC-ENTMCNC: 33.3 PG
MCV RBC AUTO: 102.3 FL
MICROALBUMIN 24H UR DL<=1MG/L-MCNC: 3.2 MG/DL
MICROALBUMIN/CREAT 24H UR-RTO: 38 MG/G
MICROSCOPIC-UA: NORMAL
MONOCYTES # BLD AUTO: 0.82 K/UL
MONOCYTES NFR BLD AUTO: 9.5 %
NEUTROPHILS # BLD AUTO: 5.91 K/UL
NEUTROPHILS NFR BLD AUTO: 68.5 %
NITRITE URINE: NEGATIVE
NONHDLC SERPL-MCNC: 88 MG/DL
PH URINE: 5.5
PLATELET # BLD AUTO: 182 K/UL
POTASSIUM SERPL-SCNC: 4.5 MMOL/L
PROT SERPL-MCNC: 6.9 G/DL
PROTEIN URINE: NORMAL
RBC # BLD: 3.99 M/UL
RBC # FLD: 13.8 %
RED BLOOD CELLS URINE: 4 /HPF
SODIUM SERPL-SCNC: 140 MMOL/L
SPECIFIC GRAVITY URINE: 1.02
SQUAMOUS EPITHELIAL CELLS: 1 /HPF
T4 FREE SERPL-MCNC: 1.5 NG/DL
TRIGL SERPL-MCNC: 118 MG/DL
TSH SERPL-ACNC: 2.15 UIU/ML
UROBILINOGEN URINE: NORMAL
VIT B12 SERPL-MCNC: 770 PG/ML
WBC # FLD AUTO: 8.64 K/UL
WHITE BLOOD CELLS URINE: 1 /HPF

## 2022-11-02 NOTE — PATIENT PROFILE ADULT. - NSALCOHOLFREQ_GEN_A_CORE_SD
monthly or less Skin Substitute Text: The defect edges were debeveled with a #15 scalpel blade.  Given the location of the defect, shape of the defect and the proximity to free margins a skin substitute graft was deemed most appropriate.  The graft material was trimmed to fit the size of the defect. The graft was then placed in the primary defect and oriented appropriately.

## 2022-11-07 ENCOUNTER — RX RENEWAL (OUTPATIENT)
Age: 87
End: 2022-11-07

## 2022-11-13 NOTE — DISCHARGE NOTE NURSING/CASE MANAGEMENT/SOCIAL WORK - NSDCPEELIQUIS_GEN_ALL_CORE
Apixaban/Eliquis - Compliance/Apixaban/Eliquis - Follow up monitoring/Apixaban/Eliquis - Potential for adverse drug reactions and interactions/Apixaban/Eliquis - Dietary Advice Home

## 2022-11-28 NOTE — CONSULT NOTE ADULT - PROBLEM/RECOMMENDATION-1
DISPLAY PLAN FREE TEXT Render Risk Assessment In Note?: no Detail Level: Simple Additional Notes: If the cyst does not resolve, patient should see a plastic surgeon for excision. Additional Notes: Patient does not want to do another course of isotretinoin.

## 2022-12-12 ENCOUNTER — RX RENEWAL (OUTPATIENT)
Age: 87
End: 2022-12-12

## 2022-12-14 NOTE — PATIENT PROFILE ADULT. - PATIENT REPRESENTATIVE NAME
Thank you! Thank you for allowing me to care for you in the emergency department. I sincerely hope that you are satisfied with your visit today. It is my goal to provide you with excellent care. Below you will find a list of your labs and imaging from your visit today if applicable. Should you have any questions regarding these results please do not hesitate to call the emergency department. Please review [facility] for a more detailed result list since the below list may not be comprehensive. Instructions on how to sign up to [facility] should be provided in this packet. Labs -     Recent Results (from the past 12 hour(s))   CBC WITH AUTOMATED DIFF    Collection Time: 12/13/22  7:46 PM   Result Value Ref Range    WBC 8.4 4.1 - 11.1 K/uL    RBC 5.42 4.10 - 5.70 M/uL    HGB 15.9 12.1 - 17.0 g/dL    HCT 47.3 36.6 - 50.3 %    MCV 87.3 80.0 - 99.0 FL    MCH 29.3 26.0 - 34.0 PG    MCHC 33.6 30.0 - 36.5 g/dL    RDW 13.4 11.5 - 14.5 %    PLATELET 293 316 - 402 K/uL    MPV 8.8 (L) 8.9 - 12.9 FL    NRBC 0.0 0.0  WBC    ABSOLUTE NRBC 0.00 0.00 - 0.01 K/uL    NEUTROPHILS 63 32 - 75 %    LYMPHOCYTES 29 12 - 49 %    MONOCYTES 6 5 - 13 %    EOSINOPHILS 1 0 - 7 %    BASOPHILS 1 0 - 1 %    IMMATURE GRANULOCYTES 0 0 - 0.5 %    ABS. NEUTROPHILS 5.3 1.8 - 8.0 K/UL    ABS. LYMPHOCYTES 2.5 0.8 - 3.5 K/UL    ABS. MONOCYTES 0.5 0.0 - 1.0 K/UL    ABS. EOSINOPHILS 0.1 0.0 - 0.4 K/UL    ABS. BASOPHILS 0.0 0.0 - 0.1 K/UL    ABS. IMM.  GRANS. 0.0 0.00 - 0.04 K/UL    DF AUTOMATED     METABOLIC PANEL, COMPREHENSIVE    Collection Time: 12/13/22  7:46 PM   Result Value Ref Range    Sodium 138 136 - 145 mmol/L    Potassium 4.2 3.5 - 5.1 mmol/L    Chloride 103 97 - 108 mmol/L    CO2 31 21 - 32 mmol/L    Anion gap 4 (L) 5 - 15 mmol/L    Glucose 97 65 - 100 mg/dL    BUN 19 6 - 20 mg/dL    Creatinine 1.38 (H) 0.70 - 1.30 mg/dL    BUN/Creatinine ratio 14 12 - 20      eGFR >60 >60 ml/min/1.73m2    Calcium 9.6 8.5 - 10.1 mg/dL    Bilirubin, total 0.9 0.2 - 1.0 mg/dL    AST (SGOT) 22 15 - 37 U/L    ALT (SGPT) 27 12 - 78 U/L    Alk. phosphatase 64 45 - 117 U/L    Protein, total 7.3 6.4 - 8.2 g/dL    Albumin 4.0 3.5 - 5.0 g/dL    Globulin 3.3 2.0 - 4.0 g/dL    A-G Ratio 1.2 1.1 - 2.2     TROPONIN-HIGH SENSITIVITY    Collection Time: 12/13/22  7:46 PM   Result Value Ref Range    Troponin-High Sensitivity 5 0 - 76 ng/L   TROPONIN-HIGH SENSITIVITY    Collection Time: 12/13/22  9:56 PM   Result Value Ref Range    Troponin-High Sensitivity 5 0 - 76 ng/L       Radiologic Studies -   XR CHEST PORT   Final Result      No acute process on portable chest.           CT Results  (Last 48 hours)      None          CXR Results  (Last 48 hours)                 12/13/22 2001  XR CHEST PORT Final result    Impression:      No acute process on portable chest.           Narrative:  EXAM:  XR CHEST PORT       INDICATION: Chest pain       COMPARISON: July 1, 2022       TECHNIQUE: portable chest AP view       FINDINGS: The cardiac silhouette is within normal limits. The pulmonary   vasculature is within normal limits. The lungs and pleural spaces are clear. The visualized bones and upper abdomen   are age-appropriate. If you feel that you have not received excellent quality care or timely care, please ask to speak to the nurse manager. Please choose us in the future for your continued health care needs. ------------------------------------------------------------------------------------------------------------  The exam and treatment you received in the Emergency Department were for an urgent problem and are not intended as complete care. It is important that you follow-up with a doctor, nurse practitioner, or physician assistant to:  (1) confirm your diagnosis,  (2) re-evaluation of changes in your illness and treatment, and  (3) for ongoing care.   If your symptoms become worse or you do not improve as expected and you are unable to reach your usual health care provider, you should return to the Emergency Department. We are available 24 hours a day. Please take your discharge instructions with you when you go to your follow-up appointment. If a prescription has been provided, please have it filled as soon as possible to prevent a delay in treatment. Read the entire medication instruction sheet provided to you by the pharmacy. If you have any questions or reservations about taking the medication due to side effects or interactions with other medications, please call your primary care physician or contact the ER to speak with the charge nurse. Make an appointment with your family doctor or the physician you were referred to for follow-up of this visit as instructed on your discharge paperwork, as this is a mandatory follow-up. Return to the ER if you are unable to be seen or if you are unable to be seen in a timely manner. If you have any problem arranging the follow-up visit, contact the Emergency Department immediately. Subha Pedraza

## 2022-12-30 NOTE — DISCHARGE NOTE ADULT - INSTRUCTIONS
[Care Plan reviewed and provided to patient/caregiver] : Care plan reviewed and provided to patient/caregiver [Understands and communicates without difficulty] : Patient/Caregiver understands and communicates without difficulty With a diagnosis of diabetes comes a strict diet regimen to help control blood sugars by watching carbohydrate consumption. Carbohydrates, such as starches, fruits, dairy and starchy vegetables, is the food group that affects glucose levels in the body. Carefully monitoring carbohydrate intake is a main component of the diabetic diet; eating three meals each day that are roughly equivalent in size can help control blood sugars. A registered dietitian can help you plan a diet customized to your individual needs.

## 2023-01-03 ENCOUNTER — RESULT CHARGE (OUTPATIENT)
Age: 88
End: 2023-01-03

## 2023-01-04 ENCOUNTER — APPOINTMENT (OUTPATIENT)
Dept: INTERNAL MEDICINE | Facility: CLINIC | Age: 88
End: 2023-01-04
Payer: MEDICARE

## 2023-01-04 VITALS
BODY MASS INDEX: 27.28 KG/M2 | HEART RATE: 67 BPM | SYSTOLIC BLOOD PRESSURE: 115 MMHG | HEIGHT: 68 IN | DIASTOLIC BLOOD PRESSURE: 75 MMHG | OXYGEN SATURATION: 95 % | RESPIRATION RATE: 13 BRPM | WEIGHT: 180 LBS

## 2023-01-04 DIAGNOSIS — L60.0 INGROWING NAIL: ICD-10-CM

## 2023-01-04 DIAGNOSIS — H61.23 IMPACTED CERUMEN, BILATERAL: ICD-10-CM

## 2023-01-04 PROCEDURE — 69209 REMOVE IMPACTED EAR WAX UNI: CPT | Mod: 50,59

## 2023-01-04 PROCEDURE — 36415 COLL VENOUS BLD VENIPUNCTURE: CPT

## 2023-01-04 PROCEDURE — 99214 OFFICE O/P EST MOD 30 MIN: CPT | Mod: 25

## 2023-01-05 ENCOUNTER — NON-APPOINTMENT (OUTPATIENT)
Age: 88
End: 2023-01-05

## 2023-01-05 ENCOUNTER — TRANSCRIPTION ENCOUNTER (OUTPATIENT)
Age: 88
End: 2023-01-05

## 2023-01-05 LAB
ALBUMIN SERPL ELPH-MCNC: 4.3 G/DL
ALP BLD-CCNC: 66 U/L
ALT SERPL-CCNC: 23 U/L
ANION GAP SERPL CALC-SCNC: 11 MMOL/L
AST SERPL-CCNC: 27 U/L
BASOPHILS # BLD AUTO: 0.01 K/UL
BASOPHILS NFR BLD AUTO: 0.1 %
BILIRUB SERPL-MCNC: 0.8 MG/DL
BUN SERPL-MCNC: 19 MG/DL
CALCIUM SERPL-MCNC: 9.2 MG/DL
CHLORIDE SERPL-SCNC: 103 MMOL/L
CHOLEST SERPL-MCNC: 155 MG/DL
CO2 SERPL-SCNC: 27 MMOL/L
CREAT SERPL-MCNC: 1.12 MG/DL
EGFR: 62 ML/MIN/1.73M2
EOSINOPHIL # BLD AUTO: 0.18 K/UL
EOSINOPHIL NFR BLD AUTO: 2.4 %
ESTIMATED AVERAGE GLUCOSE: 174 MG/DL
GLUCOSE SERPL-MCNC: 133 MG/DL
HBA1C MFR BLD HPLC: 7.7 %
HCT VFR BLD CALC: 40.4 %
HDLC SERPL-MCNC: 76 MG/DL
HGB BLD-MCNC: 13.3 G/DL
IMM GRANULOCYTES NFR BLD AUTO: 0.3 %
LDLC SERPL CALC-MCNC: 62 MG/DL
LYMPHOCYTES # BLD AUTO: 1.6 K/UL
LYMPHOCYTES NFR BLD AUTO: 21.4 %
MAGNESIUM SERPL-MCNC: 2.1 MG/DL
MAN DIFF?: NORMAL
MCHC RBC-ENTMCNC: 32.9 GM/DL
MCHC RBC-ENTMCNC: 33.8 PG
MCV RBC AUTO: 102.8 FL
MONOCYTES # BLD AUTO: 0.68 K/UL
MONOCYTES NFR BLD AUTO: 9.1 %
NEUTROPHILS # BLD AUTO: 4.97 K/UL
NEUTROPHILS NFR BLD AUTO: 66.7 %
NONHDLC SERPL-MCNC: 79 MG/DL
PLATELET # BLD AUTO: 167 K/UL
POTASSIUM SERPL-SCNC: 4.1 MMOL/L
PROT SERPL-MCNC: 6.9 G/DL
RBC # BLD: 3.93 M/UL
RBC # FLD: 13.9 %
SODIUM SERPL-SCNC: 141 MMOL/L
TRIGL SERPL-MCNC: 84 MG/DL
WBC # FLD AUTO: 7.46 K/UL

## 2023-01-05 NOTE — ASSESSMENT
[FreeTextEntry1] : Status post irrigation with excellent results.  Patient to follow-up with podiatry as planned.  Venipuncture done in our office, Labs sent out. Patient advised to continue present medications with diet/exercise and specialists followup. Patient will return to the office in 3months\par \par Dr. Burns was present in office building while I examined patient\par \par \par Last colonoscopy was 7/2022\par CEA level 9/2022\par Last 24-hour urine was 10/2021= container given to repeat\par MA 9/2022\par specialists include\par 1. endocrinology-Dr. Acosta\par 2. ophthalmology-Dr. Canseco-10/2022\par 3. podiatry-Dr. Stone\par 4. cardiology-Dr. king\par 5.  pulmonary-Dr. gamino/Dr. Sarmiento\par 6. gastroenterology-Dr. Burnette\par 7. vascular-Dr. Haskins-changed to Dr. Mayur Walsh\par 8.neurology-Dr. Rosen\par 9. Cardiothoracic surgeon-Dr. Mckeon-NO NEED FOR FOLLOW UP\par 10. - @[facility]\par 11.Derm-Cira\par 12.Hand MILDRED, Delicia\par 13.PMR-Dr. Tyson\par 14.Colorectal-Dr. Carvalho/Dr. Hancock\par 15.ONC=Dr. Amaro\par vaccines are UTD< +got covid vaccines X3 \par echo via cardio

## 2023-01-05 NOTE — PHYSICAL EXAM
[General Appearance - In No Acute Distress] : in no acute distress [] : no respiratory distress [Respiration, Rhythm And Depth] : normal respiratory rhythm and effort [Auscultation Breath Sounds / Voice Sounds] : lungs were clear to auscultation bilaterally [Heart Rate And Rhythm] : heart rate was normal and rhythm regular [Affect] : the affect was normal [Mood] : the mood was normal [de-identified] : TM's with + cerumen impaction bilateral status post irrigation with excellent results, patient tolerated well [de-identified] : + Ingrown nail left big toe, no signs of infection [FreeTextEntry1] : +PPM

## 2023-01-05 NOTE — HISTORY OF PRESENT ILLNESS
[FreeTextEntry1] : Patient presents for followup on A. fib/hyperlipidemia/type 2 diabetes. Patient is currently fasting for today's labs. Patient is currently on Eliquis/amiodarone for A. fib,on zetia for his hyperlipidemia and on prandin for his type 2 diabetes.\par -Patient is complaining of clogged ears bilateral\par -Patient complaining of questionable ingrown toenail for 1 week, scheduled to see podiatry next week\par

## 2023-01-08 LAB
CREAT 24H UR-MCNC: 1.2 G/24 H
CREAT ?TM UR-MCNC: 98 MG/DL
PROT 24H UR-MRATE: 19 MG/DL
PROT ?TM UR-MCNC: 24 HR
PROT UR-MCNC: 228 MG/24 H
SPECIMEN VOL 24H UR: 1200 ML

## 2023-01-09 ENCOUNTER — TRANSCRIPTION ENCOUNTER (OUTPATIENT)
Age: 88
End: 2023-01-09

## 2023-01-09 ENCOUNTER — NON-APPOINTMENT (OUTPATIENT)
Age: 88
End: 2023-01-09

## 2023-01-17 ENCOUNTER — NON-APPOINTMENT (OUTPATIENT)
Age: 88
End: 2023-01-17

## 2023-01-19 ENCOUNTER — NON-APPOINTMENT (OUTPATIENT)
Age: 88
End: 2023-01-19

## 2023-02-16 ENCOUNTER — NON-APPOINTMENT (OUTPATIENT)
Age: 88
End: 2023-02-16

## 2023-02-17 ENCOUNTER — NON-APPOINTMENT (OUTPATIENT)
Age: 88
End: 2023-02-17

## 2023-03-10 ENCOUNTER — NON-APPOINTMENT (OUTPATIENT)
Age: 88
End: 2023-03-10

## 2023-03-20 ENCOUNTER — NON-APPOINTMENT (OUTPATIENT)
Age: 88
End: 2023-03-20

## 2023-04-05 ENCOUNTER — NON-APPOINTMENT (OUTPATIENT)
Age: 88
End: 2023-04-05

## 2023-04-19 ENCOUNTER — NON-APPOINTMENT (OUTPATIENT)
Age: 88
End: 2023-04-19

## 2023-04-24 ENCOUNTER — NON-APPOINTMENT (OUTPATIENT)
Age: 88
End: 2023-04-24

## 2023-04-27 ENCOUNTER — NON-APPOINTMENT (OUTPATIENT)
Age: 88
End: 2023-04-27

## 2023-05-08 ENCOUNTER — APPOINTMENT (OUTPATIENT)
Dept: INTERNAL MEDICINE | Facility: CLINIC | Age: 88
End: 2023-05-08

## 2023-05-13 ENCOUNTER — TRANSCRIPTION ENCOUNTER (OUTPATIENT)
Age: 88
End: 2023-05-13

## 2023-05-15 ENCOUNTER — NON-APPOINTMENT (OUTPATIENT)
Age: 88
End: 2023-05-15

## 2023-05-15 ENCOUNTER — TRANSCRIPTION ENCOUNTER (OUTPATIENT)
Age: 88
End: 2023-05-15

## 2023-05-16 ENCOUNTER — APPOINTMENT (OUTPATIENT)
Dept: INTERNAL MEDICINE | Facility: CLINIC | Age: 88
End: 2023-05-16
Payer: MEDICARE

## 2023-05-16 VITALS
HEART RATE: 61 BPM | HEIGHT: 68 IN | WEIGHT: 164 LBS | RESPIRATION RATE: 18 BRPM | DIASTOLIC BLOOD PRESSURE: 60 MMHG | SYSTOLIC BLOOD PRESSURE: 112 MMHG | BODY MASS INDEX: 24.86 KG/M2 | OXYGEN SATURATION: 94 %

## 2023-05-16 DIAGNOSIS — Z09 ENCOUNTER FOR FOLLOW-UP EXAMINATION AFTER COMPLETED TREATMENT FOR CONDITIONS OTHER THAN MALIGNANT NEOPLASM: ICD-10-CM

## 2023-05-16 DIAGNOSIS — R33.9 RETENTION OF URINE, UNSPECIFIED: ICD-10-CM

## 2023-05-16 DIAGNOSIS — Z87.440 PERSONAL HISTORY OF URINARY (TRACT) INFECTIONS: ICD-10-CM

## 2023-05-16 DIAGNOSIS — R53.83 OTHER FATIGUE: ICD-10-CM

## 2023-05-16 DIAGNOSIS — I73.9 PERIPHERAL VASCULAR DISEASE, UNSPECIFIED: Chronic | ICD-10-CM

## 2023-05-16 DIAGNOSIS — Z87.898 PERSONAL HISTORY OF OTHER SPECIFIED CONDITIONS: ICD-10-CM

## 2023-05-16 DIAGNOSIS — N39.0 URINARY TRACT INFECTION, SITE NOT SPECIFIED: ICD-10-CM

## 2023-05-16 DIAGNOSIS — J18.9 PNEUMONIA, UNSPECIFIED ORGANISM: ICD-10-CM

## 2023-05-16 DIAGNOSIS — R53.1 WEAKNESS: ICD-10-CM

## 2023-05-16 PROCEDURE — 99496 TRANSJ CARE MGMT HIGH F2F 7D: CPT | Mod: 25

## 2023-05-16 PROCEDURE — 36415 COLL VENOUS BLD VENIPUNCTURE: CPT

## 2023-05-17 LAB
ALBUMIN SERPL ELPH-MCNC: 4 G/DL
ALP BLD-CCNC: 68 U/L
ALT SERPL-CCNC: 28 U/L
ANION GAP SERPL CALC-SCNC: 12 MMOL/L
AST SERPL-CCNC: 31 U/L
BASOPHILS # BLD AUTO: 0.02 K/UL
BASOPHILS NFR BLD AUTO: 0.2 %
BILIRUB SERPL-MCNC: 0.4 MG/DL
BUN SERPL-MCNC: 29 MG/DL
CALCIUM SERPL-MCNC: 10 MG/DL
CHLORIDE SERPL-SCNC: 99 MMOL/L
CO2 SERPL-SCNC: 30 MMOL/L
CREAT SERPL-MCNC: 1.5 MG/DL
EGFR: 44 ML/MIN/1.73M2
EOSINOPHIL # BLD AUTO: 0.11 K/UL
EOSINOPHIL NFR BLD AUTO: 1.1 %
GLUCOSE SERPL-MCNC: 236 MG/DL
HCT VFR BLD CALC: 44.7 %
HGB BLD-MCNC: 14 G/DL
IMM GRANULOCYTES NFR BLD AUTO: 0.7 %
LYMPHOCYTES # BLD AUTO: 1.24 K/UL
LYMPHOCYTES NFR BLD AUTO: 12.8 %
MAGNESIUM SERPL-MCNC: 2.1 MG/DL
MAN DIFF?: NORMAL
MCHC RBC-ENTMCNC: 31.3 GM/DL
MCHC RBC-ENTMCNC: 32.9 PG
MCV RBC AUTO: 104.9 FL
MONOCYTES # BLD AUTO: 0.74 K/UL
MONOCYTES NFR BLD AUTO: 7.6 %
NEUTROPHILS # BLD AUTO: 7.51 K/UL
NEUTROPHILS NFR BLD AUTO: 77.6 %
NT-PROBNP SERPL-MCNC: 3130 PG/ML
PLATELET # BLD AUTO: 190 K/UL
POTASSIUM SERPL-SCNC: 5.3 MMOL/L
PROT SERPL-MCNC: 7.2 G/DL
RBC # BLD: 4.26 M/UL
RBC # FLD: 13.5 %
SODIUM SERPL-SCNC: 140 MMOL/L
T4 FREE SERPL-MCNC: 1.7 NG/DL
TSH SERPL-ACNC: 1.98 UIU/ML
WBC # FLD AUTO: 9.69 K/UL

## 2023-05-17 NOTE — ASSESSMENT
[FreeTextEntry1] : 89-year-old male with significant hospitalization with respiratory failure secondary to combination of acute heart failure as well as pneumonia.\par Patient continue symptomatic although clinically not in overt heart failure.\par Completed antibiotics for pneumonia.\par \par Also patient clinically with obstructive uropathy therefore told patient's wife to call urology today and needs to be seen ASAP and likely needs Caba catheter.\par \par Venipuncture done today with labs to check CMP, CBC, BNP and thyroid.\par \par Follow-up with cardiology as scheduled\par Follow-up in 1 week

## 2023-05-17 NOTE — HISTORY OF PRESENT ILLNESS
[Post-hospitalization from ___ Hospital] : Post-hospitalization from [unfilled] Hospital [Admitted on: ___] : The patient was admitted on [unfilled] [Discharged on ___] : discharged on [unfilled] [Discharge Summary] : discharge summary [Pertinent Labs] : pertinent labs [Radiology Findings] : radiology findings [Discharge Med List] : discharge medication list [Med Reconciliation] : medication reconciliation has been completed [Patient Contacted By: ____] : and contacted by [unfilled] [FreeTextEntry2] : The patient presents with his wife after being admitted to [facility] where he presented to the ER on May 7, 2023 complaining of 1 week of dyspnea with exertion and nonproductive cough. Some chest discomfort without pain.\par He follows with cardiology who he had seen 2 weeks prior and had been on Lasix. He complains of increased lower extremity edema.\par Patient evaluated and diagnosed with acute CHF exacerbation complicated by bacterial pneumonia, UTI and AFRICA.\par Diuresis with Lasix and treated with azithromycin and ceftriaxone.\par He became dehydrated and was given gentle fluids with improvement.\par Improved and stable for discharge on May 12, 2023 on increased dose of Lasix at 40 mg daily as well as added spironolactone 25 mg daily and metoprolol 25 mg daily.\par Patient to complete antibiotics with 3 further days of Augmentin and 1 day of azithromycin. \par \par Patient continues to feel poorly stating that he gets dyspnea on exertion and continues with some cough.  Completed antibiotics.\par Has scheduled cardiology visit in 2 days.\par He and his wife state that when he came home he had no edema but now has mild edema of his feet.  He is sitting a lot and not ambulating often.\par He also states he is having great difficulty voiding stating that he feels his bladder is full but gets very little out.  Known history of BPH with some obstructive symptoms with prostate procedure having been discussed but never done.\par \par Taking medications as prescribed.

## 2023-05-17 NOTE — REVIEW OF SYSTEMS
[Negative] : Heme/Lymph [Fever] : no fever [Chills] : no chills [Fatigue] : fatigue [Night Sweats] : no night sweats [Recent Change In Weight] : ~T no recent weight change [Palpitations] : no palpitations [Chest Pain] : no chest pain [Claudication] : no  leg claudication [Lower Ext Edema] : lower extremity edema [Orthopena] : no orthopnea [Paroxysmal Nocturnal Dyspnea] : no paroxysmal nocturnal dyspnea [Shortness Of Breath] : shortness of breath [Wheezing] : no wheezing [Cough] : cough [Dyspnea on Exertion] : dyspnea on exertion [Hesitancy] : hesitancy [Nocturia] : nocturia [Frequency] : frequency

## 2023-05-17 NOTE — PHYSICAL EXAM
[No Acute Distress] : no acute distress [No JVD] : no jugular venous distention [No Respiratory Distress] : no respiratory distress  [No Accessory Muscle Use] : no accessory muscle use [No Focal Deficits] : no focal deficits [Normal Affect] : the affect was normal [Normal Rate] : normal rate  [Soft] : abdomen soft [Non Tender] : non-tender [Speech Grossly Normal] : speech grossly normal [de-identified] : Bilateral basilar crackles [de-identified] : Tired appearing [de-identified] : Irregularly irregular [de-identified] : Trace bilateral edema [de-identified] : Ambulates slowly with a walker [de-identified] : Suprapubic mass likely full bladder

## 2023-05-18 ENCOUNTER — APPOINTMENT (OUTPATIENT)
Dept: CARDIOLOGY | Facility: CLINIC | Age: 88
End: 2023-05-18
Payer: MEDICARE

## 2023-05-18 ENCOUNTER — NON-APPOINTMENT (OUTPATIENT)
Age: 88
End: 2023-05-18

## 2023-05-18 VITALS
SYSTOLIC BLOOD PRESSURE: 106 MMHG | HEIGHT: 68 IN | HEART RATE: 60 BPM | DIASTOLIC BLOOD PRESSURE: 58 MMHG | OXYGEN SATURATION: 98 % | BODY MASS INDEX: 25.31 KG/M2 | WEIGHT: 167 LBS

## 2023-05-18 PROCEDURE — 99214 OFFICE O/P EST MOD 30 MIN: CPT

## 2023-05-18 PROCEDURE — 93000 ELECTROCARDIOGRAM COMPLETE: CPT

## 2023-05-18 NOTE — DISCUSSION/SUMMARY
[Optimized for Surgery] : the patient is optimized for surgery [FreeTextEntry1] : Pre Operative Issues:\par \par The patient is maximized for the planned procedure.\par \par Proceed without delay.\par \par Keep input equal to output.\par \par Standard DVT prophylaxis is recommended.\par \par Hold Eliquis for 3 days prior to surgery and resume on postop day #2 if there is no bleeding.  Patient's creatinine is 1.5.

## 2023-05-18 NOTE — ASSESSMENT
[FreeTextEntry1] : CAD: Patient status post CABG, PCI in the past.  He recently presented with infection and congestive heart failure.  He was improving until today.  He did not take his Lasix today because he had to come to the doctor.  He states his breathing significantly deteriorated.\par \par I recommended daily Lasix at 40 mg.  If he is feeling out of breath he can increase it to 80 twice daily for up to 3 days.\par \par CHF: Somewhat improved.  Left-ventricular function is normal.  The patient underwent PCI in 2020.  Given advanced age and renal insufficiency will attempt to hold off on invasive evaluation.\par \par Renal insufficiency: Creatinine is 1.5.  The patient is tolerating low-dose Eliquis therapy.\par \par Atrial fibrillation: Continue Eliquis.\par \par Unable to urinate: The patient was told of the need for Green light procedure.  At times he is totally unable to urinate.  He has had a presumed UTI.  Overall feel risk-benefit favors proceeding with  procedure.\par \par Echocardiography on May 8, 2023 revealed ejection fraction of 60 to 65%, TAVR valve without prosthetic aortic stenosis.  There was no aortic insufficiency.\par \par The patient will follow-up with either myself or Dr. Kem Mcgill.  He wishes to discuss this with Dr. Mcgill.  \par

## 2023-05-18 NOTE — HISTORY OF PRESENT ILLNESS
[Scheduled Procedure ___] : a [unfilled] [FreeTextEntry1] : CAD: Patient status post CABG, PCI in the past.  He recently presented with infection and congestive heart failure.  He was improving until today.  He did not take his Lasix today because he had to come to the doctor.  He states his breathing significantly deteriorated.\par \par I recommended daily Lasix at 40 mg.  If he is feeling out of breath he can increase it to 80 twice daily for up to 3 days.\par \par CHF: Somewhat improved.  Left-ventricular function is normal.  The patient underwent PCI in 2020.  Given advanced age and renal insufficiency will attempt to hold off on invasive evaluation.\par \par Renal insufficiency: Creatinine is 1.5.  The patient is tolerating low-dose Eliquis therapy.\par \par Atrial fibrillation: Continue Eliquis.\par \par Unable to urinate: The patient was told of the need for Green light procedure.  At times he is totally unable to urinate.  He has had a presumed UTI.  Overall feel risk-benefit favors proceeding with  procedure.\par \par Echocardiography on May 8, 2023 revealed ejection fraction of 60 to 65%, TAVR valve without prosthetic aortic stenosis.  There was no aortic insufficiency.\par

## 2023-05-19 ENCOUNTER — APPOINTMENT (OUTPATIENT)
Dept: RADIOLOGY | Facility: CLINIC | Age: 88
End: 2023-05-19
Payer: MEDICARE

## 2023-05-19 PROCEDURE — 71046 X-RAY EXAM CHEST 2 VIEWS: CPT

## 2023-05-23 ENCOUNTER — APPOINTMENT (OUTPATIENT)
Dept: INTERNAL MEDICINE | Facility: CLINIC | Age: 88
End: 2023-05-23
Payer: MEDICARE

## 2023-05-23 VITALS
RESPIRATION RATE: 18 BRPM | DIASTOLIC BLOOD PRESSURE: 60 MMHG | WEIGHT: 164 LBS | HEIGHT: 68 IN | SYSTOLIC BLOOD PRESSURE: 110 MMHG | BODY MASS INDEX: 24.86 KG/M2 | HEART RATE: 60 BPM | OXYGEN SATURATION: 97 %

## 2023-05-23 DIAGNOSIS — R26.81 UNSTEADINESS ON FEET: ICD-10-CM

## 2023-05-23 DIAGNOSIS — Z74.09 OTHER REDUCED MOBILITY: ICD-10-CM

## 2023-05-23 DIAGNOSIS — Z87.898 PERSONAL HISTORY OF OTHER SPECIFIED CONDITIONS: ICD-10-CM

## 2023-05-23 DIAGNOSIS — I50.9 HEART FAILURE, UNSPECIFIED: ICD-10-CM

## 2023-05-23 DIAGNOSIS — Z91.81 HISTORY OF FALLING: ICD-10-CM

## 2023-05-23 PROCEDURE — 36415 COLL VENOUS BLD VENIPUNCTURE: CPT

## 2023-05-23 PROCEDURE — 99214 OFFICE O/P EST MOD 30 MIN: CPT | Mod: 25

## 2023-05-23 NOTE — REVIEW OF SYSTEMS
[Dyspnea on Exertion] : dyspnea on exertion [Negative] : Heme/Lymph [Chest Pain] : no chest pain [Palpitations] : no palpitations [Claudication] : no  leg claudication [Lower Ext Edema] : no lower extremity edema [Orthopena] : no orthopnea [Paroxysmal Nocturnal Dyspnea] : no paroxysmal nocturnal dyspnea [Shortness Of Breath] : no shortness of breath [Wheezing] : no wheezing [Cough] : no cough

## 2023-05-23 NOTE — ASSESSMENT
[FreeTextEntry1] : Status post hospital admission secondary to heart failure as well as pneumonia and AFRICA with patient clinically improved.\par Chest x-ray done yesterday clear without pneumonia.\par Energy level increased as well as decreased dyspnea on exertion.\par Therefore continue present medications and follow-up with cardiology next week as planned.\par Venipuncture done today in the office to check CMP and CBC.\par Follow-up in 2 weeks.

## 2023-05-23 NOTE — HISTORY OF PRESENT ILLNESS
[FreeTextEntry1] : Follow-up status post hospitalization for CHF and pneumonia [de-identified] : OLD NOTE FROM MAY 16,2023;\par \par Date(s) of hospitalization: The patient was admitted on May 7, 2023 and discharged on May 12, 2023. \par The patient was called and contacted by Sivan OGLESBY within 48 hours of hospital discharge and I have reviewed the discharge summary, pertinent labs, radiology findings, discharge medication list and medication reconciliation has been completed. \par Brief Hospital Course: The patient presents with his wife after being admitted to [facility] where he presented to the ER on May 7, 2023 complaining of 1 week of dyspnea with exertion and nonproductive cough. Some chest discomfort without pain.\par He follows with cardiology who he had seen 2 weeks prior and had been on Lasix. He complains of increased lower extremity edema.\par Patient evaluated and diagnosed with acute CHF exacerbation complicated by bacterial pneumonia, UTI and AFRICA.\par Diuresis with Lasix and treated with azithromycin and ceftriaxone.\par He became dehydrated and was given gentle fluids with improvement.\par Improved and stable for discharge on May 12, 2023 on increased dose of Lasix at 40 mg daily as well as added spironolactone 25 mg daily and metoprolol 25 mg daily.\par Patient to complete antibiotics with 3 further days of Augmentin and 1 day of azithromycin. \par \par Patient continues to feel poorly stating that he gets dyspnea on exertion and continues with some cough. Completed antibiotics.\par Has scheduled cardiology visit in 2 days.\par He and his wife state that when he came home he had no edema but now has mild edema of his feet. He is sitting a lot and not ambulating often.\par He also states he is having great difficulty voiding stating that he feels his bladder is full but gets very little out. Known history of BPH with some obstructive symptoms with prostate procedure having been discussed but never done.\par \par Taking medications as prescribed. \par \par NEW NOTE:\par At at patient's previous visit he was not feeling well and was felt to have urinary retention therefore he saw urology the following day with bladder scan showing no urinary retention.  Voiding is better when he takes Lasix.\par \par Blood work showed mildly increased creatinine but relatively stable at 1.6 and BNP increased at 3000 but decreased from 5000 in the hospital.\par He saw the cardiologist he saw in the hospital who felt he was stable with no changes in treatment.\par He has an appointment to see his usual cardiologist next week.\par \par Overall he is feeling better with continued dyspnea on exertion but less and increased energy.  Overall feeling better.

## 2023-05-23 NOTE — PHYSICAL EXAM
[No Acute Distress] : no acute distress [No Respiratory Distress] : no respiratory distress  [No Accessory Muscle Use] : no accessory muscle use [Clear to Auscultation] : lungs were clear to auscultation bilaterally [Normal Rate] : normal rate  [Regular Rhythm] : with a regular rhythm [No Edema] : there was no peripheral edema [No Focal Deficits] : no focal deficits [Normal Affect] : the affect was normal [de-identified] : Generally looking better, smiling at times [de-identified] : Slight crackles left base

## 2023-05-24 ENCOUNTER — NON-APPOINTMENT (OUTPATIENT)
Age: 88
End: 2023-05-24

## 2023-05-24 LAB
ALBUMIN SERPL ELPH-MCNC: 3.9 G/DL
ALP BLD-CCNC: 61 U/L
ALT SERPL-CCNC: 22 U/L
ANION GAP SERPL CALC-SCNC: 11 MMOL/L
AST SERPL-CCNC: 25 U/L
BILIRUB SERPL-MCNC: 0.4 MG/DL
BUN SERPL-MCNC: 44 MG/DL
CALCIUM SERPL-MCNC: 9.4 MG/DL
CHLORIDE SERPL-SCNC: 101 MMOL/L
CO2 SERPL-SCNC: 28 MMOL/L
CREAT SERPL-MCNC: 1.79 MG/DL
EGFR: 36 ML/MIN/1.73M2
GLUCOSE SERPL-MCNC: 275 MG/DL
MAGNESIUM SERPL-MCNC: 2.2 MG/DL
POTASSIUM SERPL-SCNC: 5.1 MMOL/L
PROT SERPL-MCNC: 6.5 G/DL
SODIUM SERPL-SCNC: 141 MMOL/L

## 2023-05-25 ENCOUNTER — NON-APPOINTMENT (OUTPATIENT)
Age: 88
End: 2023-05-25

## 2023-05-25 ENCOUNTER — APPOINTMENT (OUTPATIENT)
Dept: CARE COORDINATION | Facility: HOME HEALTH | Age: 88
End: 2023-05-25

## 2023-05-25 RX ORDER — SPIRONOLACTONE 25 MG/1
25 TABLET ORAL DAILY
Refills: 1 | Status: DISCONTINUED | COMMUNITY
Start: 2023-05-17 | End: 2023-05-25

## 2023-05-25 RX ORDER — AMIODARONE HYDROCHLORIDE 100 MG/1
100 TABLET ORAL DAILY
Qty: 90 | Refills: 1 | Status: DISCONTINUED | COMMUNITY
End: 2023-05-25

## 2023-06-01 ENCOUNTER — TRANSCRIPTION ENCOUNTER (OUTPATIENT)
Age: 88
End: 2023-06-01

## 2023-06-03 NOTE — H&P PST ADULT - NSICDXPASTSURGICALHX_GEN_ALL_CORE_FT
PAST SURGICAL HISTORY:  H/O cystoscopy     History of CEA (carotid endarterectomy) Bilateral    History of permanent cardiac pacemaker placement [employer]    S/P appendectomy     S/P CABG x 2 LIMA LAD SVG OM1    S/P cataract surgery, left     S/P cataract surgery, right     S/P cholecystectomy     S/P cholecystectomy     S/P hernia repair     S/P ICD (internal cardiac defibrillator) procedure     S/P joint replacement Bilateral knee replacement    S/P nasal surgery Secondary to nasal fracture    S/P PTCA (percutaneous transluminal coronary angioplasty) x 9    S/P tonsillectomy     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 #29 Medtronic core valve

## 2023-06-03 NOTE — H&P PST ADULT - HISTORY OF PRESENT ILLNESS
Department of Cardiology                                                 [facility]/[place] [address] [place]                                            Telephone: [phone]. Fax:[phone]                                              Pre- Cardiac Procedure H + P/Progress Note      HPI:      89 male with PMHX of AS, Asthma, BPH with urinary obstruction, Cancer, CAD s/p CABG, multiple catheterizations which showed LIMA to LAD patent, patent radial graft ot OM1, brachytherapy / laser atherectomy/PTCA of LM w/20% residual stenosis and ROEL III flow, minimal LV dysfunction w/moderate MR (caths performed by Dr. Mcgill both at [facility] and [facility]), s/p TAVR (medtronic 2020), s/p ICD/PPM upgrade, VT w/ATP on 2/5. Patient continues to experience MENDEZ. Recent admission to [facility] w/CHF/PNA/AFRICA, was treated and recommended to follow with cardiology for CHF management. Patient now presents to cath holding for CardioMems placement for management of heart failure.       Symptoms:        Angina (Class):        Ischemic Symptoms:     Heart Failure:        Systolic/Diastolic/Combined:        NYHA Class (within 2 weeks):     Assessment of LVEF:       EF:   %       Assessed by:  Echocardiogram        Date:   11/2022      Prior Cardiac Interventions:  Most Recent     < from: Cardiac Cath Lab - Adult (02.19.20 @ 11:49) >  VENTRICLES: EF 55% w/ inferior hypokinesis w/ moderate MR.  CORONARY VESSELS: R dominant.  Moderate sized RCA w/ patent stent and moderate distal disease.  99% mid LM stenosis w/ dec flow into LCx/LCx fills via LAD ( retrograde ).  95% ostial LAD stenosis w/ large distal vessel filling via graft.  Grafts-1. LIMA to LAD-patent.  2. Radial to OM1-patent.  LAD:   --  Proximal LAD:  CX:   --  Proximal circumflex:  AORTA: Mildly dilated asc aorta w/ trace AI.  COMPLICATIONS: No complications occurred during the cath lab visit.  DIAGNOSTIC IMPRESSIONS: Mild pulmonary HTN/normal filling pressures/low  arterial sat c/w lung disease.  Laser atherectomy/PTCA of LM w/ 20% residual stenosis and ROEL III flow  maintained thruout.  PTCA of ostial LAD w/ 40% residual stenosis.  Patent LIMA to LAD.  Patent radial graft to OM1.  Minimal LV dysfunction w/ mod MR.  No LV/Ao gradient w/ trace AI.  DIAGNOSTIC RECOMMENDATIONS: Asa/plavix.  Outpt brachytherapy.  INTERVENTIONAL IMPRESSIONS: Mild pulmonary HTN/normal filling pressures/low  arterial sat c/w lung disease.  Laser atherectomy/PTCA of LM w/ 20% residual stenosis and ROEL III flow  maintained thruout.  PTCA of ostial LAD w/ 40% residual stenosis.  Patent LIMA to LAD.  Patent radial graft to OM1.  Minimal LV dysfunction w/ mod MR.  No LV/Ao gradient w/ trace AI.  INTERVENTIONAL RECOMMENDATIONS: Asa/plavix.  Outpt brachytherapy.  Prepared and signed by  Kem Mcgill MD  Signed 02/19/2020 13:49:31    < end of copied text >             Noninvasive Testing:   Stress Test: Date:   3/2022        Protocol: Regadenoson        Duration of Exercise:        Symptoms:         EKG Changes:        DTS:        Myocardial Imaging:  moderate area of ischemia inferior and inferoseptal wall EF 57%        Risk Assessment (Low, Medium, High):     Echo:       Risk Assessments:  ASA:  Mallampati:  Bleeding Risk:  Creatinine:  GFR:    Associated Risk Factors:        Frailty Screening: (N/A, mild, mod, severe)       Cerebrovascular Disease: N/A       Chronic Lung Disease: N/A       Peripheral Arterial Disease: N/A       Chronic Kidney Disease (if yes, what is GFR): N/A       Uncontrolled Diabetes (if yes, what is HgbA1C or FBS): N/A       Poorly Controlled Hypertension (if yes, what is SBP): N/A       Morbid Obesity (if yes, what is BMI): N/A       History of Recent Ventricular Arrhythmia: N/A       Inability to Ambulate Safely: N/A       Need for Therapeutic Anticoagulation: N/A       Antiplatelet or Contrast Allergy: N/A    Antianginal Therapies:        Beta Blockers:         Calcium Channel Blockers:        Long Acting Nitrates:        Ranexa:     	  MEDICATIONS:                    ROS:     PHYSICAL EXAM:      T(C): --  HR: --  BP: --  RR: --  SpO2: --  Wt(kg): --      I&O's Summary      Daily     Daily     TELEMETRY: 	      ECG:  	    LABS:	 	                                                                               Department of Cardiology                                                 [facility]/[place] [address] [place]                                            Telephone: [phone]. Fax:[phone]                                              Pre- Cardiac Procedure H + P/Progress Note      HPI:      89 male with PMHX of AS, Asthma, BPH with urinary obstruction, Cancer, CAD s/p CABG, multiple catheterizations which showed LIMA to LAD patent, patent radial graft ot OM1, brachytherapy / laser atherectomy/PTCA of LM w/20% residual stenosis and ROEL III flow, minimal LV dysfunction w/moderate MR (caths performed by Dr. Mcgill both at [facility] and [facility]), s/p TAVR (medtronic 2020), s/p ICD/PPM upgrade, VT w/ATP on 2/5. Patient continues to experience MENDEZ. Recent admission to [facility] w/CHF/PNA/AFRICA, was treated and recommended to follow with cardiology for CHF management. Patient now presents to cath holding for CardioMems placement for management of heart failure.       Symptoms:        Angina (Class):        Ischemic Symptoms:     Heart Failure:        Systolic/Diastolic/Combined:        NYHA Class (within 2 weeks):     Assessment of LVEF:       EF:   %       Assessed by:  Echocardiogram        Date:   11/2022      Prior Cardiac Interventions:  Most Recent     < from: Cardiac Cath Lab - Adult (02.19.20 @ 11:49) >  VENTRICLES: EF 55% w/ inferior hypokinesis w/ moderate MR.  CORONARY VESSELS: R dominant.  Moderate sized RCA w/ patent stent and moderate distal disease.  99% mid LM stenosis w/ dec flow into LCx/LCx fills via LAD ( retrograde ).  95% ostial LAD stenosis w/ large distal vessel filling via graft.  Grafts-1. LIMA to LAD-patent.  2. Radial to OM1-patent.  LAD:   --  Proximal LAD:  CX:   --  Proximal circumflex:  AORTA: Mildly dilated asc aorta w/ trace AI.  COMPLICATIONS: No complications occurred during the cath lab visit.  DIAGNOSTIC IMPRESSIONS: Mild pulmonary HTN/normal filling pressures/low  arterial sat c/w lung disease.  Laser atherectomy/PTCA of LM w/ 20% residual stenosis and ROEL III flow  maintained thruout.  PTCA of ostial LAD w/ 40% residual stenosis.  Patent LIMA to LAD.  Patent radial graft to OM1.  Minimal LV dysfunction w/ mod MR.  No LV/Ao gradient w/ trace AI.  DIAGNOSTIC RECOMMENDATIONS: Asa/plavix.  Outpt brachytherapy.  INTERVENTIONAL IMPRESSIONS: Mild pulmonary HTN/normal filling pressures/low  arterial sat c/w lung disease.  Laser atherectomy/PTCA of LM w/ 20% residual stenosis and ROEL III flow  maintained thruout.  PTCA of ostial LAD w/ 40% residual stenosis.  Patent LIMA to LAD.  Patent radial graft to OM1.  Minimal LV dysfunction w/ mod MR.  No LV/Ao gradient w/ trace AI.  INTERVENTIONAL RECOMMENDATIONS: Asa/plavix.  Outpt brachytherapy.  Prepared and signed by  Kem Mcgill MD  Signed 02/19/2020 13:49:31    < end of copied text >             Noninvasive Testing:   Stress Test: Date:   3/2022        Protocol: Regadenoson        Duration of Exercise:        Symptoms:         EKG Changes:        DTS:        Myocardial Imaging:  moderate area of ischemia inferior and inferoseptal wall EF 57%        Risk Assessment (Low, Medium, High):     Associated Risk Factors:        Frailty Screening: (N/A, mild, mod, severe)       Cerebrovascular Disease: N/A       Chronic Lung Disease: N/A       Peripheral Arterial Disease: N/A       Chronic Kidney Disease (if yes, what is GFR): N/A       Uncontrolled Diabetes (if yes, what is HgbA1C or FBS): N/A       Poorly Controlled Hypertension (if yes, what is SBP): N/A       Morbid Obesity (if yes, what is BMI): N/A       History of Recent Ventricular Arrhythmia: N/A       Inability to Ambulate Safely: N/A       Need for Therapeutic Anticoagulation: N/A       Antiplatelet or Contrast Allergy: N/A    Antianginal Therapies:        Beta Blockers:         Calcium Channel Blockers:        Long Acting Nitrates:        Ranexa:     	  MEDICATIONS:                    ROS:     PHYSICAL EXAM:      T(C): --  HR: --  BP: --  RR: --  SpO2: --  Wt(kg): --      I&O's Summary      Daily     Daily     TELEMETRY: 	      ECG:  	    LABS:	 	                                                                               Department of Cardiology                                                 [facility]/[place] [address] [place]                                            Telephone: [phone]. Fax:[phone]                                              Pre- Cardiac Procedure H + P/Progress Note      HPI:      89 male with PMHX of AS, Asthma, BPH with urinary obstruction, Cancer, CAD s/p CABG, multiple catheterizations which showed LIMA to LAD patent, patent radial graft ot OM1, brachytherapy / laser atherectomy/PTCA of LM w/20% residual stenosis and ROEL III flow, minimal LV dysfunction w/moderate MR (caths performed by Dr. Mcgill both at [facility] and [facility]), s/p TAVR (medtronic 2020), s/p ICD/PPM upgrade, VT w/ATP on 2/5. Patient continues to experience MENDEZ. Recent admission to [place] w/CHF/PNA/AFRICA, was treated and recommended to follow with cardiology for CHF management. Patient now presents to cath holding for CardioMems placement for management of heart failure.       Symptoms:        Angina (Class): III       Ischemic Symptoms: MENDEZ     Heart Failure:        Systolic/Diastolic/Combined: combined        NYHA Class (within 2 weeks): III    Assessment of LVEF:       EF:   60-65%       Assessed by:  Echocardiogram        Date:   11/2022      Prior Cardiac Interventions:  Most Recent     < from: Cardiac Cath Lab - Adult (02.19.20 @ 11:49) >  VENTRICLES: EF 55% w/ inferior hypokinesis w/ moderate MR.  CORONARY VESSELS: R dominant.  Moderate sized RCA w/ patent stent and moderate distal disease.  99% mid LM stenosis w/ dec flow into LCx/LCx fills via LAD ( retrograde ).  95% ostial LAD stenosis w/ large distal vessel filling via graft.  Grafts-1. LIMA to LAD-patent.  2. Radial to OM1-patent.  LAD:   --  Proximal LAD:  CX:   --  Proximal circumflex:  AORTA: Mildly dilated asc aorta w/ trace AI.  COMPLICATIONS: No complications occurred during the cath lab visit.  DIAGNOSTIC IMPRESSIONS: Mild pulmonary HTN/normal filling pressures/low  arterial sat c/w lung disease.  Laser atherectomy/PTCA of LM w/ 20% residual stenosis and ROEL III flow  maintained thruout.  PTCA of ostial LAD w/ 40% residual stenosis.  Patent LIMA to LAD.  Patent radial graft to OM1.  Minimal LV dysfunction w/ mod MR.  No LV/Ao gradient w/ trace AI.  DIAGNOSTIC RECOMMENDATIONS: Asa/plavix.  Outpt brachytherapy.  INTERVENTIONAL IMPRESSIONS: Mild pulmonary HTN/normal filling pressures/low  arterial sat c/w lung disease.  Laser atherectomy/PTCA of LM w/ 20% residual stenosis and ROEL III flow  maintained thruout.  PTCA of ostial LAD w/ 40% residual stenosis.  Patent LIMA to LAD.  Patent radial graft to OM1.  Minimal LV dysfunction w/ mod MR.  No LV/Ao gradient w/ trace AI.  INTERVENTIONAL RECOMMENDATIONS: Asa/plavix.  Outpt brachytherapy.  Prepared and signed by  Kem Mcgill MD  Signed 02/19/2020 13:49:31    < end of copied text >             Noninvasive Testing:   Stress Test: Date:   3/2022        Protocol: Regadenoson        Duration of Exercise:        Symptoms:         EKG Changes:        DTS:        Myocardial Imaging:  moderate area of ischemia inferior and inferoseptal wall EF 57%        Risk Assessment (Low, Medium, High):     Associated Risk Factors:        Frailty Screening: (N/A, mild, mod, severe)       Cerebrovascular Disease: N/A       Chronic Lung Disease: N/A       Peripheral Arterial Disease: N/A       Chronic Kidney Disease (if yes, what is GFR): N/A       Uncontrolled Diabetes (if yes, what is HgbA1C or FBS): N/A       Poorly Controlled Hypertension (if yes, what is SBP): N/A       Morbid Obesity (if yes, what is BMI): N/A       History of Recent Ventricular Arrhythmia: N/A       Inability to Ambulate Safely: N/A       Need for Therapeutic Anticoagulation: N/A       Antiplatelet or Contrast Allergy: N/A    Antianginal Therapies:        Beta Blockers:         Calcium Channel Blockers:        Long Acting Nitrates:        Ranexa:     	  Social history:  lives with spouse, remote former smoker(35pk years/quit 40 years ago), rare ETOH     REVIEW OF SYSTEMS:    CONSTITUTIONAL: No weakness, fevers or chills  EYES/ENT: No visual changes;  No vertigo or throat pain   NECK: No pain or stiffness  RESPIRATORY: No cough, wheezing, hemoptysis; No shortness of breath  CARDIOVASCULAR: No chest pain or palpitations  GASTROINTESTINAL: No abdominal or epigastric pain. No nausea, vomiting, or hematemesis; No diarrhea or constipation. No melena or hematochezia.  GENITOURINARY: No dysuria, frequency or hematuria  NEUROLOGICAL: No numbness or weakness  SKIN: No itching, burning, rashes, or lesions   All other review of systems is negative unless indicated above.    PHYSICAL EXAM:    Vital Signs Last 24 Hrs  T(C): 36.7 (05 Jun 2023 11:58), Max: 36.7 (05 Jun 2023 11:58)  T(F): 98.1 (05 Jun 2023 11:58), Max: 98.1 (05 Jun 2023 11:58)  HR: 63 (05 Jun 2023 11:58) (63 - 63)  BP: 116/56 (05 Jun 2023 11:58) (116/56 - 116/56)  RR: 17 (05 Jun 2023 11:58) (17 - 17)  SpO2: 95% (05 Jun 2023 11:58) (95% - 95%)    Parameters below as of 05 Jun 2023 11:58  Patient On (Oxygen Delivery Method): room air        GENERAL: Pt lying comfortably, NAD.  ENMT: PERRL, +EOMI.  NECK: soft, Supple, No JVD,   CHEST/LUNG: diminshed bibasilar   HEART: S1S2+, Regular rate and rhythm; No murmurs, +1 pitting b/l le   ABDOMEN: Soft, Nontender, Nondistended; Bowel sounds present.  MUSCULOSKELETAL: Normal range of motion.  SKIN: No rashes or lesions.  NEURO: AAOX3, no focal deficits, no motor r sensory loss.  PSYCH: normal mood.  Procedure site:   VASCULAR:   Radial +2 R/+2 L  Femoral +2 R/+2 L  PT/DP +1 doppler       EKG: vpaced 60bpm

## 2023-06-03 NOTE — H&P PST ADULT - ASSESSMENT
89 male with PMHX of AS, Asthma, BPH with urinary obstruction, Cancer, CAD s/p CABG, multiple catheterizations which showed LIMA to LAD patent, patent radial graft ot OM1, brachytherapy / laser atherectomy/PTCA of LM w/20% residual stenosis and ROEL III flow, minimal LV dysfunction w/moderate MR (caths performed by Dr. Mcgill both at [facility] and [facility]), s/p TAVR (medtronic 2020), s/p ICD/PPM upgrade, VT w/ATP on 2/5. Patient continues to experience MENDEZ. Recent admission to [facility] w/CHF/PNA/AFRICA, was treated and recommended to follow with cardiology for CHF management. Patient now presents to cath holding for CardioMems placement for management of heart failure.       ASA  Mallampati  GFR  Creat  Bleeding Risk  COVID-19        -plan for LHC via RRA versus RFA  -patient seen and examined  -confirmed appropriate NPO duration  -ECG and Labs reviewed  -Aspirin 81mg po pre-cath?????  -procedure discussed with patient; risks and benefits explained, questions answered  -consent obtained by attending IC  -pre and post  cc NS bolus ordered to prevent STUART????  -pt instructed IF STENT PLACED WILL REQUIRE TO STAY OVERNIGHT FOR MONITORING AND DISCHARGED IN THE AM    -cardiac rehab info provided if stent is placed recommended to start if needed post PCI      89 male with PMHX of AS, Asthma, BPH with urinary obstruction, Cancer, CAD s/p CABG, multiple catheterizations which showed LIMA to LAD patent, patent radial graft ot OM1, brachytherapy / laser atherectomy/PTCA of LM w/20% residual stenosis and ROEL III flow, minimal LV dysfunction w/moderate MR (caths performed by Dr. Mcgill [facility] at [facility] and [facility]), s/p TAVR (medtronic 2020), s/p ICD/PPM upgrade, VT w/ATP on 2/5. Patient continues to experience MENDEZ. Recent admission to [facility] w/CHF/PNA/AFRICA, was treated and recommended to follow with cardiology for CHF management. Patient now presents to cath holding for CardioMems placement for management of heart failure.       ASA 3  Mallampati 2  GFR 44  Creat 1.50  Bleeding Risk 1.7    -plan for RHC with cardiomems via RFV   -patient seen and examined  -confirmed appropriate NPO duration  -ECG and Labs reviewed  -Took Eliquis 2.5mg this morning, discussed with Dr. Mcgill   -procedure discussed with patient; risks and benefits explained, questions answered  -consent obtained by attending IC

## 2023-06-05 ENCOUNTER — OUTPATIENT (OUTPATIENT)
Dept: OUTPATIENT SERVICES | Facility: HOSPITAL | Age: 88
LOS: 1 days | End: 2023-06-05
Payer: MEDICARE

## 2023-06-05 ENCOUNTER — TRANSCRIPTION ENCOUNTER (OUTPATIENT)
Age: 88
End: 2023-06-05

## 2023-06-05 VITALS
DIASTOLIC BLOOD PRESSURE: 56 MMHG | SYSTOLIC BLOOD PRESSURE: 116 MMHG | OXYGEN SATURATION: 95 % | RESPIRATION RATE: 17 BRPM | HEART RATE: 63 BPM | TEMPERATURE: 98 F

## 2023-06-05 VITALS
HEART RATE: 67 BPM | OXYGEN SATURATION: 98 % | DIASTOLIC BLOOD PRESSURE: 72 MMHG | SYSTOLIC BLOOD PRESSURE: 138 MMHG | RESPIRATION RATE: 17 BRPM

## 2023-06-05 DIAGNOSIS — Z98.41 CATARACT EXTRACTION STATUS, RIGHT EYE: Chronic | ICD-10-CM

## 2023-06-05 DIAGNOSIS — I50.9 HEART FAILURE, UNSPECIFIED: ICD-10-CM

## 2023-06-05 DIAGNOSIS — N40.0 BENIGN PROSTATIC HYPERPLASIA WITHOUT LOWER URINARY TRACT SYMPTOMS: ICD-10-CM

## 2023-06-05 DIAGNOSIS — Z98.61 CORONARY ANGIOPLASTY STATUS: Chronic | ICD-10-CM

## 2023-06-05 DIAGNOSIS — Z90.49 ACQUIRED ABSENCE OF OTHER SPECIFIED PARTS OF DIGESTIVE TRACT: Chronic | ICD-10-CM

## 2023-06-05 DIAGNOSIS — Z98.890 OTHER SPECIFIED POSTPROCEDURAL STATES: Chronic | ICD-10-CM

## 2023-06-05 DIAGNOSIS — Z98.89 OTHER SPECIFIED POSTPROCEDURAL STATES: Chronic | ICD-10-CM

## 2023-06-05 DIAGNOSIS — I10 ESSENTIAL (PRIMARY) HYPERTENSION: ICD-10-CM

## 2023-06-05 DIAGNOSIS — Z95.810 PRESENCE OF AUTOMATIC (IMPLANTABLE) CARDIAC DEFIBRILLATOR: Chronic | ICD-10-CM

## 2023-06-05 DIAGNOSIS — Z95.3 PRESENCE OF XENOGENIC HEART VALVE: Chronic | ICD-10-CM

## 2023-06-05 DIAGNOSIS — Z95.1 PRESENCE OF AORTOCORONARY BYPASS GRAFT: Chronic | ICD-10-CM

## 2023-06-05 DIAGNOSIS — Z98.42 CATARACT EXTRACTION STATUS, LEFT EYE: Chronic | ICD-10-CM

## 2023-06-05 DIAGNOSIS — Z96.60 PRESENCE OF UNSPECIFIED ORTHOPEDIC JOINT IMPLANT: Chronic | ICD-10-CM

## 2023-06-05 DIAGNOSIS — I48.92 UNSPECIFIED ATRIAL FLUTTER: ICD-10-CM

## 2023-06-05 DIAGNOSIS — Z95.0 PRESENCE OF CARDIAC PACEMAKER: Chronic | ICD-10-CM

## 2023-06-05 DIAGNOSIS — E78.5 HYPERLIPIDEMIA, UNSPECIFIED: ICD-10-CM

## 2023-06-05 DIAGNOSIS — E11.9 TYPE 2 DIABETES MELLITUS WITHOUT COMPLICATIONS: ICD-10-CM

## 2023-06-05 LAB
ANION GAP SERPL CALC-SCNC: 17 MMOL/L — SIGNIFICANT CHANGE UP (ref 5–17)
APTT BLD: 30.3 SEC — SIGNIFICANT CHANGE UP (ref 27.5–35.5)
BASOPHILS # BLD AUTO: 0.02 K/UL — SIGNIFICANT CHANGE UP (ref 0–0.2)
BASOPHILS NFR BLD AUTO: 0.3 % — SIGNIFICANT CHANGE UP (ref 0–2)
BUN SERPL-MCNC: 33.5 MG/DL — HIGH (ref 8–20)
CALCIUM SERPL-MCNC: 9.8 MG/DL — SIGNIFICANT CHANGE UP (ref 8.4–10.5)
CHLORIDE SERPL-SCNC: 100 MMOL/L — SIGNIFICANT CHANGE UP (ref 96–108)
CO2 SERPL-SCNC: 27 MMOL/L — SIGNIFICANT CHANGE UP (ref 22–29)
CREAT SERPL-MCNC: 1.5 MG/DL — HIGH (ref 0.5–1.3)
EGFR: 44 ML/MIN/1.73M2 — LOW
EOSINOPHIL # BLD AUTO: 0.09 K/UL — SIGNIFICANT CHANGE UP (ref 0–0.5)
EOSINOPHIL NFR BLD AUTO: 1.2 % — SIGNIFICANT CHANGE UP (ref 0–6)
GLUCOSE SERPL-MCNC: 160 MG/DL — HIGH (ref 70–99)
HCT VFR BLD CALC: 39 % — SIGNIFICANT CHANGE UP (ref 39–50)
HGB BLD-MCNC: 13.2 G/DL — SIGNIFICANT CHANGE UP (ref 13–17)
IMM GRANULOCYTES NFR BLD AUTO: 0.4 % — SIGNIFICANT CHANGE UP (ref 0–0.9)
INR BLD: 1.57 RATIO — HIGH (ref 0.88–1.16)
LYMPHOCYTES # BLD AUTO: 1.34 K/UL — SIGNIFICANT CHANGE UP (ref 1–3.3)
LYMPHOCYTES # BLD AUTO: 17.7 % — SIGNIFICANT CHANGE UP (ref 13–44)
MCHC RBC-ENTMCNC: 33.5 PG — SIGNIFICANT CHANGE UP (ref 27–34)
MCHC RBC-ENTMCNC: 33.8 GM/DL — SIGNIFICANT CHANGE UP (ref 32–36)
MCV RBC AUTO: 99 FL — SIGNIFICANT CHANGE UP (ref 80–100)
MONOCYTES # BLD AUTO: 0.77 K/UL — SIGNIFICANT CHANGE UP (ref 0–0.9)
MONOCYTES NFR BLD AUTO: 10.2 % — SIGNIFICANT CHANGE UP (ref 2–14)
NEUTROPHILS # BLD AUTO: 5.32 K/UL — SIGNIFICANT CHANGE UP (ref 1.8–7.4)
NEUTROPHILS NFR BLD AUTO: 70.2 % — SIGNIFICANT CHANGE UP (ref 43–77)
PLATELET # BLD AUTO: 124 K/UL — LOW (ref 150–400)
POTASSIUM SERPL-MCNC: 4 MMOL/L — SIGNIFICANT CHANGE UP (ref 3.5–5.3)
POTASSIUM SERPL-SCNC: 4 MMOL/L — SIGNIFICANT CHANGE UP (ref 3.5–5.3)
PROTHROM AB SERPL-ACNC: 18.3 SEC — HIGH (ref 10.5–13.4)
RBC # BLD: 3.94 M/UL — LOW (ref 4.2–5.8)
RBC # FLD: 13.9 % — SIGNIFICANT CHANGE UP (ref 10.3–14.5)
SODIUM SERPL-SCNC: 144 MMOL/L — SIGNIFICANT CHANGE UP (ref 135–145)
WBC # BLD: 7.57 K/UL — SIGNIFICANT CHANGE UP (ref 3.8–10.5)
WBC # FLD AUTO: 7.57 K/UL — SIGNIFICANT CHANGE UP (ref 3.8–10.5)

## 2023-06-05 PROCEDURE — C2624: CPT

## 2023-06-05 PROCEDURE — 85610 PROTHROMBIN TIME: CPT

## 2023-06-05 PROCEDURE — 93005 ELECTROCARDIOGRAM TRACING: CPT

## 2023-06-05 PROCEDURE — 93010 ELECTROCARDIOGRAM REPORT: CPT

## 2023-06-05 PROCEDURE — C1769: CPT

## 2023-06-05 PROCEDURE — 80048 BASIC METABOLIC PNL TOTAL CA: CPT

## 2023-06-05 PROCEDURE — C1760: CPT

## 2023-06-05 PROCEDURE — 85730 THROMBOPLASTIN TIME PARTIAL: CPT

## 2023-06-05 PROCEDURE — 85025 COMPLETE CBC W/AUTO DIFF WBC: CPT

## 2023-06-05 PROCEDURE — C1887: CPT

## 2023-06-05 PROCEDURE — 36415 COLL VENOUS BLD VENIPUNCTURE: CPT

## 2023-06-05 PROCEDURE — C1894: CPT

## 2023-06-05 RX ORDER — ALPRAZOLAM 0.25 MG
1 TABLET ORAL
Refills: 0 | DISCHARGE

## 2023-06-05 RX ORDER — L.ACIDOPH/B.ANIMALIS/B.LONGUM 15B CELL
1 CAPSULE ORAL
Qty: 0 | Refills: 0 | DISCHARGE

## 2023-06-05 RX ORDER — METFORMIN HYDROCHLORIDE 850 MG/1
1 TABLET ORAL
Qty: 0 | Refills: 0 | DISCHARGE

## 2023-06-05 RX ORDER — FUROSEMIDE 40 MG
1 TABLET ORAL
Qty: 0 | Refills: 0 | DISCHARGE

## 2023-06-05 RX ORDER — APIXABAN 2.5 MG/1
1 TABLET, FILM COATED ORAL
Refills: 0 | DISCHARGE

## 2023-06-05 RX ORDER — BUDESONIDE AND FORMOTEROL FUMARATE DIHYDRATE 160; 4.5 UG/1; UG/1
2 AEROSOL RESPIRATORY (INHALATION)
Refills: 0 | DISCHARGE

## 2023-06-05 RX ORDER — CHLORHEXIDINE GLUCONATE 213 G/1000ML
1 SOLUTION TOPICAL ONCE
Refills: 0 | Status: COMPLETED | OUTPATIENT
Start: 2023-06-05 | End: 2023-06-05

## 2023-06-05 RX ORDER — BUDESONIDE AND FORMOTEROL FUMARATE DIHYDRATE 160; 4.5 UG/1; UG/1
2 AEROSOL RESPIRATORY (INHALATION)
Qty: 0 | Refills: 0 | DISCHARGE

## 2023-06-05 RX ORDER — REPAGLINIDE 1 MG/1
1 TABLET ORAL
Qty: 0 | Refills: 0 | DISCHARGE

## 2023-06-05 RX ORDER — FUROSEMIDE 40 MG
3 TABLET ORAL
Refills: 0 | DISCHARGE

## 2023-06-05 RX ORDER — ALFUZOSIN HYDROCHLORIDE 10 MG/1
1 TABLET, EXTENDED RELEASE ORAL
Qty: 0 | Refills: 0 | DISCHARGE

## 2023-06-05 RX ADMIN — CHLORHEXIDINE GLUCONATE 1 APPLICATION(S): 213 SOLUTION TOPICAL at 18:42

## 2023-06-05 NOTE — CHART NOTE - NSCHARTNOTEFT_GEN_A_CORE
Now s/p RHC and cardiomems via RFV with Dr. Mcgill , tolerated procedure well. Pt arrived to recovery in NAD and HDS, access site stable, no bleed/hematoma, distal pulse +   Intraprocedurally: rec'd fentanyl 25mcg IVP and omnipaque 10ml  Findings: RA: 13/9/9, RV 54/6/9, PA 52/19/33, PCWP 22 successful implant of cardiomems     Plan:  -Formal cath report pending  -Post procedure management/monitoring per protocol  -Access site precautions  -may raise HOB 30 degrees at 1800, 45 degrees at 1900, then OOB and dc 2000 if remains HDS and no bleeding complications   -Repeat ECG if any clinical indication or change on tele  -Continue current medical therapy  -resume Eliquis in am   -Educated regarding post procedure management and care  -Discussed the importance of RF modification  -F/U outpt in 1-2 weeks with Cardiologist Dr. Mcgill

## 2023-06-05 NOTE — DISCHARGE NOTE PROVIDER - HOSPITAL COURSE
Brief Hospital Course: Now s/p RHC and cardiomems via RFV with Dr. Mcgill , tolerated procedure well. Pt arrived to recovery in NAD and HDS, access site stable, no bleed/hematoma, distal pulse +   Intraprocedurally: rec'd fentanyl 25mcg IVP and omnipaque 10ml  Findings: RA: 13/9/9, RV 54/6/9, PA 52/19/33, PCWP 22 successful implant of cardiomems     Plan:  -Formal cath report pending  -Post procedure management/monitoring per protocol  -Access site precautions  -may raise HOB 30 degrees at 1800, 45 degrees at 1900, then OOB and dc 2000 if remains HDS and no bleeding complications   -Repeat ECG if any clinical indication or change on tele  -Continue current medical therapy  -resume Eliquis in am   -Educated regarding post procedure management and care  -Discussed the importance of RF modification  -F/U outpt in 1-2 weeks with Cardiologist Dr. Mcgill.       At the time of discharge patient was hemodynamically stable and amenable to all terms of discharge. The patient has received verbal instructions from myself regarding discharge plans.     Length of Discharge: 45MIN    Patient is medically stable and cleared for discharge to home with outpatient follow up.

## 2023-06-05 NOTE — DISCHARGE NOTE PROVIDER - CARE PROVIDER_API CALL
Kem Mcgill  Cardiovascular Disease  [address] [place] [address]  Phone: [phone]  Fax: [phone]  Follow Up Time: 2 weeks

## 2023-06-05 NOTE — ASU PATIENT PROFILE, ADULT - FALL HARM RISK - RISK INTERVENTIONS

## 2023-06-05 NOTE — DISCHARGE NOTE PROVIDER - NSDCMRMEDTOKEN_GEN_ALL_CORE_FT
alfuzosin 10 mg oral tablet, extended release: 1 tab(s) orally once a day (at bedtime)  Eliquis 2.5 mg oral tablet: 1 orally 2 times a day  furosemide 20 mg oral tablet: 3 orally once a day  latanoprost 0.005% ophthalmic solution: 1 drop(s) to each affected eye once a day (at bedtime)  multivitamin: 1 tab(s) orally once a day  repaglinide 1 mg oral tablet: 1 tab(s) orally 3 times a day (before meals)  Symbicort 160 mcg-4.5 mcg/inh inhalation aerosol: 2 puff(s) inhaled 2 times a day  Symbicort 80 mcg-4.5 mcg/inh inhalation aerosol: 2 inhaled once a day  Xanax 0.25 mg oral tablet: 1 orally once a day (at bedtime)  Zetia 10 mg oral tablet: 1 tab(s) orally once a day  
Out of season

## 2023-06-05 NOTE — DISCHARGE NOTE NURSING/CASE MANAGEMENT/SOCIAL WORK - PATIENT PORTAL LINK FT
You can access the FollowMyHealth Patient Portal offered by [facility] by registering at the following website: [email or web address]. By joining [facility]’s FollowMyHealth portal, you will also be able to view your health information using other applications (apps) compatible with our system.

## 2023-06-05 NOTE — DISCHARGE NOTE PROVIDER - NSDCCPCAREPLAN_GEN_ALL_CORE_FT
PRINCIPAL DISCHARGE DIAGNOSIS  Diagnosis: Chronic diastolic congestive heart failure  Assessment and Plan of Treatment: s/p right heart cardiac cath with cardiomems placement.   -continue all home medications  -resume eliquis  -follow up with Dr. Mcgill in 2 weeks

## 2023-06-05 NOTE — DISCHARGE NOTE PROVIDER - NSDCFUSCHEDAPPT_GEN_ALL_CORE_FT
[facility]  INTMED [address]  Scheduled Appointment: 06/13/2023

## 2023-06-09 ENCOUNTER — TRANSCRIPTION ENCOUNTER (OUTPATIENT)
Age: 88
End: 2023-06-09

## 2023-06-12 ENCOUNTER — NON-APPOINTMENT (OUTPATIENT)
Age: 88
End: 2023-06-12

## 2023-06-12 ENCOUNTER — TRANSCRIPTION ENCOUNTER (OUTPATIENT)
Age: 88
End: 2023-06-12

## 2023-06-13 ENCOUNTER — APPOINTMENT (OUTPATIENT)
Dept: INTERNAL MEDICINE | Facility: CLINIC | Age: 88
End: 2023-06-13
Payer: MEDICARE

## 2023-06-13 VITALS
WEIGHT: 172 LBS | OXYGEN SATURATION: 96 % | BODY MASS INDEX: 26.07 KG/M2 | SYSTOLIC BLOOD PRESSURE: 120 MMHG | HEART RATE: 74 BPM | RESPIRATION RATE: 14 BRPM | DIASTOLIC BLOOD PRESSURE: 70 MMHG | HEIGHT: 68 IN

## 2023-06-13 VITALS
RESPIRATION RATE: 11 BRPM | BODY MASS INDEX: 18.49 KG/M2 | WEIGHT: 122 LBS | OXYGEN SATURATION: 98 % | HEART RATE: 62 BPM | HEIGHT: 68 IN

## 2023-06-13 PROCEDURE — 36415 COLL VENOUS BLD VENIPUNCTURE: CPT

## 2023-06-13 PROCEDURE — 99214 OFFICE O/P EST MOD 30 MIN: CPT | Mod: 25

## 2023-06-14 LAB
ALBUMIN SERPL ELPH-MCNC: 3.9 G/DL
ALP BLD-CCNC: 60 U/L
ALT SERPL-CCNC: 19 U/L
ANION GAP SERPL CALC-SCNC: 11 MMOL/L
AST SERPL-CCNC: 27 U/L
BILIRUB SERPL-MCNC: 0.7 MG/DL
BUN SERPL-MCNC: 31 MG/DL
CALCIUM SERPL-MCNC: 9.3 MG/DL
CHLORIDE SERPL-SCNC: 102 MMOL/L
CHOLEST SERPL-MCNC: 135 MG/DL
CO2 SERPL-SCNC: 30 MMOL/L
CREAT SERPL-MCNC: 1.54 MG/DL
EGFR: 43 ML/MIN/1.73M2
ESTIMATED AVERAGE GLUCOSE: 177 MG/DL
GLUCOSE SERPL-MCNC: 106 MG/DL
HBA1C MFR BLD HPLC: 7.8 %
HDLC SERPL-MCNC: 63 MG/DL
LDLC SERPL CALC-MCNC: 56 MG/DL
MAGNESIUM SERPL-MCNC: 2.2 MG/DL
NONHDLC SERPL-MCNC: 72 MG/DL
POTASSIUM SERPL-SCNC: 3.7 MMOL/L
PROT SERPL-MCNC: 6.5 G/DL
SODIUM SERPL-SCNC: 143 MMOL/L
TRIGL SERPL-MCNC: 79 MG/DL

## 2023-06-19 NOTE — PATIENT PROFILE ADULT. - VISION (WITH CORRECTIVE LENSES IF THE PATIENT USUALLY WEARS THEM):
Normal vision: sees adequately in most situations; can see medication labels, newsprint
19-Jun-2023 13:50

## 2023-06-22 ENCOUNTER — NON-APPOINTMENT (OUTPATIENT)
Age: 88
End: 2023-06-22

## 2023-06-22 NOTE — INPATIENT CERTIFICATION FOR MEDICARE PATIENTS - NS ICMP TWO DAYS INPATIENT
Yes
Quality 137: Melanoma: Continuity Of Care - Recall System: Patient information entered into a recall system that includes: target date for the next exam specified AND a process to follow up with patients regarding missed or unscheduled appointments
Detail Level: Detailed
Quality 111:Pneumonia Vaccination Status For Older Adults: Patient did not receive any pneumococcal conjugate or polysaccharide vaccine on or after their 60th birthday and before the end of the measurement period

## 2023-07-12 ENCOUNTER — NON-APPOINTMENT (OUTPATIENT)
Age: 88
End: 2023-07-12

## 2023-07-20 ENCOUNTER — LABORATORY RESULT (OUTPATIENT)
Age: 88
End: 2023-07-20

## 2023-07-21 LAB
ANION GAP SERPL CALC-SCNC: 9 MMOL/L
BUN SERPL-MCNC: 22 MG/DL
CALCIUM SERPL-MCNC: 9.3 MG/DL
CHLORIDE SERPL-SCNC: 101 MMOL/L
CO2 SERPL-SCNC: 31 MMOL/L
CREAT SERPL-MCNC: 1.31 MG/DL
EGFR: 52 ML/MIN/1.73M2
GLUCOSE SERPL-MCNC: 133 MG/DL
POTASSIUM SERPL-SCNC: 4.3 MMOL/L
SODIUM SERPL-SCNC: 141 MMOL/L

## 2023-07-21 NOTE — ADDENDUM
[FreeTextEntry1] : Labs show\par -BUN/creatinine improved at 31/1.5\par -H&H of 12.7/40.4\par Repeat in 3 to 4 weeks at lab near home\par \par Repeat blood work shows\par -H&H stable at 12.6/40.7 with creatinine of 1.3

## 2023-07-21 NOTE — ASSESSMENT
[FreeTextEntry1] : Venipuncture done in our office, Labs sent out. Patient advised to continue present medications with diet/exercise and specialists followup. Patient will return to the office in September for CPE\par \par \par Last colonoscopy was 7/2022\par CEA level 9/2022\par Last 24-hour urine was 1/2023\par MA 9/2022\par specialists include\par 1. endocrinology-=to change to Dr. Casillas\par 2. ophthalmology-Dr. Brown-10/2022\par 3. podiatry-Dr. Stone\par 4. cardiology-Dr. king\par 5.  pulmonary-\par 6. gastroenterology-Dr. Burnette\par 7. vascular-Dr. Haskins-changed to Dr. Mayur Walsh\par 8.neurology-?MD name\par 9. Cardiothoracic surgeon-Dr. Mckeon-NO NEED FOR FOLLOW UP\par 10. - @\par 11.Derm-?MD name \par 12.Hand Delicia LEVY\par 13.PMR-Dr. Tyson\par 14.Colorectal-Dr. Carvalho/Dr. Hancock\par 15.ONC=Dr. Amaro\par 16.  [place] [facility] as needed\par vaccines are UTD< +got covid vaccines X3 \par echo via cardio

## 2023-07-21 NOTE — PHYSICAL EXAM
[General Appearance - In No Acute Distress] : in no acute distress [] : no respiratory distress [Respiration, Rhythm And Depth] : normal respiratory rhythm and effort [Auscultation Breath Sounds / Voice Sounds] : lungs were clear to auscultation bilaterally [Heart Rate And Rhythm] : heart rate was normal and rhythm regular [Mood] : the mood was normal [Affect] : the affect was normal [FreeTextEntry1] : +PPM

## 2023-07-24 ENCOUNTER — NON-APPOINTMENT (OUTPATIENT)
Age: 88
End: 2023-07-24

## 2023-08-22 ENCOUNTER — NON-APPOINTMENT (OUTPATIENT)
Age: 88
End: 2023-08-22

## 2023-08-24 ENCOUNTER — NON-APPOINTMENT (OUTPATIENT)
Age: 88
End: 2023-08-24

## 2023-09-05 ENCOUNTER — NON-APPOINTMENT (OUTPATIENT)
Age: 88
End: 2023-09-05

## 2023-09-19 NOTE — DISCHARGE NOTE PROVIDER - CARE PROVIDERS DIRECT ADDRESSES
,DirectAddress_Unknown Additional Notes: DW patient if no resolution 4 weeks we will consider biopsy at follow up. Pt agreeable to plan. Render Risk Assessment In Note?: no Detail Level: Simple

## 2023-10-03 ENCOUNTER — APPOINTMENT (OUTPATIENT)
Dept: ENDOCRINOLOGY | Facility: CLINIC | Age: 88
End: 2023-10-03
Payer: MEDICARE

## 2023-10-03 VITALS
HEART RATE: 70 BPM | TEMPERATURE: 97 F | RESPIRATION RATE: 14 BRPM | WEIGHT: 173.13 LBS | DIASTOLIC BLOOD PRESSURE: 60 MMHG | OXYGEN SATURATION: 96 % | BODY MASS INDEX: 26.86 KG/M2 | SYSTOLIC BLOOD PRESSURE: 130 MMHG | HEIGHT: 67.5 IN

## 2023-10-03 DIAGNOSIS — R79.89 OTHER SPECIFIED ABNORMAL FINDINGS OF BLOOD CHEMISTRY: ICD-10-CM

## 2023-10-03 DIAGNOSIS — Z13.820 ENCOUNTER FOR SCREENING FOR OSTEOPOROSIS: ICD-10-CM

## 2023-10-03 PROCEDURE — 99205 OFFICE O/P NEW HI 60 MIN: CPT

## 2023-10-03 RX ORDER — APIXABAN 2.5 MG/1
2.5 TABLET, FILM COATED ORAL TWICE DAILY
Refills: 0 | Status: ACTIVE | COMMUNITY

## 2023-10-03 RX ORDER — AMIODARONE HYDROCHLORIDE 200 MG/1
200 TABLET ORAL DAILY
Refills: 0 | Status: ACTIVE | COMMUNITY

## 2023-10-03 RX ORDER — LOTEPREDNOL ETABONATE 5 MG/ML
0.5 SUSPENSION/ DROPS OPHTHALMIC
Qty: 5 | Refills: 0 | Status: DISCONTINUED | COMMUNITY
Start: 2020-07-17 | End: 2023-10-03

## 2023-10-03 RX ORDER — FINASTERIDE 5 MG/1
5 TABLET, FILM COATED ORAL DAILY
Refills: 0 | Status: ACTIVE | COMMUNITY

## 2023-10-03 RX ORDER — UMECLIDINIUM BROMIDE AND VILANTEROL TRIFENATATE 62.5; 25 UG/1; UG/1
62.5-25 POWDER RESPIRATORY (INHALATION) DAILY
Qty: 3 | Refills: 3 | Status: DISCONTINUED | OUTPATIENT
Start: 2022-03-29 | End: 2023-10-03

## 2023-10-03 RX ORDER — FLUTICASONE PROPIONATE 0.5 MG/G
0.05 CREAM TOPICAL TWICE DAILY
Qty: 1 | Refills: 0 | Status: DISCONTINUED | COMMUNITY
Start: 2022-05-19 | End: 2023-10-03

## 2023-10-03 RX ORDER — METOPROLOL SUCCINATE 25 MG/1
25 TABLET, EXTENDED RELEASE ORAL
Qty: 90 | Refills: 1 | Status: DISCONTINUED | COMMUNITY
Start: 2023-05-17 | End: 2023-10-03

## 2023-10-03 RX ORDER — LEVALBUTEROL TARTRATE 45 UG/1
45 AEROSOL, METERED ORAL EVERY 4 HOURS
Qty: 1 | Refills: 0 | Status: DISCONTINUED | COMMUNITY
Start: 2023-05-15 | End: 2023-10-03

## 2023-10-03 RX ORDER — CLOTRIMAZOLE AND BETAMETHASONE DIPROPIONATE 10; .5 MG/G; MG/G
1-0.05 CREAM TOPICAL
Qty: 45 | Refills: 0 | Status: DISCONTINUED | COMMUNITY
Start: 2022-03-08 | End: 2023-10-03

## 2023-10-12 ENCOUNTER — APPOINTMENT (OUTPATIENT)
Dept: ENDOCRINOLOGY | Facility: CLINIC | Age: 88
End: 2023-10-12

## 2023-10-17 ENCOUNTER — APPOINTMENT (OUTPATIENT)
Dept: ENDOCRINOLOGY | Facility: CLINIC | Age: 88
End: 2023-10-17
Payer: MEDICARE

## 2023-10-17 PROCEDURE — 97802 MEDICAL NUTRITION INDIV IN: CPT

## 2023-10-18 ENCOUNTER — NON-APPOINTMENT (OUTPATIENT)
Age: 88
End: 2023-10-18

## 2023-10-19 ENCOUNTER — APPOINTMENT (OUTPATIENT)
Dept: INTERNAL MEDICINE | Facility: CLINIC | Age: 88
End: 2023-10-19
Payer: MEDICARE

## 2023-10-19 VITALS
HEART RATE: 79 BPM | HEIGHT: 67.5 IN | DIASTOLIC BLOOD PRESSURE: 60 MMHG | BODY MASS INDEX: 26.37 KG/M2 | WEIGHT: 170 LBS | SYSTOLIC BLOOD PRESSURE: 118 MMHG | RESPIRATION RATE: 18 BRPM | OXYGEN SATURATION: 96 %

## 2023-10-19 DIAGNOSIS — Z23 ENCOUNTER FOR IMMUNIZATION: ICD-10-CM

## 2023-10-19 DIAGNOSIS — I25.10 ATHEROSCLEROTIC HEART DISEASE OF NATIVE CORONARY ARTERY W/OUT ANGINA PECTORIS: ICD-10-CM

## 2023-10-19 DIAGNOSIS — E53.8 DEFICIENCY OF OTHER SPECIFIED B GROUP VITAMINS: ICD-10-CM

## 2023-10-19 DIAGNOSIS — Z00.00 ENCOUNTER FOR GENERAL ADULT MEDICAL EXAMINATION W/OUT ABNORMAL FINDINGS: ICD-10-CM

## 2023-10-19 DIAGNOSIS — Z98.61 ATHEROSCLEROTIC HEART DISEASE OF NATIVE CORONARY ARTERY W/OUT ANGINA PECTORIS: ICD-10-CM

## 2023-10-19 DIAGNOSIS — N18.31 CHRONIC KIDNEY DISEASE, STAGE 3A: ICD-10-CM

## 2023-10-19 DIAGNOSIS — E11.3299 TYPE 2 DIABETES MELLITUS WITH MILD NONPROLIFERATIVE DIABETIC RETINOPATHY WITHOUT MACULAR EDEMA, UNSPECIFIED EYE: Chronic | ICD-10-CM

## 2023-10-19 DIAGNOSIS — J44.9 CHRONIC OBSTRUCTIVE PULMONARY DISEASE, UNSPECIFIED: Chronic | ICD-10-CM

## 2023-10-19 DIAGNOSIS — I48.0 PAROXYSMAL ATRIAL FIBRILLATION: ICD-10-CM

## 2023-10-19 DIAGNOSIS — C18.9 MALIGNANT NEOPLASM OF COLON, UNSPECIFIED: Chronic | ICD-10-CM

## 2023-10-19 DIAGNOSIS — K64.9 UNSPECIFIED HEMORRHOIDS: ICD-10-CM

## 2023-10-19 PROCEDURE — 36415 COLL VENOUS BLD VENIPUNCTURE: CPT

## 2023-10-19 PROCEDURE — 90662 IIV NO PRSV INCREASED AG IM: CPT

## 2023-10-19 PROCEDURE — G0008: CPT

## 2023-10-19 PROCEDURE — G0439: CPT

## 2023-10-19 RX ORDER — FUROSEMIDE 40 MG/1
40 TABLET ORAL DAILY
Qty: 90 | Refills: 1 | Status: ACTIVE | COMMUNITY
Start: 2023-01-04

## 2023-10-20 LAB
ALBUMIN SERPL ELPH-MCNC: 4 G/DL
ALP BLD-CCNC: 64 U/L
ALT SERPL-CCNC: 23 U/L
ANION GAP SERPL CALC-SCNC: 12 MMOL/L
APPEARANCE: CLEAR
AST SERPL-CCNC: 30 U/L
BACTERIA: NEGATIVE /HPF
BASOPHILS # BLD AUTO: 0.04 K/UL
BASOPHILS NFR BLD AUTO: 0.5 %
BILIRUB SERPL-MCNC: 0.5 MG/DL
BILIRUBIN URINE: NEGATIVE
BLOOD URINE: NEGATIVE
BUN SERPL-MCNC: 28 MG/DL
CALCIUM SERPL-MCNC: 9 MG/DL
CAST: 1 /LPF
CEA SERPL-MCNC: 6.1 NG/ML
CHLORIDE SERPL-SCNC: 100 MMOL/L
CHOLEST SERPL-MCNC: 146 MG/DL
CO2 SERPL-SCNC: 26 MMOL/L
COLOR: NORMAL
CREAT SERPL-MCNC: 1.35 MG/DL
CREAT SPEC-SCNC: 156 MG/DL
EGFR: 50 ML/MIN/1.73M2
EOSINOPHIL # BLD AUTO: 0.13 K/UL
EOSINOPHIL NFR BLD AUTO: 1.7 %
EPITHELIAL CELLS: 1 /HPF
ESTIMATED AVERAGE GLUCOSE: 157 MG/DL
FOLATE SERPL-MCNC: >20 NG/ML
GLUCOSE QUALITATIVE U: NEGATIVE MG/DL
GLUCOSE SERPL-MCNC: 178 MG/DL
HBA1C MFR BLD HPLC: 7.1 %
HCT VFR BLD CALC: 38.4 %
HDLC SERPL-MCNC: 67 MG/DL
HGB BLD-MCNC: 12.7 G/DL
IMM GRANULOCYTES NFR BLD AUTO: 0.4 %
KETONES URINE: NEGATIVE MG/DL
LDLC SERPL CALC-MCNC: 61 MG/DL
LEUKOCYTE ESTERASE URINE: NEGATIVE
LYMPHOCYTES # BLD AUTO: 1.23 K/UL
LYMPHOCYTES NFR BLD AUTO: 15.9 %
MAGNESIUM SERPL-MCNC: 2.2 MG/DL
MAN DIFF?: NORMAL
MCHC RBC-ENTMCNC: 33.1 GM/DL
MCHC RBC-ENTMCNC: 34.4 PG
MCV RBC AUTO: 104.1 FL
MICROALBUMIN 24H UR DL<=1MG/L-MCNC: 3.9 MG/DL
MICROALBUMIN/CREAT 24H UR-RTO: 25 MG/G
MICROSCOPIC-UA: NORMAL
MONOCYTES # BLD AUTO: 0.68 K/UL
MONOCYTES NFR BLD AUTO: 8.8 %
NEUTROPHILS # BLD AUTO: 5.65 K/UL
NEUTROPHILS NFR BLD AUTO: 72.7 %
NITRITE URINE: NEGATIVE
NONHDLC SERPL-MCNC: 79 MG/DL
PH URINE: 5.5
PLATELET # BLD AUTO: 162 K/UL
POTASSIUM SERPL-SCNC: 4.1 MMOL/L
PROT SERPL-MCNC: 6.8 G/DL
PROTEIN URINE: 30 MG/DL
RBC # BLD: 3.69 M/UL
RBC # FLD: 14.6 %
RED BLOOD CELLS URINE: 0 /HPF
SODIUM SERPL-SCNC: 139 MMOL/L
SPECIFIC GRAVITY URINE: 1.02
T4 FREE SERPL-MCNC: 1.6 NG/DL
TRIGL SERPL-MCNC: 98 MG/DL
TSH SERPL-ACNC: 2.48 UIU/ML
UROBILINOGEN URINE: 0.2 MG/DL
VIT B12 SERPL-MCNC: 731 PG/ML
WBC # FLD AUTO: 7.76 K/UL
WHITE BLOOD CELLS URINE: 0 /HPF

## 2023-10-25 NOTE — ASU PREOP CHECKLIST - IDENTIFICATION BAND VERIFIED
Initial Clinical Review    Admission: Date/Time/Statement:   Admission Orders (From admission, onward)       Ordered        10/24/23 2205  Inpatient Admission  Once                          Orders Placed This Encounter   Procedures    Inpatient Admission     Standing Status:   Standing     Number of Occurrences:   1     Order Specific Question:   Level of Care     Answer:   Level 2 Stepdown / HOT [14]     Order Specific Question:   Estimated length of stay     Answer:   More than 2 Midnights     Order Specific Question:   Certification     Answer:   I certify that inpatient services are medically necessary for this patient for a duration of greater than two midnights. See H&P and MD Progress Notes for additional information about the patient's course of treatment. ED Arrival Information       Expected   10/24/2023     Arrival   10/24/2023 20:22    Acuity   Emergent              Means of arrival   Ambulance    Escorted by   [facility]    Service   Hospitalist    Admission type   Emergency              Arrival complaint   STROKE alert             Chief Complaint   Patient presents with    CVA/TIA-like Symptoms       Initial Presentation: 68 y.o. male  presents to ed from home via ems for evaluation and treatment of stroke like symptoms. Pre hospital stroke alert for acute onset of L facial droop, LUE,LLE weakness, slurred speech. Last seen normal 1 hr prior to ems arrival in ed. Not on anticoagulants. Takes daily aspirin. Vomited several times en route. Received iv zofran x1. Clinical assessment significant for hypertension. PMHX: HTN, TIA , RENAL CA. Clinical assessment significant for Gcs=15, LLE edema, pale, L facial droop, dysarthria, weakness. BUN 32, CR 1.99, GLUCOSE 210, WBC 12.12. Imaging shows severe near occlusive plaque stenosis L cervical ICA > 90% worsened from previous imaging.  Initially treated with iv labetalol, iv multi electrolyte 75/hr, iv protonix, rectal aspirin, brilinta, NGT placement. Admit to inpatient step down for left sided weakness. Consult neurology: stroke alert  Patient and wife opted not to proceed with thrombolytic therapy. He has a known aneurysm. Bleeding risk. NIHSS=8  MRI confirmed lacunar stroke. Plan permissive HTN, atorvastatin, PT/OT/ST evaluations, PM&R consult, telemetry, neuro checks, ECHO. Date: 10-25-23    Day 2: inpatient step down      Hypertension persists. Plan for ECHO. Continue telemetry and neuro checks. ST evaluation completed. Pt NPO with tube feeds, aspiration precautions. Recommend VBS. PMR consult for left hemiplegia, dysarthria, left hemisensory deficit, impaired mobility  and self care. Recommend inpatient rehab - appropriate for acute rehab. Date: 10-26-23    Day 3: Has surpassed a 2nd midnight with active treatments and services, which include trend BMP / CBC & accuchecks, NGT, tube feed, iv protonix, iv fluids, neuro checks, I/O, PT/OT/ST, telemetry.       ED Triage Vitals   10/24/23 2030 10/24/23 2026 10/24/23 2026 10/24/23 2026 10/24/23 2026   98.5 °F (36.9 °C) 91 18 (!) 187/91 99 %      Tympanic Monitor         No Pain          10/24/23 104 kg (228 lb 13.4 oz)     Additional Vital Signs:     Date/Time Temp Pulse Resp BP MAP (mmHg) SpO2 O2 Device   10/25/23 1200 -- 94 18 190/96 Abnormal  132 98 % --   10/25/23 1007 -- -- -- -- -- 97 % None (Room air)   10/25/23 1000 -- 93 17 205/84 Abnormal  120 -- --   10/25/23 0807 98.2 °F (36.8 °C) 96 30 Abnormal  179/82 Abnormal  118 97 % --   10/25/23 0800 -- 96 18 179/82 Abnormal  118 -- --   10/25/23 0700 -- -- -- 168/76 109 -- --   10/25/23 0630 -- -- -- 185/94 Abnormal  133 -- --   10/25/23 0600 -- 97 17 218/98 Abnormal  140 -- --   10/25/23 0530 -- 94 15 207/105 Abnormal  142 -- --   10/25/23 0430 -- 98 14 175/84 Abnormal  120 -- --   10/25/23 0330 -- 99 20 175/97 Abnormal  129 -- --   10/25/23 0230 -- 95 18 185/86 Abnormal  -- -- --   10/25/23 0130 -- 100 20 191/98 Abnormal 137 -- --   10/25/23 0030 -- 91 19 184/88 Abnormal  127 -- --   10/24/23 2359 -- 85 20 179/106 Abnormal  -- 94 % None (Room air)   10/24/23 2145 -- 80 18 179/87 Abnormal  122 96 % --   10/24/23 2130 -- 82 18 179/90 Abnormal  -- 97 % None (Room air)   10/24/23 21:15:27 -- 90 18 188/90 Abnormal  -- 97 % None (Room air)   10/24/23 2100 -- 90 17 190/101 Abnormal  -- 99 % --   10/24/23 20:50:27 -- 90 -- 193/93 Abnormal  -- 99 % --   10/24/23 2045 -- 90 18 181/88 Abnormal  -- 98 % None (Room air)   10/24/23 2030 98.5 °F (36.9 °C) 90 18 187/91 Abnormal  -- 99 % --   10/24/23 20:26:30 -- 91 18 187/91 Abnormal  -- 99 % None (Room air)     Date and Time Eye Opening Best Verbal Response Best Motor Response [place] Coma Scale Score   10/25/23 0800 4 5 6 15   10/25/23 0600 4 5 6 15   10/25/23 0400 4 5 6 15   10/25/23 0300 4 5 6 15   10/25/23 0200 4 5 6 15   10/25/23 0100 4 5 6 15   10/25/23 0000 4 5 6 15   10/24/23 2300 4 5 6 15   10/24/23 2200 4 5 6 15   10/24/23 2145 4 5 6 15   10/24/23 2130 4 5 6 15   10/24/23 2115 4 5 6 15   10/24/23 2100 4 5 6 15   10/24/23 2057 4 5 6 15   10/24/23 2045 4 5 6 15   10/24/23 2030 4 5 6 15   10/24/23 2026 4 5 6 15             Pertinent Labs/Diagnostic Test Results:     ECHO  10-25      Left Ventricle: Left ventricular cavity size is normal. Wall thickness is normal. The left ventricular ejection fraction is 50%. Systolic function is low normal. Although no diagnostic regional wall motion abnormality was identified, this possibility cannot be completely excluded on the basis of this study. Diastolic function is mildly abnormal, consistent with grade I (abnormal) relaxation. Right Ventricle: Right ventricular cavity size is normal. Systolic function is normal.    Aortic Valve: There is aortic valve sclerosis.       EKG 10-24   Sinus rhythm with Premature atrial complexes   Moderate voltage criteria for LVH, may be normal variant   Prolonged QT   Abnormal ECG   When compared with ECG of 08-JUL-2022 13:42,   Fusion complexes are no longer Present   Premature ventricular complexes are no longer Present   Premature atrial complexes are now Presen     XR abdomen 1 vw portable   Final  (10/25 9205)      Enteric tube positioned within the distal stomach. .         MRI brain wo contrast   Final(10/25 0113)      Acute to early subacute lacunar infarct in the right paramedian veronique. No mass effect or hemorrhagic conversion. XR abdomen 1 vw portable   Final (10/25 0618)      Enteric tube coiled over the region of the mouth. This was subsequently repositioned. CTA stroke alert (head/neck)   Final (10/24 2058)      Occlusion of the left V3 and left V4 segments, similar to the prior exam.      Severe near occlusive plaque stenosis proximal left cervical ICA with greater than 90% stenosis, worsened compared to the prior exam.      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         CT stroke alert brain   Final (10/24 2032)      No acute intracranial abnormality. Chronic microangiopathic changes.         Results from last 7 days   Lab Units 10/24/23  2054   SARS-COV-2  Negative     Results from last 7 days   Lab Units 10/24/23  2054   WBC Thousand/uL 12.02*   HEMOGLOBIN g/dL 13.6   HEMATOCRIT % 40.1   PLATELETS Thousands/uL 152         Results from last 7 days   Lab Units 10/25/23  0617 10/24/23  2054   SODIUM mmol/L 139 137   POTASSIUM mmol/L 4.5 4.3   CHLORIDE mmol/L 103 102   CO2 mmol/L 27 29   ANION GAP mmol/L 9 6   BUN mg/dL 30* 32*   CREATININE mg/dL 2.04* 1.99*   EGFR ml/min/1.73sq m 30 31   CALCIUM mg/dL 9.0 9.0   MAGNESIUM mg/dL 2.0  --    PHOSPHORUS mg/dL 3.4  --      Results from last 7 days   Lab Units 10/25/23  0617   AST U/L 14   ALT U/L 10   ALK PHOS U/L 66   TOTAL PROTEIN g/dL 6.3*   ALBUMIN g/dL 3.8   TOTAL BILIRUBIN mg/dL 0.89     Results from last 7 days   Lab Units 10/25/23  1229 10/25/23  0612 10/25/23  0057 10/24/23  2026   POC GLUCOSE mg/dl 140 164* 147* 214*     Results from last 7 days   Lab Units 10/25/23  0617 10/24/23  2054   GLUCOSE RANDOM mg/dL 141* 210*         Results from last 7 days   Lab Units 10/24/23  2054   HEMOGLOBIN A1C % 6.7*   EAG mg/dl 146         Results from last 7 days   Lab Units 10/24/23  2054   HS TNI 0HR ng/L 11         Results from last 7 days   Lab Units 10/24/23  2054   PROTIME seconds 13.0   INR  0.99   PTT seconds 28     Results from last 7 days   Lab Units 10/25/23  0617   TSH 3RD GENERATON uIU/mL 0.993     Results from last 7 days   Lab Units 10/25/23  0619   CLARITY UA  Clear   COLOR UA  Yellow   SPEC GRAV UA  1.031*   PH UA  6.5   GLUCOSE UA mg/dl Trace*   KETONES UA mg/dl Negative   BLOOD UA  Trace*   PROTEIN UA mg/dl 200 (2+)*   NITRITE UA  Negative   BILIRUBIN UA  Negative   UROBILINOGEN UA (BE) mg/dl <2.0   LEUKOCYTES UA  Negative   WBC UA /hpf 2-4*   RBC UA /hpf 10-20*   BACTERIA UA /hpf None Seen   EPITHELIAL CELLS WET PREP /hpf None Seen     Results from last 7 days   Lab Units 10/24/23  2054   INFLUENZA A PCR  Negative   INFLUENZA B PCR  Negative   RSV PCR  Negative         ED Treatment:   Medication Administration from 10/24/2023 2012 to 10/25/2023 0040         Date/Time Order Dose Route Action     10/24/2023 2042 EDT ondansetron (FOR EMS ONLY) (ZOFRAN) 4 mg/2 mL injection 4 mg   Given to EMS     10/24/2023 2100 EDT labetalol (NORMODYNE) injection 10 mg 10 mg Intravenous Given     10/24/2023 2151 EDT aspirin rectal suppository 300 mg 300 mg Rectal Given     10/24/2023 2313 EDT multi-electrolyte (PLASMALYTE-A/ISOLYTE-S PH 7.4) IV solution 75 mL/hr Intravenous New Bag     10/24/2023 2315 EDT pantoprazole (PROTONIX) injection 40 mg 40 mg Intravenous Given          Past Medical History:   Diagnosis Date    Hypertension     TIA (transient ischemic attack)      Present on Admission:   Chronic renal disease, stage IV (HCC)   Essential hypertension   Left-sided weakness   Renal cell carcinoma of right kidney ([address])   Type 2 diabetes mellitus with stage 3a chronic kidney disease, without long-term current use of insulin (formerly [facility])      Admitting Diagnosis:     Hypertension [I10]  CVA (cerebral vascular accident) ([address]) [I63.9]  Stroke ([address]) [I63.9]  Stroke-like symptoms [R29.90]  Dysphagia [R13.10]    Scheduled Medications:    aspirin, 81 mg, Per NG Tube, Daily  atorvastatin, 40 mg, Per NG Tube, QPM  enoxaparin, 40 mg, Subcutaneous, Daily  insulin lispro, 1-6 Units, Subcutaneous, Q6H ERICA  pantoprazole, 40 mg, Intravenous, Q12H [facility]  ticagrelor, 90 mg, Per NG Tube, Q12H [facility]      Continuous IV Infusions:  multi-electrolyte, 75 mL/hr, Intravenous, Continuous      PRN Meds:  labetalol, 10 mg, Intravenous, Q6H PRN  ondansetron, 4 mg, Intravenous, Q6H PRN        IP CONSULT TO NEUROLOGY  IP CONSULT TO PHYSICAL MEDICINE REHAB  IP CONSULT TO CASE MANAGEMENT  IP CONSULT TO NUTRITION SERVICES    Network Utilization Review Department  ATTENTION: Please call with any questions or concerns to [phone] and carefully listen to the prompts so that you are directed to the right person. All voicemails are confidential.   For Discharge needs, contact Care Management DC Support Team at [phone] opt. 2  Send all requests for admission clinical reviews, approved or denied determinations and any other requests to dedicated fax number below belonging to the campus where the patient is receiving treatment.  List of dedicated fax numbers for the Facilities:  [place] DENIALS (Administrative/Medical Necessity) [phone]   DISCHARGE SUPPORT TEAM (NETWORK) [phone] [address] [facility] ([facility]s) [phone]   [address] [facility] [address] [facility] [phone] [address]  - [address] [phone]   [address] [place] [address] [phone] done

## 2023-10-26 ENCOUNTER — RX ONLY (RX ONLY)
Age: 88
End: 2023-10-26

## 2023-10-30 ENCOUNTER — OFFICE (OUTPATIENT)
Facility: LOCATION | Age: 88
Setting detail: OPHTHALMOLOGY
End: 2023-10-30
Payer: MEDICARE

## 2023-10-30 DIAGNOSIS — H01.002: ICD-10-CM

## 2023-10-30 DIAGNOSIS — G24.5: ICD-10-CM

## 2023-10-30 DIAGNOSIS — H35.373: ICD-10-CM

## 2023-10-30 DIAGNOSIS — H43.393: ICD-10-CM

## 2023-10-30 DIAGNOSIS — H40.1131: ICD-10-CM

## 2023-10-30 DIAGNOSIS — H01.004: ICD-10-CM

## 2023-10-30 DIAGNOSIS — H16.223: ICD-10-CM

## 2023-10-30 DIAGNOSIS — E11.3293: ICD-10-CM

## 2023-10-30 DIAGNOSIS — H01.001: ICD-10-CM

## 2023-10-30 DIAGNOSIS — H01.005: ICD-10-CM

## 2023-10-30 DIAGNOSIS — H02.834: ICD-10-CM

## 2023-10-30 DIAGNOSIS — H02.831: ICD-10-CM

## 2023-10-30 PROCEDURE — 92014 COMPRE OPH EXAM EST PT 1/>: CPT | Performed by: OPHTHALMOLOGY

## 2023-10-30 PROCEDURE — 92133 CPTRZD OPH DX IMG PST SGM ON: CPT | Performed by: OPHTHALMOLOGY

## 2023-10-30 ASSESSMENT — SUPERFICIAL PUNCTATE KERATITIS (SPK)
OS_SPK: 2+
OD_SPK: 2+

## 2023-10-30 ASSESSMENT — LID EXAM ASSESSMENTS
OS_DERMATOCHALASIS: 1+
OD_DERMATOCHALASIS: 1+
OS_BLEPHARITIS: LLL LUL T
OD_BLEPHARITIS: RLL RUL T

## 2023-10-30 ASSESSMENT — CONFRONTATIONAL VISUAL FIELD TEST (CVF)
OS_FINDINGS: FULL
OD_FINDINGS: FULL

## 2023-10-30 ASSESSMENT — TONOMETRY
OS_IOP_MMHG: 16
OD_IOP_MMHG: 16

## 2023-10-30 ASSESSMENT — CORNEAL DYSTROPHY - POSTERIOR
OD_POSTERIORDYSTROPHY: GUTTATA
OS_POSTERIORDYSTROPHY: GUTTATA

## 2023-10-31 PROBLEM — H02.831 DERMATOCHALASIS; RIGHT UPPER LID, LEFT UPPER LID: Status: ACTIVE | Noted: 2023-10-30

## 2023-10-31 PROBLEM — H02.834 DERMATOCHALASIS; RIGHT UPPER LID, LEFT UPPER LID: Status: ACTIVE | Noted: 2023-10-30

## 2023-10-31 PROBLEM — E11.3293 TYPE 2 DIABETES MELLITUS WITH MILD NONPROLIFERATIVE DIABETIC RETINOPATHY WITHOUT MACULAR EDEMA: Status: ACTIVE | Noted: 2023-10-30

## 2023-10-31 ASSESSMENT — VISUAL ACUITY
OD_BCVA: 20/40+1
OS_BCVA: 20/40-2

## 2023-10-31 ASSESSMENT — REFRACTION_CURRENTRX
OD_OVR_VA: 20/
OS_SPHERE: +2.50
OD_SPHERE: +2.50
OS_OVR_VA: 20/

## 2023-11-07 ENCOUNTER — APPOINTMENT (OUTPATIENT)
Dept: ENDOCRINOLOGY | Facility: CLINIC | Age: 88
End: 2023-11-07

## 2023-12-06 ENCOUNTER — APPOINTMENT (OUTPATIENT)
Dept: ENDOCRINOLOGY | Facility: CLINIC | Age: 88
End: 2023-12-06
Payer: MEDICARE

## 2023-12-06 VITALS
DIASTOLIC BLOOD PRESSURE: 63 MMHG | WEIGHT: 170 LBS | OXYGEN SATURATION: 96 % | HEIGHT: 67.5 IN | HEART RATE: 97 BPM | SYSTOLIC BLOOD PRESSURE: 118 MMHG | BODY MASS INDEX: 26.37 KG/M2

## 2023-12-06 DIAGNOSIS — R79.89 OTHER SPECIFIED ABNORMAL FINDINGS OF BLOOD CHEMISTRY: ICD-10-CM

## 2023-12-06 PROCEDURE — 99214 OFFICE O/P EST MOD 30 MIN: CPT

## 2023-12-15 NOTE — H&P PST ADULT - BLOOD AVOIDANCE/RESTRICTIONS, PROFILE
Pre-op complete. No family at bedside. Text notifications initiated. Pt declined need for safe. Belongings in postop.   none

## 2024-01-23 ENCOUNTER — RESULT CHARGE (OUTPATIENT)
Age: 89
End: 2024-01-23

## 2024-01-24 ENCOUNTER — APPOINTMENT (OUTPATIENT)
Dept: INTERNAL MEDICINE | Facility: CLINIC | Age: 89
End: 2024-01-24
Payer: MEDICARE

## 2024-01-24 VITALS
BODY MASS INDEX: 26.37 KG/M2 | DIASTOLIC BLOOD PRESSURE: 70 MMHG | HEART RATE: 87 BPM | SYSTOLIC BLOOD PRESSURE: 120 MMHG | OXYGEN SATURATION: 97 % | RESPIRATION RATE: 14 BRPM | HEIGHT: 67.5 IN | WEIGHT: 170 LBS

## 2024-01-24 DIAGNOSIS — H61.23 IMPACTED CERUMEN, BILATERAL: ICD-10-CM

## 2024-01-24 PROCEDURE — 36415 COLL VENOUS BLD VENIPUNCTURE: CPT

## 2024-01-24 PROCEDURE — 99214 OFFICE O/P EST MOD 30 MIN: CPT | Mod: 25

## 2024-01-24 PROCEDURE — 69209 REMOVE IMPACTED EAR WAX UNI: CPT | Mod: 50,59

## 2024-01-24 NOTE — PHYSICAL EXAM
[General Appearance - In No Acute Distress] : in no acute distress [] : no respiratory distress [Respiration, Rhythm And Depth] : normal respiratory rhythm and effort [Auscultation Breath Sounds / Voice Sounds] : lungs were clear to auscultation bilaterally [Heart Rate And Rhythm] : heart rate was normal and rhythm regular [Affect] : the affect was normal [Mood] : the mood was normal [FreeTextEntry1] : +PPM [de-identified] : + Wax impaction bilateral status post irrigation with excellent results, patient tolerated well

## 2024-01-24 NOTE — ASSESSMENT
[FreeTextEntry1] : Venipuncture done in our office, Labs sent out. Patient advised to continue present medications with diet/exercise and specialists followup. Patient will return to the office in 3-4months   Dr. Burns was present in office building while I examined patient  Last colonoscopy was 7/2022 CEA level 10/2023 Last 24-hour urine was 1/2023 MA 10/2023 specialists include 1. endocrinology- 2. ophthalmology-Dr. Brown-10/2023 3. podiatry-Dr. Stone 4. cardiology-Dr. king 5. pulmonary- 6. gastroenterology-Dr. Burnette 7. vascular-Dr. Haskins-changed to Dr. Mayur Walsh 8.neurology-Dr. Holbrook 9. Cardiothoracic surgeon-Dr. Mckeon-NO NEED FOR FOLLOW UP 10. - @ 11.Derm-?MD name 12.Juwan APARICIO-D, Delicia 13.PMR-Dr. Tyson 14.Colorectal-Dr. Carvalho/Dr. Hancock 15.ONC=Dr. Amaro 16. [place] [facility] as needed vaccines are UTD< +got covid vaccines  echo via cardio CT chest via pulm BD 10/2023=normal

## 2024-01-24 NOTE — HISTORY OF PRESENT ILLNESS
[FreeTextEntry1] : Patient presents for followup on A. fib/hyperlipidemia/type 2 diabetes. Patient is currently fasting for today's labs. Patient is currently on Eliquis for A. fib,on zetia for his hyperlipidemia and on prandin for his type 2 diabetes. -Neurology added betahistine and patient states his head feels clearer -Patient complaining of decreased hearing therefore assuming wax impaction

## 2024-01-25 ENCOUNTER — RX RENEWAL (OUTPATIENT)
Age: 89
End: 2024-01-25

## 2024-01-25 RX ORDER — FLUTICASONE PROPIONATE 50 UG/1
50 SPRAY, METERED NASAL DAILY
Qty: 48 | Refills: 1 | Status: ACTIVE | COMMUNITY
Start: 2021-09-16 | End: 1900-01-01

## 2024-01-26 LAB
ALBUMIN SERPL ELPH-MCNC: 4.2 G/DL
ALP BLD-CCNC: 65 U/L
ALT SERPL-CCNC: 24 U/L
ANION GAP SERPL CALC-SCNC: 11 MMOL/L
AST SERPL-CCNC: 26 U/L
BASOPHILS # BLD AUTO: 0.03 K/UL
BASOPHILS NFR BLD AUTO: 0.4 %
BILIRUB SERPL-MCNC: 1 MG/DL
BUN SERPL-MCNC: 23 MG/DL
CALCIUM SERPL-MCNC: 9.2 MG/DL
CHLORIDE SERPL-SCNC: 103 MMOL/L
CHOLEST SERPL-MCNC: 144 MG/DL
CO2 SERPL-SCNC: 28 MMOL/L
CREAT SERPL-MCNC: 1.39 MG/DL
EGFR: 48 ML/MIN/1.73M2
EOSINOPHIL # BLD AUTO: 0.17 K/UL
EOSINOPHIL NFR BLD AUTO: 2.5 %
ESTIMATED AVERAGE GLUCOSE: 154 MG/DL
GLUCOSE SERPL-MCNC: 109 MG/DL
HBA1C MFR BLD HPLC: 7 %
HCT VFR BLD CALC: 37.9 %
HDLC SERPL-MCNC: 71 MG/DL
HGB BLD-MCNC: 12.4 G/DL
IMM GRANULOCYTES NFR BLD AUTO: 0.3 %
LDLC SERPL CALC-MCNC: 58 MG/DL
LYMPHOCYTES # BLD AUTO: 1.67 K/UL
LYMPHOCYTES NFR BLD AUTO: 24.6 %
MAGNESIUM SERPL-MCNC: 2.1 MG/DL
MAN DIFF?: NORMAL
MCHC RBC-ENTMCNC: 32.7 GM/DL
MCHC RBC-ENTMCNC: 33.2 PG
MCV RBC AUTO: 101.3 FL
MONOCYTES # BLD AUTO: 0.76 K/UL
MONOCYTES NFR BLD AUTO: 11.2 %
NEUTROPHILS # BLD AUTO: 4.13 K/UL
NEUTROPHILS NFR BLD AUTO: 61 %
NONHDLC SERPL-MCNC: 73 MG/DL
PLATELET # BLD AUTO: 152 K/UL
POTASSIUM SERPL-SCNC: 4 MMOL/L
PROT SERPL-MCNC: 6.7 G/DL
RBC # BLD: 3.74 M/UL
RBC # FLD: 14 %
SODIUM SERPL-SCNC: 142 MMOL/L
TRIGL SERPL-MCNC: 76 MG/DL
WBC # FLD AUTO: 6.78 K/UL

## 2024-02-06 ENCOUNTER — APPOINTMENT (OUTPATIENT)
Dept: ENDOCRINOLOGY | Facility: CLINIC | Age: 89
End: 2024-02-06
Payer: MEDICARE

## 2024-02-06 PROCEDURE — 97803 MED NUTRITION INDIV SUBSEQ: CPT

## 2024-02-08 ENCOUNTER — NON-APPOINTMENT (OUTPATIENT)
Age: 89
End: 2024-02-08

## 2024-02-27 ENCOUNTER — OFFICE (OUTPATIENT)
Facility: LOCATION | Age: 89
Setting detail: OPHTHALMOLOGY
End: 2024-02-27
Payer: MEDICARE

## 2024-02-27 DIAGNOSIS — G24.5: ICD-10-CM

## 2024-02-27 DIAGNOSIS — H40.1131: ICD-10-CM

## 2024-02-27 DIAGNOSIS — H43.393: ICD-10-CM

## 2024-02-27 DIAGNOSIS — H02.831: ICD-10-CM

## 2024-02-27 DIAGNOSIS — H16.223: ICD-10-CM

## 2024-02-27 DIAGNOSIS — H02.403: ICD-10-CM

## 2024-02-27 DIAGNOSIS — H02.834: ICD-10-CM

## 2024-02-27 DIAGNOSIS — E11.3293: ICD-10-CM

## 2024-02-27 PROCEDURE — 92250 FUNDUS PHOTOGRAPHY W/I&R: CPT | Performed by: OPHTHALMOLOGY

## 2024-02-27 PROCEDURE — 92014 COMPRE OPH EXAM EST PT 1/>: CPT | Performed by: OPHTHALMOLOGY

## 2024-02-27 ASSESSMENT — LID EXAM ASSESSMENTS
OD_BLEPHARITIS: RLL RUL T
OS_DERMATOCHALASIS: 1+
OS_BLEPHARITIS: LLL LUL T
OD_COMMENTS: PTOSIS UL COMMENTS
OD_DERMATOCHALASIS: 1+

## 2024-02-27 ASSESSMENT — CONFRONTATIONAL VISUAL FIELD TEST (CVF)
OD_FINDINGS: FULL
OS_FINDINGS: FULL

## 2024-02-27 ASSESSMENT — SUPERFICIAL PUNCTATE KERATITIS (SPK)
OS_SPK: 2+
OD_SPK: 2+

## 2024-02-27 ASSESSMENT — CORNEAL DYSTROPHY - POSTERIOR
OS_POSTERIORDYSTROPHY: GUTTATA
OD_POSTERIORDYSTROPHY: GUTTATA

## 2024-02-28 PROBLEM — H02.831 DERMATOCHALASIS; RIGHT UPPER LID, LEFT UPPER LID: Status: ACTIVE | Noted: 2024-02-27

## 2024-02-28 PROBLEM — H02.834 DERMATOCHALASIS; RIGHT UPPER LID, LEFT UPPER LID: Status: ACTIVE | Noted: 2024-02-27

## 2024-02-28 ASSESSMENT — LID POSITION - PTOSIS
OD_PTOSIS: 3+
OS_PTOSIS: 1+

## 2024-02-28 ASSESSMENT — REFRACTION_CURRENTRX
OS_SPHERE: +2.50
OS_OVR_VA: 20/
OD_OVR_VA: 20/
OD_SPHERE: +2.50

## 2024-02-29 ENCOUNTER — NON-APPOINTMENT (OUTPATIENT)
Age: 89
End: 2024-02-29

## 2024-03-09 ENCOUNTER — NON-APPOINTMENT (OUTPATIENT)
Age: 89
End: 2024-03-09

## 2024-03-11 DIAGNOSIS — R05.9 COUGH, UNSPECIFIED: ICD-10-CM

## 2024-03-14 NOTE — DISCHARGE NOTE ADULT - REASON FOR ADMISSION
[facility]OUN[facility]  URGENT CARE OFFICE VISIT    SUBJECTIVE:  Vira Alberts is a 67 year old female who presented requesting evaluation for cough, congestion, headache, fever to 101, chills that began Saturday. Reports coughing up chunky yellow phlegm and blowing chunky yellow nasal secretions. Taking tylenol. Vira also has irregular pulse as noted by the RN who roomed her. 12 lead obtained and she is in Atrial fib with RVR. Denies sob, nausea, chest pain. For past several months has felt she fatigues more easily.     REVIEW OF SYSTEMS:    A review of systems was performed and findings relevant to these symptoms are included in the HPI.     PAST HISTORIES:    ALLERGIES:   Allergen Reactions    Sulfa Antibiotics PRURITUS       MEDICATIONS:  Outpatient Medications Marked as Taking for the 3/14/24 encounter (Walk In) with Clara Bennett APNP   Medication Sig Dispense Refill    rosuvastatin (Crestor) 40 MG tablet Take 1 tablet by mouth daily (before breakfast). 30 tablet 12    clopidogrel (PLAVIX) 75 MG tablet Take 1 tablet by mouth daily. 90 tablet 3    sertraline (ZOLOFT) 50 MG tablet Take 1 tablet by mouth daily. 30 tablet 5    lisinopril (ZESTRIL) 40 MG tablet Take 1 tablet by mouth daily. 90 tablet 3    hydroCHLOROthiazide (HYDRODIURIL) 25 MG tablet Take 1 tablet by mouth daily. 90 tablet 3    amLODIPine (Norvasc) 10 MG tablet Take 1 tablet by mouth daily. 90 tablet 2    metoPROLOL tartrate (LOPRESSOR) 50 MG tablet Take 1 tablet by mouth every 12 hours. 180 tablet 3       Past Medical History:   Diagnosis Date    Anxiety     Arthritis     knee    Chronic pain     Colon polyp 09/11/2018    dr guzman  Tubular Adenoma and Hyperplastic Polyp  Reeat 3 years    Colon polyp 11/15/2021    dr guzman, hyperplastic polyp recall 5 years    Coronary artery disease     COVID-19 virus infection 01/04/2023    Depression     Essential (primary) hypertension     High cholesterol     Lumbar stenosis     Lumbar  angiogram stenosis with neurogenic claudication     Myocardial infarction (CMD)     S/P CABG x 3 2020    LIMA -> LAD, SVG -> OM, SVG -> Distal RCA; Right whole leg EVH, Left Thigh EVH    Spondylolisthesis at L4-L5 level     Wears glasses      Past Surgical History:   Procedure Laterality Date    Bunionectomy      right foot    Cabg, artery-vein, two  2020    LIMA -> LAD, SVG -> OM, SVG -> Distal RCA; Right whole leg EVH, Left Thigh EVH, per Dr. Klever Finnegan    Cardiac catherization  08/15/2020    multivessel disease-> CVOR consult for CABG     section, classic  08/30/1993    x 1    Colonoscopy w/ polypectomy  2018    polyps (tubular adenoma, hyperplastic)    dr guzamn    Colonoscopy w/ polypectomy  11/15/2021    dr guzman    Tonsillectomy  as a child    Total knee arthroplasty Left 2020    Tubal ligation       Social History     Tobacco Use    Smoking status: Former     Current packs/day: 0.00     Average packs/day: 0.8 packs/day for 47.2 years (35.4 ttl pk-yrs)     Types: Cigarettes     Start date: 1970     Quit date: 2017     Years since quittin.3    Smokeless tobacco: Never   Vaping Use    Vaping Use: never used   Substance Use Topics    Alcohol use: Not Currently     Alcohol/week: 0.0 standard drinks of alcohol     Comment: social - special occasion; at those times 3-4 drinks    Drug use: No     Social History     Tobacco Use   Smoking Status Former    Current packs/day: 0.00    Average packs/day: 0.8 packs/day for 47.2 years (35.4 ttl pk-yrs)    Types: Cigarettes    Start date: 1970    Quit date: 2017    Years since quittin.3   Smokeless Tobacco Never     Social History     Substance and Sexual Activity   Alcohol Use Not Currently    Alcohol/week: 0.0 standard drinks of alcohol    Comment: social - special occasion; at those times 3-4 drinks     Family History   Problem Relation Age of Onset    Stroke Mother     High blood pressure Mother     Hypertension Mother      Diabetes Father     Dementia/Alzheimers Father     Heart disease Father     COPD Brother     Dementia/Alzheimers Paternal Grandfather      I have reviewed the past medical history, family history, social history, medications and allergies listed in the medical record as obtained by my nursing staff and support staff and agree with their documentation    OBJECTIVE:  PHYSICAL EXAM:    Visit Vitals  BP (!) 120/95 (BP Location: RUE - Right upper extremity, Patient Position: Sitting, Cuff Size: Regular)   Pulse (!) 102   Temp 99.1 °F (37.3 °C) (Oral)   Resp 18   Ht 5' 4\" (1.626 m)   Wt 84.9 kg (187 lb 2.7 oz)   SpO2 97%   BMI 32.13 kg/m²      Vital signs reviewed.  Nursing note reviewed.    CONSTITUTIONAL: Well-hydrated, well-nourished female who appears to be in no acute distress. Awake, alert and cooperative.  HEENT: Normocephalic, atraumatic. Eyes: PERRLA. External ears without deformity, auditory canals intact.TMs are clear bilaterally, landmarks intact.  Hearing is grossly intact. Nasal mucosa is moist, there is no deformity. Turbinates red and swollen. Oral mucosa moist and intact without lesions. Dentition is intact. Oropharynx mildly reddened, uvula is midline.  NECK: Supple with full range of motion. There is no tenderness noted.No lymphadenopathy noted.   LUNGS: Non labored, symmetric lung expansion. Posterior lung fields: fine inspiratory rales in left base. No wheezing, rhonchi.   CARDIOVASCULAR: Irregular, rapid rate and rhythm with normal S1 and S2. There are no murmurs, rub or gallop auscultated.   SKIN: Warm, dry, intact without rash or lesion.  NEUROLOGIC: Alert and oriented x3. Speech is normal. Equal strength bilaterally without focal deficit or weakness.  PSYCHIATRIC:  Speech and behavior appropriate. Normal mood and affect.    DIAGNOSTICS:  12 lead:          ASSESSMENT/PLAN:  1. Community acquired pneumonia of right lower lobe of lung    Has rales in left base, chunky yellow phlegm. Will treat  with Amoxicillin and Doxycycline. Recommended plain Mucinex, rest, push fluids.   2. Atrial Fib with RVR: verified by 12 lead. Patient has history of paroxysmal AF after CABG but converted to NSR spontaneously. Is on Plavix. Discussed with patient that this is a serious change in her heart rhythm and she needs to be seen in ER for further evaluation. She agreed to go and wanted to go to [facility] where she receives her heart care. I recommended she get someone to drive her there and she did phone a friend. Transfer papers completed and signed. She was directed to go straight to the ER with her friend. Case discussed with Dr. Reyes.       The patient indicated understanding of the diagnosis and agreed with the plan of care.      BETSY Tijerina

## 2024-03-23 ENCOUNTER — NON-APPOINTMENT (OUTPATIENT)
Age: 89
End: 2024-03-23

## 2024-03-25 ENCOUNTER — NON-APPOINTMENT (OUTPATIENT)
Age: 89
End: 2024-03-25

## 2024-04-03 ENCOUNTER — APPOINTMENT (OUTPATIENT)
Dept: ENDOCRINOLOGY | Facility: CLINIC | Age: 89
End: 2024-04-03
Payer: MEDICARE

## 2024-04-03 VITALS
TEMPERATURE: 98 F | SYSTOLIC BLOOD PRESSURE: 100 MMHG | WEIGHT: 179 LBS | OXYGEN SATURATION: 98 % | DIASTOLIC BLOOD PRESSURE: 60 MMHG | BODY MASS INDEX: 27.77 KG/M2 | HEART RATE: 80 BPM | RESPIRATION RATE: 14 BRPM | HEIGHT: 67.5 IN

## 2024-04-03 DIAGNOSIS — Z79.84 LONG TERM (CURRENT) USE OF ORAL HYPOGLYCEMIC DRUGS: ICD-10-CM

## 2024-04-03 PROCEDURE — G2211 COMPLEX E/M VISIT ADD ON: CPT

## 2024-04-03 PROCEDURE — 99214 OFFICE O/P EST MOD 30 MIN: CPT

## 2024-04-03 RX ORDER — HYDROCODONE BITARTRATE AND HOMATROPINE METHYLBROMIDE 1.5; 5 MG/5ML; MG/5ML
5-1.5 SOLUTION ORAL
Qty: 120 | Refills: 0 | Status: DISCONTINUED | COMMUNITY
Start: 2022-05-09 | End: 2024-04-03

## 2024-04-03 RX ORDER — BENZONATATE 200 MG/1
200 CAPSULE ORAL 3 TIMES DAILY
Qty: 21 | Refills: 0 | Status: DISCONTINUED | COMMUNITY
Start: 2024-03-11 | End: 2024-04-03

## 2024-04-24 NOTE — PHYSICAL EXAM
[Alert] : alert [Well Nourished] : well nourished [No Acute Distress] : no acute distress [Well Developed] : well developed [Normal Sclera/Conjunctiva] : normal sclera/conjunctiva [EOMI] : extra ocular movement intact [No Proptosis] : no proptosis [Normal Oropharynx] : the oropharynx was normal [Thyroid Not Enlarged] : the thyroid was not enlarged [No Thyroid Nodules] : no palpable thyroid nodules [No Respiratory Distress] : no respiratory distress [No Accessory Muscle Use] : no accessory muscle use [Clear to Auscultation] : lungs were clear to auscultation bilaterally [Normal S1, S2] : normal S1 and S2 [Normal Rate] : heart rate was normal [Regular Rhythm] : with a regular rhythm [No Edema] : no peripheral edema [Pedal Pulses Normal] : the pedal pulses are present [Not Tender] : non-tender [Normal Bowel Sounds] : normal bowel sounds [Not Distended] : not distended [Soft] : abdomen soft [Normal Anterior Cervical Nodes] : no anterior cervical lymphadenopathy [Normal Gait] : normal gait [No Rash] : no rash [No Tremors] : no tremors [Oriented x3] : oriented to person, place, and time [Acanthosis Nigricans] : no acanthosis nigricans

## 2024-04-24 NOTE — PATIENT PROFILE ADULT. - NS TRANSFER DISPOSITION PATIENT BELONGINGS
[facility] EMERGENCY DEPARTMENT  eMERGENCY dEPARTMENT eNCOUnter      Pt Name: Zia Garrett  MRN: [id number]  Birthdate 1937  Date of evaluation: 4/24/2024  Provider: Javon Echevarria MD    CHIEF COMPLAINT       Chief Complaint   Patient presents with    Emesis     Pt with reported sudden onset of projectile vomiting approx 45 min PTA     HISTORY OF PRESENT ILLNESS   (Location/Symptom, Timing/Onset, Context/Setting, Quality, Duration, Modifying Factors, Severity)  Note limiting factors.     History obtained from: EMS    Zia Garrett is a 86 y.o. male with hx of dementia and recurrent UTIs who lives in a skilled nursing facility who presents for sudden onset of projectile vomiting approximately 45 minutes prior to arrival.  Patient is oriented to person but unable to provide significant collateral history.  Denies pain.      HPI    Nursing Notes were reviewed.    REVIEW OFSYSTEMS    (2-9 systems for level 4, 10 or more for level 5)     Review of Systems   Gastrointestinal:  Positive for nausea and vomiting.       Except as noted above the remainder of the review of systems was reviewed and negative.       PAST MEDICAL HISTORY     Past Medical History:   Diagnosis Date    AMS (altered mental status)     Atherosclerotic heart disease     CAD (coronary artery disease)     Cancer (HCC)     skin    Cardiomyopathy (HCC)     CHF (congestive heart failure) (HCC)     Chronic pancreatitis (HCC)     COPD (chronic obstructive pulmonary disease) (HCC)     COVID-19     Dementia (HCC)     Depression     Duodenal ulcer without hemorrhage or perforation     Falls frequently     GERD without esophagitis     Hernia     Hyperlipidemia     Hypertension     Hypokalemia     Lack of coordination     falls    Melanoma (HCC)     MI (myocardial infarction) (HCC)     Muscle weakness (generalized)     Protein calorie malnutrition (HCC)     Pulmonary fibrosis (HCC)     Reflux     Sepsis (HCC)     Spinal stenosis     Thrombocytopenia (HCC)   with patient

## 2024-04-24 NOTE — ASSESSMENT
[FreeTextEntry1] : Pt 89 yo with h/o longstanding DM2, CAD s/p CABG, PCIs and hTN, HLD and CHF with PPM /AICD. AFib recently.  DM2 : A1c around 7 -7.5  is is a reasonable target for his age and comorbidities.  cont repaglinide 1 mg TID w each meal. No hypoG with this although cautious with prandin in CKD and his age.  check Bgs 2 x day discussed diet and exercise. Encouarged to get some walking done 2 x week  diabetic nephropathy stable CKD 3.  Bp  at target. cont MVI low B12 :  cont mVI  diabetic feet : h/o callus in the past .he would benefit from diabetic shoes   HLD  / CHF : cont zetia. check lipids today    DXA normal BMD.  cont mVI Vitamin d defic: check levels.

## 2024-05-21 ENCOUNTER — APPOINTMENT (OUTPATIENT)
Dept: INTERNAL MEDICINE | Facility: CLINIC | Age: 89
End: 2024-05-21

## 2024-05-21 ENCOUNTER — APPOINTMENT (OUTPATIENT)
Dept: INTERNAL MEDICINE | Facility: CLINIC | Age: 89
End: 2024-05-21
Payer: MEDICARE

## 2024-05-21 VITALS
RESPIRATION RATE: 14 BRPM | HEART RATE: 65 BPM | SYSTOLIC BLOOD PRESSURE: 120 MMHG | WEIGHT: 172 LBS | DIASTOLIC BLOOD PRESSURE: 70 MMHG | OXYGEN SATURATION: 96 % | HEIGHT: 67.5 IN | BODY MASS INDEX: 26.68 KG/M2

## 2024-05-21 DIAGNOSIS — R53.81 OTHER MALAISE: ICD-10-CM

## 2024-05-21 DIAGNOSIS — E11.9 TYPE 2 DIABETES MELLITUS W/OUT COMPLICATIONS: ICD-10-CM

## 2024-05-21 DIAGNOSIS — I25.10 ATHEROSCLEROTIC HEART DISEASE OF NATIVE CORONARY ARTERY W/OUT ANGINA PECTORIS: ICD-10-CM

## 2024-05-21 DIAGNOSIS — E78.5 HYPERLIPIDEMIA, UNSPECIFIED: ICD-10-CM

## 2024-05-21 DIAGNOSIS — R60.0 LOCALIZED EDEMA: ICD-10-CM

## 2024-05-21 DIAGNOSIS — Z79.899 OTHER LONG TERM (CURRENT) DRUG THERAPY: ICD-10-CM

## 2024-05-21 DIAGNOSIS — R53.83 OTHER MALAISE: ICD-10-CM

## 2024-05-21 DIAGNOSIS — K59.09 OTHER CONSTIPATION: ICD-10-CM

## 2024-05-21 DIAGNOSIS — I10 ESSENTIAL (PRIMARY) HYPERTENSION: ICD-10-CM

## 2024-05-21 PROCEDURE — 99214 OFFICE O/P EST MOD 30 MIN: CPT

## 2024-05-21 PROCEDURE — G2211 COMPLEX E/M VISIT ADD ON: CPT

## 2024-05-21 PROCEDURE — 36415 COLL VENOUS BLD VENIPUNCTURE: CPT

## 2024-05-22 LAB
ALBUMIN SERPL ELPH-MCNC: 4.2 G/DL
ALP BLD-CCNC: 79 U/L
ALT SERPL-CCNC: 22 U/L
ANION GAP SERPL CALC-SCNC: 12 MMOL/L
AST SERPL-CCNC: 28 U/L
BASOPHILS # BLD AUTO: 0.02 K/UL
BASOPHILS NFR BLD AUTO: 0.3 %
BILIRUB SERPL-MCNC: 0.7 MG/DL
BUN SERPL-MCNC: 29 MG/DL
CALCIUM SERPL-MCNC: 9.1 MG/DL
CHLORIDE SERPL-SCNC: 101 MMOL/L
CHOLEST SERPL-MCNC: 139 MG/DL
CO2 SERPL-SCNC: 28 MMOL/L
CREAT SERPL-MCNC: 1.47 MG/DL
EGFR: 45 ML/MIN/1.73M2
EOSINOPHIL # BLD AUTO: 0.1 K/UL
EOSINOPHIL NFR BLD AUTO: 1.3 %
ESTIMATED AVERAGE GLUCOSE: 157 MG/DL
GLUCOSE SERPL-MCNC: 110 MG/DL
HBA1C MFR BLD HPLC: 7.1 %
HCT VFR BLD CALC: 38.9 %
HDLC SERPL-MCNC: 70 MG/DL
HGB BLD-MCNC: 12.5 G/DL
IMM GRANULOCYTES NFR BLD AUTO: 0.3 %
LDLC SERPL CALC-MCNC: 53 MG/DL
LYMPHOCYTES # BLD AUTO: 1.26 K/UL
LYMPHOCYTES NFR BLD AUTO: 16.8 %
MAGNESIUM SERPL-MCNC: 2.3 MG/DL
MAN DIFF?: NORMAL
MCHC RBC-ENTMCNC: 32.1 GM/DL
MCHC RBC-ENTMCNC: 33.7 PG
MCV RBC AUTO: 104.9 FL
MONOCYTES # BLD AUTO: 0.72 K/UL
MONOCYTES NFR BLD AUTO: 9.6 %
NEUTROPHILS # BLD AUTO: 5.4 K/UL
NEUTROPHILS NFR BLD AUTO: 71.7 %
NONHDLC SERPL-MCNC: 70 MG/DL
NT-PROBNP SERPL-MCNC: 970 PG/ML
PLATELET # BLD AUTO: 175 K/UL
POTASSIUM SERPL-SCNC: 4.3 MMOL/L
PROT SERPL-MCNC: 6.7 G/DL
RBC # BLD: 3.71 M/UL
RBC # FLD: 14.6 %
SODIUM SERPL-SCNC: 141 MMOL/L
TRIGL SERPL-MCNC: 86 MG/DL
WBC # FLD AUTO: 7.52 K/UL

## 2024-05-22 NOTE — HISTORY OF PRESENT ILLNESS
[FreeTextEntry1] : Patient presents for followup on A. fib/hyperlipidemia/type 2 diabetes. Patient is currently fasting for today's labs. Patient is currently on Eliquis for A. fib,on zetia for his hyperlipidemia and on prandin for his type 2 diabetes. -Patient states he fell 2 times, sustained no significant injury -Patient states he has chronic edema, cardiology told him it will likely be chronic for him but they do adjust his Lasix periodically.  Patient is currently taking 40 mg/day -Patient states he is always tired -Patient continues with chronic constipation for the past year

## 2024-05-22 NOTE — ASSESSMENT
[FreeTextEntry1] : BNP to be checked, cardiology follow-up as scheduled.  Consider vascular evaluation if desires if BNP is normal.  Discussed stool softeners versus MiraLAX for chronic constipation.  Venipuncture done in our office, Labs sent out. Patient advised to continue present medications with diet/exercise and specialists followup. Patient will return to the office in oct for CP  Dr. Burns was present in office building while I examined patient  Last colonoscopy was 7/2022 CEA level 10/2023 Last 24-hour urine was 1/2023 MA 10/2023 specialists include 1. endocrinology- 2. ophthalmology-Dr. Brown-10/2023 3. podiatry-Dr. Stone 4. cardiology-Dr. king 5. pulmonary- 6. gastroenterology-Dr. Burnette 7. vascular-Dr. Haskins-changed to Dr. Mayur Walsh 8.neurology-Dr. Holbrook 9. Cardiothoracic surgeon-Dr. Mckeon-NO NEED FOR FOLLOW UP 10. - @ 11.Derm-?MD name 12.Juwan APARICIO-D, Delicia 13.PMR-Dr. Tyson 14.Colorectal-Dr. Carvalho/Dr. Hancock 15.ONC=Dr. Amaro 16. [facility] as needed vaccines are UTD< +got covid vaccines echo via cardio CT chest via pulm BD 10/2023=normal.

## 2024-05-22 NOTE — PHYSICAL EXAM
[General Appearance - In No Acute Distress] : in no acute distress [] : no respiratory distress [Respiration, Rhythm And Depth] : normal respiratory rhythm and effort [Auscultation Breath Sounds / Voice Sounds] : lungs were clear to auscultation bilaterally [Heart Rate And Rhythm] : heart rate was normal and rhythm regular [Affect] : the affect was normal [Mood] : the mood was normal [FreeTextEntry1] : +PPM [de-identified] : +1-+2 edema B/L=chronic

## 2024-05-23 LAB — TSH SERPL-ACNC: 2.53 UIU/ML

## 2024-06-06 RX ORDER — MUPIROCIN 20 MG/G
2 OINTMENT TOPICAL TWICE DAILY
Qty: 1 | Refills: 0 | Status: ACTIVE | COMMUNITY
Start: 2024-01-24

## 2024-06-06 RX ORDER — ALPRAZOLAM 0.25 MG/1
0.25 TABLET ORAL
Qty: 30 | Refills: 0 | Status: ACTIVE | COMMUNITY
Start: 2022-01-06 | End: 1900-01-01

## 2024-06-13 ENCOUNTER — NON-APPOINTMENT (OUTPATIENT)
Age: 89
End: 2024-06-13

## 2024-06-16 ENCOUNTER — NON-APPOINTMENT (OUTPATIENT)
Age: 89
End: 2024-06-16

## 2024-07-03 ENCOUNTER — APPOINTMENT (OUTPATIENT)
Dept: ENDOCRINOLOGY | Facility: CLINIC | Age: 89
End: 2024-07-03
Payer: MEDICARE

## 2024-07-03 VITALS
WEIGHT: 174 LBS | DIASTOLIC BLOOD PRESSURE: 60 MMHG | HEART RATE: 82 BPM | OXYGEN SATURATION: 96 % | BODY MASS INDEX: 26.99 KG/M2 | HEIGHT: 67.5 IN | RESPIRATION RATE: 16 BRPM | SYSTOLIC BLOOD PRESSURE: 108 MMHG

## 2024-07-03 DIAGNOSIS — I10 ESSENTIAL (PRIMARY) HYPERTENSION: ICD-10-CM

## 2024-07-03 DIAGNOSIS — E55.9 VITAMIN D DEFICIENCY, UNSPECIFIED: ICD-10-CM

## 2024-07-03 DIAGNOSIS — E78.5 HYPERLIPIDEMIA, UNSPECIFIED: ICD-10-CM

## 2024-07-03 DIAGNOSIS — E11.9 TYPE 2 DIABETES MELLITUS W/OUT COMPLICATIONS: ICD-10-CM

## 2024-07-03 DIAGNOSIS — Z79.84 LONG TERM (CURRENT) USE OF ORAL HYPOGLYCEMIC DRUGS: ICD-10-CM

## 2024-07-03 PROCEDURE — G2211 COMPLEX E/M VISIT ADD ON: CPT

## 2024-07-03 PROCEDURE — 99214 OFFICE O/P EST MOD 30 MIN: CPT

## 2024-08-04 ENCOUNTER — NON-APPOINTMENT (OUTPATIENT)
Age: 89
End: 2024-08-04

## 2024-08-05 ENCOUNTER — NON-APPOINTMENT (OUTPATIENT)
Age: 89
End: 2024-08-05

## 2024-08-06 NOTE — H&P PST ADULT - PULMONARY EMBOLUS
----- Message from Tonja Olea NP sent at 8/6/2024  7:16 AM CDT -----  Labs show worsening A1C pt is on maximum oral meds for his diabetes   I do believe trulicity was costly for pt please confirm ? May need to consider starting insulin ? Can he look into jardiance ?     no

## 2024-08-07 ENCOUNTER — NON-APPOINTMENT (OUTPATIENT)
Age: 89
End: 2024-08-07

## 2024-08-08 ENCOUNTER — NON-APPOINTMENT (OUTPATIENT)
Age: 89
End: 2024-08-08

## 2024-08-14 ENCOUNTER — APPOINTMENT (OUTPATIENT)
Dept: RADIOLOGY | Facility: CLINIC | Age: 89
End: 2024-08-14
Payer: MEDICARE

## 2024-08-14 ENCOUNTER — APPOINTMENT (OUTPATIENT)
Dept: INTERNAL MEDICINE | Facility: CLINIC | Age: 89
End: 2024-08-14
Payer: MEDICARE

## 2024-08-14 VITALS
BODY MASS INDEX: 24.8 KG/M2 | DIASTOLIC BLOOD PRESSURE: 72 MMHG | OXYGEN SATURATION: 94 % | RESPIRATION RATE: 14 BRPM | HEART RATE: 84 BPM | SYSTOLIC BLOOD PRESSURE: 130 MMHG | WEIGHT: 158 LBS | HEIGHT: 67 IN

## 2024-08-14 DIAGNOSIS — I25.10 ATHEROSCLEROTIC HEART DISEASE OF NATIVE CORONARY ARTERY W/OUT ANGINA PECTORIS: ICD-10-CM

## 2024-08-14 DIAGNOSIS — J44.9 CHRONIC OBSTRUCTIVE PULMONARY DISEASE, UNSPECIFIED: ICD-10-CM

## 2024-08-14 DIAGNOSIS — R53.1 WEAKNESS: ICD-10-CM

## 2024-08-14 DIAGNOSIS — J20.9 ACUTE BRONCHITIS, UNSPECIFIED: ICD-10-CM

## 2024-08-14 DIAGNOSIS — I10 ESSENTIAL (PRIMARY) HYPERTENSION: ICD-10-CM

## 2024-08-14 DIAGNOSIS — Z79.899 OTHER LONG TERM (CURRENT) DRUG THERAPY: ICD-10-CM

## 2024-08-14 DIAGNOSIS — R05.9 COUGH, UNSPECIFIED: ICD-10-CM

## 2024-08-14 PROCEDURE — 99214 OFFICE O/P EST MOD 30 MIN: CPT

## 2024-08-14 PROCEDURE — 36415 COLL VENOUS BLD VENIPUNCTURE: CPT

## 2024-08-14 PROCEDURE — 71046 X-RAY EXAM CHEST 2 VIEWS: CPT | Mod: 26

## 2024-08-14 PROCEDURE — G2211 COMPLEX E/M VISIT ADD ON: CPT

## 2024-08-14 RX ORDER — METHYLPREDNISOLONE 4 MG/1
4 TABLET ORAL
Qty: 1 | Refills: 0 | Status: ACTIVE | COMMUNITY
Start: 2024-08-14 | End: 1900-01-01

## 2024-08-14 RX ORDER — DOXYCYCLINE 100 MG/1
100 CAPSULE ORAL TWICE DAILY
Qty: 14 | Refills: 0 | Status: ACTIVE | COMMUNITY
Start: 2024-08-14 | End: 1900-01-01

## 2024-08-15 LAB
ALBUMIN SERPL ELPH-MCNC: 4.1 G/DL
ALP BLD-CCNC: 72 U/L
ALT SERPL-CCNC: 31 U/L
ANION GAP SERPL CALC-SCNC: 13 MMOL/L
AST SERPL-CCNC: 37 U/L
BASOPHILS # BLD AUTO: 0.02 K/UL
BASOPHILS NFR BLD AUTO: 0.2 %
BILIRUB SERPL-MCNC: 1 MG/DL
BUN SERPL-MCNC: 35 MG/DL
CALCIUM SERPL-MCNC: 9.3 MG/DL
CHLORIDE SERPL-SCNC: 97 MMOL/L
CO2 SERPL-SCNC: 29 MMOL/L
CREAT SERPL-MCNC: 1.38 MG/DL
EGFR: 48 ML/MIN/1.73M2
EOSINOPHIL # BLD AUTO: 0.06 K/UL
EOSINOPHIL NFR BLD AUTO: 0.7 %
GLUCOSE SERPL-MCNC: 203 MG/DL
HCT VFR BLD CALC: 44.1 %
HGB BLD-MCNC: 13.8 G/DL
IMM GRANULOCYTES NFR BLD AUTO: 0.4 %
LYMPHOCYTES # BLD AUTO: 1.88 K/UL
LYMPHOCYTES NFR BLD AUTO: 21 %
MAN DIFF?: NORMAL
MCHC RBC-ENTMCNC: 31.3 GM/DL
MCHC RBC-ENTMCNC: 32.5 PG
MCV RBC AUTO: 104 FL
MONOCYTES # BLD AUTO: 0.92 K/UL
MONOCYTES NFR BLD AUTO: 10.3 %
NEUTROPHILS # BLD AUTO: 6.03 K/UL
NEUTROPHILS NFR BLD AUTO: 67.4 %
PLATELET # BLD AUTO: 194 K/UL
POTASSIUM SERPL-SCNC: 4 MMOL/L
PROT SERPL-MCNC: 6.9 G/DL
RBC # BLD: 4.24 M/UL
RBC # FLD: 14.9 %
SODIUM SERPL-SCNC: 139 MMOL/L
WBC # FLD AUTO: 8.95 K/UL

## 2024-08-19 NOTE — HISTORY OF PRESENT ILLNESS
[Spouse] : spouse [de-identified] : The patient presents for an acute visit secondary to continuing not feeling well. He was seen at urgent care on 8/5 with cough and shortness of breath and felt to possibly have pneumonia therefore sent to the ER where he was seen on the same day with patient evaluated with chest x-ray not showing pneumonia but felt to have a viral illness therefore symptomatic treatment recommended.   He continued to not feel well therefore went back to urgent care on 8/8 with continued feeling ill and felt to have pneumonia and treated with Augmentin 875 twice a day which he continues on. In the ER blood work was done which was unremarkable with hemoglobin stable at 12.5 and creatinine stable at 1.46 with clear urine and BNP at 796 but not felt to be in heart failure. He continues to feel poorly with frequent cough and mucus with decreased appetite and feeling weak.  No fever.

## 2024-08-19 NOTE — HISTORY OF PRESENT ILLNESS
[Spouse] : spouse [de-identified] : The patient presents for an acute visit secondary to continuing not feeling well. He was seen at urgent care on 8/5 with cough and shortness of breath and felt to possibly have pneumonia therefore sent to the ER where he was seen on the same day with patient evaluated with chest x-ray not showing pneumonia but felt to have a viral illness therefore symptomatic treatment recommended.   He continued to not feel well therefore went back to urgent care on 8/8 with continued feeling ill and felt to have pneumonia and treated with Augmentin 875 twice a day which he continues on. In the ER blood work was done which was unremarkable with hemoglobin stable at 12.5 and creatinine stable at 1.46 with clear urine and BNP at 796 but not felt to be in heart failure. He continues to feel poorly with frequent cough and mucus with decreased appetite and feeling weak.  No fever.

## 2024-08-19 NOTE — PHYSICAL EXAM
[No Acute Distress] : no acute distress [Normal Sclera/Conjunctiva] : normal sclera/conjunctiva [No JVD] : no jugular venous distention [Normal Oropharynx] : the oropharynx was normal [No Respiratory Distress] : no respiratory distress  [No Accessory Muscle Use] : no accessory muscle use [Normal Rate] : normal rate  [Regular Rhythm] : with a regular rhythm [No Edema] : there was no peripheral edema [No Focal Deficits] : no focal deficits [Normal Affect] : the affect was normal [de-identified] : Ill-appearing [de-identified] : Scattered rhonchi

## 2024-08-19 NOTE — HISTORY OF PRESENT ILLNESS
[Spouse] : spouse [de-identified] : The patient presents for an acute visit secondary to continuing not feeling well. He was seen at urgent care on 8/5 with cough and shortness of breath and felt to possibly have pneumonia therefore sent to the ER where he was seen on the same day with patient evaluated with chest x-ray not showing pneumonia but felt to have a viral illness therefore symptomatic treatment recommended.   He continued to not feel well therefore went back to urgent care on 8/8 with continued feeling ill and felt to have pneumonia and treated with Augmentin 875 twice a day which he continues on. In the ER blood work was done which was unremarkable with hemoglobin stable at 12.5 and creatinine stable at 1.46 with clear urine and BNP at 796 but not felt to be in heart failure. He continues to feel poorly with frequent cough and mucus with decreased appetite and feeling weak.  No fever.

## 2024-08-19 NOTE — HISTORY OF PRESENT ILLNESS
[Spouse] : spouse [de-identified] : The patient presents for an acute visit secondary to continuing not feeling well. He was seen at urgent care on 8/5 with cough and shortness of breath and felt to possibly have pneumonia therefore sent to the ER where he was seen on the same day with patient evaluated with chest x-ray not showing pneumonia but felt to have a viral illness therefore symptomatic treatment recommended.   He continued to not feel well therefore went back to urgent care on 8/8 with continued feeling ill and felt to have pneumonia and treated with Augmentin 875 twice a day which he continues on. In the ER blood work was done which was unremarkable with hemoglobin stable at 12.5 and creatinine stable at 1.46 with clear urine and BNP at 796 but not felt to be in heart failure. He continues to feel poorly with frequent cough and mucus with decreased appetite and feeling weak.  No fever.

## 2024-08-19 NOTE — PHYSICAL EXAM
[No Acute Distress] : no acute distress [Normal Sclera/Conjunctiva] : normal sclera/conjunctiva [No JVD] : no jugular venous distention [Normal Oropharynx] : the oropharynx was normal [No Respiratory Distress] : no respiratory distress  [No Accessory Muscle Use] : no accessory muscle use [Normal Rate] : normal rate  [Regular Rhythm] : with a regular rhythm [No Edema] : there was no peripheral edema [No Focal Deficits] : no focal deficits [Normal Affect] : the affect was normal [de-identified] : Ill-appearing [de-identified] : Scattered rhonchi

## 2024-08-19 NOTE — PHYSICAL EXAM
[No Acute Distress] : no acute distress [Normal Sclera/Conjunctiva] : normal sclera/conjunctiva [Normal Oropharynx] : the oropharynx was normal [No JVD] : no jugular venous distention [No Respiratory Distress] : no respiratory distress  [No Accessory Muscle Use] : no accessory muscle use [Normal Rate] : normal rate  [Regular Rhythm] : with a regular rhythm [No Edema] : there was no peripheral edema [No Focal Deficits] : no focal deficits [Normal Affect] : the affect was normal [de-identified] : Ill-appearing [de-identified] : Scattered rhonchi

## 2024-08-19 NOTE — PHYSICAL EXAM
[No Acute Distress] : no acute distress [Normal Sclera/Conjunctiva] : normal sclera/conjunctiva [Normal Oropharynx] : the oropharynx was normal [No JVD] : no jugular venous distention [No Respiratory Distress] : no respiratory distress  [No Accessory Muscle Use] : no accessory muscle use [Normal Rate] : normal rate  [Regular Rhythm] : with a regular rhythm [No Edema] : there was no peripheral edema [No Focal Deficits] : no focal deficits [Normal Affect] : the affect was normal [de-identified] : Ill-appearing [de-identified] : Scattered rhonchi

## 2024-08-19 NOTE — ADDENDUM
[FreeTextEntry1] : CXR = NACPD CMP, CBC = WNL/stable  August 19, 2024: The patient and wife called to inform me that his cough is much better and he is overall feeling better.

## 2024-08-19 NOTE — ASSESSMENT
[FreeTextEntry1] : Patient continuing to feel ill with respiratory illness with chest x-ray previously showing no infiltrate therefore felt to have bronchitis on Augmentin without improvement therefore discontinue Augmentin and start doxycycline 100 mg twice a day for 7 days. Underlying history of COPD therefore also treat with a Medrol Dosepak with patient and wife made aware that his sugar will increase. Repeat chest x-ray ordered. Repeat CMP and CBC ordered. Told if any worsening then he must go to the ER.

## 2024-08-31 ENCOUNTER — NON-APPOINTMENT (OUTPATIENT)
Age: 89
End: 2024-08-31

## 2024-09-03 ENCOUNTER — APPOINTMENT (OUTPATIENT)
Dept: INTERNAL MEDICINE | Facility: CLINIC | Age: 89
End: 2024-09-03
Payer: MEDICARE

## 2024-09-03 VITALS
HEIGHT: 67 IN | HEART RATE: 85 BPM | OXYGEN SATURATION: 97 % | WEIGHT: 160 LBS | BODY MASS INDEX: 25.11 KG/M2 | DIASTOLIC BLOOD PRESSURE: 58 MMHG | SYSTOLIC BLOOD PRESSURE: 110 MMHG | RESPIRATION RATE: 15 BRPM

## 2024-09-03 DIAGNOSIS — K22.2 ESOPHAGEAL OBSTRUCTION: ICD-10-CM

## 2024-09-03 DIAGNOSIS — Z09 ENCOUNTER FOR FOLLOW-UP EXAMINATION AFTER COMPLETED TREATMENT FOR CONDITIONS OTHER THAN MALIGNANT NEOPLASM: ICD-10-CM

## 2024-09-03 DIAGNOSIS — R13.10 DYSPHAGIA, UNSPECIFIED: ICD-10-CM

## 2024-09-03 DIAGNOSIS — I25.10 ATHEROSCLEROTIC HEART DISEASE OF NATIVE CORONARY ARTERY W/OUT ANGINA PECTORIS: ICD-10-CM

## 2024-09-03 DIAGNOSIS — R21 RASH AND OTHER NONSPECIFIC SKIN ERUPTION: ICD-10-CM

## 2024-09-03 PROCEDURE — 99496 TRANSJ CARE MGMT HIGH F2F 7D: CPT

## 2024-09-03 RX ORDER — CLOTRIMAZOLE AND BETAMETHASONE DIPROPIONATE 10; .5 MG/G; MG/G
1-0.05 CREAM TOPICAL TWICE DAILY
Qty: 1 | Refills: 0 | Status: ACTIVE | COMMUNITY
Start: 2024-09-03 | End: 1900-01-01

## 2024-09-03 NOTE — PHYSICAL EXAM
[No Acute Distress] : no acute distress [No Respiratory Distress] : no respiratory distress  [No Accessory Muscle Use] : no accessory muscle use [Clear to Auscultation] : lungs were clear to auscultation bilaterally [Normal Rate] : normal rate  [Regular Rhythm] : with a regular rhythm [No Edema] : there was no peripheral edema [Soft] : abdomen soft [Non Tender] : non-tender [Normal Affect] : the affect was normal [de-identified] : Erythematous rash left groin [de-identified] : Frail

## 2024-09-03 NOTE — PHYSICAL EXAM
[No Acute Distress] : no acute distress [No Respiratory Distress] : no respiratory distress  [No Accessory Muscle Use] : no accessory muscle use [Clear to Auscultation] : lungs were clear to auscultation bilaterally [Normal Rate] : normal rate  [Regular Rhythm] : with a regular rhythm [No Edema] : there was no peripheral edema [Soft] : abdomen soft [Non Tender] : non-tender [Normal Affect] : the affect was normal [de-identified] : Erythematous rash left groin [de-identified] : Frail

## 2024-09-03 NOTE — ASSESSMENT
[FreeTextEntry1] : Status post hospitalization secondary to dysphagia with GI evaluation with endoscopy showing a Schatzki ring with patient doing better taking smaller bites and chewing his food better.  Follow-up with GI as recommended.  Patient also seen by neurology with outpatient follow-up scheduled and further testing per neurology.  Agree with no pyridostigmine for now until he sees neurology.  Right groin rash will be treated with Lotrisone cream.  Follow-up as scheduled and as needed.

## 2024-09-03 NOTE — HISTORY OF PRESENT ILLNESS
[Post-hospitalization from ___ Hospital] : Post-hospitalization from [unfilled] Hospital [Admitted on: ___] : The patient was admitted on [unfilled] [Discharged on ___] : discharged on [unfilled] [Discharge Summary] : discharge summary [Pertinent Labs] : pertinent labs [Radiology Findings] : radiology findings [Discharge Med List] : discharge medication list [Med Reconciliation] : medication reconciliation has been completed [FreeTextEntry2] : The patient and his wife present after being admitted to [facility] in 89-year-old patient with extensive medical history who was in the ER 3 weeks prior with dyspnea at rest and coughing with green sputum with workup felt to be viral and discharged on Decadron. He continues to feel not well as an outpatient and treated by myself with doxycycline and prednisone which appeared to help the patient improved but he had difficulty swallowing and now complains of dysphagia. He describes a tightness in the back of his throat when he tries to swallow solids or liquids. As a result he is not eating and lost weigh ED workup unrevealing. He was admitted with dysphagia with weight loss. Neurology consultation gotten with recommended outpatient EMG/nerve conduction study and recommended a trial of pyridostigmine 60 mg 1/2 tablet twice daily.. Speech pathology consulted swallow study showed no aspiration or penetration. GI consultation gotten with endoscopy done after Eliquis was held and restarted after endoscopy.  Endoscopy did show a Schatzki ring with possible need for dilation in the future. Chest x-ray clear. CT scan of the brain showed no acute changes. Patient symptomatically improved and was able to tolerate easy to chew foods. Stable for discharge on August 28, 2024 with outpatient PCP, neurology and GI follow-ups.  Since discharge the patient continues to feel weak but overall better where he is eating better with taking smaller bites and chewing his food very well.  With this he is getting more calories and has gained some weight.  Per instructions from neurology the patient was given a prescription for pyridostigmine with the patient states he tried and got side effects therefore is not taking.  He does complain of a rash in his left groin which she states he got in hospital.

## 2024-09-03 NOTE — HISTORY OF PRESENT ILLNESS
[Post-hospitalization from ___ Hospital] : Post-hospitalization from [unfilled] Hospital [Admitted on: ___] : The patient was admitted on [unfilled] [Discharged on ___] : discharged on [unfilled] [Discharge Summary] : discharge summary [Pertinent Labs] : pertinent labs [Radiology Findings] : radiology findings [Discharge Med List] : discharge medication list [Med Reconciliation] : medication reconciliation has been completed [FreeTextEntry2] : The patient and his wife present after being admitted to [facility] in 89-year-old patient with extensive medical history who was in the ER 3 weeks prior with dyspnea at rest and coughing with green sputum with workup felt to be viral and discharged on Decadron. He continues to feel not well as an outpatient and treated by myself with doxycycline and prednisone which appeared to help the patient improved but he had difficulty swallowing and now complains of dysphagia. He describes a tightness in the back of his throat when he tries to swallow solids or liquids. As a result he is not eating and lost weigh ED workup unrevealing. He was admitted with dysphagia with weight loss. Neurology consultation gotten with recommended outpatient EMG/nerve conduction study and recommended a trial of pyridostigmine 60 mg 1/2 tablet twice daily.. Speech pathology consulted swallow study showed no aspiration or penetration. GI consultation gotten with endoscopy done after Eliquis was held and restarted after endoscopy.  Endoscopy did show a Schatzki ring with possible need for dilation in the future. Chest x-ray clear. CT scan of the brain showed no acute changes. Patient symptomatically improved and was able to tolerate easy to chew foods. Stable for discharge on August 28, 2024 with outpatient PCP, neurology and GI follow-ups.  Since discharge the patient continues to feel weak but overall better where he is eating better with taking smaller bites and chewing his food very well.  With this he is getting more calories and has gained some weight.  Per instructions from neurology the patient was given a prescription for pyridostigmine with the patient states he tried and got side effects therefore is not taking.  He does complain of a rash in his left groin which she states he got in hospital.

## 2024-09-10 ENCOUNTER — OFFICE (OUTPATIENT)
Facility: LOCATION | Age: 89
Setting detail: OPHTHALMOLOGY
End: 2024-09-10
Payer: MEDICARE

## 2024-09-10 DIAGNOSIS — H02.403: ICD-10-CM

## 2024-09-10 DIAGNOSIS — H40.1131: ICD-10-CM

## 2024-09-10 DIAGNOSIS — E11.3293: ICD-10-CM

## 2024-09-10 DIAGNOSIS — H16.223: ICD-10-CM

## 2024-09-10 PROCEDURE — 92014 COMPRE OPH EXAM EST PT 1/>: CPT | Performed by: OPHTHALMOLOGY

## 2024-09-10 ASSESSMENT — LID POSITION - PTOSIS
OD_PTOSIS: 3+
OS_PTOSIS: 1+

## 2024-09-10 ASSESSMENT — CONFRONTATIONAL VISUAL FIELD TEST (CVF)
OD_FINDINGS: FULL
OS_FINDINGS: FULL

## 2024-09-10 ASSESSMENT — LID EXAM ASSESSMENTS
OS_DERMATOCHALASIS: 1+
OD_DERMATOCHALASIS: 1+
OD_COMMENTS: PTOSIS UL COMMENTS
OD_BLEPHARITIS: RLL RUL T
OS_BLEPHARITIS: LLL LUL T

## 2024-09-19 NOTE — PATIENT PROFILE ADULT. - BRADEN SCORE (IF 18 OR LESS ACTIVATE SKIN INJURY RISK INCREASED GUIDELINE), MLM
"Chief Complaint   Patient presents with    Consult     External hemorrhoids       Vitals:    09/19/24 1410   BP: 108/55   BP Location: Left arm   Patient Position: Sitting   Cuff Size: Adult Regular   Pulse: 65   SpO2: 100%   Weight: 168 lb 14.4 oz   Height: 5' 7.72\"       Body mass index is 25.89 kg/m .    Kassie Mishra, EMT  "
23

## 2024-09-28 ENCOUNTER — LABORATORY RESULT (OUTPATIENT)
Age: 89
End: 2024-09-28

## 2024-10-01 ENCOUNTER — NON-APPOINTMENT (OUTPATIENT)
Age: 89
End: 2024-10-01

## 2024-10-02 ENCOUNTER — APPOINTMENT (OUTPATIENT)
Dept: ENDOCRINOLOGY | Facility: CLINIC | Age: 89
End: 2024-10-02
Payer: MEDICARE

## 2024-10-02 VITALS
HEART RATE: 82 BPM | SYSTOLIC BLOOD PRESSURE: 106 MMHG | TEMPERATURE: 98 F | OXYGEN SATURATION: 96 % | HEIGHT: 67 IN | DIASTOLIC BLOOD PRESSURE: 50 MMHG | BODY MASS INDEX: 26.84 KG/M2 | RESPIRATION RATE: 16 BRPM | WEIGHT: 171 LBS

## 2024-10-02 DIAGNOSIS — I10 ESSENTIAL (PRIMARY) HYPERTENSION: ICD-10-CM

## 2024-10-02 DIAGNOSIS — E11.9 TYPE 2 DIABETES MELLITUS W/OUT COMPLICATIONS: ICD-10-CM

## 2024-10-02 DIAGNOSIS — Z79.84 LONG TERM (CURRENT) USE OF ORAL HYPOGLYCEMIC DRUGS: ICD-10-CM

## 2024-10-02 DIAGNOSIS — E78.5 HYPERLIPIDEMIA, UNSPECIFIED: ICD-10-CM

## 2024-10-02 PROCEDURE — G2211 COMPLEX E/M VISIT ADD ON: CPT

## 2024-10-02 PROCEDURE — 99214 OFFICE O/P EST MOD 30 MIN: CPT

## 2024-10-02 RX ORDER — SITAGLIPTIN 100 MG/1
100 TABLET, FILM COATED ORAL DAILY
Qty: 90 | Refills: 1 | Status: ACTIVE | COMMUNITY
Start: 2024-10-02 | End: 1900-01-01

## 2024-10-02 NOTE — ASSESSMENT
[Carbohydrate Consistent Diet] : carbohydrate consistent diet [Exercise/Effect on Glucose] : exercise/effect on glucose [Retinopathy Screening] : Patient was referred to ophthalmology for retinopathy screening [FreeTextEntry1] : Pt 89 yo with h/o longstanding DM2, CAD s/p CABG, PCIs and hTN, HLD and CHF with PPM /AICD. AFib. Admissions for CHF and URI.    DM2 : A1c 7 stable. Bgs good. Good target for his age.   stop  repaglinide 1 mg TID w for now due to risks of hypoG at his age.  no hypoG for now on his logbook , 70  check Bgs 2 x day since he has variable Bgs  discussed diet and exercise. Encouraged to get some walking done 2 x week  diabetic nephropathy stable CKD 3.  Bp  at target. low B12 :  cont mVI  diabetic feet : h/o callus in the past . Followup with podiatry.   eye exam utd   HLD  / CHF : cont zetia. Check lipids.   DXA normal BMD ( i suspect falsely high due to OA)  cont mVI Vitamin d defic: level  low normal . Start vit D  1000 u 2x week extra   All lab results reviewed independently and discussed with patient with extensive discussion.  All questions answered Laboratory tests ordered today Continue other current medications

## 2024-10-29 ENCOUNTER — LABORATORY RESULT (OUTPATIENT)
Age: 89
End: 2024-10-29

## 2024-10-29 ENCOUNTER — APPOINTMENT (OUTPATIENT)
Dept: INTERNAL MEDICINE | Facility: CLINIC | Age: 89
End: 2024-10-29
Payer: MEDICARE

## 2024-10-29 VITALS
BODY MASS INDEX: 26.22 KG/M2 | OXYGEN SATURATION: 93 % | RESPIRATION RATE: 14 BRPM | DIASTOLIC BLOOD PRESSURE: 60 MMHG | WEIGHT: 173 LBS | HEART RATE: 79 BPM | HEIGHT: 68 IN | SYSTOLIC BLOOD PRESSURE: 140 MMHG

## 2024-10-29 DIAGNOSIS — E11.9 TYPE 2 DIABETES MELLITUS W/OUT COMPLICATIONS: ICD-10-CM

## 2024-10-29 DIAGNOSIS — Z91.81 HISTORY OF FALLING: ICD-10-CM

## 2024-10-29 DIAGNOSIS — Z23 ENCOUNTER FOR IMMUNIZATION: ICD-10-CM

## 2024-10-29 DIAGNOSIS — C18.4 MALIGNANT NEOPLASM OF TRANSVERSE COLON: ICD-10-CM

## 2024-10-29 DIAGNOSIS — I48.0 PAROXYSMAL ATRIAL FIBRILLATION: ICD-10-CM

## 2024-10-29 DIAGNOSIS — Z79.899 OTHER LONG TERM (CURRENT) DRUG THERAPY: ICD-10-CM

## 2024-10-29 DIAGNOSIS — E55.9 VITAMIN D DEFICIENCY, UNSPECIFIED: ICD-10-CM

## 2024-10-29 DIAGNOSIS — E53.8 DEFICIENCY OF OTHER SPECIFIED B GROUP VITAMINS: ICD-10-CM

## 2024-10-29 DIAGNOSIS — J44.9 CHRONIC OBSTRUCTIVE PULMONARY DISEASE, UNSPECIFIED: ICD-10-CM

## 2024-10-29 DIAGNOSIS — N18.31 CHRONIC KIDNEY DISEASE, STAGE 3A: ICD-10-CM

## 2024-10-29 DIAGNOSIS — Z79.84 LONG TERM (CURRENT) USE OF ORAL HYPOGLYCEMIC DRUGS: ICD-10-CM

## 2024-10-29 DIAGNOSIS — Z98.61 ATHEROSCLEROTIC HEART DISEASE OF NATIVE CORONARY ARTERY W/OUT ANGINA PECTORIS: ICD-10-CM

## 2024-10-29 DIAGNOSIS — E78.5 HYPERLIPIDEMIA, UNSPECIFIED: ICD-10-CM

## 2024-10-29 DIAGNOSIS — R53.83 OTHER FATIGUE: ICD-10-CM

## 2024-10-29 DIAGNOSIS — I25.10 ATHEROSCLEROTIC HEART DISEASE OF NATIVE CORONARY ARTERY W/OUT ANGINA PECTORIS: ICD-10-CM

## 2024-10-29 DIAGNOSIS — I10 ESSENTIAL (PRIMARY) HYPERTENSION: ICD-10-CM

## 2024-10-29 DIAGNOSIS — Z00.00 ENCOUNTER FOR GENERAL ADULT MEDICAL EXAMINATION W/OUT ABNORMAL FINDINGS: ICD-10-CM

## 2024-10-29 PROCEDURE — G0439: CPT

## 2024-10-29 PROCEDURE — 90662 IIV NO PRSV INCREASED AG IM: CPT

## 2024-10-29 PROCEDURE — 36415 COLL VENOUS BLD VENIPUNCTURE: CPT

## 2024-10-29 PROCEDURE — G0008: CPT

## 2024-10-31 LAB
25(OH)D3 SERPL-MCNC: 38.4 NG/ML
ALBUMIN SERPL ELPH-MCNC: 4.2 G/DL
ALP BLD-CCNC: 73 U/L
ALT SERPL-CCNC: 23 U/L
ANION GAP SERPL CALC-SCNC: 15 MMOL/L
APPEARANCE: CLEAR
AST SERPL-CCNC: 25 U/L
BACTERIA: NEGATIVE /HPF
BASOPHILS # BLD AUTO: 0.02 K/UL
BASOPHILS NFR BLD AUTO: 0.2 %
BILIRUB SERPL-MCNC: 0.6 MG/DL
BILIRUBIN URINE: NEGATIVE
BLOOD URINE: NEGATIVE
BUN SERPL-MCNC: 28 MG/DL
CALCIUM SERPL-MCNC: 9.3 MG/DL
CAST: 1 /LPF
CEA SERPL-MCNC: 6.4 NG/ML
CHLORIDE SERPL-SCNC: 100 MMOL/L
CHOLEST SERPL-MCNC: 149 MG/DL
CO2 SERPL-SCNC: 26 MMOL/L
COLOR: YELLOW
CREAT SERPL-MCNC: 1.39 MG/DL
CREAT SPEC-SCNC: 52 MG/DL
EGFR: 48 ML/MIN/1.73M2
EOSINOPHIL # BLD AUTO: 0.11 K/UL
EOSINOPHIL NFR BLD AUTO: 1.4 %
EPITHELIAL CELLS: 0 /HPF
ESTIMATED AVERAGE GLUCOSE: 154 MG/DL
GLUCOSE QUALITATIVE U: NEGATIVE MG/DL
GLUCOSE SERPL-MCNC: 134 MG/DL
HBA1C MFR BLD HPLC: 7 %
HCT VFR BLD CALC: 37.4 %
HDLC SERPL-MCNC: 83 MG/DL
HGB BLD-MCNC: 11.6 G/DL
IMM GRANULOCYTES NFR BLD AUTO: 0.4 %
KETONES URINE: NEGATIVE MG/DL
LDLC SERPL CALC-MCNC: 54 MG/DL
LEUKOCYTE ESTERASE URINE: NEGATIVE
LYMPHOCYTES # BLD AUTO: 1.23 K/UL
LYMPHOCYTES NFR BLD AUTO: 15.1 %
MAGNESIUM SERPL-MCNC: 2.2 MG/DL
MAN DIFF?: NORMAL
MCHC RBC-ENTMCNC: 31 G/DL
MCHC RBC-ENTMCNC: 33.5 PG
MCV RBC AUTO: 108.1 FL
MICROALBUMIN 24H UR DL<=1MG/L-MCNC: 2.8 MG/DL
MICROALBUMIN/CREAT 24H UR-RTO: 54 MG/G
MICROSCOPIC-UA: NORMAL
MONOCYTES # BLD AUTO: 0.89 K/UL
MONOCYTES NFR BLD AUTO: 10.9 %
NEUTROPHILS # BLD AUTO: 5.86 K/UL
NEUTROPHILS NFR BLD AUTO: 72 %
NITRITE URINE: NEGATIVE
NONHDLC SERPL-MCNC: 66 MG/DL
PH URINE: 5.5
PLATELET # BLD AUTO: 160 K/UL
POTASSIUM SERPL-SCNC: 4 MMOL/L
PROT SERPL-MCNC: 6.7 G/DL
PROTEIN URINE: NEGATIVE MG/DL
RBC # BLD: 3.46 M/UL
RBC # FLD: 15.1 %
RED BLOOD CELLS URINE: 0 /HPF
SODIUM SERPL-SCNC: 141 MMOL/L
SPECIFIC GRAVITY URINE: 1.01
TRIGL SERPL-MCNC: 61 MG/DL
TSH SERPL-ACNC: 2.44 UIU/ML
UROBILINOGEN URINE: 0.2 MG/DL
VIT B12 SERPL-MCNC: 998 PG/ML
WBC # FLD AUTO: 8.14 K/UL
WHITE BLOOD CELLS URINE: 0 /HPF

## 2024-11-04 ENCOUNTER — NON-APPOINTMENT (OUTPATIENT)
Age: 89
End: 2024-11-04

## 2024-11-07 ENCOUNTER — NON-APPOINTMENT (OUTPATIENT)
Age: 89
End: 2024-11-07

## 2024-11-07 RX ORDER — SITAGLIPTIN 100 MG/1
100 TABLET, FILM COATED ORAL DAILY
Qty: 90 | Refills: 1 | Status: ACTIVE | COMMUNITY
Start: 2024-11-07 | End: 1900-01-01

## 2024-11-11 ENCOUNTER — INPATIENT (INPATIENT)
Facility: HOSPITAL | Age: 89
LOS: 1 days | Discharge: HOME HEALTH | DRG: 291 | End: 2024-11-13
Attending: STUDENT IN AN ORGANIZED HEALTH CARE EDUCATION/TRAINING PROGRAM | Admitting: STUDENT IN AN ORGANIZED HEALTH CARE EDUCATION/TRAINING PROGRAM
Payer: MEDICARE

## 2024-11-11 VITALS
WEIGHT: 171.96 LBS | DIASTOLIC BLOOD PRESSURE: 60 MMHG | OXYGEN SATURATION: 96 % | TEMPERATURE: 98 F | HEIGHT: 68 IN | RESPIRATION RATE: 20 BRPM | SYSTOLIC BLOOD PRESSURE: 123 MMHG | HEART RATE: 75 BPM

## 2024-11-11 DIAGNOSIS — Z95.1 PRESENCE OF AORTOCORONARY BYPASS GRAFT: Chronic | ICD-10-CM

## 2024-11-11 DIAGNOSIS — Z90.49 ACQUIRED ABSENCE OF OTHER SPECIFIED PARTS OF DIGESTIVE TRACT: Chronic | ICD-10-CM

## 2024-11-11 DIAGNOSIS — Z98.890 OTHER SPECIFIED POSTPROCEDURAL STATES: Chronic | ICD-10-CM

## 2024-11-11 DIAGNOSIS — Z98.42 CATARACT EXTRACTION STATUS, LEFT EYE: Chronic | ICD-10-CM

## 2024-11-11 DIAGNOSIS — Z95.810 PRESENCE OF AUTOMATIC (IMPLANTABLE) CARDIAC DEFIBRILLATOR: Chronic | ICD-10-CM

## 2024-11-11 DIAGNOSIS — Z98.89 OTHER SPECIFIED POSTPROCEDURAL STATES: Chronic | ICD-10-CM

## 2024-11-11 DIAGNOSIS — Z98.61 CORONARY ANGIOPLASTY STATUS: Chronic | ICD-10-CM

## 2024-11-11 DIAGNOSIS — Z96.60 PRESENCE OF UNSPECIFIED ORTHOPEDIC JOINT IMPLANT: Chronic | ICD-10-CM

## 2024-11-11 DIAGNOSIS — Z98.41 CATARACT EXTRACTION STATUS, RIGHT EYE: Chronic | ICD-10-CM

## 2024-11-11 DIAGNOSIS — Z95.3 PRESENCE OF XENOGENIC HEART VALVE: Chronic | ICD-10-CM

## 2024-11-11 DIAGNOSIS — Z95.0 PRESENCE OF CARDIAC PACEMAKER: Chronic | ICD-10-CM

## 2024-11-11 LAB
ALBUMIN SERPL ELPH-MCNC: 3.9 G/DL — SIGNIFICANT CHANGE UP (ref 3.3–5.2)
ALP SERPL-CCNC: 70 U/L — SIGNIFICANT CHANGE UP (ref 40–120)
ALT FLD-CCNC: 20 U/L — SIGNIFICANT CHANGE UP
ANION GAP SERPL CALC-SCNC: 8 MMOL/L — SIGNIFICANT CHANGE UP (ref 5–17)
APTT BLD: 30.6 SEC — SIGNIFICANT CHANGE UP (ref 24.5–35.6)
AST SERPL-CCNC: 28 U/L — SIGNIFICANT CHANGE UP
BASOPHILS # BLD AUTO: 0.02 K/UL — SIGNIFICANT CHANGE UP (ref 0–0.2)
BASOPHILS NFR BLD AUTO: 0.3 % — SIGNIFICANT CHANGE UP (ref 0–2)
BILIRUB SERPL-MCNC: 0.7 MG/DL — SIGNIFICANT CHANGE UP (ref 0.4–2)
BUN SERPL-MCNC: 35.4 MG/DL — HIGH (ref 8–20)
CALCIUM SERPL-MCNC: 9.1 MG/DL — SIGNIFICANT CHANGE UP (ref 8.4–10.5)
CHLORIDE SERPL-SCNC: 99 MMOL/L — SIGNIFICANT CHANGE UP (ref 96–108)
CO2 SERPL-SCNC: 34 MMOL/L — HIGH (ref 22–29)
CREAT SERPL-MCNC: 1.56 MG/DL — HIGH (ref 0.5–1.3)
EGFR: 41 ML/MIN/1.73M2 — LOW
EOSINOPHIL # BLD AUTO: 0.12 K/UL — SIGNIFICANT CHANGE UP (ref 0–0.5)
EOSINOPHIL NFR BLD AUTO: 1.7 % — SIGNIFICANT CHANGE UP (ref 0–6)
GLUCOSE SERPL-MCNC: 102 MG/DL — HIGH (ref 70–99)
HCT VFR BLD CALC: 35.1 % — LOW (ref 39–50)
HGB BLD-MCNC: 11.5 G/DL — LOW (ref 13–17)
IMM GRANULOCYTES NFR BLD AUTO: 0.1 % — SIGNIFICANT CHANGE UP (ref 0–0.9)
INR BLD: 1.31 RATIO — HIGH (ref 0.85–1.16)
LYMPHOCYTES # BLD AUTO: 1.52 K/UL — SIGNIFICANT CHANGE UP (ref 1–3.3)
LYMPHOCYTES # BLD AUTO: 21.7 % — SIGNIFICANT CHANGE UP (ref 13–44)
MAGNESIUM SERPL-MCNC: 2 MG/DL — SIGNIFICANT CHANGE UP (ref 1.6–2.6)
MCHC RBC-ENTMCNC: 32.8 G/DL — SIGNIFICANT CHANGE UP (ref 32–36)
MCHC RBC-ENTMCNC: 33.1 PG — SIGNIFICANT CHANGE UP (ref 27–34)
MCV RBC AUTO: 101.2 FL — HIGH (ref 80–100)
MONOCYTES # BLD AUTO: 0.85 K/UL — SIGNIFICANT CHANGE UP (ref 0–0.9)
MONOCYTES NFR BLD AUTO: 12.2 % — SIGNIFICANT CHANGE UP (ref 2–14)
NEUTROPHILS # BLD AUTO: 4.47 K/UL — SIGNIFICANT CHANGE UP (ref 1.8–7.4)
NEUTROPHILS NFR BLD AUTO: 64 % — SIGNIFICANT CHANGE UP (ref 43–77)
NT-PROBNP SERPL-SCNC: 790 PG/ML — HIGH (ref 0–300)
PLATELET # BLD AUTO: 150 K/UL — SIGNIFICANT CHANGE UP (ref 150–400)
POTASSIUM SERPL-MCNC: 3.6 MMOL/L — SIGNIFICANT CHANGE UP (ref 3.5–5.3)
POTASSIUM SERPL-SCNC: 3.6 MMOL/L — SIGNIFICANT CHANGE UP (ref 3.5–5.3)
PROT SERPL-MCNC: 6.6 G/DL — SIGNIFICANT CHANGE UP (ref 6.6–8.7)
PROTHROM AB SERPL-ACNC: 15.1 SEC — HIGH (ref 9.9–13.4)
RBC # BLD: 3.47 M/UL — LOW (ref 4.2–5.8)
RBC # FLD: 14.4 % — SIGNIFICANT CHANGE UP (ref 10.3–14.5)
SODIUM SERPL-SCNC: 141 MMOL/L — SIGNIFICANT CHANGE UP (ref 135–145)
TROPONIN T, HIGH SENSITIVITY RESULT: 55 NG/L — HIGH (ref 0–51)
TROPONIN T, HIGH SENSITIVITY RESULT: 56 NG/L — HIGH (ref 0–51)
WBC # BLD: 6.99 K/UL — SIGNIFICANT CHANGE UP (ref 3.8–10.5)
WBC # FLD AUTO: 6.99 K/UL — SIGNIFICANT CHANGE UP (ref 3.8–10.5)

## 2024-11-11 PROCEDURE — 93010 ELECTROCARDIOGRAM REPORT: CPT

## 2024-11-11 PROCEDURE — 71045 X-RAY EXAM CHEST 1 VIEW: CPT | Mod: 26

## 2024-11-11 PROCEDURE — 99291 CRITICAL CARE FIRST HOUR: CPT | Mod: GC

## 2024-11-11 RX ORDER — APIXABAN 5 MG/1
2.5 TABLET, FILM COATED ORAL ONCE
Refills: 0 | Status: COMPLETED | OUTPATIENT
Start: 2024-11-11 | End: 2024-11-11

## 2024-11-11 RX ORDER — FUROSEMIDE 40 MG
40 TABLET ORAL ONCE
Refills: 0 | Status: COMPLETED | OUTPATIENT
Start: 2024-11-11 | End: 2024-11-11

## 2024-11-11 RX ORDER — FINASTERIDE 5 MG/1
5 TABLET, FILM COATED ORAL DAILY
Refills: 0 | Status: DISCONTINUED | OUTPATIENT
Start: 2024-11-11 | End: 2024-11-13

## 2024-11-11 RX ORDER — EZETIMIBE 10 MG/1
10 TABLET ORAL AT BEDTIME
Refills: 0 | Status: DISCONTINUED | OUTPATIENT
Start: 2024-11-11 | End: 2024-11-13

## 2024-11-11 RX ORDER — ALPRAZOLAM 0.25 MG
0.25 TABLET ORAL AT BEDTIME
Refills: 0 | Status: DISCONTINUED | OUTPATIENT
Start: 2024-11-11 | End: 2024-11-13

## 2024-11-11 RX ORDER — AMIODARONE HCL 200 MG
200 TABLET ORAL DAILY
Refills: 0 | Status: DISCONTINUED | OUTPATIENT
Start: 2024-11-11 | End: 2024-11-13

## 2024-11-11 RX ORDER — TAMSULOSIN HCL 0.4 MG
0.8 CAPSULE ORAL AT BEDTIME
Refills: 0 | Status: DISCONTINUED | OUTPATIENT
Start: 2024-11-11 | End: 2024-11-13

## 2024-11-11 RX ORDER — FLUTICASONE PROPIONATE AND SALMETEROL XINAFOATE 230; 21 UG/1; UG/1
1 AEROSOL, METERED RESPIRATORY (INHALATION)
Refills: 0 | Status: DISCONTINUED | OUTPATIENT
Start: 2024-11-11 | End: 2024-11-13

## 2024-11-11 RX ORDER — APIXABAN 5 MG/1
2.5 TABLET, FILM COATED ORAL EVERY 12 HOURS
Refills: 0 | Status: DISCONTINUED | OUTPATIENT
Start: 2024-11-12 | End: 2024-11-13

## 2024-11-11 RX ADMIN — Medication 0.8 MILLIGRAM(S): at 22:07

## 2024-11-11 RX ADMIN — EZETIMIBE 10 MILLIGRAM(S): 10 TABLET ORAL at 22:07

## 2024-11-11 RX ADMIN — APIXABAN 2.5 MILLIGRAM(S): 5 TABLET, FILM COATED ORAL at 22:08

## 2024-11-11 RX ADMIN — Medication 40 MILLIGRAM(S): at 22:07

## 2024-11-11 RX ADMIN — Medication 0.25 MILLIGRAM(S): at 22:08

## 2024-11-11 NOTE — ED ADULT TRIAGE NOTE - CHIEF COMPLAINT QUOTE
" I have swelling to my legs and SOB" pt  was in [facility] for an appointment and was told to come to the ED

## 2024-11-11 NOTE — ED PROVIDER NOTE - OBJECTIVE STATEMENT
89-year-old male patient with a history of CHF, cardiac mems on 40 mg Lasix daily, A-fib, on Eliquis and amiodarone follows with self-pay cardiology presents to the ED complaining of shortness of breath, leg swelling.  Patient states over the past 2 weeks he has had increased leg swelling, dyspnea on exertion, shortness of breath and orthopnea.  He also reports increased in weight detected by his cardiac mems.  Patient was seen at [facility] today, reportedly had an echocardiogram was told to come to the ED for evaluation, was also given IV Lasix but unsure of what dose.  In the ED, denies any chest pain, recent fever, chills or cough.  No abdominal pain, nausea or vomiting.  No further complaints at this time

## 2024-11-11 NOTE — ED PROVIDER NOTE - NSICDXPASTSURGICALHX_GEN_ALL_CORE_FT
PAST SURGICAL HISTORY:  H/O cystoscopy     History of CEA (carotid endarterectomy) Bilateral    History of permanent cardiac pacemaker placement [employer]    S/P appendectomy     S/P CABG x 2 LIMA LAD SVG OM1    S/P cataract surgery, left     S/P cataract surgery, right     S/P cholecystectomy     S/P cholecystectomy     S/P hernia repair     S/P ICD (internal cardiac defibrillator) procedure     S/P joint replacement Bilateral knee replacement    S/P nasal surgery Secondary to nasal fracture    S/P PTCA (percutaneous transluminal coronary angioplasty) x 9    S/P tonsillectomy     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 #29 Medtronic core valve

## 2024-11-11 NOTE — ED PROVIDER NOTE - CLINICAL SUMMARY MEDICAL DECISION MAKING FREE TEXT BOX
89-year-old male patient with history of heart failure, on Lasix presents the ED complaining of worsening dyspnea, orthopnea, leg swelling x 2 weeks.  Was seen at [facility] today, had an echocardiogram, was given IV Lasix,  told to come to the ED for further evaluation.  Denies any chest pain, recent fever or chills.  On exam, vital signs stable, EKG shows no signs of ischemia, lungs clear bilaterally, 2+ pitting edema to bilateral lower extremities.  IV Lasix ordered, chest x-ray, labs, consult to [facility] cardiology

## 2024-11-11 NOTE — ED PROVIDER NOTE - ATTENDING CONTRIBUTION TO CARE
89-year-old male patient with a history of CHF, cardiac mems on 40 mg Lasix daily, A-fib, on Eliquis and amiodarone follows with self-pay cardiology presents to the ED complaining of shortness of breath, leg swelling.  Patient states over the past 2 weeks he has had increased leg swelling, dyspnea on exertion, shortness of breath and orthopnea.  He also reports increased in weight not detected by his cardiac mems. He also state the past couple of days he was told to increase his lasix and has been doing 40 mg twice a day.  Patient was seen at [facility] today, reportedly had an echocardiogram was told to come to the ED for evaluation, was also given IV Lasix but unsure of what dose. However, review of paperwork sent with the patient he received 40 mg IV.   In the ED, denies any chest pain, recent fever, chills or cough.  No abdominal pain, nausea or vomiting.  No further complaints at this time    CONSTITUTIONAL: In no apparent distress.  HEENMT: Airway patent, TM normal bilaterally, normal appearing mouth, nose, throat, neck supple with full range of motion, no cervical adenopathy.  EYES: Pupils equal, round and reactive to light, Extra-ocular movement intact, eyes are clear b/l  CARDIAC: Regular rate and rhythm, Heart sounds S1 S2 present, 2+ pitting edema to lower legs.  RESPIRATORY: No respiratory distress. No stridor, Lungs sounds clear with good aeration bilaterally.   GASTROINTESTINAL: Abdomen soft, non-tender and non-distended, no rebound, no guarding and no masses.   MUSCULOSKELETAL: Spine appears normal, movement of extremities grossly intact.  NEUROLOGICAL: Alert and interactive, no focal deficits  NEURO/PSYCH: Tone is normal, moving all extremities well,  SKIN: No cyanosis, no pallor, no jaundice, no rash    Jagdeep LOU, personally saw the patient with the resident, and completed the key components of the history and physical exam. I then discussed the management plan with the resident.    Due to the a high probability of clinically significant, life threatening deterioration, the patient required high level of preparedness to intervene emergently. I personally spent critical care time directly and personally managing the patient. This included (but not limited too reviewing  vitals, managing orders, and determining and adjusting plan depending on response to interventions.

## 2024-11-11 NOTE — ED PROVIDER NOTE - NSICDXPASTMEDICALHX_GEN_ALL_CORE_FT
PAST MEDICAL HISTORY:  Abdominal hernia     Abnormal positron emission tomography (PET) scan 2019 anterior, anteroseptal,anterolateral and iferolateral ischemia    Afib     Age-related incipient cataract of both eyes     Angina pectoris class II    Aortic stenosis TAVR #29 Medtronic core valve 2017    Asthma     BPH (benign prostatic hyperplasia)     CAD (coronary artery disease) S/P Stenting x 12 last stent 3/2019    CAD (coronary artery disease) CABG LIMA LAD SVG OM1, PCI PDA LM/LCX with PTCA    CAD (coronary artery disease) PPM, PCI LM and LCX, CABG LIMA OAD and OM1    Cancer, colon     Carotid artery disease RCEA    Cataract     Colon cancer     COPD, 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    Diabetes mellitus     MENDEZ (dyspnea on exertion)     DVT (deep venous thrombosis) left leg    Fatigue Profound w/exertion    Former smoker     Former smoker     GI bleed     H/O urinary frequency     Hematuria     History of BPH     Hyperlipidemia     Hypertension     MR (mitral regurgitation) moderate      FANNY (obstructive sleep apnea) uses mouth peice    Osteoarthritis     PVD (peripheral vascular disease)     PVD (peripheral vascular disease) intervention RLE    PVD (peripheral vascular disease) R SFA and L SFA w/stents and PTA    Schatzki's ring     SOB (shortness of breath)     Spinal stenosis     Statin intolerance     Thrombus " i have a small clot in the linning of my heart" as per pt

## 2024-11-11 NOTE — ED PROVIDER NOTE - PROGRESS NOTE DETAILS
Spoke with Dr. Bañuelos of [place]. Pt had unchanged echo today, received dose of IV lasix. recommending obs, another dose lasix, [place] bart in AM

## 2024-11-11 NOTE — ED PROVIDER NOTE - PHYSICAL EXAMINATION
Const: Pt is well appearing.  In no acute distress.   HEENT:  Oropharynx clear without exudates or erythema. Moist mucous membranes  Eyes: PERRL. EOMI.  Cardiac: Regular rate and regular rhythm. +S1/S2. No murmurs, rubs or gallops. +2 pitting edema to bilateral lower extremities   Resp: Speaking in full sentences, breath sounds equal and clear bilaterally. No wheezes, rales or rhonchi.  Abd: Soft, non-tender, non-distended.  No guarding or rebound.  Neuro: Moves all extremities symmetrically. No motor or sensory deficits

## 2024-11-12 ENCOUNTER — NON-APPOINTMENT (OUTPATIENT)
Age: 89
End: 2024-11-12

## 2024-11-12 DIAGNOSIS — R79.89 OTHER SPECIFIED ABNORMAL FINDINGS OF BLOOD CHEMISTRY: ICD-10-CM

## 2024-11-12 DIAGNOSIS — I50.23 ACUTE ON CHRONIC SYSTOLIC (CONGESTIVE) HEART FAILURE: ICD-10-CM

## 2024-11-12 PROBLEM — I25.10 ATHEROSCLEROTIC HEART DISEASE OF NATIVE CORONARY ARTERY WITHOUT ANGINA PECTORIS: Chronic | Status: INACTIVE | Noted: 2017-06-23 | Resolved: 2024-11-12

## 2024-11-12 PROBLEM — I82.90 ACUTE EMBOLISM AND THROMBOSIS OF UNSPECIFIED VEIN: Chronic | Status: INACTIVE | Noted: 2020-12-11 | Resolved: 2024-11-12

## 2024-11-12 PROBLEM — Z87.891 PERSONAL HISTORY OF NICOTINE DEPENDENCE: Chronic | Status: INACTIVE | Noted: 2017-06-23 | Resolved: 2024-11-12

## 2024-11-12 PROBLEM — I25.10 ATHEROSCLEROTIC HEART DISEASE OF NATIVE CORONARY ARTERY WITHOUT ANGINA PECTORIS: Chronic | Status: INACTIVE | Noted: 2019-05-28 | Resolved: 2024-11-12

## 2024-11-12 PROBLEM — I73.9 PERIPHERAL VASCULAR DISEASE, UNSPECIFIED: Chronic | Status: INACTIVE | Noted: 2017-08-04 | Resolved: 2024-11-12

## 2024-11-12 PROBLEM — H26.9 UNSPECIFIED CATARACT: Chronic | Status: INACTIVE | Noted: 2019-05-28 | Resolved: 2024-11-12

## 2024-11-12 PROBLEM — R31.9 HEMATURIA, UNSPECIFIED: Chronic | Status: INACTIVE | Noted: 2019-05-28 | Resolved: 2024-11-12

## 2024-11-12 PROBLEM — C18.9 MALIGNANT NEOPLASM OF COLON, UNSPECIFIED: Chronic | Status: INACTIVE | Noted: 2020-12-11 | Resolved: 2024-11-12

## 2024-11-12 PROBLEM — Z87.438 PERSONAL HISTORY OF OTHER DISEASES OF MALE GENITAL ORGANS: Chronic | Status: INACTIVE | Noted: 2019-05-28 | Resolved: 2024-11-12

## 2024-11-12 PROBLEM — I77.9 DISORDER OF ARTERIES AND ARTERIOLES, UNSPECIFIED: Chronic | Status: INACTIVE | Noted: 2017-06-23 | Resolved: 2024-11-12

## 2024-11-12 PROBLEM — R53.83 OTHER FATIGUE: Chronic | Status: INACTIVE | Noted: 2017-06-23 | Resolved: 2024-11-12

## 2024-11-12 LAB
GLUCOSE BLDC GLUCOMTR-MCNC: 133 MG/DL — HIGH (ref 70–99)
GLUCOSE BLDC GLUCOMTR-MCNC: 150 MG/DL — HIGH (ref 70–99)
GLUCOSE BLDC GLUCOMTR-MCNC: 226 MG/DL — HIGH (ref 70–99)
GLUCOSE BLDC GLUCOMTR-MCNC: 235 MG/DL — HIGH (ref 70–99)
MAGNESIUM SERPL-MCNC: 2 MG/DL — SIGNIFICANT CHANGE UP (ref 1.6–2.6)

## 2024-11-12 PROCEDURE — 93010 ELECTROCARDIOGRAM REPORT: CPT

## 2024-11-12 PROCEDURE — 99223 1ST HOSP IP/OBS HIGH 75: CPT | Mod: GC

## 2024-11-12 PROCEDURE — 99497 ADVNCD CARE PLAN 30 MIN: CPT | Mod: GC,25

## 2024-11-12 RX ORDER — GLUCAGON INJECTION, SOLUTION 1 MG/.2ML
1 INJECTION, SOLUTION SUBCUTANEOUS ONCE
Refills: 0 | Status: DISCONTINUED | OUTPATIENT
Start: 2024-11-12 | End: 2024-11-13

## 2024-11-12 RX ORDER — SENNA 187 MG
2 TABLET ORAL AT BEDTIME
Refills: 0 | Status: DISCONTINUED | OUTPATIENT
Start: 2024-11-12 | End: 2024-11-13

## 2024-11-12 RX ORDER — B-COMPLEX WITH VITAMIN C
1 VIAL (ML) INJECTION DAILY
Refills: 0 | Status: DISCONTINUED | OUTPATIENT
Start: 2024-11-12 | End: 2024-11-13

## 2024-11-12 RX ORDER — AMIODARONE HCL 200 MG
1 TABLET ORAL
Refills: 0 | DISCHARGE

## 2024-11-12 RX ORDER — FINASTERIDE 5 MG/1
1 TABLET, FILM COATED ORAL
Refills: 0 | DISCHARGE

## 2024-11-12 RX ORDER — FUROSEMIDE 40 MG
40 TABLET ORAL
Refills: 0 | Status: DISCONTINUED | OUTPATIENT
Start: 2024-11-12 | End: 2024-11-13

## 2024-11-12 RX ORDER — INSULIN LISPRO 100/ML
VIAL (ML) SUBCUTANEOUS
Refills: 0 | Status: DISCONTINUED | OUTPATIENT
Start: 2024-11-12 | End: 2024-11-13

## 2024-11-12 RX ORDER — ONDANSETRON HYDROCHLORIDE 2 MG/ML
4 INJECTION, SOLUTION INTRAMUSCULAR; INTRAVENOUS EVERY 8 HOURS
Refills: 0 | Status: DISCONTINUED | OUTPATIENT
Start: 2024-11-12 | End: 2024-11-13

## 2024-11-12 RX ORDER — INFLUENZ VIR VAC TV P-SURF2003 15MCG/.5ML
0.5 SYRINGE (ML) INTRAMUSCULAR ONCE
Refills: 0 | Status: DISCONTINUED | OUTPATIENT
Start: 2024-11-12 | End: 2024-11-13

## 2024-11-12 RX ORDER — LATANOPROST 0.005 %
1 DROPS OPHTHALMIC (EYE) AT BEDTIME
Refills: 0 | Status: DISCONTINUED | OUTPATIENT
Start: 2024-11-12 | End: 2024-11-13

## 2024-11-12 RX ORDER — MELATONIN 5 MG
3 TABLET ORAL AT BEDTIME
Refills: 0 | Status: DISCONTINUED | OUTPATIENT
Start: 2024-11-12 | End: 2024-11-13

## 2024-11-12 RX ORDER — MAGNESIUM, ALUMINUM HYDROXIDE 200-200 MG
30 TABLET,CHEWABLE ORAL EVERY 4 HOURS
Refills: 0 | Status: DISCONTINUED | OUTPATIENT
Start: 2024-11-12 | End: 2024-11-13

## 2024-11-12 RX ORDER — ACETAMINOPHEN 500 MG
650 TABLET ORAL EVERY 6 HOURS
Refills: 0 | Status: DISCONTINUED | OUTPATIENT
Start: 2024-11-12 | End: 2024-11-13

## 2024-11-12 RX ORDER — DAPAGLIFLOZIN 10 MG/1
10 TABLET, FILM COATED ORAL DAILY
Refills: 0 | Status: DISCONTINUED | OUTPATIENT
Start: 2024-11-12 | End: 2024-11-13

## 2024-11-12 RX ADMIN — Medication 2: at 19:08

## 2024-11-12 RX ADMIN — Medication 200 MILLIGRAM(S): at 05:35

## 2024-11-12 RX ADMIN — FLUTICASONE PROPIONATE AND SALMETEROL XINAFOATE 1 DOSE(S): 230; 21 AEROSOL, METERED RESPIRATORY (INHALATION) at 08:19

## 2024-11-12 RX ADMIN — DAPAGLIFLOZIN 10 MILLIGRAM(S): 10 TABLET, FILM COATED ORAL at 13:13

## 2024-11-12 RX ADMIN — EZETIMIBE 10 MILLIGRAM(S): 10 TABLET ORAL at 22:33

## 2024-11-12 RX ADMIN — Medication 0.8 MILLIGRAM(S): at 22:33

## 2024-11-12 RX ADMIN — Medication 1 TABLET(S): at 10:52

## 2024-11-12 RX ADMIN — Medication 40 MILLIGRAM(S): at 05:34

## 2024-11-12 RX ADMIN — FINASTERIDE 5 MILLIGRAM(S): 5 TABLET, FILM COATED ORAL at 11:55

## 2024-11-12 RX ADMIN — Medication 2: at 11:55

## 2024-11-12 RX ADMIN — Medication 40 MILLIGRAM(S): at 13:13

## 2024-11-12 RX ADMIN — APIXABAN 2.5 MILLIGRAM(S): 5 TABLET, FILM COATED ORAL at 10:51

## 2024-11-12 RX ADMIN — APIXABAN 2.5 MILLIGRAM(S): 5 TABLET, FILM COATED ORAL at 22:33

## 2024-11-12 NOTE — ED ADULT NURSE NOTE - NSFALLHARMRISKINTERV_ED_ALL_ED

## 2024-11-12 NOTE — ED ADULT NURSE NOTE - CHIEF COMPLAINT QUOTE
" I have swelling to my legs and SOB" pt  was in [facility] for an appointment and was told to come to the ED

## 2024-11-12 NOTE — H&P ADULT - ATTENDING COMMENTS
Patient seen, chart reviewed, case discussed with ED staff, Patient/Wife, Medical Resident. Patient with acute on chronic systolic CHF, possible component of pulm htn based on clinical picture. No evidence of ACS, No reports of diet/medication non-compliance. Await Cardiology eval, o/p echo, cardio mems interrogation. Cont. with plan as outlined above.

## 2024-11-12 NOTE — H&P ADULT - TIME BILLING
Reviewing prior medical record, ED course, independently reviewing labs/imaging studies, discussing case with ED attending, examining patient, furnishing H&P, orders, doing medication reconciliation, discussing plan of care with Patient/Wife, ED staff and answering all their questions with exception of time spent teaching.

## 2024-11-12 NOTE — CHART NOTE - NSCHARTNOTEFT_GEN_A_CORE
89 y/o M here for Acute on Chronic HFrEF. Jenna, f/u Cards, CardioMEMS wnl, awaiting outpatient TTE results, farxiga for HFpEF/CKD, at time of DC switch lasix to torsemide, remaining plan as per zacharyist

## 2024-11-12 NOTE — H&P ADULT - HISTORY OF PRESENT ILLNESS
89Y M with extensive cardiac PMHx including CAD s/p CABG (2005) and multiple stents, moderate AS s/p TAVR with permanent pacemaker placed in 2017, upgraded to pacemaker/ICD in 2019 due to nonsustained VT, CHF s/p CardioMEMS insertion (June 2023), Afib  (Eliquis and Amiodarone), PVD, T2DM, dysphagia 2/2 Schatzki ring, colon CA s/p laparscopic extended Right hemicolectomy (2021), HLD, Asthma and BPH vho was sent from his Cardiologist office (Dr. Gamino) for increasing SOB and dyspnea on exertion. Per patient, he has felt increasing SOB, worse with exertion over the past 2 weeks. Patient typically takes 40mg of Lasix daily, but in the last few days he has increased to 80mg daily without improvement. Patient states that he has called Cardio[facility] daily to check on status and was told that his goal pulmonary artery pressure is 17-19.  89Y M with extensive cardiac PMHx including CAD s/p CABG (2005) and multiple stents, moderate AS s/p TAVR with permanent pacemaker placed in 2017, upgraded to pacemaker/ICD in 2019 due to nonsustained VT, CHF s/p CardioMEMS insertion (June 2023), Afib  (Eliquis and Amiodarone), PVD, T2DM, dysphagia 2/2 Schatzki ring, colon CA s/p laparoscopic extended Right hemicolectomy (2021), HLD, COPD and BPH who was sent from his Cardiologist office (Dr. Gamino) for increasing SOB/MENDEZ. Per patient, he has felt increasing SOB over the past 2 weeks. Patient typically takes 40mg of Lasix daily, but in the last few days he has increased to 80mg daily without improvement. Patient states that he has called [facility] daily to check on status and was told that he was within his goal pulmonary artery pressure of 17-19. Patient reports that his symptoms continued to worsen which prompted him to see Dr. Gamion on 11/11. Patient checks his daily weight and at baseline is ~162, gaining 10 pounds over the past 3 weeks. Denies chest pain, nausea, vomiting, abdominal pain, dysuria, hematemesis, hematochezia, numbness, tingling. In the ED, vitals were stable. Labs were notable for BUN/Cr 35.4/1.22, pBNP 790, and Trops 56. Patient was given x1 dose of IV 40mg Lasix and placed on 2L NC for support.  89Y M with extensive cardiac PMHx including CAD s/p CABG (2005) and multiple stents, moderate AS s/p TAVR with permanent pacemaker placed in 2017, upgraded to pacemaker/ICD in 2019 due to nonsustained VT, CHF s/p CardioMEMS insertion (June 2023), Afib  (Eliquis and Amiodarone), PVD, T2DM, dysphagia 2/2 Schatzki ring, colon CA s/p laparoscopic extended Right hemicolectomy (2021), HLD, COPD, CKD stage IIIb and BPH who was sent from his Cardiologist office (Dr. Gamino) for increasing SOB/MENDEZ. Per patient, he has felt increasing SOB over the past 2 weeks. Patient typically takes 40mg of Lasix daily, but in the last few days he has increased to 80mg daily without improvement. Patient states that he has called [facility] daily to check on status and was told that he was within his goal pulmonary artery pressure of 17-19. Patient reports that his symptoms continued to worsen which prompted him to see Dr. Gamino on 11/11. Patient checks his daily weight and at baseline is ~162, gaining 10 pounds over the past 3 weeks. Denies chest pain, nausea, vomiting, abdominal pain, dysuria, hematemesis, hematochezia, numbness, tingling. In the ED, vitals were stable. Labs were notable for BUN/Cr 35.4/1.22, pBNP 790, and Trops 56. Patient was given x1 dose of IV 40mg Lasix and placed on 2L NC for support.

## 2024-11-12 NOTE — H&P ADULT - CONVERSATION DETAILS
Discussed overall medical condition, prognosis, and options for plan of care with patient, and followed up with patient's wife Subha on the phone. Discussed with patient what his wishes would be in the event that his heart were to stop beating requiring chest compressions, or in the event that he was unable to breath on his on requiring intubation and life sustaining treatment on a breathing machine. Patient stated that he would not want compressions, and would not want anything to keep him alive longer. He asked that I confirm with his spouse and HCP. I called his wife who confirmed that she supports what her  would want in those situations. MOLST was filled and placed in chart. DNR/DNI order placed.

## 2024-11-12 NOTE — H&P ADULT - ASSESSMENT
89Y M with extensive cardiac PMHx including CAD s/p CABG (2005) and multiple stents, moderate AS s/p TAVR with permanent pacemaker placed in 2017, upgraded to pacemaker/ICD in 2019 due to nonsustained VT, CHF s/p CardioMEMS insertion (June 2023), Afib  (Eliquis and Amiodarone), PVD, T2DM, dysphagia 2/2 Schatzki ring, colon CA s/p laparoscopic extended Right hemicolectomy (2021), HLD, COPD and BPH who was sent from his Cardiologist office (Dr. Gamino) for increasing SOB/MENDEZ.     #Acute on chronic CHF exacerbation   -Patient with signs of fluid overload  -Last TTE in chart 6/2024 - LVEF 50-55%  -Per patient and wife, patient had TTE done yesterday at [facility] - will try to obtain records  -Troponin and pBNP elevated  -Repeat Troponin ordered   -Continue Lasix 40mg IV BID  -Monitor urine output  -Strict I&Os  -Daily weights  -DASH diet with 1.5L fluid restriction  -Wean supplemental O2 as tolerated with goal SpO2 88-92%  -Cardiology consulted  -EP consult for CardioMEMS?    #Chronic AFib   -Continue Eliquis 2.5mg BID  -Continue Amiodarone 200mg daily    #Dysphagia s/p EGD showing Schitzaki Ring (8/2024)  -Patient tolerating PO intake  -Continue DASH diet with fluid restriction  -Aspiration precautions    #T2DM  -Hold home Prandin 1mg daily  -SSI?    #HLD  -Continue Ezetimibe 10mg daily    #COPD  -Continue Advair while inpatient    #BPD/urinary retention  -Continue finasteride 5mg daily  -Home medication Alfuzosin, continue Flomax while inpatient     DVT ppx: SCDs, Eliquis  Diet: DASH diet  Dispo: Admit to tele. Pending diuresis and PT eval.    89Y M with extensive cardiac PMHx including CAD s/p CABG (2005) and multiple stents, moderate AS s/p TAVR with permanent pacemaker placed in 2017, upgraded to pacemaker/ICD in 2019 due to nonsustained VT, CHF s/p CardioMEMS insertion (June 2023), Afib  (Eliquis and Amiodarone), PVD, T2DM, dysphagia 2/2 Schatzki ring, colon CA s/p laparoscopic extended Right hemicolectomy (2021), HLD, COPD and BPH who was sent from his Cardiologist office (Dr. Gamino) for increasing SOB/MENDEZ.     #Acute on chronic CHF exacerbation   -Patient with signs of fluid overload  -Last TTE in chart 6/2024 - LVEF 50-55%  -Per patient and wife, patient had TTE done yesterday at [facility] - will try to obtain records  -Troponin and pBNP elevated  -Repeat Troponin ordered   -Continue Lasix 40mg IV BID  -Monitor urine output  -Strict I&Os  -Daily weights  -DASH diet with 1.5L fluid restriction  -Wean supplemental O2 as tolerated with goal SpO2 88-92%  -Cardiology consulted  -EP consult for CardioMEMS?    #CKD stage 3b  -BUN/Cr 35.4/1.56 (baseline Cr ~1.22 per chart review)  -Continue to monitor  -Avoid nephrotoxic agents  -Renally dose meds    #Chronic AFib   -Continue Eliquis 2.5mg BID  -Continue Amiodarone 200mg daily    #Dysphagia s/p EGD showing Schitzaki Ring (8/2024)  -Patient tolerating PO intake  -Continue DASH diet with fluid restriction  -Aspiration precautions    #T2DM  -Hold home Prandin 1mg daily - per outpatient Endo notes stopped 2/2 to CKD  -Outpatient Endo follow up for trial of Januvia  -SSI?    #HLD  -Continue Ezetimibe 10mg daily    #COPD  -Continue Advair while inpatient    #BPD/urinary retention  -Continue finasteride 5mg daily  -Home medication Alfuzosin, continue Flomax while inpatient     DVT ppx: SCDs, Eliquis  Diet: DASH diet  Dispo: Admit to tele. Pending diuresis and PT eval.    89Y M with extensive cardiac PMHx including CAD s/p CABG (2005) and multiple stents, moderate AS s/p TAVR with permanent pacemaker placed in 2017, upgraded to pacemaker/ICD in 2019 due to nonsustained VT, CHF s/p CardioMEMS insertion (June 2023), Afib  (Eliquis and Amiodarone), PVD, T2DM, dysphagia 2/2 Schatzki ring, colon CA s/p laparoscopic extended Right hemicolectomy (2021), HLD, COPD and BPH who was sent from his Cardiologist office (Dr. Gamino) for increasing SOB/MENDEZ.     #Acute on chronic CHF exacerbation   -Patient with signs of fluid overload  -Last TTE in chart 6/2024 - LVEF 50-55%  -Per patient and wife, patient had TTE done yesterday at [facility] - will try to obtain outpatient records  -Troponin and pBNP elevated  -Repeat Troponin ordered   -Continue Lasix 40mg IV BID  -Monitor urine output  -Strict I&Os  -Daily weights  -DASH diet with 1.5L fluid restriction  -Wean supplemental O2 as tolerated with goal SpO2 88-92%  -Cardiology consulted  -Consider CardioMEMS evaluation     #CKD stage 3b  -BUN/Cr 35.4/1.56 (baseline Cr ~1.22 per chart review)  -Continue to monitor  -Avoid nephrotoxic agents  -Renally dose meds    #Chronic AFib   -Continue Eliquis 2.5mg BID  -Continue Amiodarone 200mg daily    #Dysphagia s/p EGD showing Schitzaki Ring (8/2024)  -Patient tolerating PO intake  -Continue DASH diet with fluid restriction  -Aspiration precautions    #T2DM  -Hold home Prandin 1mg daily - per outpatient Endo notes stopped 2/2 to CKD  -Outpatient Endo follow up for trial of Januvia  -SSI?    #HLD  -Continue Ezetimibe 10mg daily    #COPD  -Continue Advair while inpatient    #BPD/urinary retention  -Continue finasteride 5mg daily  -Home medication Alfuzosin, continue Flomax while inpatient     DVT ppx: SCDs, Eliquis  Diet: DASH diet  Dispo: Admit to tele. Pending diuresis and PT eval.    89Y M with extensive cardiac PMHx including CAD s/p CABG (2005) and multiple stents, moderate AS s/p TAVR with permanent pacemaker placed in 2017, upgraded to pacemaker/ICD in 2019 due to nonsustained VT, CHF s/p CardioMEMS insertion (June 2023), Afib  (Eliquis and Amiodarone), PVD, T2DM, dysphagia 2/2 Schatzki ring, colon CA s/p laparoscopic extended Right hemicolectomy (2021), HLD, COPD and BPH who was sent from his Cardiologist office (Dr. Gamino) for increasing SOB/MENDEZ.     #Acute on chronic CHF exacerbation   -Patient with signs of fluid overload, improved s/p x2 40mg IV Lasix   -Last TTE in chart 6/2024 - LVEF 50-55%  -Per patient and wife, patient had TTE done yesterday at [facility] - will try to obtain outpatient records  -Troponin and pBNP elevated  -Continue Lasix 40mg IV BID  -Responding well with net negative 550 urine output  -Monitor urine output  -Strict I&Os  -Daily weights  -DASH diet with 1.5L fluid restriction  -Wean supplemental O2 as tolerated with goal SpO2 88-92%  -Cardiology consulted  -Consider CardioMEMS evaluation   -CBC/BNP/Troponin - ordered    #CKD stage 3b  -BUN/Cr 35.4/1.56 (baseline Cr ~1.22 per chart review)  -Continue to monitor  -Avoid nephrotoxic agents  -Renally dose meds    #Chronic AFib   -Continue Eliquis 2.5mg BID  -Continue Amiodarone 200mg daily    #Dysphagia s/p EGD showing Schitzaki Ring (8/2024)  -Patient tolerating PO intake  -Continue DASH diet with fluid restriction  -Aspiration precautions    #T2DM  -Hold home Prandin 1mg daily - per outpatient Endo notes stopped 2/2 to CKD  -Outpatient Endo follow up for trial of Januvia  -SSI?    #HLD  -Continue Ezetimibe 10mg daily    #COPD  -Continue Advair while inpatient    #BPD/urinary retention  -Continue finasteride 5mg daily  -Home medication Alfuzosin, continue Flomax while inpatient     DVT ppx: SCDs, Eliquis  Diet: DASH diet  Dispo: Admit to tele. Pending diuresis and PT eval.    89Y M with extensive cardiac PMHx including CAD s/p CABG (2005) and multiple stents, moderate AS s/p TAVR with permanent pacemaker placed in 2017, upgraded to pacemaker/ICD in 2019 due to nonsustained VT, CHF s/p CardioMEMS insertion (June 2023), Afib  (Eliquis and Amiodarone), PVD, T2DM, dysphagia 2/2 Schatzki ring, colon CA s/p laparoscopic extended Right hemicolectomy (2021), HLD, COPD and BPH who was sent from his Cardiologist office (Dr. Gamino) for increasing SOB/MENDEZ.     #Acute on chronic CHF exacerbation   -Patient with signs of fluid overload, improved s/p x2 40mg IV Lasix   -Last TTE in chart 6/2024 - LVEF 50-55%  -Per patient and wife, patient had TTE done yesterday at [facility] - will try to obtain outpatient records  -Troponin and pBNP elevated  -Continue Lasix 40mg IV BID  -Responding well with net negative 550 urine output  -Monitor urine output  -Strict I&Os  -Daily weights  -Easy to chew DASH diet with 1.5L fluid restriction  -Wean supplemental O2 as tolerated with goal SpO2 88-92%  -Cardiology consulted  -Consider CardioMEMS evaluation   -CBC/BMP/Troponin - ordered  -Monitor lytes goal K>4, Mag >2  -Nutrition consulted    #CKD stage 3b  -BUN/Cr 35.4/1.56 (baseline Cr ~1.22 per chart review)  -Continue to monitor  -Daily BMP  -Avoid nephrotoxic agents  -Renally dose meds    #Chronic AFib   -Continue Eliquis 2.5mg BID  -Continue Amiodarone 200mg daily    #Dysphagia s/p EGD showing Schitzaki Ring (8/2024)  -Patient tolerating PO intake  -Continue easy to chew DASH diet with fluid restriction  -Aspiration precautions    #T2DM  -Hold home Prandin 1mg daily - per outpatient Endo notes stopped 2/2 to CKD  -Outpatient Endo follow up for trial of Januvia  -Low-dose SSI for the next 24 hours to evaluate insulin requirement to determine basal/bolus regimen    #HLD  -Continue Ezetimibe 10mg daily    #COPD  -Continue Advair while inpatient    #BPD/urinary retention  -Continue finasteride 5mg daily  -Home medication Alfuzosin, continue Flomax while inpatient     DVT ppx: SCDs, Eliquis  Diet: Easy to chew DASH diet with 1.5L fluid restriction  Dispo: Admit to tele. Pending diuresis and PT eval.    89Y M with extensive cardiac PMHx including CAD s/p CABG (2005) and multiple stents, moderate AS s/p TAVR with permanent pacemaker placed in 2017, upgraded to pacemaker/ICD in 2019 due to nonsustained VT, CHF s/p CardioMEMS insertion (June 2023), Afib  (Eliquis and Amiodarone), PVD, T2DM, dysphagia 2/2 Schatzki ring, colon CA s/p laparoscopic extended Right hemicolectomy (2021), HLD, COPD and BPH who was sent from his Cardiologist office (Dr. Gamino) for increasing SOB/MENDEZ.     #Acute on chronic CHF exacerbation   -Patient with signs of fluid overload, improved s/p x2 40mg IV Lasix   -Last TTE in chart 6/2024 - LVEF 50-55%  -Per patient and wife, patient had TTE done yesterday at [facility] - will try to obtain outpatient records  -There may be component of RHF/Pulmonary HTN given bilateral pitting edema in LLE with no obvious findings on lung examination  -Troponin and pBNP elevated  -Continue Lasix 40mg IV BID  -Responding well with net negative 550 urine output  -Monitor urine output  -Strict I&Os  -Daily weights  -Easy to chew DASH diet with 1.5L fluid restriction  -Wean supplemental O2 as tolerated with goal SpO2 88-92%  -Patient does not require CPAP/BiPAP overnight at home  -Cardiology consulted  -Consider CardioMEMS evaluation   -CBC/BMP/Troponin - ordered  -Monitor lytes goal K>4, Mag >2  -Nutrition consulted    #CKD stage 3b  -BUN/Cr 35.4/1.56 (baseline Cr ~1.22 per chart review)  -Continue to monitor  -Daily BMP  -Avoid nephrotoxic agents  -Renally dose meds    #Chronic AFib   -Continue Eliquis 2.5mg BID  -Continue Amiodarone 200mg daily    #Dysphagia s/p EGD showing Schitzaki Ring (8/2024)  -Patient tolerating PO intake  -Continue easy to chew DASH diet with fluid restriction  -Aspiration precautions    #T2DM  -Hold home Prandin 1mg daily - per outpatient Endo notes stopped 2/2 to CKD  -Outpatient Endo follow up for trial of Januvia  -Low-dose SSI for the next 24 hours to evaluate insulin requirement to determine basal/bolus regimen    #HLD  -Continue Ezetimibe 10mg daily    #COPD  -Continue Advair while inpatient    #BPD/urinary retention  -Continue finasteride 5mg daily  -Home medication Alfuzosin, continue Flomax while inpatient     DVT ppx: SCDs, Eliquis  Diet: Easy to chew DASH diet with 1.5L fluid restriction  Dispo: Admit to tele. Pending diuresis and PT eval.    89Y M with extensive cardiac PMHx including CAD s/p CABG (2005) and multiple stents, moderate AS s/p TAVR with permanent pacemaker placed in 2017, upgraded to pacemaker/ICD in 2019 due to nonsustained VT, CHF s/p CardioMEMS insertion (June 2023), Afib  (Eliquis and Amiodarone), PVD, T2DM, dysphagia 2/2 Schatzki ring, colon CA s/p laparoscopic extended Right hemicolectomy (2021), HLD, COPD and BPH who was sent from his Cardiologist office (Dr. Gamino) for increasing SOB/MENDEZ.     #Acute on chronic CHF exacerbation   -Patient with signs of fluid overload, improved s/p x2 40mg IV Lasix   -Last TTE in chart 6/2024 - LVEF 50-55%  -Per patient and wife, patient had TTE done yesterday at [facility] -obtain outpatient records  -There may be component of Pulmonary HTN given bilateral pitting edema being out of proportion to lung exam/CXR  -Continue Lasix 40mg IV BID  -Troponin due to elevated EDV from deocmpensated CHF, repeat flat, not ACS  -Responding well with net negative 550 urine output  -Monitor urine output  -Strict I&Os  -Daily weights  -Easy to chew DASH diet with 1.5L fluid restriction  -Wean supplemental O2 as tolerated with goal SpO2 88-92%  -Patient does not use NIV at home  -Cardiology consulted  -Obtain results from o/p echo   -CardioMEMS interrogation per Cardio  -CBC/BMP/Troponin - ordered  -Monitor lytes goal K>4, Mag >2  -Nutrition consulted for education/assessment     #CKD stage 3b  -BUN/Cr 35.4/1.56 (baseline Cr ~1.22 per chart review)  -Continue to monitor  -Daily BMP  -Avoid nephrotoxic agents  -Renally dose meds    #Chronic AFib   -Continue Eliquis 2.5mg BID  -Continue Amiodarone 200mg daily  -Denies falls/bleeding    #Dysphagia s/p EGD showing Schitzaki Ring (8/2024)  -Patient tolerating PO intake  -Continue easy to chew DASH diet with fluid restriction  -Aspiration precautions    #T2DM  -Hold home Prandin 1mg daily - per outpatient Endo notes stopped 2/2 to CKD  -Outpatient Endo follow up for trial of Januvia  -Low-dose SSI for the next 24 hours to evaluate insulin requirement to determine basal/bolus regimen    #HLD  -Continue Ezetimibe 10mg daily    #COPD  -Continue Advair while inpatient    #BPD/urinary retention  -Denies LUTs  -Continue finasteride 5mg daily  -Home medication Alfuzosin, continue Flomax while inpatient     DVT ppx: SCDs, Eliquis  Diet: Easy to chew DASH diet with 1.5L fluid restriction  Dispo: Home pending Cardiology f/u, resolution of decompensated CHF, Anticipated LOS 1-2 days

## 2024-11-12 NOTE — H&P ADULT - NSICDXPASTMEDICALHX_GEN_ALL_CORE_FT
PAST MEDICAL HISTORY:  Abdominal hernia     Abnormal positron emission tomography (PET) scan 2019 anterior, anteroseptal,anterolateral and iferolateral ischemia    Afib     Age-related incipient cataract of both eyes     Angina pectoris class II    Aortic stenosis TAVR #29 Medtronic core valve 2017    Asthma     BPH (benign prostatic hyperplasia)     CAD (coronary artery disease) S/P Stenting x 12 last stent 3/2019    Colon cancer     COPD, 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    Diabetes mellitus     MENDEZ (dyspnea on exertion)     DVT (deep venous thrombosis) left leg    Former smoker     GI bleed     H/O urinary frequency     Hyperlipidemia     MR (mitral regurgitation) moderate      FANNY (obstructive sleep apnea) uses mouth peice    Osteoarthritis     PVD (peripheral vascular disease)     PVD (peripheral vascular disease) R SFA and L SFA w/stents and PTA    Schatzki's ring     SOB (shortness of breath)     Spinal stenosis     Statin intolerance

## 2024-11-12 NOTE — H&P ADULT - NSHPPHYSICALEXAM_GEN_ALL_CORE
Vital Signs Last 24 Hrs  T(C): 36.3 (12 Nov 2024 02:17), Max: 36.7 (11 Nov 2024 17:10)  T(F): 97.3 (12 Nov 2024 02:17), Max: 98 (11 Nov 2024 17:10)  HR: 75 (12 Nov 2024 02:17) (73 - 75)  BP: 124/63 (12 Nov 2024 02:17) (123/60 - 124/63)  BP(mean): --  RR: 18 (12 Nov 2024 02:17) (18 - 20)  SpO2: 99% (12 Nov 2024 02:17) (96% - 99%)    Parameters below as of 12 Nov 2024 02:17  Patient On (Oxygen Delivery Method): nasal cannula  O2 Flow (L/min): 2    GENERAL: no acute distress, comfortably in bed  HEAD: NC/AT  EYES: EOMI, Non-icteric, cataracts  ENT: Mucous membranes moist, neck supple, bilateral JVD noted  NEURO: No focal deficits, moving all extremities spontaneously, A&Ox3, no dysarthria  PSYCH: Normal affect, calm, appropriate insight and judgment, fluent speech  RESP: CTAB, non-labored breathing  CVS: RRR, no murmur appreciated  GI: Soft, non-tender, non-distended, +bs all 4 quadrants  : No suprapubic tenderness  EXTREMITIES: Bilateral 2+ pitting edema to mid-thigh, tenderness to palpation bilaterally, no erythema, no warmth

## 2024-11-12 NOTE — PATIENT PROFILE ADULT - FALL HARM RISK - RISK INTERVENTIONS

## 2024-11-12 NOTE — H&P ADULT - NSICDXPASTSURGICALHX_GEN_ALL_CORE_FT
PAST SURGICAL HISTORY:  H/O cystoscopy     History of CEA (carotid endarterectomy) Bilateral    History of permanent cardiac pacemaker placement [employer]    S/P appendectomy     S/P CABG x 2 LIMA LAD SVG OM1    S/P cataract surgery, left     S/P cataract surgery, right     S/P cholecystectomy     S/P cholecystectomy     S/P hernia repair     S/P ICD (internal cardiac defibrillator) procedure     S/P joint replacement Bilateral knee replacement    S/P nasal surgery Secondary to nasal fracture    S/P PTCA (percutaneous transluminal coronary angioplasty) x 9    S/P tonsillectomy     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 #29 Medtronic core valve

## 2024-11-12 NOTE — PATIENT PROFILE ADULT - HAVE YOU BEEN EATING POORLY BECAUSE OF A DECREASED APPETITE?
Pt sent to [facility] for thoracentesis in stable condition. Daughter at bedside. Tele and monitoring removed. No (0)

## 2024-11-12 NOTE — CONSULT NOTE ADULT - SUBJECTIVE AND OBJECTIVE BOX
[facility], THE [facility]ART [facility]ENTER                                   [address] [place] [address]                                                      PHONE: [phone]                                                         FAX: [phone]  [email or web address]  ---------------------------------------------------------------------------------------------------------------------------------    Reason for Consult: + trop and CAD     HPI:  TIFFANIE ZELAYA is an 89y Male with history of CAD s/p CABG (2005) and multiple stents, severe AS s/p TAVR with SSS s/p permanent pacemaker placed in 2017, upgraded to pacemaker/ICD in 2019 due to nonsustained VT, HFpEF s/p CardioMEMS insertion (June 2023), AF (Eliquis and Amiodarone), PVD, T2DM, dysphagia 2/2 Schatzki ring, colon CA s/p laparoscopic extended Right hemicolectomy (2021), HLD, COPD, CKD stage IIIb and BPH who was sent from his Cardiologist office (Dr. Gamino) [facility] due to increasing SOB/MENDEZ. Per patient, he has felt increasing SOB over the past 2 weeks. Patient typically takes 40mg of Lasix daily, but in the last few days he has increased to 80mg daily without improvement. Patient states that he has called CardioMEMS daily to check on status and was told that he was within his goal pulmonary artery pressure of 17-19. Patient reports that his symptoms continued to worsen which prompted him to see Dr. Gamino on 11/11. Patient checks his daily weight and at baseline is ~162, gaining 10 pounds over the past 3 weeks. Denies chest pain, nausea, vomiting, abdominal pain, dysuria, hematemesis, hematochezia, numbness, tingling. Patient feeling much better today after IV Lasix          PAST MEDICAL & SURGICAL HISTORY:  Hyperlipidemia      Diabetes mellitus      Aortic stenosis  TAVR #29 Medtronic core valve 2017      Asthma      CAD (coronary artery disease)  S/P Stenting x 12 last stent 3/2019      FANNY (obstructive sleep apnea)  uses mouth peice      PVD (peripheral vascular disease)      Angina pectoris  class II      BPH (benign prostatic hyperplasia)      SOB (shortness of breath)      MENDEZ (dyspnea on exertion)      Age-related incipient cataract of both eyes      Osteoarthritis      Abnormal positron emission tomography (PET) scan  2019 anterior, anteroseptal,anterolateral and iferolateral ischemia      MR (mitral regurgitation)  moderate        COPD, mild  contolled never intubated      H/O urinary frequency      Former smoker      PVD (peripheral vascular disease)  R SFA and L SFA w/stents and PTA      Statin intolerance      Afib      Colon cancer      Schatzki's ring      Spinal stenosis      GI bleed      DVT (deep venous thrombosis)  left leg      Abdominal hernia      S/P tonsillectomy      S/P cholecystectomy      History of CEA (carotid endarterectomy)  Bilateral      S/P cataract surgery, left      S/P cataract surgery, right      S/P joint replacement  Bilateral knee replacement      S/P nasal surgery  Secondary to nasal fracture      S/P PTCA (percutaneous transluminal coronary angioplasty)  x 9      S/P CABG x 2  LIMA LAD SVG OM1      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  #29 Medtronic core valve      History of permanent cardiac pacemaker placement  Visionnaire      S/P hernia repair      S/P ICD (internal cardiac defibrillator) procedure      S/P appendectomy      S/P cholecystectomy      H/O cystoscopy          adhesives (Rash)  statins (Muscle Pain)      MEDICATIONS  (STANDING):  ALPRAZolam 0.25 milliGRAM(s) Oral at bedtime  aMIOdarone    Tablet 200 milliGRAM(s) Oral daily  apixaban 2.5 milliGRAM(s) Oral every 12 hours  dapagliflozin 10 milliGRAM(s) Oral daily  dextrose 5%. 1000 milliLiter(s) (50 mL/Hr) IV Continuous <Continuous>  dextrose 5%. 1000 milliLiter(s) (100 mL/Hr) IV Continuous <Continuous>  dextrose 50% Injectable 25 Gram(s) IV Push once  dextrose 50% Injectable 25 Gram(s) IV Push once  dextrose 50% Injectable 12.5 Gram(s) IV Push once  ezetimibe 10 milliGRAM(s) Oral at bedtime  finasteride 5 milliGRAM(s) Oral daily  fluticasone propionate/ salmeterol 100-50 MICROgram(s) Diskus 1 Dose(s) Inhalation two times a day  furosemide   Injectable 40 milliGRAM(s) IV Push two times a day  glucagon  Injectable 1 milliGRAM(s) IntraMuscular once  influenza  Vaccine (HIGH DOSE) 0.5 milliLiter(s) IntraMuscular once  insulin lispro (ADMELOG) corrective regimen sliding scale   SubCutaneous three times a day before meals  latanoprost 0.005% Ophthalmic Solution 1 Drop(s) Both EYES at bedtime  multivitamin 1 Tablet(s) Oral daily  tamsulosin 0.8 milliGRAM(s) Oral at bedtime    MEDICATIONS  (PRN):  acetaminophen     Tablet .. 650 milliGRAM(s) Oral every 6 hours PRN Temp greater or equal to 38C (100.4F), Mild Pain (1 - 3)  aluminum hydroxide/magnesium hydroxide/simethicone Suspension 30 milliLiter(s) Oral every 4 hours PRN Dyspepsia  dextrose Oral Gel 15 Gram(s) Oral once PRN Blood Glucose LESS THAN 70 milliGRAM(s)/deciliter  melatonin 3 milliGRAM(s) Oral at bedtime PRN Insomnia  ondansetron Injectable 4 milliGRAM(s) IV Push every 8 hours PRN Nausea and/or Vomiting  senna 2 Tablet(s) Oral at bedtime PRN Constipation      Social History:  Cigarettes:         none           Alchohol:       none          Illicit Drug Abuse:  none    ROS: Negative other than as mentioned in HPI.    Vital Signs Last 24 Hrs  T(C): 36.4 (12 Nov 2024 08:15), Max: 36.7 (11 Nov 2024 17:10)  T(F): 97.6 (12 Nov 2024 08:15), Max: 98 (11 Nov 2024 17:10)  HR: 72 (12 Nov 2024 08:19) (69 - 79)  BP: 113/57 (12 Nov 2024 08:15) (110/63 - 124/63)  BP(mean): --  RR: 18 (12 Nov 2024 08:15) (18 - 20)  SpO2: 95% (12 Nov 2024 08:15) (95% - 99%)    Parameters below as of 12 Nov 2024 08:19  Patient On (Oxygen Delivery Method): room air      ICU Vital Signs Last 24 Hrs  TIFFANIE ZELAYA  I&O's Detail    11 Nov 2024 07:01  -  12 Nov 2024 07:00  --------------------------------------------------------  IN:  Total IN: 0 mL    OUT:    Voided (mL): 550 mL  Total OUT: 550 mL    Total NET: -550 mL        I&O's Summary    11 Nov 2024 07:01  -  12 Nov 2024 07:00  --------------------------------------------------------  IN: 0 mL / OUT: 550 mL / NET: -550 mL      Drug Dosing Weight  TIFFANIE ZELAYA      PHYSICAL EXAM:  General: Appears well developed, alert and cooperative.  HEENT: Head; normocephalic, atraumatic.  Eyes: Pupils reactive, cornea wnl.  Neck: Supple, no nodes adenopathy, no NVD or carotid bruit or thyromegaly.  CARDIOVASCULAR: Normal S1 and S2, 1/6 murmur, rub, gallop or lift.   LUNGS: Decrease BS at the lower bases   ABDOMEN: Soft, nontender without mass or organomegaly. bowel sounds normoactive.  EXTREMITIES: No clubbing, cyanosis o++ edema. Distal pulses wnl.   SKIN: warm and dry with normal turgor.  NEURO: Alert/oriented x 3/normal motor exam. No pathologic reflexes.    PSYCH: normal affect.        LABS:                        11.5   6.99  )-----------( 150      ( 11 Nov 2024 20:54 )             35.1     11-11    141  |  99  |  35.4[H]  ----------------------------<  102[H]  3.6   |  34.0[H]  |  1.56[H]    Ca    9.1      11 Nov 2024 20:54  Mg     2.0     11-12    TPro  6.6  /  Alb  3.9  /  TBili  0.7  /  DBili  x   /  AST  28  /  ALT  20  /  AlkPhos  70  11-11    TIFFANIE ZELAYA      PT/INR - ( 11 Nov 2024 20:54 )   PT: 15.1 sec;   INR: 1.31 ratio         PTT - ( 11 Nov 2024 20:54 )  PTT:30.6 sec  Urinalysis Basic - ( 11 Nov 2024 20:54 )    Color: x / Appearance: x / SG: x / pH: x  Gluc: 102 mg/dL / Ketone: x  / Bili: x / Urobili: x   Blood: x / Protein: x / Nitrite: x   Leuk Esterase: x / RBC: x / WBC x   Sq Epi: x / Non Sq Epi: x / Bacteria: x        RADIOLOGY & ADDITIONAL STUDIES:    INTERPRETATION OF TELEMETRY (personally reviewed):    ECG:    < from: 12 Lead ECG (11.11.24 @ 17:21) >    Diagnosis Line Atrial-sensed ventricular-paced rhythm  Abnormal ECG  When compared with ECG of 05-JUN-2023 11:19,  Vent. rate has increased BY  16 BPM  Confirmed by МАРИЯ BLEDSOE ([id number]) on 11/12/2024 8:27:13 AM    < end of copied text >      ECHO: Pending     CARDIAC CATHETERIZATION:  < from: Cardiac Catheterization (06.05.23 @ 16:08) >    Cath Lab Report    Diagnostic Cardiologist:       Kem Mcgill MD   Interventional Cardiologist:   Kem Mcgill MD   PV Diagnostic Cardiologist:    Kem Mcgill MD     Procedures Performed   Procedures:              1.    Ultrasound Guided Access     2.    Perclose   3.    RHC   4.    Venous Access - Right Femoral   5.    PA Angiography   6.    CardioMems Implant     Diagnostic Conclusions:     Severe pulmonary HTN.   L PA gram.   Cardiomems.   Recommendations:     1-2 weeks at [facility].     Hemodynamic:           Hemodynamic Impressions     < end of copied text >      < from: Cardiac Cath Lab - Adult (02.26.20 @ 11:49) >  VENTRICLES: No left ventriculogram was performed.  CORONARY VESSELS: The coronary circulation is co-dominant.  LM:   --  LM: Stents open with distal 30%.  LAD:   --  LAD: The LAD is open with open stents with diffuse distal  disease.  CX:   --  Proximal circumflex: There was a 75 % stenosis.  RCA:   --  RCA: This vessel was not injected.  COMPLICATIONS: There were no complications.  DIAGNOSTIC IMPRESSIONS: There is significant ISR of the distalLM and  proximal LCx of 75% with good runoff.  DIAGNOSTIC RECOMMENDATIONS: In view of the clinical history of recurrent  ISR and the angiographic findings, PCI and Brachytherapy will be  performed.  INTERVENTIONAL IMPRESSIONS: A successful IVUS guided PCI of the LM and LCx  was performed followed by Brachytherapy delivering 23Gr for 5min and  56sec. The lesion was reduced to 5%.  INTERVENTIONAL RECOMMENDATIONS: Continue DAPT and risk factor modification  with high intensity statin therapy.  Prepared and signed by    < end of copied text >      Assessment and Plan:  In summary, TIFFANIE ZELAYA is an 89y Male with past medical history significant for history of CAD s/p CABG (2005) and multiple stents, severe AS s/p TAVR with SSS s/p permanent pacemaker placed in 2017, upgraded to pacemaker/ICD in 2019 due to nonsustained VT, HFpEF s/p CardioMEMS insertion (June 2023), AF (Eliquis and Amiodarone), PVD, T2DM, dysphagia 2/2 Schatzki ring, colon CA s/p laparoscopic extended Right hemicolectomy (2021), HLD, COPD, CKD stage IIIb and BPH who was sent from his Cardiologist office (Dr. Gamino) [facility] due to increasing SOB/MENDEZ. Per patient, he has felt increasing SOB over the past 2 weeks. Patient typically takes 40mg of Lasix daily, but in the last few days he has increased to 80mg daily without improvement. Patient states that he has called CardioMEMS daily to check on status and was told that he was within his goal pulmonary artery pressure of 17-19. Patient reports that his symptoms continued to worsen which prompted him to see Dr. Gamino on 11/11. Patient checks his daily weight and at baseline is ~162, gaining 10 pounds over the past 3 weeks. Denies chest pain, nausea, vomiting, abdominal pain, dysuria, hematemesis, hematochezia, numbness, tingling. Patient feeling much better today after IV Lasix        Acute on chronic HFpEF in setting of CKD     + trop not C/W with ACS     TTE 8/2023 normal EF DDD LVH TAVR mean systolic gradient 3 mmHg without effusion       Plan  Monitor on TELE   Awaiting TTE results that was done at [facility] on 11/11/2024  Agree with IV Lasix  Monitor renal panel daily   FARXIGA for HFpEF/CKD   Continue other CV medications

## 2024-11-12 NOTE — ED ADULT NURSE NOTE - NSICDXPASTSURGICALHX_GEN_ALL_CORE_FT
PAST SURGICAL HISTORY:  H/O cystoscopy     History of CEA (carotid endarterectomy) Bilateral    History of permanent cardiac pacemaker placement [employer]    S/P appendectomy     S/P CABG x 2 LIMA LAD SVG OM1    S/P cataract surgery, left     S/P cataract surgery, right     S/P cholecystectomy     S/P cholecystectomy     S/P hernia repair     S/P ICD (internal cardiac defibrillator) procedure     S/P joint replacement Bilateral knee replacement    S/P nasal surgery Secondary to nasal fracture    S/P PTCA (percutaneous transluminal coronary angioplasty) x 9    S/P tonsillectomy     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 #29 Medtronic core valve

## 2024-11-13 ENCOUNTER — TRANSCRIPTION ENCOUNTER (OUTPATIENT)
Age: 89
End: 2024-11-13

## 2024-11-13 ENCOUNTER — NON-APPOINTMENT (OUTPATIENT)
Age: 89
End: 2024-11-13

## 2024-11-13 VITALS
OXYGEN SATURATION: 94 % | TEMPERATURE: 97 F | RESPIRATION RATE: 18 BRPM | HEART RATE: 76 BPM | SYSTOLIC BLOOD PRESSURE: 118 MMHG | DIASTOLIC BLOOD PRESSURE: 68 MMHG

## 2024-11-13 LAB
A1C WITH ESTIMATED AVERAGE GLUCOSE RESULT: 7.2 % — HIGH (ref 4–5.6)
ANION GAP SERPL CALC-SCNC: 14 MMOL/L — SIGNIFICANT CHANGE UP (ref 5–17)
BASOPHILS # BLD AUTO: 0.03 K/UL — SIGNIFICANT CHANGE UP (ref 0–0.2)
BASOPHILS NFR BLD AUTO: 0.3 % — SIGNIFICANT CHANGE UP (ref 0–2)
BUN SERPL-MCNC: 38 MG/DL — HIGH (ref 8–20)
CALCIUM SERPL-MCNC: 8.6 MG/DL — SIGNIFICANT CHANGE UP (ref 8.4–10.5)
CHLORIDE SERPL-SCNC: 98 MMOL/L — SIGNIFICANT CHANGE UP (ref 96–108)
CO2 SERPL-SCNC: 33 MMOL/L — HIGH (ref 22–29)
CREAT SERPL-MCNC: 2.02 MG/DL — HIGH (ref 0.5–1.3)
EGFR: 30 ML/MIN/1.73M2 — LOW
EOSINOPHIL # BLD AUTO: 0.17 K/UL — SIGNIFICANT CHANGE UP (ref 0–0.5)
EOSINOPHIL NFR BLD AUTO: 1.7 % — SIGNIFICANT CHANGE UP (ref 0–6)
ESTIMATED AVERAGE GLUCOSE: 160 MG/DL — HIGH (ref 68–114)
GLUCOSE BLDC GLUCOMTR-MCNC: 148 MG/DL — HIGH (ref 70–99)
GLUCOSE BLDC GLUCOMTR-MCNC: 204 MG/DL — HIGH (ref 70–99)
GLUCOSE SERPL-MCNC: 143 MG/DL — HIGH (ref 70–99)
HCT VFR BLD CALC: 38.8 % — LOW (ref 39–50)
HGB BLD-MCNC: 12.6 G/DL — LOW (ref 13–17)
IMM GRANULOCYTES NFR BLD AUTO: 0.3 % — SIGNIFICANT CHANGE UP (ref 0–0.9)
LYMPHOCYTES # BLD AUTO: 1.92 K/UL — SIGNIFICANT CHANGE UP (ref 1–3.3)
LYMPHOCYTES # BLD AUTO: 19.5 % — SIGNIFICANT CHANGE UP (ref 13–44)
MAGNESIUM SERPL-MCNC: 2.3 MG/DL — SIGNIFICANT CHANGE UP (ref 1.6–2.6)
MCHC RBC-ENTMCNC: 32.5 G/DL — SIGNIFICANT CHANGE UP (ref 32–36)
MCHC RBC-ENTMCNC: 33.5 PG — SIGNIFICANT CHANGE UP (ref 27–34)
MCV RBC AUTO: 103.2 FL — HIGH (ref 80–100)
MONOCYTES # BLD AUTO: 0.96 K/UL — HIGH (ref 0–0.9)
MONOCYTES NFR BLD AUTO: 9.7 % — SIGNIFICANT CHANGE UP (ref 2–14)
NEUTROPHILS # BLD AUTO: 6.74 K/UL — SIGNIFICANT CHANGE UP (ref 1.8–7.4)
NEUTROPHILS NFR BLD AUTO: 68.5 % — SIGNIFICANT CHANGE UP (ref 43–77)
PLATELET # BLD AUTO: 193 K/UL — SIGNIFICANT CHANGE UP (ref 150–400)
POTASSIUM SERPL-MCNC: 3.7 MMOL/L — SIGNIFICANT CHANGE UP (ref 3.5–5.3)
POTASSIUM SERPL-SCNC: 3.7 MMOL/L — SIGNIFICANT CHANGE UP (ref 3.5–5.3)
RBC # BLD: 3.76 M/UL — LOW (ref 4.2–5.8)
RBC # FLD: 14.3 % — SIGNIFICANT CHANGE UP (ref 10.3–14.5)
SODIUM SERPL-SCNC: 145 MMOL/L — SIGNIFICANT CHANGE UP (ref 135–145)
TROPONIN T, HIGH SENSITIVITY RESULT: 61 NG/L — HIGH (ref 0–51)
WBC # BLD: 9.85 K/UL — SIGNIFICANT CHANGE UP (ref 3.8–10.5)
WBC # FLD AUTO: 9.85 K/UL — SIGNIFICANT CHANGE UP (ref 3.8–10.5)

## 2024-11-13 PROCEDURE — 99239 HOSP IP/OBS DSCHRG MGMT >30: CPT

## 2024-11-13 RX ORDER — TORSEMIDE 100 MG/1
1 TABLET ORAL
Qty: 30 | Refills: 0
Start: 2024-11-13 | End: 2024-12-12

## 2024-11-13 RX ORDER — DIPHENHYDRAMINE HCL 12.5MG/5ML
25 ELIXIR ORAL ONCE
Refills: 0 | Status: COMPLETED | OUTPATIENT
Start: 2024-11-13 | End: 2024-11-13

## 2024-11-13 RX ORDER — FUROSEMIDE 40 MG
1 TABLET ORAL
Refills: 0 | DISCHARGE

## 2024-11-13 RX ORDER — DAPAGLIFLOZIN 10 MG/1
1 TABLET, FILM COATED ORAL
Qty: 30 | Refills: 0
Start: 2024-11-13 | End: 2024-12-12

## 2024-11-13 RX ADMIN — FINASTERIDE 5 MILLIGRAM(S): 5 TABLET, FILM COATED ORAL at 13:03

## 2024-11-13 RX ADMIN — Medication 40 MILLIGRAM(S): at 06:44

## 2024-11-13 RX ADMIN — Medication 2: at 13:03

## 2024-11-13 RX ADMIN — Medication 1 DROP(S): at 00:22

## 2024-11-13 RX ADMIN — Medication 25 MILLIGRAM(S): at 13:03

## 2024-11-13 RX ADMIN — APIXABAN 2.5 MILLIGRAM(S): 5 TABLET, FILM COATED ORAL at 09:53

## 2024-11-13 RX ADMIN — DAPAGLIFLOZIN 10 MILLIGRAM(S): 10 TABLET, FILM COATED ORAL at 13:04

## 2024-11-13 RX ADMIN — Medication 1 TABLET(S): at 13:03

## 2024-11-13 RX ADMIN — Medication 0.25 MILLIGRAM(S): at 00:21

## 2024-11-13 RX ADMIN — Medication 200 MILLIGRAM(S): at 06:44

## 2024-11-13 NOTE — CHART NOTE - NSCHARTNOTEFT_GEN_A_CORE
Pt with x4 beats NSVT  Resting comfortably in NAD   Denies chest pain, SOB, palpitations, n/v/d/c, abdominal pain, HA, dizziness, blurry vision  All vital signs stable  F/u am labs  RN to notify of any acute change in pt status

## 2024-11-13 NOTE — DISCHARGE NOTE PROVIDER - NSDCCPCAREPLAN_GEN_ALL_CORE_FT
PRINCIPAL DISCHARGE DIAGNOSIS  Diagnosis: Acute on chronic systolic congestive heart failure  Assessment and Plan of Treatment: - Admitted with acute CHF exacerbation.  - TTE revealed reduced EF.   - Volume status and respiratory status improved with diuresis.   - Evalauted by cardiology, please continue with cardiac medications as directed and follow up outpatient with Cardiology and your PCP.      SECONDARY DISCHARGE DIAGNOSES  Diagnosis: Stage 3 chronic kidney disease  Assessment and Plan of Treatment: - Renal function monitored closely and remained stable.   - Follow up outpatient with your PCP and Nephrologist for monitoring.    Diagnosis: Chronic atrial fibrillation  Assessment and Plan of Treatment: - Continued on home medications.   - Continue medications as directed and follow up outpatient with your PCP and Cardiology.

## 2024-11-13 NOTE — DISCHARGE NOTE NURSING/CASE MANAGEMENT/SOCIAL WORK - FINANCIAL ASSISTANCE
[facility] provides services at a reduced cost to those who are determined to be eligible through [facility]’s financial assistance program. Information regarding [facility]’s financial assistance program can be found by going to [email or web address] or by calling [phone].

## 2024-11-13 NOTE — DISCHARGE NOTE PROVIDER - PROVIDER TOKENS
PROVIDER:[TOKEN:[[id number]],FOLLOWUP:[1-3 days]],PROVIDER:[TOKEN:[[id number]],FOLLOWUP:[1-3 days]]

## 2024-11-13 NOTE — DISCHARGE NOTE PROVIDER - NSDCMRMEDTOKEN_GEN_ALL_CORE_FT
alfuzosin 10 mg oral tablet, extended release: 1 tab(s) orally once a day (at bedtime)  amiodarone 200 mg oral tablet: 1 tab(s) orally once a day  Eliquis 2.5 mg oral tablet: 1 orally 2 times a day  finasteride 5 mg oral tablet: 1 tab(s) orally once a day  furosemide 40 mg oral tablet: 1 tab(s) orally once a day  latanoprost 0.005% ophthalmic solution: 1 drop(s) to each affected eye once a day (at bedtime)  multivitamin: 1 tab(s) orally once a day  repaglinide 1 mg oral tablet: 1 tab(s) orally 3 times a day (before meals)  Symbicort 160 mcg-4.5 mcg/inh inhalation aerosol: 1 puff(s) inhaled once a day  Xanax 0.25 mg oral tablet: 1 tablet orally once a day (at bedtime) as needed for  anxiety  Zetia 10 mg oral tablet: 1 tab(s) orally once a day   alfuzosin 10 mg oral tablet, extended release: 1 tab(s) orally once a day (at bedtime)  amiodarone 200 mg oral tablet: 1 tab(s) orally once a day  dapagliflozin 10 mg oral tablet: 1 tab(s) orally once a day  Eliquis 2.5 mg oral tablet: 1 orally 2 times a day  finasteride 5 mg oral tablet: 1 tab(s) orally once a day  latanoprost 0.005% ophthalmic solution: 1 drop(s) to each affected eye once a day (at bedtime)  multivitamin: 1 tab(s) orally once a day  repaglinide 1 mg oral tablet: 1 tab(s) orally 3 times a day (before meals)  Symbicort 160 mcg-4.5 mcg/inh inhalation aerosol: 1 puff(s) inhaled once a day  torsemide 20 mg oral tablet: 1 tab(s) orally once a day  Xanax 0.25 mg oral tablet: 1 tablet orally once a day (at bedtime) as needed for  anxiety  Zetia 10 mg oral tablet: 1 tab(s) orally once a day

## 2024-11-13 NOTE — DISCHARGE NOTE NURSING/CASE MANAGEMENT/SOCIAL WORK - NSDCFUADDAPPT_GEN_ALL_CORE_FT
[facility] will follow up with him post discharge.    Pcp- Appointment made with   on 11/20 1:30   at  [address] [place] [address].    If you are unable to attend your pre-scheduled appointment, please contact the office directly at [phone] to reschedule

## 2024-11-13 NOTE — DISCHARGE NOTE PROVIDER - ATTENDING DISCHARGE PHYSICAL EXAMINATION:
GENERAL: no acute distress, comfortably in bed  HEAD: NC/AT  EYES: EOMI, Non-icteric, cataracts  ENT: Mucous membranes moist, neck supple, bilateral JVD noted  NEURO: No focal deficits, moving all extremities spontaneously, A&Ox3, no dysarthria  PSYCH: Normal affect, calm, appropriate insight and judgment, fluent speech  RESP: CTAB, non-labored breathing  CVS: RRR, no murmur appreciated  GI: Soft, non-tender, non-distended, +bs all 4 quadrants  : No suprapubic tenderness  EXTREMITIES: Bilateral 2+ pitting edema to mid-thigh, tenderness to palpation bilaterally, no erythema, no warmth

## 2024-11-13 NOTE — PROGRESS NOTE ADULT - SUBJECTIVE AND OBJECTIVE BOX
[place] [facility] - [facility], THE [facility]ART CENTER                                   [address] [place] [address]                                                      PHONE: [phone]                                                         FAX: [phone]  [email or web address]  ---------------------------------------------------------------------------------------------------------------------------------    Overnight events/patient complaints:  Pt feels well no complaints. Edema has resolved    adhesives (Rash)  statins (Muscle Pain)    MEDICATIONS  (STANDING):  ALPRAZolam 0.25 milliGRAM(s) Oral at bedtime  aMIOdarone    Tablet 200 milliGRAM(s) Oral daily  apixaban 2.5 milliGRAM(s) Oral every 12 hours  dapagliflozin 10 milliGRAM(s) Oral daily  dextrose 5%. 1000 milliLiter(s) (100 mL/Hr) IV Continuous <Continuous>  dextrose 5%. 1000 milliLiter(s) (50 mL/Hr) IV Continuous <Continuous>  dextrose 50% Injectable 25 Gram(s) IV Push once  dextrose 50% Injectable 25 Gram(s) IV Push once  dextrose 50% Injectable 12.5 Gram(s) IV Push once  ezetimibe 10 milliGRAM(s) Oral at bedtime  finasteride 5 milliGRAM(s) Oral daily  fluticasone propionate/ salmeterol 100-50 MICROgram(s) Diskus 1 Dose(s) Inhalation two times a day  furosemide   Injectable 40 milliGRAM(s) IV Push two times a day  glucagon  Injectable 1 milliGRAM(s) IntraMuscular once  influenza  Vaccine (HIGH DOSE) 0.5 milliLiter(s) IntraMuscular once  insulin lispro (ADMELOG) corrective regimen sliding scale   SubCutaneous three times a day before meals  latanoprost 0.005% Ophthalmic Solution 1 Drop(s) Both EYES at bedtime  multivitamin 1 Tablet(s) Oral daily  tamsulosin 0.8 milliGRAM(s) Oral at bedtime    MEDICATIONS  (PRN):  acetaminophen     Tablet .. 650 milliGRAM(s) Oral every 6 hours PRN Temp greater or equal to 38C (100.4F), Mild Pain (1 - 3)  aluminum hydroxide/magnesium hydroxide/simethicone Suspension 30 milliLiter(s) Oral every 4 hours PRN Dyspepsia  dextrose Oral Gel 15 Gram(s) Oral once PRN Blood Glucose LESS THAN 70 milliGRAM(s)/deciliter  melatonin 3 milliGRAM(s) Oral at bedtime PRN Insomnia  ondansetron Injectable 4 milliGRAM(s) IV Push every 8 hours PRN Nausea and/or Vomiting  senna 2 Tablet(s) Oral at bedtime PRN Constipation      Vital Signs Last 24 Hrs  T(C): 36.4 (13 Nov 2024 07:47), Max: 36.8 (12 Nov 2024 19:38)  T(F): 97.6 (13 Nov 2024 07:47), Max: 98.3 (12 Nov 2024 19:38)  HR: 76 (13 Nov 2024 07:47) (74 - 81)  BP: 127/67 (13 Nov 2024 07:47) (107/56 - 138/69)  BP(mean): 87 (13 Nov 2024 07:47) (87 - 87)  RR: 18 (13 Nov 2024 07:47) (18 - 18)  SpO2: 93% (13 Nov 2024 07:47) (92% - 98%)    Parameters below as of 13 Nov 2024 07:47  Patient On (Oxygen Delivery Method): room air      ICU Vital Signs Last 24 Hrs  TIFFANIE ZELAYA  I&O's Detail    12 Nov 2024 07:01  -  13 Nov 2024 07:00  --------------------------------------------------------  IN:  Total IN: 0 mL    OUT:    Voided (mL): 1700 mL  Total OUT: 1700 mL    Total NET: -1700 mL        Drug Dosing Weight  TIFFANIE ZELAYA      PHYSICAL EXAM:  General:  alert and cooperative.  HEENT: Head; normocephalic, atraumatic.  Eyes: Pupils reactive, cornea wnl.  Neck: Supple, no nodes adenopathy, no NVD or carotid bruit or thyromegaly.  CARDIOVASCULAR: Normal S1 and S2, No murmur, rub, gallop or lift.   LUNGS: No rales, rhonchi or wheeze. Normal breath sounds bilaterally.  ABDOMEN: Soft, nontender without mass or organomegaly. bowel sounds normoactive.  EXTREMITIES: No clubbing, cyanosis or edema. Distal pulses wnl.   SKIN: warm and dry with normal turgor.  NEURO: Alert/oriented x 3/normal motor exam. No pathologic reflexes.    PSYCH: normal affect.        LABS:                        12.6   9.85  )-----------( 193      ( 13 Nov 2024 03:47 )             38.8     11-13    145  |  98  |  38.0[H]  ----------------------------<  143[H]  3.7   |  33.0[H]  |  2.02[H]    Ca    8.6      13 Nov 2024 03:47  Mg     2.3     11-13    TPro  6.6  /  Alb  3.9  /  TBili  0.7  /  DBili  x   /  AST  28  /  ALT  20  /  AlkPhos  70  11-11    TIFFANIE ZELAYA      PT/INR - ( 11 Nov 2024 20:54 )   PT: 15.1 sec;   INR: 1.31 ratio         PTT - ( 11 Nov 2024 20:54 )  PTT:30.6 sec  Urinalysis Basic - ( 13 Nov 2024 03:47 )    Color: x / Appearance: x / SG: x / pH: x  Gluc: 143 mg/dL / Ketone: x  / Bili: x / Urobili: x   Blood: x / Protein: x / Nitrite: x   Leuk Esterase: x / RBC: x / WBC x   Sq Epi: x / Non Sq Epi: x / Bacteria: x        RADIOLOGY & ADDITIONAL STUDIES:    INTERPRETATION OF TELEMETRY (personally reviewed): Vpaced no events       ECHO from office without significant abnormality        ASSESSMENT AND PLAN:  In summary, TIFFANIE ZELAYA is an 89y Male with past medical history significant for CAD s/p CABG (2005) and multiple stents, severe AS s/p TAVR with SSS s/p permanent pacemaker placed in 2017, upgraded to pacemaker/ICD in 2019 due to nonsustained VT, HFpEF s/p CardioMEMS insertion (June 2023), AF (Eliquis and Amiodarone), PVD, T2DM, dysphagia 2/2 Schatzki ring, colon CA s/p laparoscopic extended Right hemicolectomy (2021), HLD, COPD, CKD stage IIIb and BPH who was sent from his Cardiologist office (Dr. Mcgill) [facility] due to increasing SOB/MENDEZ. Per patient, he has felt increasing SOB over the past 2 weeks. Patient typically takes 40mg of Lasix daily, but in the last few  days he has increased to 80mg daily without improvement. Patient states that he has called CardioMEMS daily to check on status and was told that he was within his goal pulmonary artery pressure of 17-19. Patient reports that his symptoms continued to worsen which prompted him to see Dr. Mcgill on 11/11. Patient checks his daily weight and at baseline is ~162, gaining 10 pounds over the past 3 weeks. Denies chest pain, nausea, vomiting, abdominal pain, dysuria, hematemesis, hematochezia, numbness, tingling. Patient feeling much better today after IV Lasix      Pt sx resolved  Would discharge on torsemide 20mg with close monitoring of weights   ECHO resulted above  Cw farxiga will need to watch GFR closely  cw cardiac meds

## 2024-11-13 NOTE — DISCHARGE NOTE PROVIDER - HOSPITAL COURSE
89 year old male with a PMHx significant for CAD s/p CABG (2005) and multiple stents, severe AS s/p TAVR with SSS s/p permanent pacemaker placed in 2017, upgraded to pacemaker/ICD in 2019 due to nonsustained VT, HFpEF s/p CardioMEMS insertion (June 2023), AF (Eliquis and Amiodarone), PVD, T2DM, dysphagia 2/2 Schatzki ring, colon CA s/p laparoscopic extended Right hemicolectomy (2021), HLD, COPD, CKD stage IIIb and BPH, who presented to [facility] ED with c/o increasing SOB/MENDEZ. Patient states that he has called CardioMEMS daily to check on status and was told that he was within his goal pulmonary artery pressure of 17-19. In the ED, noted to be volume overloaded. Started on IV lasix and admitted for management of acute on chronic CHF exacerbation. Cardiology was consulted,     #Acute on chronic CHF exacerbation   -Patient with signs of fluid overload, improved s/p x2 40mg IV Lasix   -Last TTE in chart 6/2024 - LVEF 50-55%  -Per patient and wife, patient had TTE done yesterday at [facility] -obtain outpatient records  -There may be component of Pulmonary HTN given bilateral pitting edema being out of proportion to lung exam/CXR  -Continue Lasix 40mg IV BID  -Troponin due to elevated EDV from deocmpensated CHF, repeat flat, not ACS  -Responding well with net negative 550 urine output  -Monitor urine output  -Strict I&Os  -Daily weights  -Easy to chew DASH diet with 1.5L fluid restriction  -Wean supplemental O2 as tolerated with goal SpO2 88-92%  -Patient does not use NIV at home  -Cardiology consulted  -Obtain results from o/p echo   -CardioMEMS interrogation per Cardio  -CBC/BMP/Troponin - ordered  -Monitor lytes goal K>4, Mag >2  -Nutrition consulted for education/assessment     #CKD stage 3b  -BUN/Cr 35.4/1.56 (baseline Cr ~1.22 per chart review)  -Continue to monitor  -Daily BMP  -Avoid nephrotoxic agents  -Renally dose meds    #Chronic AFib   -Continue Eliquis 2.5mg BID  -Continue Amiodarone 200mg daily  -Denies falls/bleeding    #Dysphagia s/p EGD showing Schitzaki Ring (8/2024)  -Patient tolerating PO intake  -Continue easy to chew DASH diet with fluid restriction  -Aspiration precautions    #T2DM  -Hold home Prandin 1mg daily - per outpatient Endo notes stopped 2/2 to CKD  -Outpatient Endo follow up for trial of Januvia  -Low-dose SSI for the next 24 hours to evaluate insulin requirement to determine basal/bolus regimen    #HLD  -Continue Ezetimibe 10mg daily    #COPD  -Continue Advair while inpatient    #BPD/urinary retention  -Denies LUTs  -Continue finasteride 5mg daily  -Home medication Alfuzosin, continue Flomax while inpatient     DVT ppx: SCDs, Eliquis  Diet: Easy to chew DASH diet with 1.5L fluid restriction  Dispo: Home pending Cardiology f/u, resolution of decompensated CHF, Anticipated LOS 1-2 days    89 year old male with a PMHx significant for CAD s/p CABG (2005) and multiple stents, severe AS s/p TAVR with SSS s/p permanent pacemaker placed in 2017, upgraded to pacemaker/ICD in 2019 due to nonsustained VT, HFpEF s/p CardioMEMS insertion (June 2023), AF (Eliquis and Amiodarone), PVD, T2DM, dysphagia 2/2 Schatzki ring, colon CA s/p laparoscopic extended Right hemicolectomy (2021), HLD, COPD, CKD stage IIIb and BPH, who presented to [facility] ED with c/o increasing SOB/MENDEZ. Patient states that he has called CardioMEMS daily to check on status and was told that he was within his goal pulmonary artery pressure of 17-19. In the ED, noted to be volume overloaded. Started on IV lasix and admitted for management of acute on chronic CHF exacerbation. Cardiology was consulted, continued on IV diuresis with improvement in fluid status. Weaned off supplemental oxygen which was well tolerated. CardioMEMS interrogation was wnl. Noted to have elevated troponin in setting of CHF exacerbation, no acute ischemia. Troponin remained flat. Noted to have elevated sugars, to follow up outpatient with endocrinology for medication optimization. Otherwise continued on home medications. At this time, patient is medically stable for discharge with close outpatient follow up.

## 2024-11-13 NOTE — DISCHARGE NOTE PROVIDER - CARE PROVIDER_API CALL
Jaspal Burns.  Internal Medicine  [address] [place] [id number]  Phone: [phone]  Fax: [phone]  Follow Up Time: 1-3 days    Saman Prater  Cardiovascular Disease  [address] [place] [id number]  Phone: [phone]  Fax: [phone]  Follow Up Time: 1-3 days

## 2024-11-13 NOTE — DISCHARGE NOTE NURSING/CASE MANAGEMENT/SOCIAL WORK - PATIENT PORTAL LINK FT
You can access the FollowMyHealth Patient Portal offered by [facility] by registering at the following website: [email or web address]. By joining [employer]’s FollowMyHealth portal, you will also be able to view your health information using other applications (apps) compatible with our system.

## 2024-11-13 NOTE — DISCHARGE NOTE NURSING/CASE MANAGEMENT/SOCIAL WORK - NSDCPEFALRISK_GEN_ALL_CORE
For information on Fall & Injury Prevention, visit: [email or web address] OR  [email or web address] OR  https://www.cdc.gov/steadi/patient.html

## 2024-11-13 NOTE — DISCHARGE NOTE PROVIDER - CARE PROVIDERS DIRECT ADDRESSES
[email or web address]

## 2024-11-13 NOTE — DISCHARGE NOTE PROVIDER - NSDCFUSCHEDAPPT_GEN_ALL_CORE_FT
Jaspal Burns  [facility]  INTMED [address]  Scheduled Appointment: 11/20/2024    [facility]  INTMED [address]  Scheduled Appointment: 01/28/2025    Yahaira Gtz  [facility]  ENDOCRIN [address]  Scheduled Appointment: 01/29/2025

## 2024-11-15 ENCOUNTER — NON-APPOINTMENT (OUTPATIENT)
Age: 89
End: 2024-11-15

## 2024-11-20 ENCOUNTER — APPOINTMENT (OUTPATIENT)
Dept: INTERNAL MEDICINE | Facility: CLINIC | Age: 89
End: 2024-11-20

## 2024-11-26 ENCOUNTER — APPOINTMENT (OUTPATIENT)
Dept: ENDOCRINOLOGY | Facility: CLINIC | Age: 89
End: 2024-11-26
Payer: MEDICARE

## 2024-11-26 PROCEDURE — 83036 HEMOGLOBIN GLYCOSYLATED A1C: CPT

## 2024-11-26 PROCEDURE — 97163 PT EVAL HIGH COMPLEX 45 MIN: CPT

## 2024-11-26 PROCEDURE — 36415 COLL VENOUS BLD VENIPUNCTURE: CPT

## 2024-11-26 PROCEDURE — 85730 THROMBOPLASTIN TIME PARTIAL: CPT

## 2024-11-26 PROCEDURE — 94760 N-INVAS EAR/PLS OXIMETRY 1: CPT

## 2024-11-26 PROCEDURE — 82962 GLUCOSE BLOOD TEST: CPT

## 2024-11-26 PROCEDURE — 80053 COMPREHEN METABOLIC PANEL: CPT

## 2024-11-26 PROCEDURE — 99285 EMERGENCY DEPT VISIT HI MDM: CPT | Mod: 25

## 2024-11-26 PROCEDURE — 94640 AIRWAY INHALATION TREATMENT: CPT

## 2024-11-26 PROCEDURE — 83735 ASSAY OF MAGNESIUM: CPT

## 2024-11-26 PROCEDURE — 93005 ELECTROCARDIOGRAM TRACING: CPT

## 2024-11-26 PROCEDURE — 84484 ASSAY OF TROPONIN QUANT: CPT

## 2024-11-26 PROCEDURE — 97803 MED NUTRITION INDIV SUBSEQ: CPT | Mod: GA

## 2024-11-26 PROCEDURE — 85025 COMPLETE CBC W/AUTO DIFF WBC: CPT

## 2024-11-26 PROCEDURE — 80048 BASIC METABOLIC PNL TOTAL CA: CPT

## 2024-11-26 PROCEDURE — 85610 PROTHROMBIN TIME: CPT

## 2024-11-26 PROCEDURE — 96374 THER/PROPH/DIAG INJ IV PUSH: CPT

## 2024-11-26 PROCEDURE — 71045 X-RAY EXAM CHEST 1 VIEW: CPT

## 2024-11-26 PROCEDURE — 83880 ASSAY OF NATRIURETIC PEPTIDE: CPT

## 2024-11-26 RX ORDER — SITAGLIPTIN 100 MG/1
100 TABLET ORAL
Qty: 1 | Refills: 1 | Status: ACTIVE | COMMUNITY
Start: 2024-11-19 | End: 1900-01-01

## 2024-12-02 ENCOUNTER — APPOINTMENT (OUTPATIENT)
Dept: ENDOCRINOLOGY | Facility: CLINIC | Age: 88
End: 2024-12-02

## 2024-12-16 ENCOUNTER — APPOINTMENT (OUTPATIENT)
Dept: INTERNAL MEDICINE | Facility: CLINIC | Age: 88
End: 2024-12-16
Payer: MEDICARE

## 2024-12-16 VITALS
DIASTOLIC BLOOD PRESSURE: 70 MMHG | RESPIRATION RATE: 13 BRPM | HEART RATE: 70 BPM | BODY MASS INDEX: 25.54 KG/M2 | SYSTOLIC BLOOD PRESSURE: 120 MMHG | WEIGHT: 168 LBS

## 2024-12-16 DIAGNOSIS — L98.9 DISORDER OF THE SKIN AND SUBCUTANEOUS TISSUE, UNSPECIFIED: ICD-10-CM

## 2024-12-16 PROCEDURE — G2211 COMPLEX E/M VISIT ADD ON: CPT

## 2024-12-16 PROCEDURE — 99213 OFFICE O/P EST LOW 20 MIN: CPT

## 2025-01-17 ENCOUNTER — NON-APPOINTMENT (OUTPATIENT)
Age: 89
End: 2025-01-17

## 2025-01-20 NOTE — H&P PST ADULT - ENERGY EXPENDITURE (METS)
Orders for admission, patient is aware of plan and ready to go upstairs. Any questions, please call ED RN Angi at extension [phone].     Patient Covid vaccination status: Fully vaccinated     COVID Test Ordered in ED: SARS-CoV-2/Flu A and B/RSV by PCR (GeneXpert)    COVID Suspicion at Admission: N/A    Running Infusions:    sodium chloride 2,121 mL (01/19/25 1928)        Mental Status/LOC at time of transport: A+O x 2          <4

## 2025-01-27 ENCOUNTER — OFFICE (OUTPATIENT)
Facility: LOCATION | Age: OVER 89
Setting detail: OPHTHALMOLOGY
End: 2025-01-27
Payer: MEDICARE

## 2025-01-27 DIAGNOSIS — H40.1131: ICD-10-CM

## 2025-01-27 DIAGNOSIS — H02.403: ICD-10-CM

## 2025-01-27 PROCEDURE — 99213 OFFICE O/P EST LOW 20 MIN: CPT | Performed by: OPHTHALMOLOGY

## 2025-01-27 ASSESSMENT — LID POSITION - PTOSIS
OD_PTOSIS: 3+
OS_PTOSIS: 1+

## 2025-01-27 ASSESSMENT — LID EXAM ASSESSMENTS
OD_COMMENTS: PTOSIS UL COMMENTS
OD_DERMATOCHALASIS: 1+
OS_DERMATOCHALASIS: 1+
OD_BLEPHARITIS: RLL RUL T
OS_BLEPHARITIS: LLL LUL T

## 2025-01-27 ASSESSMENT — CORNEAL DYSTROPHY - POSTERIOR
OS_POSTERIORDYSTROPHY: GUTTATA
OD_POSTERIORDYSTROPHY: GUTTATA

## 2025-01-27 ASSESSMENT — SUPERFICIAL PUNCTATE KERATITIS (SPK)
OS_SPK: 2+
OD_SPK: 2+

## 2025-01-27 ASSESSMENT — TONOMETRY
OS_IOP_MMHG: 16
OD_IOP_MMHG: 16

## 2025-01-27 NOTE — DISCHARGE NOTE PROVIDER - DETAILS OF MALNUTRITION DIAGNOSIS/DIAGNOSES
Chief Complaint   Patient presents with    Ear Pain     Right      Ольга is a 4 year old female is accompanied today by her mother, presenting with RT ear pain that started today. Prior to this, she was sick last week with a URI, symptoms included of fevers and congestion.    Has tried ibuprofen. Bilateral ear tubes fell out last year. Mother reports h/o recurrent ear infections.     Denies fevers, congestion, rhinorrhea, changes in hearing, or otorrhea.     Vitals:  Pulse 89   Temp 98.4 °F (36.9 °C) (Oral)   Resp 24   Wt 20.7 kg (45 lb 9.6 oz)     ALLERGIES:  No Known Allergies    Medications: currently is not taking any medications    All allergies and medications reviewed.    Pharmacy verified:  [facility] [address]     PCP: Dr. Leigh Chicas     Patient would like communication of their results via:     Mother's Cell Phone:   Telephone Information:   Mobile [phone]     Okay to leave a message containing results? Yes    Nursing staff performed/collected the following; n/a    PPE worn by writer :yes  Did the patient wear a mask for today's visit? no     This patient has been assessed with a concern for Malnutrition and was treated during this hospitalization for the following Nutrition diagnosis/diagnoses:     -  01/26/2021: Severe protein-calorie malnutrition   This patient has been assessed with a concern for Malnutrition and was treated during this hospitalization for the following Nutrition diagnosis/diagnoses:     -  01/26/2021: Severe protein-calorie malnutrition    This patient has been assessed with a concern for Malnutrition and was treated during this hospitalization for the following Nutrition diagnosis/diagnoses:     -  01/26/2021: Severe protein-calorie malnutrition   This patient has been assessed with a concern for Malnutrition and was treated during this hospitalization for the following Nutrition diagnosis/diagnoses:     -  01/26/2021: Severe protein-calorie malnutrition    This patient has been assessed with a concern for Malnutrition and was treated during this hospitalization for the following Nutrition diagnosis/diagnoses:     -  01/26/2021: Severe protein-calorie malnutrition    This patient has been assessed with a concern for Malnutrition and was treated during this hospitalization for the following Nutrition diagnosis/diagnoses:     - 01/26/2021: Severe protein-calorie malnutrition

## 2025-01-29 ENCOUNTER — APPOINTMENT (OUTPATIENT)
Dept: ENDOCRINOLOGY | Facility: CLINIC | Age: 89
End: 2025-01-29
Payer: MEDICARE

## 2025-01-29 VITALS
DIASTOLIC BLOOD PRESSURE: 58 MMHG | OXYGEN SATURATION: 96 % | BODY MASS INDEX: 25.91 KG/M2 | RESPIRATION RATE: 16 BRPM | HEART RATE: 74 BPM | HEIGHT: 68 IN | WEIGHT: 171 LBS | SYSTOLIC BLOOD PRESSURE: 112 MMHG

## 2025-01-29 DIAGNOSIS — K59.01 SLOW TRANSIT CONSTIPATION: ICD-10-CM

## 2025-01-29 DIAGNOSIS — R06.09 OTHER FORMS OF DYSPNEA: ICD-10-CM

## 2025-01-29 DIAGNOSIS — L98.9 DISORDER OF THE SKIN AND SUBCUTANEOUS TISSUE, UNSPECIFIED: ICD-10-CM

## 2025-01-29 DIAGNOSIS — M79.18 MYALGIA, OTHER SITE: ICD-10-CM

## 2025-01-29 DIAGNOSIS — R80.9 PROTEINURIA, UNSPECIFIED: ICD-10-CM

## 2025-01-29 DIAGNOSIS — K43.2 INCISIONAL HERNIA W/OUT OBSTRUCTION OR GANGRENE: ICD-10-CM

## 2025-01-29 DIAGNOSIS — Z86.39 PERSONAL HISTORY OF OTHER ENDOCRINE, NUTRITIONAL AND METABOLIC DISEASE: ICD-10-CM

## 2025-01-29 DIAGNOSIS — Z87.448 PERSONAL HISTORY OF OTHER DISEASES OF URINARY SYSTEM: ICD-10-CM

## 2025-01-29 DIAGNOSIS — M19.90 UNSPECIFIED OSTEOARTHRITIS, UNSPECIFIED SITE: ICD-10-CM

## 2025-01-29 DIAGNOSIS — Z09 ENCOUNTER FOR FOLLOW-UP EXAMINATION AFTER COMPLETED TREATMENT FOR CONDITIONS OTHER THAN MALIGNANT NEOPLASM: ICD-10-CM

## 2025-01-29 DIAGNOSIS — M25.512 PAIN IN LEFT SHOULDER: ICD-10-CM

## 2025-01-29 DIAGNOSIS — Z87.898 PERSONAL HISTORY OF OTHER SPECIFIED CONDITIONS: ICD-10-CM

## 2025-01-29 DIAGNOSIS — Z87.19 PERSONAL HISTORY OF OTHER DISEASES OF THE DIGESTIVE SYSTEM: ICD-10-CM

## 2025-01-29 DIAGNOSIS — M77.8 OTHER ENTHESOPATHIES, NOT ELSEWHERE CLASSIFIED: ICD-10-CM

## 2025-01-29 DIAGNOSIS — Z86.79 PERSONAL HISTORY OF OTHER DISEASES OF THE CIRCULATORY SYSTEM: ICD-10-CM

## 2025-01-29 DIAGNOSIS — Z01.818 ENCOUNTER FOR OTHER PREPROCEDURAL EXAMINATION: ICD-10-CM

## 2025-01-29 DIAGNOSIS — R21 RASH AND OTHER NONSPECIFIC SKIN ERUPTION: ICD-10-CM

## 2025-01-29 DIAGNOSIS — R23.9 UNSPECIFIED SKIN CHANGES: ICD-10-CM

## 2025-01-29 DIAGNOSIS — D64.9 ANEMIA, UNSPECIFIED: ICD-10-CM

## 2025-01-29 DIAGNOSIS — Z71.89 OTHER SPECIFIED COUNSELING: ICD-10-CM

## 2025-01-29 DIAGNOSIS — Z92.29 PERSONAL HISTORY OF OTHER DRUG THERAPY: ICD-10-CM

## 2025-01-29 DIAGNOSIS — Z79.2 LONG TERM (CURRENT) USE OF ANTIBIOTICS: ICD-10-CM

## 2025-01-29 DIAGNOSIS — Z85.038 PERSONAL HISTORY OF OTHER MALIGNANT NEOPLASM OF LARGE INTESTINE: ICD-10-CM

## 2025-01-29 DIAGNOSIS — R79.89 OTHER SPECIFIED ABNORMAL FINDINGS OF BLOOD CHEMISTRY: ICD-10-CM

## 2025-01-29 DIAGNOSIS — L60.0 INGROWING NAIL: ICD-10-CM

## 2025-01-29 DIAGNOSIS — R53.83 OTHER MALAISE: ICD-10-CM

## 2025-01-29 DIAGNOSIS — M79.622 PAIN IN LEFT UPPER ARM: ICD-10-CM

## 2025-01-29 DIAGNOSIS — R53.81 OTHER MALAISE: ICD-10-CM

## 2025-01-29 DIAGNOSIS — M62.838 OTHER MUSCLE SPASM: ICD-10-CM

## 2025-01-29 DIAGNOSIS — K64.8 OTHER HEMORRHOIDS: ICD-10-CM

## 2025-01-29 PROBLEM — Z79.899 ENCOUNTER FOR LONG-TERM CURRENT USE OF MEDICATION: Status: RESOLVED | Noted: 2018-08-01 | Resolved: 2025-01-29

## 2025-01-29 PROBLEM — E11.65 TYPE 2 DIABETES MELLITUS WITH HYPERGLYCEMIA: Status: ACTIVE | Noted: 2025-01-29

## 2025-01-29 PROCEDURE — G2211 COMPLEX E/M VISIT ADD ON: CPT

## 2025-01-29 PROCEDURE — 99214 OFFICE O/P EST MOD 30 MIN: CPT

## 2025-01-29 ASSESSMENT — VISUAL ACUITY
OD_BCVA: 20/40-1
OS_BCVA: 20/40

## 2025-01-29 ASSESSMENT — REFRACTION_CURRENTRX
OD_OVR_VA: 20/
OS_OVR_VA: 20/
OD_SPHERE: +2.50
OS_SPHERE: +2.50

## 2025-01-29 ASSESSMENT — REFRACTION_AUTOREFRACTION
OS_SPHERE: UNABLE
OD_SPHERE: UNABLE

## 2025-02-04 ENCOUNTER — APPOINTMENT (OUTPATIENT)
Dept: INTERNAL MEDICINE | Facility: CLINIC | Age: 89
End: 2025-02-04
Payer: MEDICARE

## 2025-02-04 VITALS
HEART RATE: 77 BPM | SYSTOLIC BLOOD PRESSURE: 120 MMHG | DIASTOLIC BLOOD PRESSURE: 76 MMHG | WEIGHT: 168 LBS | RESPIRATION RATE: 13 BRPM | BODY MASS INDEX: 25.46 KG/M2 | OXYGEN SATURATION: 96 % | HEIGHT: 68 IN

## 2025-02-04 DIAGNOSIS — E11.65 TYPE 2 DIABETES MELLITUS WITH HYPERGLYCEMIA: ICD-10-CM

## 2025-02-04 DIAGNOSIS — I25.10 ATHEROSCLEROTIC HEART DISEASE OF NATIVE CORONARY ARTERY W/OUT ANGINA PECTORIS: ICD-10-CM

## 2025-02-04 DIAGNOSIS — I48.0 PAROXYSMAL ATRIAL FIBRILLATION: ICD-10-CM

## 2025-02-04 DIAGNOSIS — Z79.899 OTHER LONG TERM (CURRENT) DRUG THERAPY: ICD-10-CM

## 2025-02-04 DIAGNOSIS — E78.5 HYPERLIPIDEMIA, UNSPECIFIED: ICD-10-CM

## 2025-02-04 PROCEDURE — 99214 OFFICE O/P EST MOD 30 MIN: CPT

## 2025-02-04 PROCEDURE — 36415 COLL VENOUS BLD VENIPUNCTURE: CPT

## 2025-02-04 PROCEDURE — G2211 COMPLEX E/M VISIT ADD ON: CPT

## 2025-02-05 LAB
ALBUMIN SERPL ELPH-MCNC: 4.1 G/DL
ALP BLD-CCNC: 75 U/L
ALT SERPL-CCNC: 17 U/L
ANION GAP SERPL CALC-SCNC: 13 MMOL/L
AST SERPL-CCNC: 25 U/L
BASOPHILS # BLD AUTO: 0.03 K/UL
BASOPHILS NFR BLD AUTO: 0.4 %
BILIRUB SERPL-MCNC: 1.4 MG/DL
BUN SERPL-MCNC: 27 MG/DL
CALCIUM SERPL-MCNC: 9.2 MG/DL
CHLORIDE SERPL-SCNC: 100 MMOL/L
CHOLEST SERPL-MCNC: 135 MG/DL
CO2 SERPL-SCNC: 28 MMOL/L
CREAT SERPL-MCNC: 1.61 MG/DL
EGFR: 40 ML/MIN/1.73M2
EOSINOPHIL # BLD AUTO: 0.14 K/UL
EOSINOPHIL NFR BLD AUTO: 1.8 %
ESTIMATED AVERAGE GLUCOSE: 154 MG/DL
GLUCOSE SERPL-MCNC: 89 MG/DL
HBA1C MFR BLD HPLC: 7 %
HCT VFR BLD CALC: 37.3 %
HDLC SERPL-MCNC: 76 MG/DL
HGB BLD-MCNC: 12 G/DL
IMM GRANULOCYTES NFR BLD AUTO: 0.3 %
LDLC SERPL CALC-MCNC: 47 MG/DL
LYMPHOCYTES # BLD AUTO: 1.45 K/UL
LYMPHOCYTES NFR BLD AUTO: 19.2 %
MAGNESIUM SERPL-MCNC: 2.2 MG/DL
MAN DIFF?: NORMAL
MCHC RBC-ENTMCNC: 32.2 G/DL
MCHC RBC-ENTMCNC: 33.3 PG
MCV RBC AUTO: 103.6 FL
MONOCYTES # BLD AUTO: 0.9 K/UL
MONOCYTES NFR BLD AUTO: 11.9 %
NEUTROPHILS # BLD AUTO: 5.03 K/UL
NEUTROPHILS NFR BLD AUTO: 66.4 %
NONHDLC SERPL-MCNC: 60 MG/DL
PLATELET # BLD AUTO: 171 K/UL
POTASSIUM SERPL-SCNC: 4 MMOL/L
PROT SERPL-MCNC: 6.9 G/DL
RBC # BLD: 3.6 M/UL
RBC # FLD: 15.3 %
SODIUM SERPL-SCNC: 141 MMOL/L
TRIGL SERPL-MCNC: 63 MG/DL
TSH SERPL-ACNC: 3.13 UIU/ML
WBC # FLD AUTO: 7.57 K/UL

## 2025-02-06 DIAGNOSIS — R74.8 ABNORMAL LEVELS OF OTHER SERUM ENZYMES: ICD-10-CM

## 2025-02-07 NOTE — DISCHARGE NOTE NURSING/CASE MANAGEMENT/SOCIAL WORK - NSPROEXTENSIONSOFSELF_GEN_A_NUR
Daryn with [facility]'s pharmacy calling about Rx Toujeo Solostar sent in today. Instructions are to: Inject 30 Units into the skin daily. Inject 40 units    Pharmacy needs to clarify these instructions. Please advise.   
It should be 40 units    
Spoke to Tu, informed them rx is to be for 40 units. They will update script.   
none

## 2025-03-18 ENCOUNTER — OFFICE (OUTPATIENT)
Facility: LOCATION | Age: OVER 89
Setting detail: OPHTHALMOLOGY
End: 2025-03-18
Payer: MEDICARE

## 2025-03-18 DIAGNOSIS — H02.403: ICD-10-CM

## 2025-03-18 DIAGNOSIS — H40.1131: ICD-10-CM

## 2025-03-18 DIAGNOSIS — E11.3293: ICD-10-CM

## 2025-03-18 PROCEDURE — 99213 OFFICE O/P EST LOW 20 MIN: CPT | Performed by: OPHTHALMOLOGY

## 2025-03-18 ASSESSMENT — LID EXAM ASSESSMENTS
OD_DERMATOCHALASIS: 1+
OS_BLEPHARITIS: LLL LUL T
OD_BLEPHARITIS: RLL RUL T
OS_DERMATOCHALASIS: 1+
OD_COMMENTS: PTOSIS UL COMMENTS

## 2025-03-18 ASSESSMENT — LID POSITION - PTOSIS
OS_PTOSIS: 1+
OD_PTOSIS: 3+

## 2025-03-18 ASSESSMENT — CONFRONTATIONAL VISUAL FIELD TEST (CVF)
OD_FINDINGS: FULL
OS_FINDINGS: FULL

## 2025-03-18 ASSESSMENT — SUPERFICIAL PUNCTATE KERATITIS (SPK)
OD_SPK: 2+
OS_SPK: 2+

## 2025-03-18 ASSESSMENT — CORNEAL DYSTROPHY - POSTERIOR
OS_POSTERIORDYSTROPHY: GUTTATA
OD_POSTERIORDYSTROPHY: GUTTATA

## 2025-03-19 ASSESSMENT — REFRACTION_CURRENTRX
OS_SPHERE: +2.50
OD_OVR_VA: 20/
OD_SPHERE: +2.50
OS_OVR_VA: 20/

## 2025-03-19 ASSESSMENT — VISUAL ACUITY
OD_BCVA: 20/50-2
OS_BCVA: 20/50-2

## 2025-03-19 ASSESSMENT — REFRACTION_AUTOREFRACTION
OS_SPHERE: UNABLE
OD_SPHERE: UNABLE

## 2025-03-20 ENCOUNTER — NON-APPOINTMENT (OUTPATIENT)
Age: 89
End: 2025-03-20

## 2025-03-24 ENCOUNTER — RESULT REVIEW (OUTPATIENT)
Age: 89
End: 2025-03-24

## 2025-03-29 ENCOUNTER — NON-APPOINTMENT (OUTPATIENT)
Age: 89
End: 2025-03-29

## 2025-03-31 ENCOUNTER — TRANSCRIPTION ENCOUNTER (OUTPATIENT)
Age: 89
End: 2025-03-31

## 2025-04-01 ENCOUNTER — TRANSCRIPTION ENCOUNTER (OUTPATIENT)
Age: 89
End: 2025-04-01

## 2025-04-01 ENCOUNTER — APPOINTMENT (OUTPATIENT)
Dept: CARE COORDINATION | Facility: HOME HEALTH | Age: 89
End: 2025-04-01
Payer: MEDICARE

## 2025-04-01 DIAGNOSIS — I43 ORGAN-LIMITED AMYLOIDOSIS: ICD-10-CM

## 2025-04-01 DIAGNOSIS — I48.0 PAROXYSMAL ATRIAL FIBRILLATION: ICD-10-CM

## 2025-04-01 DIAGNOSIS — I50.30 UNSPECIFIED DIASTOLIC (CONGESTIVE) HEART FAILURE: ICD-10-CM

## 2025-04-01 DIAGNOSIS — E85.4 ORGAN-LIMITED AMYLOIDOSIS: ICD-10-CM

## 2025-04-01 DIAGNOSIS — Z95.0 PRESENCE OF CARDIAC PACEMAKER: ICD-10-CM

## 2025-04-01 DIAGNOSIS — E11.22 TYPE 2 DIABETES MELLITUS WITH DIABETIC CHRONIC KIDNEY DISEASE: ICD-10-CM

## 2025-04-01 DIAGNOSIS — N18.30 TYPE 2 DIABETES MELLITUS WITH DIABETIC CHRONIC KIDNEY DISEASE: ICD-10-CM

## 2025-04-01 DIAGNOSIS — H00.015 HORDEOLUM EXTERNUM LEFT LOWER EYELID: ICD-10-CM

## 2025-04-01 PROCEDURE — 99495 TRANSJ CARE MGMT MOD F2F 14D: CPT

## 2025-04-02 ENCOUNTER — NON-APPOINTMENT (OUTPATIENT)
Age: 89
End: 2025-04-02

## 2025-04-02 VITALS
WEIGHT: 156.25 LBS | SYSTOLIC BLOOD PRESSURE: 122 MMHG | HEART RATE: 85 BPM | RESPIRATION RATE: 14 BRPM | BODY MASS INDEX: 23.76 KG/M2 | DIASTOLIC BLOOD PRESSURE: 58 MMHG | OXYGEN SATURATION: 91 %

## 2025-04-02 PROBLEM — H00.015 HORDEOLUM EXTERNUM OF LEFT LOWER EYELID: Status: ACTIVE | Noted: 2025-04-02

## 2025-04-02 PROBLEM — E85.4 CARDIAC AMYLOIDOSIS: Status: ACTIVE | Noted: 2025-04-02

## 2025-04-02 PROBLEM — I50.30 (HFPEF) HEART FAILURE WITH PRESERVED EJECTION FRACTION: Status: ACTIVE | Noted: 2025-04-02

## 2025-04-02 PROBLEM — E11.22 CONTROLLED TYPE 2 DIABETES MELLITUS WITH STAGE 3 CHRONIC KIDNEY DISEASE, WITHOUT LONG-TERM CURRENT USE OF INSULIN: Status: ACTIVE | Noted: 2025-04-02

## 2025-04-02 RX ORDER — LORATADINE 5 MG
17 TABLET,CHEWABLE ORAL DAILY
Qty: 2 | Refills: 0 | Status: ACTIVE | COMMUNITY

## 2025-04-02 RX ORDER — BUDESONIDE AND FORMOTEROL FUMARATE DIHYDRATE 80; 4.5 UG/1; UG/1
80-4.5 AEROSOL RESPIRATORY (INHALATION)
Refills: 0 | Status: ACTIVE | COMMUNITY

## 2025-04-02 RX ORDER — BRIMONIDINE TARTRATE 2 MG/MG
0.2 SOLUTION/ DROPS OPHTHALMIC EVERY 8 HOURS
Refills: 0 | Status: ACTIVE | COMMUNITY

## 2025-04-02 RX ORDER — REPAGLINIDE 2 MG/1
2 TABLET ORAL 3 TIMES DAILY
Refills: 0 | Status: ACTIVE | COMMUNITY

## 2025-04-02 RX ORDER — ERYTHROMYCIN 5 MG/G
5 OINTMENT OPHTHALMIC
Refills: 0 | Status: ACTIVE | COMMUNITY

## 2025-04-02 RX ORDER — SENNOSIDES 8.6 MG
8.6 TABLET ORAL
Refills: 0 | Status: ACTIVE | COMMUNITY

## 2025-04-02 RX ORDER — TORSEMIDE 20 MG/1
20 TABLET ORAL DAILY
Refills: 0 | Status: ACTIVE | COMMUNITY

## 2025-04-10 ENCOUNTER — TRANSCRIPTION ENCOUNTER (OUTPATIENT)
Age: 89
End: 2025-04-10

## 2025-04-17 ENCOUNTER — TRANSCRIPTION ENCOUNTER (OUTPATIENT)
Age: 89
End: 2025-04-17

## 2025-05-02 ENCOUNTER — NON-APPOINTMENT (OUTPATIENT)
Age: 89
End: 2025-05-02

## 2025-05-07 ENCOUNTER — APPOINTMENT (OUTPATIENT)
Dept: INTERNAL MEDICINE | Facility: CLINIC | Age: 89
End: 2025-05-07
Payer: MEDICARE

## 2025-05-07 VITALS
OXYGEN SATURATION: 94 % | RESPIRATION RATE: 15 BRPM | HEART RATE: 72 BPM | WEIGHT: 166.4 LBS | DIASTOLIC BLOOD PRESSURE: 76 MMHG | BODY MASS INDEX: 25.3 KG/M2 | SYSTOLIC BLOOD PRESSURE: 115 MMHG

## 2025-05-07 DIAGNOSIS — R09.02 HYPOXEMIA: ICD-10-CM

## 2025-05-07 DIAGNOSIS — Z79.899 OTHER LONG TERM (CURRENT) DRUG THERAPY: ICD-10-CM

## 2025-05-07 DIAGNOSIS — Z09 ENCOUNTER FOR FOLLOW-UP EXAMINATION AFTER COMPLETED TREATMENT FOR CONDITIONS OTHER THAN MALIGNANT NEOPLASM: ICD-10-CM

## 2025-05-07 DIAGNOSIS — I25.10 ATHEROSCLEROTIC HEART DISEASE OF NATIVE CORONARY ARTERY W/OUT ANGINA PECTORIS: ICD-10-CM

## 2025-05-07 DIAGNOSIS — Z98.61 ATHEROSCLEROTIC HEART DISEASE OF NATIVE CORONARY ARTERY W/OUT ANGINA PECTORIS: ICD-10-CM

## 2025-05-07 PROCEDURE — 36415 COLL VENOUS BLD VENIPUNCTURE: CPT

## 2025-05-07 PROCEDURE — 99214 OFFICE O/P EST MOD 30 MIN: CPT

## 2025-05-07 RX ORDER — DOCUSATE SODIUM 100 MG/1
100 TABLET ORAL
Refills: 0 | Status: ACTIVE | COMMUNITY
Start: 2025-05-07

## 2025-05-08 LAB
ALBUMIN SERPL ELPH-MCNC: 4.1 G/DL
ALP BLD-CCNC: 75 U/L
ALT SERPL-CCNC: 21 U/L
ANION GAP SERPL CALC-SCNC: 14 MMOL/L
AST SERPL-CCNC: 27 U/L
BASOPHILS # BLD AUTO: 0.03 K/UL
BASOPHILS NFR BLD AUTO: 0.4 %
BILIRUB SERPL-MCNC: 1 MG/DL
BUN SERPL-MCNC: 39 MG/DL
CALCIUM SERPL-MCNC: 9.2 MG/DL
CHLORIDE SERPL-SCNC: 98 MMOL/L
CHOLEST SERPL-MCNC: 137 MG/DL
CO2 SERPL-SCNC: 30 MMOL/L
CREAT SERPL-MCNC: 1.62 MG/DL
EGFRCR SERPLBLD CKD-EPI 2021: 40 ML/MIN/1.73M2
EOSINOPHIL # BLD AUTO: 0.18 K/UL
EOSINOPHIL NFR BLD AUTO: 2.7 %
ESTIMATED AVERAGE GLUCOSE: 169 MG/DL
GLUCOSE SERPL-MCNC: 119 MG/DL
HBA1C MFR BLD HPLC: 7.5 %
HCT VFR BLD CALC: 34.5 %
HDLC SERPL-MCNC: 72 MG/DL
HGB BLD-MCNC: 11.5 G/DL
IMM GRANULOCYTES NFR BLD AUTO: 0.3 %
LDLC SERPL-MCNC: 53 MG/DL
LYMPHOCYTES # BLD AUTO: 1.32 K/UL
LYMPHOCYTES NFR BLD AUTO: 19.5 %
MAGNESIUM SERPL-MCNC: 2.4 MG/DL
MAN DIFF?: NORMAL
MCHC RBC-ENTMCNC: 33.3 G/DL
MCHC RBC-ENTMCNC: 33.3 PG
MCV RBC AUTO: 100 FL
MONOCYTES # BLD AUTO: 0.81 K/UL
MONOCYTES NFR BLD AUTO: 11.9 %
NEUTROPHILS # BLD AUTO: 4.42 K/UL
NEUTROPHILS NFR BLD AUTO: 65.2 %
NONHDLC SERPL-MCNC: 65 MG/DL
PLATELET # BLD AUTO: 155 K/UL
POTASSIUM SERPL-SCNC: 4.1 MMOL/L
PROT SERPL-MCNC: 6.4 G/DL
RBC # BLD: 3.45 M/UL
RBC # FLD: 15.6 %
SODIUM SERPL-SCNC: 142 MMOL/L
TRIGL SERPL-MCNC: 59 MG/DL
TSH SERPL-ACNC: 4.33 UIU/ML
WBC # FLD AUTO: 6.78 K/UL

## 2025-05-13 ENCOUNTER — APPOINTMENT (OUTPATIENT)
Dept: ENDOCRINOLOGY | Facility: CLINIC | Age: 89
End: 2025-05-13
Payer: MEDICARE

## 2025-05-13 VITALS
BODY MASS INDEX: 25.01 KG/M2 | WEIGHT: 165 LBS | TEMPERATURE: 98 F | HEIGHT: 68 IN | HEART RATE: 70 BPM | DIASTOLIC BLOOD PRESSURE: 60 MMHG | SYSTOLIC BLOOD PRESSURE: 110 MMHG | OXYGEN SATURATION: 96 % | RESPIRATION RATE: 16 BRPM

## 2025-05-13 DIAGNOSIS — E11.65 TYPE 2 DIABETES MELLITUS WITH HYPERGLYCEMIA: ICD-10-CM

## 2025-05-13 DIAGNOSIS — Z79.84 LONG TERM (CURRENT) USE OF ORAL HYPOGLYCEMIC DRUGS: ICD-10-CM

## 2025-05-13 DIAGNOSIS — E78.5 HYPERLIPIDEMIA, UNSPECIFIED: ICD-10-CM

## 2025-05-13 DIAGNOSIS — E55.9 VITAMIN D DEFICIENCY, UNSPECIFIED: ICD-10-CM

## 2025-05-13 DIAGNOSIS — R94.6 ABNORMAL RESULTS OF THYROID FUNCTION STUDIES: ICD-10-CM

## 2025-05-13 PROCEDURE — 99214 OFFICE O/P EST MOD 30 MIN: CPT

## 2025-05-13 PROCEDURE — G2211 COMPLEX E/M VISIT ADD ON: CPT

## 2025-05-23 RX ORDER — REPAGLINIDE 1 MG/1
1 TABLET ORAL 3 TIMES DAILY
Qty: 270 | Refills: 1 | Status: ACTIVE | COMMUNITY
Start: 2025-05-22 | End: 1900-01-01

## 2025-06-02 ENCOUNTER — NON-APPOINTMENT (OUTPATIENT)
Age: 89
End: 2025-06-02

## 2025-06-18 ENCOUNTER — NON-APPOINTMENT (OUTPATIENT)
Age: 89
End: 2025-06-18

## 2025-06-30 NOTE — DISCHARGE NOTE PROVIDER - NSDCACTIVITY_GEN_ALL_CORE
Walking - Indoors allowed/Walking - Outdoors allowed/Showering allowed/No heavy lifting/straining/Do not drive or operate machinery
IV and/or equipment tethered to patient/Weakness

## 2025-07-16 NOTE — ED ADULT NURSE NOTE - EXTENSIONS OF SELF_ADULT
Orders Placed This Encounter   Medications    Insulin Pen Needle 32G X 4 MM MISC     Si each by Does not apply route in the morning, at noon, in the evening, and at bedtime     Dispense:  200 each     Refill:  2        None

## 2025-07-17 NOTE — ASU PATIENT PROFILE, ADULT - ABILITY TO HEAR (WITH HEARING AID OR HEARING APPLIANCE IF NORMALLY USED):
Adequate: hears normal conversation without difficulty Detail Level: Detailed Depth Of Biopsy: dermis Was A Bandage Applied: Yes Size Of Lesion In Cm: 0 Biopsy Type: H and E Biopsy Method: Dermablade Anesthesia Type: 1% lidocaine with epinephrine Anesthesia Volume In Cc: 0.5 Hemostasis: Drysol Wound Care: Petrolatum Dressing: bandage Destruction After The Procedure: No Type Of Destruction Used: Curettage Curettage Text: The wound bed was treated with curettage after the biopsy was performed. Cryotherapy Text: The wound bed was treated with cryotherapy after the biopsy was performed. Electrodesiccation Text: The wound bed was treated with electrodesiccation after the biopsy was performed. Electrodesiccation And Curettage Text: The wound bed was treated with electrodesiccation and curettage after the biopsy was performed. Silver Nitrate Text: The wound bed was treated with silver nitrate after the biopsy was performed. Lab: [id number] Lab Facility: [id number] Medical Necessity Information: It is in your best interest to select a reason for this procedure from the list below. All of these items fulfill various CMS LCD requirements except the new and changing color options. Consent: Written consent was obtained and risks were reviewed including but not limited to scarring, infection, bleeding, scabbing, incomplete removal, nerve damage and allergy to anesthesia. Post-Care Instructions: I reviewed with the patient in detail post-care instructions. Patient is to keep the biopsy site dry overnight, and then apply bacitracin twice daily until healed. Patient may apply hydrogen peroxide soaks to remove any crusting. Notification Instructions: Patient will be notified of biopsy results. However, patient instructed to call the office if not contacted within 2 weeks. Billing Type: Third-Party Bill Information: Selecting Yes will display possible errors in your note based on the variables you have selected. This validation is only offered as a suggestion for you. PLEASE NOTE THAT THE VALIDATION TEXT WILL BE REMOVED WHEN YOU FINALIZE YOUR NOTE. IF YOU WANT TO FAX A PRELIMINARY NOTE YOU WILL NEED TO TOGGLE THIS TO 'NO' IF YOU DO NOT WANT IT IN YOUR FAXED NOTE.

## 2025-07-17 NOTE — H&P PST ADULT - NSICDXPASTMEDICALHX_GEN_ALL_CORE_FT
Writer attempted to call patient regarding message below. Writer was not able to leave voicemail. Will attempt to call patient again later.     PAST MEDICAL HISTORY:  Abdominal hernia     Abnormal positron emission tomography (PET) scan 2019 anterior, anteroseptal,anterolateral and iferolateral ischemia    Afib     Age-related incipient cataract of both eyes     Angina pectoris class II    Aortic stenosis TAVR #29 [employer] core valve 2017    Asthma     BPH (benign prostatic hyperplasia)     CAD (coronary artery disease) S/P Stenting x 12 last stent 3/2019    CAD (coronary artery disease) CABG LIMA LAD SVG OM1, PCI PDA LM/LCX with PTCA    CAD (coronary artery disease) PPM, PCI LM and LCX, CABG LIMA OAD and OM1    Cancer, colon     Carotid artery disease RCEA    Cataract     Colon cancer     COPD, 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    Diabetes mellitus     MENDEZ (dyspnea on exertion)     DVT (deep venous thrombosis) left leg    Fatigue Profound w/exertion    Former smoker     Former smoker     GI bleed     H/O urinary frequency     Hematuria     History of BPH     Hyperlipidemia     Hypertension     MR (mitral regurgitation) moderate      FANNY (obstructive sleep apnea) uses mouth peice    Osteoarthritis     PVD (peripheral vascular disease)     PVD (peripheral vascular disease) intervention RLE    PVD (peripheral vascular disease) R SFA and L SFA w/stents and PTA    Schatzki's ring     SOB (shortness of breath)     Spinal stenosis     Statin intolerance     Thrombus " i have a small clot in the linning of my heart" as per pt

## 2025-07-24 ENCOUNTER — APPOINTMENT (OUTPATIENT)
Dept: INTERNAL MEDICINE | Facility: CLINIC | Age: 89
End: 2025-07-24
Payer: MEDICARE

## 2025-07-24 VITALS
WEIGHT: 157 LBS | DIASTOLIC BLOOD PRESSURE: 70 MMHG | SYSTOLIC BLOOD PRESSURE: 120 MMHG | HEART RATE: 80 BPM | RESPIRATION RATE: 16 BRPM | HEIGHT: 68 IN | OXYGEN SATURATION: 97 % | BODY MASS INDEX: 23.79 KG/M2

## 2025-07-24 DIAGNOSIS — R79.89 OTHER SPECIFIED ABNORMAL FINDINGS OF BLOOD CHEMISTRY: ICD-10-CM

## 2025-07-24 DIAGNOSIS — Z79.899 OTHER LONG TERM (CURRENT) DRUG THERAPY: ICD-10-CM

## 2025-07-24 DIAGNOSIS — E11.65 TYPE 2 DIABETES MELLITUS WITH HYPERGLYCEMIA: ICD-10-CM

## 2025-07-24 DIAGNOSIS — I25.10 ATHEROSCLEROTIC HEART DISEASE OF NATIVE CORONARY ARTERY W/OUT ANGINA PECTORIS: ICD-10-CM

## 2025-07-24 DIAGNOSIS — E78.5 HYPERLIPIDEMIA, UNSPECIFIED: ICD-10-CM

## 2025-07-24 PROCEDURE — G2211 COMPLEX E/M VISIT ADD ON: CPT

## 2025-07-24 PROCEDURE — 36415 COLL VENOUS BLD VENIPUNCTURE: CPT

## 2025-07-24 PROCEDURE — 99214 OFFICE O/P EST MOD 30 MIN: CPT

## 2025-07-25 LAB
ALBUMIN SERPL ELPH-MCNC: 4.1 G/DL
ALP BLD-CCNC: 91 U/L
ALT SERPL-CCNC: 21 U/L
ANION GAP SERPL CALC-SCNC: 14 MMOL/L
AST SERPL-CCNC: 26 U/L
BASOPHILS # BLD AUTO: 0.02 K/UL
BASOPHILS NFR BLD AUTO: 0.3 %
BILIRUB SERPL-MCNC: 1 MG/DL
BUN SERPL-MCNC: 34 MG/DL
CALCIUM SERPL-MCNC: 9.3 MG/DL
CHLORIDE SERPL-SCNC: 98 MMOL/L
CHOLEST SERPL-MCNC: 145 MG/DL
CO2 SERPL-SCNC: 33 MMOL/L
CREAT SERPL-MCNC: 1.72 MG/DL
EGFRCR SERPLBLD CKD-EPI 2021: 37 ML/MIN/1.73M2
EOSINOPHIL # BLD AUTO: 0.18 K/UL
EOSINOPHIL NFR BLD AUTO: 2.3 %
ESTIMATED AVERAGE GLUCOSE: 154 MG/DL
GLUCOSE SERPL-MCNC: 123 MG/DL
HBA1C MFR BLD HPLC: 7 %
HCT VFR BLD CALC: 36 %
HDLC SERPL-MCNC: 75 MG/DL
HGB BLD-MCNC: 11.4 G/DL
IMM GRANULOCYTES NFR BLD AUTO: 0.3 %
LDLC SERPL-MCNC: 57 MG/DL
LYMPHOCYTES # BLD AUTO: 1.35 K/UL
LYMPHOCYTES NFR BLD AUTO: 17.1 %
MAGNESIUM SERPL-MCNC: 2.5 MG/DL
MAN DIFF?: NORMAL
MCHC RBC-ENTMCNC: 31.7 G/DL
MCHC RBC-ENTMCNC: 32.8 PG
MCV RBC AUTO: 103.4 FL
MONOCYTES # BLD AUTO: 0.82 K/UL
MONOCYTES NFR BLD AUTO: 10.4 %
NEUTROPHILS # BLD AUTO: 5.5 K/UL
NEUTROPHILS NFR BLD AUTO: 69.6 %
NONHDLC SERPL-MCNC: 70 MG/DL
PLATELET # BLD AUTO: 170 K/UL
POTASSIUM SERPL-SCNC: 4.4 MMOL/L
PROT SERPL-MCNC: 6.7 G/DL
RBC # BLD: 3.48 M/UL
RBC # FLD: 14.9 %
SODIUM SERPL-SCNC: 145 MMOL/L
T4 FREE SERPL-MCNC: 1.6 NG/DL
THYROGLOB AB SERPL-ACNC: 17.6 IU/ML
THYROPEROXIDASE AB SERPL IA-ACNC: <9 IU/ML
TRIGL SERPL-MCNC: 62 MG/DL
TSH SERPL-ACNC: 2.46 UIU/ML
WBC # FLD AUTO: 7.89 K/UL

## 2025-08-11 ENCOUNTER — NON-APPOINTMENT (OUTPATIENT)
Age: 89
End: 2025-08-11

## 2025-08-11 RX ORDER — EMPAGLIFLOZIN 10 MG/1
10 TABLET, FILM COATED ORAL
Refills: 0 | Status: ACTIVE | COMMUNITY
Start: 2025-08-11

## 2025-08-12 ENCOUNTER — NON-APPOINTMENT (OUTPATIENT)
Age: 89
End: 2025-08-12

## 2025-08-25 ENCOUNTER — NON-APPOINTMENT (OUTPATIENT)
Age: 89
End: 2025-08-25

## 2025-08-29 ENCOUNTER — APPOINTMENT (OUTPATIENT)
Age: 89
End: 2025-08-29

## 2025-08-29 VITALS
HEIGHT: 68 IN | HEART RATE: 84 BPM | SYSTOLIC BLOOD PRESSURE: 110 MMHG | DIASTOLIC BLOOD PRESSURE: 62 MMHG | BODY MASS INDEX: 24.71 KG/M2 | WEIGHT: 163 LBS | OXYGEN SATURATION: 94 %

## 2025-08-29 DIAGNOSIS — I47.29 OTHER VENTRICULAR TACHYCARDIA: ICD-10-CM

## 2025-08-29 DIAGNOSIS — E85.4 ORGAN-LIMITED AMYLOIDOSIS: ICD-10-CM

## 2025-08-29 DIAGNOSIS — Z45.010 ENCOUNTER FOR CHECKING AND TESTING OF CARDIAC PACEMAKER PULSE GENERATOR [BATTERY]: ICD-10-CM

## 2025-08-29 DIAGNOSIS — I50.30 UNSPECIFIED DIASTOLIC (CONGESTIVE) HEART FAILURE: ICD-10-CM

## 2025-08-29 DIAGNOSIS — Z45.02 ENCOUNTER FOR ADJUSTMENT AND MANAGEMENT OF AUTOMATIC IMPLANTABLE CARDIAC DEFIBRILLATOR: ICD-10-CM

## 2025-08-29 DIAGNOSIS — I48.0 PAROXYSMAL ATRIAL FIBRILLATION: ICD-10-CM

## 2025-08-29 DIAGNOSIS — I43 ORGAN-LIMITED AMYLOIDOSIS: ICD-10-CM

## 2025-08-29 DIAGNOSIS — Z95.2 PRESENCE OF PROSTHETIC HEART VALVE: ICD-10-CM

## 2025-08-29 PROCEDURE — 93289 INTERROG DEVICE EVAL HEART: CPT

## 2025-08-29 PROCEDURE — 99214 OFFICE O/P EST MOD 30 MIN: CPT | Mod: 25

## 2025-08-29 PROCEDURE — 93000 ELECTROCARDIOGRAM COMPLETE: CPT | Mod: 59

## 2025-08-30 PROBLEM — I47.29 NSVT (NONSUSTAINED VENTRICULAR TACHYCARDIA): Status: ACTIVE | Noted: 2018-04-10

## 2025-09-03 ENCOUNTER — NON-APPOINTMENT (OUTPATIENT)
Age: 89
End: 2025-09-03

## 2025-09-10 ENCOUNTER — NON-APPOINTMENT (OUTPATIENT)
Age: 89
End: 2025-09-10

## 2025-09-16 ENCOUNTER — NON-APPOINTMENT (OUTPATIENT)
Age: 89
End: 2025-09-16

## 2025-09-19 ENCOUNTER — NON-APPOINTMENT (OUTPATIENT)
Age: 89
End: 2025-09-19